# Patient Record
Sex: MALE | Race: WHITE | Employment: UNEMPLOYED | URBAN - METROPOLITAN AREA
[De-identification: names, ages, dates, MRNs, and addresses within clinical notes are randomized per-mention and may not be internally consistent; named-entity substitution may affect disease eponyms.]

---

## 2017-01-01 ENCOUNTER — NURSE TRIAGE (OUTPATIENT)
Dept: NURSING | Facility: CLINIC | Age: 57
End: 2017-01-01

## 2017-01-01 ENCOUNTER — TELEPHONE (OUTPATIENT)
Dept: INTERNAL MEDICINE | Facility: CLINIC | Age: 57
End: 2017-01-01

## 2017-01-01 ENCOUNTER — APPOINTMENT (OUTPATIENT)
Dept: SPEECH THERAPY | Facility: CLINIC | Age: 57
DRG: 101 | End: 2017-01-01
Attending: PSYCHIATRY & NEUROLOGY
Payer: MEDICARE

## 2017-01-01 ENCOUNTER — TRANSFERRED RECORDS (OUTPATIENT)
Dept: HEALTH INFORMATION MANAGEMENT | Facility: CLINIC | Age: 57
End: 2017-01-01

## 2017-01-01 ENCOUNTER — APPOINTMENT (OUTPATIENT)
Dept: GENERAL RADIOLOGY | Facility: CLINIC | Age: 57
DRG: 194 | End: 2017-01-01
Attending: EMERGENCY MEDICINE
Payer: MEDICARE

## 2017-01-01 ENCOUNTER — MEDICAL CORRESPONDENCE (OUTPATIENT)
Dept: HEALTH INFORMATION MANAGEMENT | Facility: CLINIC | Age: 57
End: 2017-01-01

## 2017-01-01 ENCOUNTER — OFFICE VISIT (OUTPATIENT)
Dept: INTERNAL MEDICINE | Facility: CLINIC | Age: 57
End: 2017-01-01
Payer: MEDICARE

## 2017-01-01 ENCOUNTER — TELEPHONE (OUTPATIENT)
Dept: ENDOCRINOLOGY | Facility: CLINIC | Age: 57
End: 2017-01-01

## 2017-01-01 ENCOUNTER — CARE COORDINATION (OUTPATIENT)
Dept: CARE COORDINATION | Facility: CLINIC | Age: 57
End: 2017-01-01

## 2017-01-01 ENCOUNTER — APPOINTMENT (OUTPATIENT)
Dept: GENERAL RADIOLOGY | Facility: CLINIC | Age: 57
End: 2017-01-01
Attending: EMERGENCY MEDICINE
Payer: MEDICARE

## 2017-01-01 ENCOUNTER — ALLIED HEALTH/NURSE VISIT (OUTPATIENT)
Dept: NEUROLOGY | Facility: CLINIC | Age: 57
DRG: 101 | End: 2017-01-01
Attending: PSYCHIATRY & NEUROLOGY
Payer: MEDICARE

## 2017-01-01 ENCOUNTER — DOCUMENTATION ONLY (OUTPATIENT)
Dept: OTHER | Facility: CLINIC | Age: 57
End: 2017-01-01

## 2017-01-01 ENCOUNTER — HOSPITAL ENCOUNTER (INPATIENT)
Facility: CLINIC | Age: 57
LOS: 3 days | Discharge: NURSING, REHAB OR LONG-TERM CARE | DRG: 194 | End: 2017-04-29
Attending: EMERGENCY MEDICINE | Admitting: INTERNAL MEDICINE
Payer: MEDICARE

## 2017-01-01 ENCOUNTER — DOCUMENTATION ONLY (OUTPATIENT)
Dept: CARE COORDINATION | Facility: CLINIC | Age: 57
End: 2017-01-01

## 2017-01-01 ENCOUNTER — HOSPITAL ENCOUNTER (OUTPATIENT)
Facility: CLINIC | Age: 57
Setting detail: OBSERVATION
Discharge: HOME HEALTH | End: 2017-12-17
Attending: EMERGENCY MEDICINE | Admitting: INTERNAL MEDICINE
Payer: MEDICARE

## 2017-01-01 ENCOUNTER — HOSPITAL ENCOUNTER (INPATIENT)
Facility: CLINIC | Age: 57
LOS: 1 days | Discharge: NURSING, REHAB OR LONG-TERM CARE | DRG: 101 | End: 2017-11-07
Attending: EMERGENCY MEDICINE | Admitting: PSYCHIATRY & NEUROLOGY
Payer: MEDICARE

## 2017-01-01 ENCOUNTER — APPOINTMENT (OUTPATIENT)
Dept: CT IMAGING | Facility: CLINIC | Age: 57
End: 2017-01-01
Attending: EMERGENCY MEDICINE
Payer: MEDICARE

## 2017-01-01 ENCOUNTER — APPOINTMENT (OUTPATIENT)
Dept: CT IMAGING | Facility: CLINIC | Age: 57
DRG: 194 | End: 2017-01-01
Attending: EMERGENCY MEDICINE
Payer: MEDICARE

## 2017-01-01 ENCOUNTER — APPOINTMENT (OUTPATIENT)
Dept: CT IMAGING | Facility: CLINIC | Age: 57
DRG: 101 | End: 2017-01-01
Attending: EMERGENCY MEDICINE
Payer: MEDICARE

## 2017-01-01 ENCOUNTER — APPOINTMENT (OUTPATIENT)
Dept: GENERAL RADIOLOGY | Facility: CLINIC | Age: 57
DRG: 101 | End: 2017-01-01
Attending: PSYCHIATRY & NEUROLOGY
Payer: MEDICARE

## 2017-01-01 VITALS
BODY MASS INDEX: 34.85 KG/M2 | WEIGHT: 229.94 LBS | RESPIRATION RATE: 18 BRPM | OXYGEN SATURATION: 93 % | DIASTOLIC BLOOD PRESSURE: 63 MMHG | TEMPERATURE: 97.6 F | SYSTOLIC BLOOD PRESSURE: 110 MMHG | HEIGHT: 68 IN | HEART RATE: 82 BPM

## 2017-01-01 VITALS
HEART RATE: 120 BPM | WEIGHT: 165 LBS | DIASTOLIC BLOOD PRESSURE: 74 MMHG | OXYGEN SATURATION: 95 % | HEIGHT: 67 IN | BODY MASS INDEX: 25.9 KG/M2 | SYSTOLIC BLOOD PRESSURE: 108 MMHG | TEMPERATURE: 97.9 F

## 2017-01-01 VITALS
TEMPERATURE: 98 F | HEIGHT: 67 IN | HEART RATE: 80 BPM | DIASTOLIC BLOOD PRESSURE: 62 MMHG | RESPIRATION RATE: 16 BRPM | SYSTOLIC BLOOD PRESSURE: 117 MMHG | WEIGHT: 165 LBS | OXYGEN SATURATION: 98 % | BODY MASS INDEX: 25.9 KG/M2

## 2017-01-01 VITALS — DIASTOLIC BLOOD PRESSURE: 76 MMHG | TEMPERATURE: 97 F | HEART RATE: 71 BPM | SYSTOLIC BLOOD PRESSURE: 120 MMHG

## 2017-01-01 VITALS
DIASTOLIC BLOOD PRESSURE: 70 MMHG | HEIGHT: 67 IN | SYSTOLIC BLOOD PRESSURE: 124 MMHG | WEIGHT: 220 LBS | OXYGEN SATURATION: 97 % | HEART RATE: 70 BPM | TEMPERATURE: 97.9 F | BODY MASS INDEX: 34.53 KG/M2

## 2017-01-01 VITALS
OXYGEN SATURATION: 96 % | DIASTOLIC BLOOD PRESSURE: 66 MMHG | TEMPERATURE: 98.3 F | SYSTOLIC BLOOD PRESSURE: 114 MMHG | HEART RATE: 71 BPM

## 2017-01-01 VITALS
SYSTOLIC BLOOD PRESSURE: 153 MMHG | RESPIRATION RATE: 16 BRPM | TEMPERATURE: 97.2 F | DIASTOLIC BLOOD PRESSURE: 90 MMHG | OXYGEN SATURATION: 93 %

## 2017-01-01 VITALS
SYSTOLIC BLOOD PRESSURE: 136 MMHG | HEIGHT: 68 IN | DIASTOLIC BLOOD PRESSURE: 80 MMHG | HEART RATE: 82 BPM | RESPIRATION RATE: 20 BRPM | TEMPERATURE: 97.5 F | WEIGHT: 215.6 LBS | OXYGEN SATURATION: 96 % | BODY MASS INDEX: 32.67 KG/M2

## 2017-01-01 VITALS
OXYGEN SATURATION: 97 % | HEART RATE: 72 BPM | WEIGHT: 229 LBS | DIASTOLIC BLOOD PRESSURE: 68 MMHG | TEMPERATURE: 98.7 F | BODY MASS INDEX: 34.71 KG/M2 | HEIGHT: 68 IN | SYSTOLIC BLOOD PRESSURE: 104 MMHG

## 2017-01-01 VITALS
HEART RATE: 78 BPM | BODY MASS INDEX: 34.53 KG/M2 | DIASTOLIC BLOOD PRESSURE: 72 MMHG | WEIGHT: 220 LBS | TEMPERATURE: 98.6 F | HEIGHT: 67 IN | OXYGEN SATURATION: 96 % | SYSTOLIC BLOOD PRESSURE: 130 MMHG

## 2017-01-01 VITALS — TEMPERATURE: 98.8 F | SYSTOLIC BLOOD PRESSURE: 128 MMHG | HEART RATE: 86 BPM | DIASTOLIC BLOOD PRESSURE: 84 MMHG

## 2017-01-01 VITALS
BODY MASS INDEX: 35.31 KG/M2 | HEART RATE: 78 BPM | HEIGHT: 67 IN | SYSTOLIC BLOOD PRESSURE: 116 MMHG | OXYGEN SATURATION: 95 % | TEMPERATURE: 98.4 F | DIASTOLIC BLOOD PRESSURE: 60 MMHG | WEIGHT: 225 LBS

## 2017-01-01 DIAGNOSIS — R60.0 BILATERAL EDEMA OF LOWER EXTREMITY: ICD-10-CM

## 2017-01-01 DIAGNOSIS — G81.04 FLACCID HEMIPLEGIA OF LEFT NONDOMINANT SIDE DUE TO INFARCTION OF BRAIN (H): ICD-10-CM

## 2017-01-01 DIAGNOSIS — G81.94 HEMIPLEGIA AFFECTING LEFT NONDOMINANT SIDE (H): ICD-10-CM

## 2017-01-01 DIAGNOSIS — K59.1 FUNCTIONAL DIARRHEA: ICD-10-CM

## 2017-01-01 DIAGNOSIS — A04.72 COLITIS DUE TO CLOSTRIDIUM DIFFICILE: ICD-10-CM

## 2017-01-01 DIAGNOSIS — I63.9 FLACCID HEMIPLEGIA OF LEFT NONDOMINANT SIDE DUE TO INFARCTION OF BRAIN (H): ICD-10-CM

## 2017-01-01 DIAGNOSIS — M54.5 LOW BACK PAIN, UNSPECIFIED BACK PAIN LATERALITY, UNSPECIFIED CHRONICITY, WITH SCIATICA PRESENCE UNSPECIFIED: ICD-10-CM

## 2017-01-01 DIAGNOSIS — F32.0 MILD MAJOR DEPRESSION (H): ICD-10-CM

## 2017-01-01 DIAGNOSIS — Z86.73 HISTORY OF CVA (CEREBROVASCULAR ACCIDENT): ICD-10-CM

## 2017-01-01 DIAGNOSIS — I10 ESSENTIAL HYPERTENSION, BENIGN: ICD-10-CM

## 2017-01-01 DIAGNOSIS — Z53.9 DIAGNOSIS NOT YET DEFINED: Primary | ICD-10-CM

## 2017-01-01 DIAGNOSIS — G40.909 SEIZURE DISORDER (H): ICD-10-CM

## 2017-01-01 DIAGNOSIS — K59.1 FUNCTIONAL DIARRHEA: Primary | ICD-10-CM

## 2017-01-01 DIAGNOSIS — J44.9 CHRONIC OBSTRUCTIVE PULMONARY DISEASE, UNSPECIFIED COPD TYPE (H): ICD-10-CM

## 2017-01-01 DIAGNOSIS — R19.7 DIARRHEA OF PRESUMED INFECTIOUS ORIGIN: Primary | ICD-10-CM

## 2017-01-01 DIAGNOSIS — N40.1 BENIGN NON-NODULAR PROSTATIC HYPERPLASIA WITH LOWER URINARY TRACT SYMPTOMS: ICD-10-CM

## 2017-01-01 DIAGNOSIS — R44.3 HALLUCINATIONS: ICD-10-CM

## 2017-01-01 DIAGNOSIS — G93.40 ENCEPHALOPATHY: ICD-10-CM

## 2017-01-01 DIAGNOSIS — Z86.73 HISTORY OF STROKE: Primary | ICD-10-CM

## 2017-01-01 DIAGNOSIS — R05.8 RESPIRATORY TRACT CONGESTION WITH COUGH: ICD-10-CM

## 2017-01-01 DIAGNOSIS — Z23 NEED FOR PROPHYLACTIC VACCINATION AND INOCULATION AGAINST INFLUENZA: ICD-10-CM

## 2017-01-01 DIAGNOSIS — J41.8 MIXED SIMPLE AND MUCOPURULENT CHRONIC BRONCHITIS (H): ICD-10-CM

## 2017-01-01 DIAGNOSIS — E78.5 HYPERLIPIDEMIA LDL GOAL <100: ICD-10-CM

## 2017-01-01 DIAGNOSIS — M17.12 PRIMARY OSTEOARTHRITIS OF LEFT KNEE: Primary | ICD-10-CM

## 2017-01-01 DIAGNOSIS — Z86.73 HISTORY OF STROKE: ICD-10-CM

## 2017-01-01 DIAGNOSIS — F69 BEHAVIOR PROBLEM, ADULT: Primary | ICD-10-CM

## 2017-01-01 DIAGNOSIS — F33.0 MAJOR DEPRESSIVE DISORDER, RECURRENT EPISODE, MILD (H): ICD-10-CM

## 2017-01-01 DIAGNOSIS — J44.9 CHRONIC OBSTRUCTIVE PULMONARY DISEASE, UNSPECIFIED COPD TYPE (H): Primary | ICD-10-CM

## 2017-01-01 DIAGNOSIS — G40.89 OTHER SEIZURES (H): Primary | ICD-10-CM

## 2017-01-01 DIAGNOSIS — R41.82 ALTERED MENTAL STATUS, UNSPECIFIED ALTERED MENTAL STATUS TYPE: ICD-10-CM

## 2017-01-01 DIAGNOSIS — Z86.73 HISTORY OF CVA (CEREBROVASCULAR ACCIDENT): Primary | ICD-10-CM

## 2017-01-01 DIAGNOSIS — R41.0 CONFUSION: Primary | ICD-10-CM

## 2017-01-01 DIAGNOSIS — G40.909 SEIZURE DISORDER (H): Primary | ICD-10-CM

## 2017-01-01 DIAGNOSIS — G89.4 CHRONIC PAIN SYNDROME: ICD-10-CM

## 2017-01-01 DIAGNOSIS — M54.5 LOW BACK PAIN, UNSPECIFIED BACK PAIN LATERALITY, UNSPECIFIED CHRONICITY, WITH SCIATICA PRESENCE UNSPECIFIED: Primary | ICD-10-CM

## 2017-01-01 DIAGNOSIS — M54.2 NECK PAIN: ICD-10-CM

## 2017-01-01 DIAGNOSIS — J41.8 MIXED SIMPLE AND MUCOPURULENT CHRONIC BRONCHITIS (H): Primary | ICD-10-CM

## 2017-01-01 DIAGNOSIS — F69 BEHAVIOR PROBLEM, ADULT: ICD-10-CM

## 2017-01-01 DIAGNOSIS — R60.0 LEG EDEMA, LEFT: ICD-10-CM

## 2017-01-01 DIAGNOSIS — E55.9 VITAMIN D DEFICIENCY: Primary | ICD-10-CM

## 2017-01-01 DIAGNOSIS — R56.9 SEIZURE (H): ICD-10-CM

## 2017-01-01 DIAGNOSIS — R21 RASH: Primary | ICD-10-CM

## 2017-01-01 DIAGNOSIS — J44.1 COPD EXACERBATION (H): ICD-10-CM

## 2017-01-01 DIAGNOSIS — I10 ESSENTIAL HYPERTENSION, BENIGN: Primary | ICD-10-CM

## 2017-01-01 DIAGNOSIS — J10.1 INFLUENZA B: Primary | ICD-10-CM

## 2017-01-01 DIAGNOSIS — Z71.89 ACP (ADVANCE CARE PLANNING): Chronic | ICD-10-CM

## 2017-01-01 DIAGNOSIS — S91.302A WOUND, OPEN, FOOT, LEFT, INITIAL ENCOUNTER: Primary | ICD-10-CM

## 2017-01-01 DIAGNOSIS — G40.89 OTHER SEIZURES (H): ICD-10-CM

## 2017-01-01 DIAGNOSIS — Z09 HOSPITAL DISCHARGE FOLLOW-UP: Primary | ICD-10-CM

## 2017-01-01 DIAGNOSIS — R41.0 DISORIENTATION: Primary | ICD-10-CM

## 2017-01-01 DIAGNOSIS — R56.9 SEIZURE (H): Primary | ICD-10-CM

## 2017-01-01 DIAGNOSIS — R19.7 DIARRHEA, UNSPECIFIED TYPE: Primary | ICD-10-CM

## 2017-01-01 DIAGNOSIS — R41.0 DELIRIUM: ICD-10-CM

## 2017-01-01 LAB
ALBUMIN SERPL-MCNC: 2.8 G/DL (ref 3.4–5)
ALBUMIN SERPL-MCNC: 3 G/DL (ref 3.4–5)
ALBUMIN SERPL-MCNC: 3.2 G/DL (ref 3.4–5)
ALBUMIN SERPL-MCNC: 3.2 G/DL (ref 3.4–5)
ALBUMIN SERPL-MCNC: 3.5 G/DL (ref 3.4–5)
ALBUMIN UR-MCNC: 10 MG/DL
ALBUMIN UR-MCNC: NEGATIVE MG/DL
ALP SERPL-CCNC: 58 U/L (ref 40–150)
ALP SERPL-CCNC: 62 U/L (ref 40–150)
ALP SERPL-CCNC: 68 U/L (ref 40–150)
ALP SERPL-CCNC: 71 U/L (ref 40–150)
ALP SERPL-CCNC: 77 U/L (ref 40–150)
ALT SERPL W P-5'-P-CCNC: 11 U/L (ref 0–70)
ALT SERPL W P-5'-P-CCNC: 16 U/L (ref 0–70)
ALT SERPL W P-5'-P-CCNC: 16 U/L (ref 0–70)
ALT SERPL W P-5'-P-CCNC: 22 U/L (ref 0–70)
ALT SERPL W P-5'-P-CCNC: 23 U/L (ref 0–70)
AMMONIA PLAS-SCNC: 39 UMOL/L (ref 10–50)
ANION GAP SERPL CALCULATED.3IONS-SCNC: 10 MMOL/L (ref 3–14)
ANION GAP SERPL CALCULATED.3IONS-SCNC: 10 MMOL/L (ref 3–14)
ANION GAP SERPL CALCULATED.3IONS-SCNC: 12 MMOL/L (ref 3–14)
ANION GAP SERPL CALCULATED.3IONS-SCNC: 5 MMOL/L (ref 6–17)
ANION GAP SERPL CALCULATED.3IONS-SCNC: 6 MMOL/L (ref 3–14)
ANION GAP SERPL CALCULATED.3IONS-SCNC: 7 MMOL/L (ref 3–14)
ANION GAP SERPL CALCULATED.3IONS-SCNC: 8 MMOL/L (ref 3–14)
ANION GAP SERPL CALCULATED.3IONS-SCNC: 9 MMOL/L (ref 3–14)
APPEARANCE UR: CLEAR
APPEARANCE UR: CLEAR
AST SERPL W P-5'-P-CCNC: 10 U/L (ref 0–45)
AST SERPL W P-5'-P-CCNC: 17 U/L (ref 0–45)
AST SERPL W P-5'-P-CCNC: 25 U/L (ref 0–45)
AST SERPL W P-5'-P-CCNC: 26 U/L (ref 0–45)
AST SERPL W P-5'-P-CCNC: 45 U/L (ref 0–45)
BACTERIA SPEC CULT: NO GROWTH
BACTERIA SPEC CULT: NO GROWTH
BASOPHILS # BLD AUTO: 0 10E9/L (ref 0–0.2)
BASOPHILS # BLD AUTO: 0 10E9/L (ref 0–0.2)
BASOPHILS NFR BLD AUTO: 0.5 %
BASOPHILS NFR BLD AUTO: 0.5 %
BILIRUB DIRECT SERPL-MCNC: 0.1 MG/DL (ref 0–0.2)
BILIRUB SERPL-MCNC: 0.2 MG/DL (ref 0.2–1.3)
BILIRUB SERPL-MCNC: 0.3 MG/DL (ref 0.2–1.3)
BILIRUB SERPL-MCNC: 0.3 MG/DL (ref 0.2–1.3)
BILIRUB UR QL STRIP: NEGATIVE
BILIRUB UR QL STRIP: NEGATIVE
BUN SERPL-MCNC: 10 MG/DL (ref 7–30)
BUN SERPL-MCNC: 12 MG/DL (ref 7–30)
BUN SERPL-MCNC: 14 MG/DL (ref 7–30)
BUN SERPL-MCNC: 15 MG/DL (ref 7–30)
BUN SERPL-MCNC: 19 MG/DL (ref 7–30)
BUN SERPL-MCNC: 21 MG/DL (ref 7–30)
BUN SERPL-MCNC: 22 MG/DL (ref 7–30)
BUN SERPL-MCNC: 24 MG/DL (ref 7–30)
C DIFF TOX B STL QL: ABNORMAL
CA-I BLD-SCNC: 4.7 MG/DL (ref 4.4–5.2)
CALCIUM SERPL-MCNC: 7.5 MG/DL (ref 8.5–10.1)
CALCIUM SERPL-MCNC: 7.8 MG/DL (ref 8.5–10.1)
CALCIUM SERPL-MCNC: 8 MG/DL (ref 8.5–10.1)
CALCIUM SERPL-MCNC: 8.1 MG/DL (ref 8.5–10.1)
CALCIUM SERPL-MCNC: 8.3 MG/DL (ref 8.5–10.1)
CALCIUM SERPL-MCNC: 8.4 MG/DL (ref 8.5–10.1)
CALCIUM SERPL-MCNC: 8.5 MG/DL (ref 8.5–10.1)
CARBAMAZEPINE EP FREE SERPL-MCNC: 2.6 UG/ML
CARBAMAZEPINE EP SERPL-MCNC: 5.1 UG/ML
CARBAMAZEPINE FREE SERPL-MCNC: 2.4 UG/ML
CARBAMAZEPINE SERPL-MCNC: 8 UG/ML
CARBAMAZEPINE SERPL-MCNC: 9.4 MG/L (ref 4–12)
CHLORIDE BLD-SCNC: 108 MMOL/L (ref 94–109)
CHLORIDE SERPL-SCNC: 106 MMOL/L (ref 94–109)
CHLORIDE SERPL-SCNC: 107 MMOL/L (ref 94–109)
CHLORIDE SERPL-SCNC: 108 MMOL/L (ref 94–109)
CHLORIDE SERPL-SCNC: 109 MMOL/L (ref 94–109)
CHLORIDE SERPL-SCNC: 109 MMOL/L (ref 94–109)
CHLORIDE SERPL-SCNC: 110 MMOL/L (ref 94–109)
CHLORIDE SERPL-SCNC: 111 MMOL/L (ref 94–109)
CHLORIDE SERPL-SCNC: 111 MMOL/L (ref 94–109)
CHLORIDE SERPL-SCNC: 112 MMOL/L (ref 94–109)
CO2 BLD-SCNC: 27 MMOL/L (ref 20–32)
CO2 BLDCOV-SCNC: 24 MMOL/L (ref 21–28)
CO2 BLDCOV-SCNC: 25 MMOL/L (ref 21–28)
CO2 BLDCOV-SCNC: 27 MMOL/L (ref 21–28)
CO2 SERPL-SCNC: 21 MMOL/L (ref 20–32)
CO2 SERPL-SCNC: 23 MMOL/L (ref 20–32)
CO2 SERPL-SCNC: 24 MMOL/L (ref 20–32)
CO2 SERPL-SCNC: 25 MMOL/L (ref 20–32)
CO2 SERPL-SCNC: 25 MMOL/L (ref 20–32)
CO2 SERPL-SCNC: 27 MMOL/L (ref 20–32)
CO2 SERPL-SCNC: 27 MMOL/L (ref 20–32)
COLOR UR AUTO: YELLOW
COLOR UR AUTO: YELLOW
CREAT BLD-MCNC: 1.1 MG/DL (ref 0.66–1.25)
CREAT BLD-MCNC: 1.1 MG/DL (ref 0.66–1.25)
CREAT SERPL-MCNC: 0.71 MG/DL (ref 0.66–1.25)
CREAT SERPL-MCNC: 0.75 MG/DL (ref 0.66–1.25)
CREAT SERPL-MCNC: 0.82 MG/DL (ref 0.66–1.25)
CREAT SERPL-MCNC: 0.82 MG/DL (ref 0.66–1.25)
CREAT SERPL-MCNC: 0.92 MG/DL (ref 0.66–1.25)
CREAT SERPL-MCNC: 0.94 MG/DL (ref 0.66–1.25)
CREAT SERPL-MCNC: 1 MG/DL (ref 0.66–1.25)
CREAT SERPL-MCNC: 1 MG/DL (ref 0.66–1.25)
CREAT SERPL-MCNC: 1.02 MG/DL (ref 0.66–1.25)
CREAT SERPL-MCNC: 1.15 MG/DL (ref 0.66–1.25)
CRP SERPL-MCNC: 10 MG/L (ref 0–8)
DIFFERENTIAL METHOD BLD: ABNORMAL
DIFFERENTIAL METHOD BLD: ABNORMAL
EOSINOPHIL # BLD AUTO: 0.1 10E9/L (ref 0–0.7)
EOSINOPHIL # BLD AUTO: 0.2 10E9/L (ref 0–0.7)
EOSINOPHIL NFR BLD AUTO: 1.8 %
EOSINOPHIL NFR BLD AUTO: 2.7 %
ERYTHROCYTE [DISTWIDTH] IN BLOOD BY AUTOMATED COUNT: 12.3 % (ref 10–15)
ERYTHROCYTE [DISTWIDTH] IN BLOOD BY AUTOMATED COUNT: 12.3 % (ref 10–15)
ERYTHROCYTE [DISTWIDTH] IN BLOOD BY AUTOMATED COUNT: 12.5 % (ref 10–15)
ERYTHROCYTE [DISTWIDTH] IN BLOOD BY AUTOMATED COUNT: 12.7 % (ref 10–15)
ERYTHROCYTE [DISTWIDTH] IN BLOOD BY AUTOMATED COUNT: 13.4 % (ref 10–15)
ERYTHROCYTE [DISTWIDTH] IN BLOOD BY AUTOMATED COUNT: 13.7 % (ref 10–15)
ERYTHROCYTE [SEDIMENTATION RATE] IN BLOOD BY WESTERGREN METHOD: 16 MM/H (ref 0–20)
FLUAV+FLUBV RNA SPEC QL NAA+PROBE: ABNORMAL
FLUAV+FLUBV RNA SPEC QL NAA+PROBE: ABNORMAL
FOLATE SERPL-MCNC: 10.6 NG/ML
GFR SERPL CREATININE-BSD FRML MDRD: 65 ML/MIN/1.7M2
GFR SERPL CREATININE-BSD FRML MDRD: 69 ML/MIN/1.7M2
GFR SERPL CREATININE-BSD FRML MDRD: 69 ML/MIN/1.7M2
GFR SERPL CREATININE-BSD FRML MDRD: 75 ML/MIN/1.7M2
GFR SERPL CREATININE-BSD FRML MDRD: 76 ML/MIN/1.7M2
GFR SERPL CREATININE-BSD FRML MDRD: 76 ML/MIN/1.7M2
GFR SERPL CREATININE-BSD FRML MDRD: 82 ML/MIN/1.7M2
GFR SERPL CREATININE-BSD FRML MDRD: 85 ML/MIN/1.7M2
GFR SERPL CREATININE-BSD FRML MDRD: >90 ML/MIN/1.7M2
GFR SERPL CREATININE-BSD FRML MDRD: ABNORMAL ML/MIN/1.7M2
GLUCOSE BLD-MCNC: 102 MG/DL (ref 70–99)
GLUCOSE BLDC GLUCOMTR-MCNC: 102 MG/DL (ref 70–99)
GLUCOSE BLDC GLUCOMTR-MCNC: 164 MG/DL (ref 70–99)
GLUCOSE SERPL-MCNC: 114 MG/DL (ref 70–99)
GLUCOSE SERPL-MCNC: 118 MG/DL (ref 70–99)
GLUCOSE SERPL-MCNC: 146 MG/DL (ref 70–99)
GLUCOSE SERPL-MCNC: 80 MG/DL (ref 70–99)
GLUCOSE SERPL-MCNC: 84 MG/DL (ref 70–99)
GLUCOSE SERPL-MCNC: 85 MG/DL (ref 70–99)
GLUCOSE SERPL-MCNC: 95 MG/DL (ref 70–99)
GLUCOSE SERPL-MCNC: 96 MG/DL (ref 70–99)
GLUCOSE SERPL-MCNC: 99 MG/DL (ref 70–99)
GLUCOSE UR STRIP-MCNC: NEGATIVE MG/DL
GLUCOSE UR STRIP-MCNC: NEGATIVE MG/DL
HBA1C MFR BLD: 5.3 % (ref 4.3–6)
HCT VFR BLD AUTO: 36.9 % (ref 40–53)
HCT VFR BLD AUTO: 37.1 % (ref 40–53)
HCT VFR BLD AUTO: 37.9 % (ref 40–53)
HCT VFR BLD AUTO: 38 % (ref 40–53)
HCT VFR BLD AUTO: 39.1 % (ref 40–53)
HCT VFR BLD AUTO: 39.7 % (ref 40–53)
HCT VFR BLD AUTO: 40 % (ref 40–53)
HCT VFR BLD AUTO: 41 % (ref 40–53)
HCT VFR BLD CALC: 42 %PCV (ref 40–53)
HGB BLD CALC-MCNC: 14.3 G/DL (ref 13.3–17.7)
HGB BLD-MCNC: 11.7 G/DL (ref 13.3–17.7)
HGB BLD-MCNC: 11.8 G/DL (ref 13.3–17.7)
HGB BLD-MCNC: 12.6 G/DL (ref 13.3–17.7)
HGB BLD-MCNC: 12.7 G/DL (ref 13.3–17.7)
HGB BLD-MCNC: 12.9 G/DL (ref 13.3–17.7)
HGB BLD-MCNC: 13 G/DL (ref 13.3–17.7)
HGB UR QL STRIP: NEGATIVE
HGB UR QL STRIP: NEGATIVE
HYALINE CASTS #/AREA URNS LPF: 2 /LPF (ref 0–2)
IMM GRANULOCYTES # BLD: 0 10E9/L (ref 0–0.4)
IMM GRANULOCYTES # BLD: 0.1 10E9/L (ref 0–0.4)
IMM GRANULOCYTES NFR BLD: 0.6 %
IMM GRANULOCYTES NFR BLD: 1 %
INR PPP: 1.01 (ref 0.86–1.14)
INTERPRETATION ECG - MUSE: NORMAL
INTERPRETATION ECG - MUSE: NORMAL
KETONES UR STRIP-MCNC: 5 MG/DL
KETONES UR STRIP-MCNC: NEGATIVE MG/DL
LACTATE BLD-SCNC: 1 MMOL/L (ref 0.7–2.1)
LACTATE BLD-SCNC: 1.3 MMOL/L (ref 0.7–2.1)
LACTATE BLD-SCNC: 2.7 MMOL/L (ref 0.7–2.1)
LEUKOCYTE ESTERASE UR QL STRIP: NEGATIVE
LEUKOCYTE ESTERASE UR QL STRIP: NEGATIVE
LEVETIRACETAM SERPL-MCNC: 21 UG/ML
LEVETIRACETAM SERPL-MCNC: 21 UG/ML (ref 12–46)
LYMPHOCYTES # BLD AUTO: 1.1 10E9/L (ref 0.8–5.3)
LYMPHOCYTES # BLD AUTO: 1.7 10E9/L (ref 0.8–5.3)
LYMPHOCYTES NFR BLD AUTO: 21.2 %
LYMPHOCYTES NFR BLD AUTO: 29.4 %
Lab: NORMAL
Lab: NORMAL
MAGNESIUM SERPL-MCNC: 2.2 MG/DL (ref 1.6–2.3)
MCH RBC QN AUTO: 34 PG (ref 26.5–33)
MCH RBC QN AUTO: 34 PG (ref 26.5–33)
MCH RBC QN AUTO: 34.4 PG (ref 26.5–33)
MCH RBC QN AUTO: 34.5 PG (ref 26.5–33)
MCH RBC QN AUTO: 34.7 PG (ref 26.5–33)
MCH RBC QN AUTO: 34.8 PG (ref 26.5–33)
MCH RBC QN AUTO: 35.1 PG (ref 26.5–33)
MCH RBC QN AUTO: 35.5 PG (ref 26.5–33)
MCHC RBC AUTO-ENTMCNC: 31.5 G/DL (ref 31.5–36.5)
MCHC RBC AUTO-ENTMCNC: 31.5 G/DL (ref 31.5–36.5)
MCHC RBC AUTO-ENTMCNC: 31.7 G/DL (ref 31.5–36.5)
MCHC RBC AUTO-ENTMCNC: 32 G/DL (ref 31.5–36.5)
MCHC RBC AUTO-ENTMCNC: 32.5 G/DL (ref 31.5–36.5)
MCHC RBC AUTO-ENTMCNC: 32.5 G/DL (ref 31.5–36.5)
MCHC RBC AUTO-ENTMCNC: 33.4 G/DL (ref 31.5–36.5)
MCHC RBC AUTO-ENTMCNC: 33.5 G/DL (ref 31.5–36.5)
MCV RBC AUTO: 104 FL (ref 78–100)
MCV RBC AUTO: 106 FL (ref 78–100)
MCV RBC AUTO: 108 FL (ref 78–100)
MCV RBC AUTO: 108 FL (ref 78–100)
MCV RBC AUTO: 109 FL (ref 78–100)
MCV RBC AUTO: 110 FL (ref 78–100)
METHYLMALONATE SERPL-SCNC: 0.16 UMOL/L (ref 0–0.4)
MICRO REPORT STATUS: NORMAL
MICRO REPORT STATUS: NORMAL
MONOCYTES # BLD AUTO: 0.4 10E9/L (ref 0–1.3)
MONOCYTES # BLD AUTO: 0.9 10E9/L (ref 0–1.3)
MONOCYTES NFR BLD AUTO: 11.1 %
MONOCYTES NFR BLD AUTO: 11.5 %
NEUTROPHILS # BLD AUTO: 2.1 10E9/L (ref 1.6–8.3)
NEUTROPHILS # BLD AUTO: 5 10E9/L (ref 1.6–8.3)
NEUTROPHILS NFR BLD AUTO: 55.8 %
NEUTROPHILS NFR BLD AUTO: 63.9 %
NITRATE UR QL: NEGATIVE
NITRATE UR QL: NEGATIVE
NRBC # BLD AUTO: 0 10*3/UL
NRBC # BLD AUTO: 0 10*3/UL
NRBC BLD AUTO-RTO: 0 /100
NRBC BLD AUTO-RTO: 0 /100
PCO2 BLDV: 42 MM HG (ref 40–50)
PCO2 BLDV: 51 MM HG (ref 40–50)
PCO2 BLDV: 51 MM HG (ref 40–50)
PH BLDV: 7.29 PH (ref 7.32–7.43)
PH BLDV: 7.3 PH (ref 7.32–7.43)
PH BLDV: 7.42 PH (ref 7.32–7.43)
PH UR STRIP: 5 PH (ref 5–7)
PH UR STRIP: 6.5 PH (ref 5–7)
PHOSPHATE SERPL-MCNC: 3 MG/DL (ref 2.5–4.5)
PLATELET # BLD AUTO: 139 10E9/L (ref 150–450)
PLATELET # BLD AUTO: 141 10E9/L (ref 150–450)
PLATELET # BLD AUTO: 148 10E9/L (ref 150–450)
PLATELET # BLD AUTO: 160 10E9/L (ref 150–450)
PLATELET # BLD AUTO: 162 10E9/L (ref 150–450)
PLATELET # BLD AUTO: 167 10E9/L (ref 150–450)
PLATELET # BLD AUTO: 167 10E9/L (ref 150–450)
PLATELET # BLD AUTO: 196 10E9/L (ref 150–450)
PLATELET # BLD AUTO: 225 10E9/L (ref 150–450)
PLATELET # BLD AUTO: 259 10E9/L (ref 150–450)
PO2 BLDV: 22 MM HG (ref 25–47)
PO2 BLDV: 32 MM HG (ref 25–47)
PO2 BLDV: 53 MM HG (ref 25–47)
POTASSIUM BLD-SCNC: 5.3 MMOL/L (ref 3.4–5.3)
POTASSIUM SERPL-SCNC: 4 MMOL/L (ref 3.4–5.3)
POTASSIUM SERPL-SCNC: 4.2 MMOL/L (ref 3.4–5.3)
POTASSIUM SERPL-SCNC: 4.3 MMOL/L (ref 3.4–5.3)
POTASSIUM SERPL-SCNC: 4.3 MMOL/L (ref 3.4–5.3)
POTASSIUM SERPL-SCNC: 4.6 MMOL/L (ref 3.4–5.3)
POTASSIUM SERPL-SCNC: 4.9 MMOL/L (ref 3.4–5.3)
POTASSIUM SERPL-SCNC: 5.7 MMOL/L (ref 3.4–5.3)
PROT SERPL-MCNC: 6.2 G/DL (ref 6.8–8.8)
PROT SERPL-MCNC: 6.5 G/DL (ref 6.8–8.8)
PROT SERPL-MCNC: 6.8 G/DL (ref 6.8–8.8)
PROT SERPL-MCNC: 6.9 G/DL (ref 6.8–8.8)
PROT SERPL-MCNC: 7.7 G/DL (ref 6.8–8.8)
RBC # BLD AUTO: 3.44 10E12/L (ref 4.4–5.9)
RBC # BLD AUTO: 3.47 10E12/L (ref 4.4–5.9)
RBC # BLD AUTO: 3.58 10E12/L (ref 4.4–5.9)
RBC # BLD AUTO: 3.66 10E12/L (ref 4.4–5.9)
RBC # BLD AUTO: 3.66 10E12/L (ref 4.4–5.9)
RBC # BLD AUTO: 3.67 10E12/L (ref 4.4–5.9)
RBC # BLD AUTO: 3.68 10E12/L (ref 4.4–5.9)
RBC # BLD AUTO: 3.74 10E12/L (ref 4.4–5.9)
RBC #/AREA URNS AUTO: 1 /HPF (ref 0–2)
RBC #/AREA URNS AUTO: 3 /HPF (ref 0–2)
RSV RNA SPEC NAA+PROBE: ABNORMAL
SAO2 % BLDV FROM PO2: 30 %
SAO2 % BLDV FROM PO2: 54 %
SAO2 % BLDV FROM PO2: 88 %
SODIUM BLD-SCNC: 140 MMOL/L (ref 133–144)
SODIUM SERPL-SCNC: 138 MMOL/L (ref 133–144)
SODIUM SERPL-SCNC: 141 MMOL/L (ref 133–144)
SODIUM SERPL-SCNC: 141 MMOL/L (ref 133–144)
SODIUM SERPL-SCNC: 143 MMOL/L (ref 133–144)
SODIUM SERPL-SCNC: 144 MMOL/L (ref 133–144)
SODIUM SERPL-SCNC: 145 MMOL/L (ref 133–144)
SOURCE: ABNORMAL
SP GR UR STRIP: 1.01 (ref 1–1.03)
SP GR UR STRIP: 1.02 (ref 1–1.03)
SPECIMEN SOURCE: ABNORMAL
SPECIMEN SOURCE: ABNORMAL
SPECIMEN SOURCE: NORMAL
SPECIMEN SOURCE: NORMAL
SQUAMOUS #/AREA URNS AUTO: <1 /HPF (ref 0–1)
T PALLIDUM IGG+IGM SER QL: NEGATIVE
TROPONIN I BLD-MCNC: 0 UG/L (ref 0–0.1)
TROPONIN I BLD-MCNC: 0.02 UG/L (ref 0–0.1)
TROPONIN I SERPL-MCNC: <0.015 UG/L (ref 0–0.04)
URN SPEC COLLECT METH UR: ABNORMAL
UROBILINOGEN UR STRIP-MCNC: 0 MG/DL (ref 0–2)
UROBILINOGEN UR STRIP-MCNC: NORMAL MG/DL (ref 0–2)
VALPROATE SERPL-MCNC: 123 MG/L (ref 50–100)
VALPROATE SERPL-MCNC: 64 MG/L (ref 50–100)
VIT B1 BLD-MCNC: 69 NMOL/L (ref 70–180)
VIT B12 SERPL-MCNC: 786 PG/ML (ref 193–986)
WBC # BLD AUTO: 3.4 10E9/L (ref 4–11)
WBC # BLD AUTO: 3.6 10E9/L (ref 4–11)
WBC # BLD AUTO: 3.8 10E9/L (ref 4–11)
WBC # BLD AUTO: 4.3 10E9/L (ref 4–11)
WBC # BLD AUTO: 5.4 10E9/L (ref 4–11)
WBC # BLD AUTO: 6.3 10E9/L (ref 4–11)
WBC # BLD AUTO: 7.8 10E9/L (ref 4–11)
WBC # BLD AUTO: 7.8 10E9/L (ref 4–11)
WBC #/AREA URNS AUTO: 1 /HPF (ref 0–2)
WBC #/AREA URNS AUTO: 1 /HPF (ref 0–2)

## 2017-01-01 PROCEDURE — 99496 TRANSJ CARE MGMT HIGH F2F 7D: CPT | Performed by: INTERNAL MEDICINE

## 2017-01-01 PROCEDURE — 99207 ZZC APP CREDIT; MD BILLING SHARED VISIT: CPT | Performed by: INTERNAL MEDICINE

## 2017-01-01 PROCEDURE — 25000132 ZZH RX MED GY IP 250 OP 250 PS 637: Mod: GY | Performed by: INTERNAL MEDICINE

## 2017-01-01 PROCEDURE — 82803 BLOOD GASES ANY COMBINATION: CPT

## 2017-01-01 PROCEDURE — 40000275 ZZH STATISTIC RCP TIME EA 10 MIN

## 2017-01-01 PROCEDURE — 80157 ASSAY CARBAMAZEPINE FREE: CPT | Performed by: EMERGENCY MEDICINE

## 2017-01-01 PROCEDURE — A9270 NON-COVERED ITEM OR SERVICE: HCPCS | Mod: GY | Performed by: INTERNAL MEDICINE

## 2017-01-01 PROCEDURE — 99214 OFFICE O/P EST MOD 30 MIN: CPT | Performed by: INTERNAL MEDICINE

## 2017-01-01 PROCEDURE — 81001 URINALYSIS AUTO W/SCOPE: CPT | Performed by: EMERGENCY MEDICINE

## 2017-01-01 PROCEDURE — G0180 MD CERTIFICATION HHA PATIENT: HCPCS | Performed by: INTERNAL MEDICINE

## 2017-01-01 PROCEDURE — 82140 ASSAY OF AMMONIA: CPT | Performed by: PSYCHIATRY & NEUROLOGY

## 2017-01-01 PROCEDURE — 94640 AIRWAY INHALATION TREATMENT: CPT

## 2017-01-01 PROCEDURE — 92526 ORAL FUNCTION THERAPY: CPT | Mod: GN | Performed by: SPEECH-LANGUAGE PATHOLOGIST

## 2017-01-01 PROCEDURE — 80177 DRUG SCRN QUAN LEVETIRACETAM: CPT | Performed by: EMERGENCY MEDICINE

## 2017-01-01 PROCEDURE — 80048 BASIC METABOLIC PNL TOTAL CA: CPT | Performed by: EMERGENCY MEDICINE

## 2017-01-01 PROCEDURE — 40000274 ZZH STATISTIC RCP CONSULT EA 30 MIN

## 2017-01-01 PROCEDURE — 87493 C DIFF AMPLIFIED PROBE: CPT | Performed by: INTERNAL MEDICINE

## 2017-01-01 PROCEDURE — A9270 NON-COVERED ITEM OR SERVICE: HCPCS | Mod: GY | Performed by: STUDENT IN AN ORGANIZED HEALTH CARE EDUCATION/TRAINING PROGRAM

## 2017-01-01 PROCEDURE — 99223 1ST HOSP IP/OBS HIGH 75: CPT | Mod: AI | Performed by: INTERNAL MEDICINE

## 2017-01-01 PROCEDURE — 71010 XR CHEST PORT 1 VW: CPT

## 2017-01-01 PROCEDURE — 85027 COMPLETE CBC AUTOMATED: CPT | Performed by: INTERNAL MEDICINE

## 2017-01-01 PROCEDURE — 99217 ZZC OBSERVATION CARE DISCHARGE: CPT | Performed by: INTERNAL MEDICINE

## 2017-01-01 PROCEDURE — 82746 ASSAY OF FOLIC ACID SERUM: CPT | Performed by: EMERGENCY MEDICINE

## 2017-01-01 PROCEDURE — 99207 ZZC CDG-CODE CATEGORY CHANGED: CPT | Performed by: INTERNAL MEDICINE

## 2017-01-01 PROCEDURE — 80048 BASIC METABOLIC PNL TOTAL CA: CPT | Performed by: INTERNAL MEDICINE

## 2017-01-01 PROCEDURE — 93010 ELECTROCARDIOGRAM REPORT: CPT | Performed by: INTERNAL MEDICINE

## 2017-01-01 PROCEDURE — 80053 COMPREHEN METABOLIC PANEL: CPT | Performed by: EMERGENCY MEDICINE

## 2017-01-01 PROCEDURE — 72125 CT NECK SPINE W/O DYE: CPT

## 2017-01-01 PROCEDURE — 94640 AIRWAY INHALATION TREATMENT: CPT | Mod: 76

## 2017-01-01 PROCEDURE — 70450 CT HEAD/BRAIN W/O DYE: CPT

## 2017-01-01 PROCEDURE — 40000915 ZZH STATISTIC SITTER, EVENING HOURS

## 2017-01-01 PROCEDURE — 85027 COMPLETE CBC AUTOMATED: CPT | Performed by: EMERGENCY MEDICINE

## 2017-01-01 PROCEDURE — 94660 CPAP INITIATION&MGMT: CPT

## 2017-01-01 PROCEDURE — 25000125 ZZHC RX 250: Performed by: EMERGENCY MEDICINE

## 2017-01-01 PROCEDURE — 25000125 ZZHC RX 250: Performed by: INTERNAL MEDICINE

## 2017-01-01 PROCEDURE — 93005 ELECTROCARDIOGRAM TRACING: CPT

## 2017-01-01 PROCEDURE — 36416 COLLJ CAPILLARY BLOOD SPEC: CPT | Performed by: INTERNAL MEDICINE

## 2017-01-01 PROCEDURE — 83921 ORGANIC ACID SINGLE QUANT: CPT | Performed by: EMERGENCY MEDICINE

## 2017-01-01 PROCEDURE — 86780 TREPONEMA PALLIDUM: CPT | Performed by: INTERNAL MEDICINE

## 2017-01-01 PROCEDURE — 99233 SBSQ HOSP IP/OBS HIGH 50: CPT | Performed by: INTERNAL MEDICINE

## 2017-01-01 PROCEDURE — 90686 IIV4 VACC NO PRSV 0.5 ML IM: CPT | Performed by: INTERNAL MEDICINE

## 2017-01-01 PROCEDURE — 96361 HYDRATE IV INFUSION ADD-ON: CPT

## 2017-01-01 PROCEDURE — 71260 CT THORAX DX C+: CPT

## 2017-01-01 PROCEDURE — 40000556 ZZH STATISTIC PERIPHERAL IV START W US GUIDANCE

## 2017-01-01 PROCEDURE — 25000125 ZZHC RX 250: Performed by: STUDENT IN AN ORGANIZED HEALTH CARE EDUCATION/TRAINING PROGRAM

## 2017-01-01 PROCEDURE — G0378 HOSPITAL OBSERVATION PER HR: HCPCS

## 2017-01-01 PROCEDURE — 25000128 H RX IP 250 OP 636: Performed by: EMERGENCY MEDICINE

## 2017-01-01 PROCEDURE — 80164 ASSAY DIPROPYLACETIC ACD TOT: CPT | Performed by: EMERGENCY MEDICINE

## 2017-01-01 PROCEDURE — 25000125 ZZHC RX 250

## 2017-01-01 PROCEDURE — 99285 EMERGENCY DEPT VISIT HI MDM: CPT | Mod: 25

## 2017-01-01 PROCEDURE — 99239 HOSP IP/OBS DSCHRG MGMT >30: CPT | Performed by: INTERNAL MEDICINE

## 2017-01-01 PROCEDURE — 80076 HEPATIC FUNCTION PANEL: CPT | Performed by: EMERGENCY MEDICINE

## 2017-01-01 PROCEDURE — 93010 ELECTROCARDIOGRAM REPORT: CPT | Mod: Z6 | Performed by: EMERGENCY MEDICINE

## 2017-01-01 PROCEDURE — 25000128 H RX IP 250 OP 636: Performed by: INTERNAL MEDICINE

## 2017-01-01 PROCEDURE — 82607 VITAMIN B-12: CPT | Performed by: EMERGENCY MEDICINE

## 2017-01-01 PROCEDURE — 25000132 ZZH RX MED GY IP 250 OP 250 PS 637: Mod: GY | Performed by: STUDENT IN AN ORGANIZED HEALTH CARE EDUCATION/TRAINING PROGRAM

## 2017-01-01 PROCEDURE — 86140 C-REACTIVE PROTEIN: CPT | Performed by: INTERNAL MEDICINE

## 2017-01-01 PROCEDURE — 25500064 ZZH RX 255 OP 636: Performed by: EMERGENCY MEDICINE

## 2017-01-01 PROCEDURE — 36415 COLL VENOUS BLD VENIPUNCTURE: CPT | Performed by: EMERGENCY MEDICINE

## 2017-01-01 PROCEDURE — 96365 THER/PROPH/DIAG IV INF INIT: CPT | Mod: 59

## 2017-01-01 PROCEDURE — 94640 AIRWAY INHALATION TREATMENT: CPT | Performed by: EMERGENCY MEDICINE

## 2017-01-01 PROCEDURE — 85049 AUTOMATED PLATELET COUNT: CPT | Performed by: INTERNAL MEDICINE

## 2017-01-01 PROCEDURE — 80053 COMPREHEN METABOLIC PANEL: CPT | Performed by: INTERNAL MEDICINE

## 2017-01-01 PROCEDURE — A9270 NON-COVERED ITEM OR SERVICE: HCPCS | Performed by: INTERNAL MEDICINE

## 2017-01-01 PROCEDURE — 96374 THER/PROPH/DIAG INJ IV PUSH: CPT

## 2017-01-01 PROCEDURE — 99285 EMERGENCY DEPT VISIT HI MDM: CPT | Mod: 25 | Performed by: EMERGENCY MEDICINE

## 2017-01-01 PROCEDURE — 85652 RBC SED RATE AUTOMATED: CPT | Performed by: INTERNAL MEDICINE

## 2017-01-01 PROCEDURE — 85014 HEMATOCRIT: CPT

## 2017-01-01 PROCEDURE — 85610 PROTHROMBIN TIME: CPT | Performed by: EMERGENCY MEDICINE

## 2017-01-01 PROCEDURE — 36415 COLL VENOUS BLD VENIPUNCTURE: CPT | Performed by: INTERNAL MEDICINE

## 2017-01-01 PROCEDURE — 84425 ASSAY OF VITAMIN B-1: CPT | Performed by: PSYCHIATRY & NEUROLOGY

## 2017-01-01 PROCEDURE — 80339 ANTIEPILEPTICS NOS 1-3: CPT | Performed by: EMERGENCY MEDICINE

## 2017-01-01 PROCEDURE — 99215 OFFICE O/P EST HI 40 MIN: CPT | Performed by: INTERNAL MEDICINE

## 2017-01-01 PROCEDURE — 25000132 ZZH RX MED GY IP 250 OP 250 PS 637: Performed by: INTERNAL MEDICINE

## 2017-01-01 PROCEDURE — 84484 ASSAY OF TROPONIN QUANT: CPT

## 2017-01-01 PROCEDURE — 85025 COMPLETE CBC W/AUTO DIFF WBC: CPT | Performed by: EMERGENCY MEDICINE

## 2017-01-01 PROCEDURE — 00000146 ZZHCL STATISTIC GLUCOSE BY METER IP

## 2017-01-01 PROCEDURE — 70498 CT ANGIOGRAPHY NECK: CPT

## 2017-01-01 PROCEDURE — 99225 ZZC SUBSEQUENT OBSERVATION CARE,LEVEL II: CPT | Performed by: INTERNAL MEDICINE

## 2017-01-01 PROCEDURE — 40000225 ZZH STATISTIC SLP WARD VISIT: Performed by: SPEECH-LANGUAGE PATHOLOGIST

## 2017-01-01 PROCEDURE — 83605 ASSAY OF LACTIC ACID: CPT

## 2017-01-01 PROCEDURE — 96372 THER/PROPH/DIAG INJ SC/IM: CPT

## 2017-01-01 PROCEDURE — 40000916 ZZH STATISTIC SITTER, NIGHT HOURS

## 2017-01-01 PROCEDURE — 83735 ASSAY OF MAGNESIUM: CPT | Performed by: EMERGENCY MEDICINE

## 2017-01-01 PROCEDURE — 40000893 ZZH STATISTIC PT IP EVAL DEFER

## 2017-01-01 PROCEDURE — 80156 ASSAY CARBAMAZEPINE TOTAL: CPT | Performed by: EMERGENCY MEDICINE

## 2017-01-01 PROCEDURE — 12000007 ZZH R&B INTERMEDIATE

## 2017-01-01 PROCEDURE — 92610 EVALUATE SWALLOWING FUNCTION: CPT | Mod: GN | Performed by: SPEECH-LANGUAGE PATHOLOGIST

## 2017-01-01 PROCEDURE — 82565 ASSAY OF CREATININE: CPT | Performed by: INTERNAL MEDICINE

## 2017-01-01 PROCEDURE — G0008 ADMIN INFLUENZA VIRUS VAC: HCPCS | Performed by: INTERNAL MEDICINE

## 2017-01-01 PROCEDURE — 12000008 ZZH R&B INTERMEDIATE UMMC

## 2017-01-01 PROCEDURE — 84484 ASSAY OF TROPONIN QUANT: CPT | Performed by: EMERGENCY MEDICINE

## 2017-01-01 PROCEDURE — 40000914 ZZH STATISTIC SITTER, DAY HOURS

## 2017-01-01 PROCEDURE — 12000000 ZZH R&B MED SURG/OB

## 2017-01-01 PROCEDURE — 84100 ASSAY OF PHOSPHORUS: CPT | Performed by: EMERGENCY MEDICINE

## 2017-01-01 PROCEDURE — 83036 HEMOGLOBIN GLYCOSYLATED A1C: CPT | Performed by: EMERGENCY MEDICINE

## 2017-01-01 PROCEDURE — 99215 OFFICE O/P EST HI 40 MIN: CPT | Mod: 25 | Performed by: INTERNAL MEDICINE

## 2017-01-01 PROCEDURE — 99213 OFFICE O/P EST LOW 20 MIN: CPT | Performed by: FAMILY MEDICINE

## 2017-01-01 PROCEDURE — 25000128 H RX IP 250 OP 636: Performed by: STUDENT IN AN ORGANIZED HEALTH CARE EDUCATION/TRAINING PROGRAM

## 2017-01-01 PROCEDURE — 96375 TX/PRO/DX INJ NEW DRUG ADDON: CPT

## 2017-01-01 PROCEDURE — 74177 CT ABD & PELVIS W/CONTRAST: CPT

## 2017-01-01 PROCEDURE — 87040 BLOOD CULTURE FOR BACTERIA: CPT | Performed by: EMERGENCY MEDICINE

## 2017-01-01 PROCEDURE — 25000128 H RX IP 250 OP 636

## 2017-01-01 PROCEDURE — 95951 ZZHC EEG VIDEO < 12 HR: CPT | Mod: 52,ZF

## 2017-01-01 PROCEDURE — 40000809 ZZH STATISTIC NO DOCUMENTATION TO SUPPORT CHARGE

## 2017-01-01 PROCEDURE — 87631 RESP VIRUS 3-5 TARGETS: CPT | Performed by: INTERNAL MEDICINE

## 2017-01-01 PROCEDURE — 80047 BASIC METABLC PNL IONIZED CA: CPT

## 2017-01-01 PROCEDURE — 93005 ELECTROCARDIOGRAM TRACING: CPT | Performed by: EMERGENCY MEDICINE

## 2017-01-01 PROCEDURE — 82565 ASSAY OF CREATININE: CPT

## 2017-01-01 PROCEDURE — 40000275 ZZH STATISTIC RCP TIME EA 10 MIN: Mod: 76

## 2017-01-01 RX ORDER — SIMVASTATIN 40 MG
TABLET ORAL
Qty: 31 TABLET | Refills: 0 | Status: SHIPPED | OUTPATIENT
Start: 2017-01-01 | End: 2017-01-01

## 2017-01-01 RX ORDER — GABAPENTIN 600 MG/1
TABLET ORAL
Refills: 11 | OUTPATIENT
Start: 2017-01-01

## 2017-01-01 RX ORDER — ACETAMINOPHEN 325 MG/1
TABLET ORAL
Refills: 10 | OUTPATIENT
Start: 2017-01-01

## 2017-01-01 RX ORDER — LOPERAMIDE HCL 2 MG
CAPSULE ORAL
Qty: 180 CAPSULE | Status: SHIPPED | OUTPATIENT
Start: 2017-01-01 | End: 2017-01-01

## 2017-01-01 RX ORDER — CITALOPRAM HYDROBROMIDE 20 MG/1
TABLET ORAL
Qty: 31 TABLET | Refills: 5 | OUTPATIENT
Start: 2017-01-01

## 2017-01-01 RX ORDER — BUDESONIDE 0.25 MG/2ML
0.25 INHALANT ORAL 2 TIMES DAILY
Status: DISCONTINUED | OUTPATIENT
Start: 2017-01-01 | End: 2017-01-01 | Stop reason: HOSPADM

## 2017-01-01 RX ORDER — LOPERAMIDE HCL 2 MG
CAPSULE ORAL
Refills: 11 | OUTPATIENT
Start: 2017-01-01

## 2017-01-01 RX ORDER — DIVALPROEX SODIUM 500 MG/1
TABLET, DELAYED RELEASE ORAL
Refills: 11 | OUTPATIENT
Start: 2017-01-01

## 2017-01-01 RX ORDER — DEXTROSE MONOHYDRATE 25 G/50ML
25-50 INJECTION, SOLUTION INTRAVENOUS
Status: DISCONTINUED | OUTPATIENT
Start: 2017-01-01 | End: 2017-01-01

## 2017-01-01 RX ORDER — NYSTATIN 100000 U/G
OINTMENT TOPICAL 2 TIMES DAILY
Qty: 30 G | Refills: 1 | Status: SHIPPED | OUTPATIENT
Start: 2017-01-01 | End: 2017-01-01

## 2017-01-01 RX ORDER — QUETIAPINE FUMARATE 100 MG/1
100 TABLET, FILM COATED ORAL 2 TIMES DAILY
DISCHARGE
Start: 2017-01-01 | End: 2017-01-01

## 2017-01-01 RX ORDER — CLOPIDOGREL BISULFATE 75 MG/1
75 TABLET ORAL DAILY
Status: DISCONTINUED | OUTPATIENT
Start: 2017-01-01 | End: 2017-01-01 | Stop reason: HOSPADM

## 2017-01-01 RX ORDER — LOPERAMIDE HYDROCHLORIDE 2 MG/1
2 TABLET ORAL 4 TIMES DAILY PRN
Status: ON HOLD | COMMUNITY
End: 2017-01-01

## 2017-01-01 RX ORDER — AMLODIPINE BESYLATE 10 MG/1
10 TABLET ORAL DAILY
Status: DISCONTINUED | OUTPATIENT
Start: 2017-01-01 | End: 2017-01-01 | Stop reason: HOSPADM

## 2017-01-01 RX ORDER — FUROSEMIDE 20 MG
TABLET ORAL
Qty: 31 TABLET | Refills: 5 | Status: SHIPPED | OUTPATIENT
Start: 2017-01-01 | End: 2017-01-01

## 2017-01-01 RX ORDER — QUETIAPINE FUMARATE 100 MG/1
100 TABLET, FILM COATED ORAL 2 TIMES DAILY
Status: DISCONTINUED | OUTPATIENT
Start: 2017-01-01 | End: 2017-01-01 | Stop reason: HOSPADM

## 2017-01-01 RX ORDER — ONDANSETRON 2 MG/ML
4 INJECTION INTRAMUSCULAR; INTRAVENOUS EVERY 6 HOURS PRN
Status: DISCONTINUED | OUTPATIENT
Start: 2017-01-01 | End: 2017-01-01 | Stop reason: HOSPADM

## 2017-01-01 RX ORDER — BACLOFEN 10 MG/1
20 TABLET ORAL 3 TIMES DAILY
Status: DISCONTINUED | OUTPATIENT
Start: 2017-01-01 | End: 2017-01-01 | Stop reason: HOSPADM

## 2017-01-01 RX ORDER — POTASSIUM CHLORIDE 29.8 MG/ML
20 INJECTION INTRAVENOUS
Status: DISCONTINUED | OUTPATIENT
Start: 2017-01-01 | End: 2017-01-01 | Stop reason: HOSPADM

## 2017-01-01 RX ORDER — POTASSIUM CHLORIDE 1.5 G/1.58G
20-40 POWDER, FOR SOLUTION ORAL
Status: DISCONTINUED | OUTPATIENT
Start: 2017-01-01 | End: 2017-01-01 | Stop reason: HOSPADM

## 2017-01-01 RX ORDER — QUETIAPINE FUMARATE 100 MG/1
TABLET, FILM COATED ORAL
Qty: 62 TABLET | Refills: 5 | OUTPATIENT
Start: 2017-01-01

## 2017-01-01 RX ORDER — CEPHALEXIN 500 MG/1
500 CAPSULE ORAL 2 TIMES DAILY
Qty: 14 CAPSULE | Refills: 0 | Status: SHIPPED | OUTPATIENT
Start: 2017-01-01 | End: 2017-01-01

## 2017-01-01 RX ORDER — TAMSULOSIN HYDROCHLORIDE 0.4 MG/1
0.4 CAPSULE ORAL DAILY
Status: DISCONTINUED | OUTPATIENT
Start: 2017-01-01 | End: 2017-01-01 | Stop reason: HOSPADM

## 2017-01-01 RX ORDER — ACETAMINOPHEN 325 MG/1
650 TABLET ORAL EVERY 4 HOURS PRN
Status: DISCONTINUED | OUTPATIENT
Start: 2017-01-01 | End: 2017-01-01 | Stop reason: HOSPADM

## 2017-01-01 RX ORDER — GINSENG 100 MG
CAPSULE ORAL 2 TIMES DAILY PRN
Status: DISCONTINUED | OUTPATIENT
Start: 2017-01-01 | End: 2017-01-01 | Stop reason: HOSPADM

## 2017-01-01 RX ORDER — SIMVASTATIN 40 MG
TABLET ORAL
Qty: 30 TABLET | Refills: 0 | Status: SHIPPED | OUTPATIENT
Start: 2017-01-01 | End: 2017-01-01

## 2017-01-01 RX ORDER — IPRATROPIUM BROMIDE AND ALBUTEROL SULFATE 2.5; .5 MG/3ML; MG/3ML
6 SOLUTION RESPIRATORY (INHALATION) ONCE
Status: DISCONTINUED | OUTPATIENT
Start: 2017-01-01 | End: 2017-01-01

## 2017-01-01 RX ORDER — POTASSIUM CL/LIDO/0.9 % NACL 10MEQ/0.1L
10 INTRAVENOUS SOLUTION, PIGGYBACK (ML) INTRAVENOUS
Status: DISCONTINUED | OUTPATIENT
Start: 2017-01-01 | End: 2017-01-01 | Stop reason: HOSPADM

## 2017-01-01 RX ORDER — ONDANSETRON 4 MG/1
4 TABLET, ORALLY DISINTEGRATING ORAL EVERY 6 HOURS PRN
Status: DISCONTINUED | OUTPATIENT
Start: 2017-01-01 | End: 2017-01-01 | Stop reason: HOSPADM

## 2017-01-01 RX ORDER — QUETIAPINE FUMARATE 50 MG/1
TABLET, FILM COATED ORAL
Qty: 62 TABLET | Refills: 5 | OUTPATIENT
Start: 2017-01-01

## 2017-01-01 RX ORDER — IPRATROPIUM BROMIDE AND ALBUTEROL SULFATE 2.5; .5 MG/3ML; MG/3ML
3 SOLUTION RESPIRATORY (INHALATION) EVERY 4 HOURS
Status: DISCONTINUED | OUTPATIENT
Start: 2017-01-01 | End: 2017-01-01

## 2017-01-01 RX ORDER — IPRATROPIUM BROMIDE AND ALBUTEROL SULFATE 2.5; .5 MG/3ML; MG/3ML
3 SOLUTION RESPIRATORY (INHALATION) ONCE
Status: COMPLETED | OUTPATIENT
Start: 2017-01-01 | End: 2017-01-01

## 2017-01-01 RX ORDER — QUETIAPINE FUMARATE 200 MG/1
100 TABLET, FILM COATED ORAL 2 TIMES DAILY
Qty: 60 TABLET | DISCHARGE
Start: 2017-01-01 | End: 2017-01-01

## 2017-01-01 RX ORDER — NALOXONE HYDROCHLORIDE 0.4 MG/ML
INJECTION, SOLUTION INTRAMUSCULAR; INTRAVENOUS; SUBCUTANEOUS
Status: COMPLETED
Start: 2017-01-01 | End: 2017-01-01

## 2017-01-01 RX ORDER — MINERAL OIL/HYDROPHIL PETROLAT
OINTMENT (GRAM) TOPICAL DAILY
Status: ON HOLD | COMMUNITY
End: 2018-01-01

## 2017-01-01 RX ORDER — SODIUM CHLORIDE 9 MG/ML
1000 INJECTION, SOLUTION INTRAVENOUS CONTINUOUS
Status: DISCONTINUED | OUTPATIENT
Start: 2017-01-01 | End: 2017-01-01

## 2017-01-01 RX ORDER — LABETALOL HYDROCHLORIDE 5 MG/ML
20 INJECTION, SOLUTION INTRAVENOUS
Status: DISCONTINUED | OUTPATIENT
Start: 2017-01-01 | End: 2017-01-01 | Stop reason: HOSPADM

## 2017-01-01 RX ORDER — ACETAMINOPHEN 325 MG/1
TABLET ORAL
Qty: 60 TABLET | Refills: 11 | Status: SHIPPED | OUTPATIENT
Start: 2017-01-01 | End: 2018-01-01 | Stop reason: DRUGHIGH

## 2017-01-01 RX ORDER — CARBAMAZEPINE 200 MG/1
TABLET ORAL
Qty: 90 TABLET | Refills: 11 | Status: SHIPPED | OUTPATIENT
Start: 2017-01-01 | End: 2018-01-01

## 2017-01-01 RX ORDER — FUROSEMIDE 20 MG
20 TABLET ORAL DAILY
Status: DISCONTINUED | OUTPATIENT
Start: 2017-01-01 | End: 2017-01-01 | Stop reason: HOSPADM

## 2017-01-01 RX ORDER — ALBUTEROL SULFATE 0.83 MG/ML
SOLUTION RESPIRATORY (INHALATION)
Status: COMPLETED
Start: 2017-01-01 | End: 2017-01-01

## 2017-01-01 RX ORDER — BACLOFEN 20 MG/1
TABLET ORAL
Qty: 90 TABLET | Refills: 11 | Status: ON HOLD | OUTPATIENT
Start: 2017-01-01 | End: 2018-01-01

## 2017-01-01 RX ORDER — POLYETHYLENE GLYCOL 3350 17 G/17G
17 POWDER, FOR SOLUTION ORAL DAILY PRN
Status: DISCONTINUED | OUTPATIENT
Start: 2017-01-01 | End: 2017-01-01

## 2017-01-01 RX ORDER — CITALOPRAM HYDROBROMIDE 20 MG/1
20 TABLET ORAL DAILY
Status: DISCONTINUED | OUTPATIENT
Start: 2017-01-01 | End: 2017-01-01 | Stop reason: HOSPADM

## 2017-01-01 RX ORDER — CARBAMAZEPINE 200 MG/1
200 TABLET ORAL 3 TIMES DAILY
Status: DISCONTINUED | OUTPATIENT
Start: 2017-01-01 | End: 2017-01-01 | Stop reason: HOSPADM

## 2017-01-01 RX ORDER — POTASSIUM CHLORIDE 750 MG/1
20-40 TABLET, EXTENDED RELEASE ORAL
Status: DISCONTINUED | OUTPATIENT
Start: 2017-01-01 | End: 2017-01-01 | Stop reason: HOSPADM

## 2017-01-01 RX ORDER — LEVETIRACETAM 500 MG/1
500 TABLET ORAL 2 TIMES DAILY
Status: DISCONTINUED | OUTPATIENT
Start: 2017-01-01 | End: 2017-01-01 | Stop reason: HOSPADM

## 2017-01-01 RX ORDER — NALOXONE HYDROCHLORIDE 0.4 MG/ML
0.2 INJECTION, SOLUTION INTRAMUSCULAR; INTRAVENOUS; SUBCUTANEOUS ONCE
Status: DISCONTINUED | OUTPATIENT
Start: 2017-01-01 | End: 2017-01-01

## 2017-01-01 RX ORDER — NALOXONE HYDROCHLORIDE 0.4 MG/ML
.1-.4 INJECTION, SOLUTION INTRAMUSCULAR; INTRAVENOUS; SUBCUTANEOUS
Status: DISCONTINUED | OUTPATIENT
Start: 2017-01-01 | End: 2017-01-01 | Stop reason: HOSPADM

## 2017-01-01 RX ORDER — DIVALPROEX SODIUM 250 MG/1
250 TABLET, DELAYED RELEASE ORAL 2 TIMES DAILY
Status: DISCONTINUED | OUTPATIENT
Start: 2017-01-01 | End: 2017-01-01 | Stop reason: HOSPADM

## 2017-01-01 RX ORDER — OXYCODONE HYDROCHLORIDE 5 MG/1
5 TABLET ORAL 4 TIMES DAILY
Qty: 90 TABLET | Refills: 0 | COMMUNITY
Start: 2017-01-01 | End: 2017-01-01

## 2017-01-01 RX ORDER — OSELTAMIVIR PHOSPHATE 75 MG/1
150 CAPSULE ORAL 2 TIMES DAILY
Status: DISCONTINUED | OUTPATIENT
Start: 2017-01-01 | End: 2017-01-01 | Stop reason: HOSPADM

## 2017-01-01 RX ORDER — IPRATROPIUM BROMIDE AND ALBUTEROL SULFATE 2.5; .5 MG/3ML; MG/3ML
1 SOLUTION RESPIRATORY (INHALATION) 3 TIMES DAILY
Status: DISCONTINUED | OUTPATIENT
Start: 2017-01-01 | End: 2017-01-01 | Stop reason: HOSPADM

## 2017-01-01 RX ORDER — FUROSEMIDE 20 MG
TABLET ORAL
Qty: 30 TABLET | Refills: 5 | Status: ON HOLD | OUTPATIENT
Start: 2017-01-01 | End: 2018-01-01

## 2017-01-01 RX ORDER — NALOXONE HYDROCHLORIDE 0.4 MG/ML
.1-.4 INJECTION, SOLUTION INTRAMUSCULAR; INTRAVENOUS; SUBCUTANEOUS
Status: DISCONTINUED | OUTPATIENT
Start: 2017-01-01 | End: 2017-01-01

## 2017-01-01 RX ORDER — DIVALPROEX SODIUM 250 MG/1
TABLET, DELAYED RELEASE ORAL
Refills: 11 | OUTPATIENT
Start: 2017-01-01

## 2017-01-01 RX ORDER — GABAPENTIN 600 MG/1
1800 TABLET ORAL EVERY EVENING
Status: DISCONTINUED | OUTPATIENT
Start: 2017-01-01 | End: 2017-01-01 | Stop reason: HOSPADM

## 2017-01-01 RX ORDER — AMLODIPINE BESYLATE 10 MG/1
TABLET ORAL
Qty: 31 TABLET | Refills: 11 | Status: ON HOLD | OUTPATIENT
Start: 2017-01-01 | End: 2018-01-01

## 2017-01-01 RX ORDER — OXYCODONE HYDROCHLORIDE 5 MG/1
5 TABLET ORAL 3 TIMES DAILY
Qty: 90 TABLET | Refills: 0 | Status: SHIPPED | OUTPATIENT
Start: 2017-01-01 | End: 2017-01-01

## 2017-01-01 RX ORDER — PREDNISONE 20 MG/1
40 TABLET ORAL DAILY
DISCHARGE
Start: 2017-01-01 | End: 2017-01-01

## 2017-01-01 RX ORDER — OSELTAMIVIR PHOSPHATE 75 MG/1
75 CAPSULE ORAL 2 TIMES DAILY
Status: DISCONTINUED | OUTPATIENT
Start: 2017-01-01 | End: 2017-01-01

## 2017-01-01 RX ORDER — SIMVASTATIN 40 MG
40 TABLET ORAL AT BEDTIME
Status: DISCONTINUED | OUTPATIENT
Start: 2017-01-01 | End: 2017-01-01 | Stop reason: HOSPADM

## 2017-01-01 RX ORDER — LEVALBUTEROL TARTRATE 45 UG/1
2 AEROSOL, METERED ORAL EVERY 6 HOURS PRN
Status: DISCONTINUED | OUTPATIENT
Start: 2017-01-01 | End: 2017-01-01 | Stop reason: HOSPADM

## 2017-01-01 RX ORDER — LEVETIRACETAM 1000 MG/1
1000 TABLET ORAL 2 TIMES DAILY
Qty: 60 TABLET | Status: ON HOLD | DISCHARGE
Start: 2017-01-01 | End: 2017-01-01

## 2017-01-01 RX ORDER — IPRATROPIUM BROMIDE AND ALBUTEROL SULFATE 2.5; .5 MG/3ML; MG/3ML
SOLUTION RESPIRATORY (INHALATION)
Status: COMPLETED
Start: 2017-01-01 | End: 2017-01-01

## 2017-01-01 RX ORDER — OXYCODONE HYDROCHLORIDE 5 MG/1
2.5 TABLET ORAL 2 TIMES DAILY
Qty: 15 TABLET | Refills: 0 | Status: SHIPPED | OUTPATIENT
Start: 2017-01-01 | End: 2018-01-01

## 2017-01-01 RX ORDER — IPRATROPIUM BROMIDE AND ALBUTEROL SULFATE 2.5; .5 MG/3ML; MG/3ML
3 SOLUTION RESPIRATORY (INHALATION) EVERY 4 HOURS PRN
Status: DISCONTINUED | OUTPATIENT
Start: 2017-01-01 | End: 2017-01-01 | Stop reason: HOSPADM

## 2017-01-01 RX ORDER — AMOXICILLIN 250 MG
1 CAPSULE ORAL 2 TIMES DAILY PRN
Status: DISCONTINUED | OUTPATIENT
Start: 2017-01-01 | End: 2017-01-01 | Stop reason: HOSPADM

## 2017-01-01 RX ORDER — QUETIAPINE FUMARATE 100 MG/1
200 TABLET, FILM COATED ORAL 2 TIMES DAILY
Status: DISCONTINUED | OUTPATIENT
Start: 2017-01-01 | End: 2017-01-01 | Stop reason: HOSPADM

## 2017-01-01 RX ORDER — POTASSIUM CHLORIDE 7.45 MG/ML
10 INJECTION INTRAVENOUS
Status: DISCONTINUED | OUTPATIENT
Start: 2017-01-01 | End: 2017-01-01 | Stop reason: HOSPADM

## 2017-01-01 RX ORDER — DIVALPROEX SODIUM 500 MG/1
1500 TABLET, DELAYED RELEASE ORAL EVERY 12 HOURS SCHEDULED
Status: DISCONTINUED | OUTPATIENT
Start: 2017-01-01 | End: 2017-01-01 | Stop reason: HOSPADM

## 2017-01-01 RX ORDER — DIVALPROEX SODIUM 250 MG/1
250 TABLET, DELAYED RELEASE ORAL 2 TIMES DAILY
COMMUNITY
End: 2018-01-01

## 2017-01-01 RX ORDER — OXYCODONE HYDROCHLORIDE 5 MG/1
5 TABLET ORAL 4 TIMES DAILY
Qty: 120 TABLET | Refills: 0 | Status: ON HOLD | OUTPATIENT
Start: 2017-01-01 | End: 2017-01-01

## 2017-01-01 RX ORDER — LEVALBUTEROL TARTRATE 45 UG/1
2 AEROSOL, METERED ORAL EVERY 6 HOURS PRN
Qty: 1 INHALER | Refills: 8 | Status: SHIPPED | OUTPATIENT
Start: 2017-01-01 | End: 2018-01-01

## 2017-01-01 RX ORDER — FUROSEMIDE 20 MG
20 TABLET ORAL DAILY
Status: DISCONTINUED | OUTPATIENT
Start: 2017-01-01 | End: 2017-01-01

## 2017-01-01 RX ORDER — OXYCODONE HYDROCHLORIDE 5 MG/1
TABLET ORAL
Qty: 180 TABLET | Refills: 0 | Status: SHIPPED | OUTPATIENT
Start: 2017-01-01 | End: 2017-01-01

## 2017-01-01 RX ORDER — GABAPENTIN 300 MG/1
600 CAPSULE ORAL 3 TIMES DAILY
Status: DISCONTINUED | OUTPATIENT
Start: 2017-01-01 | End: 2017-01-01 | Stop reason: HOSPADM

## 2017-01-01 RX ORDER — SODIUM CHLORIDE 9 MG/ML
INJECTION, SOLUTION INTRAVENOUS CONTINUOUS
Status: DISCONTINUED | OUTPATIENT
Start: 2017-01-01 | End: 2017-01-01 | Stop reason: CLARIF

## 2017-01-01 RX ORDER — POTASSIUM CHLORIDE 750 MG/1
10 TABLET, EXTENDED RELEASE ORAL EVERY MORNING
Status: DISCONTINUED | OUTPATIENT
Start: 2017-01-01 | End: 2017-01-01 | Stop reason: HOSPADM

## 2017-01-01 RX ORDER — OXYCODONE HYDROCHLORIDE 10 MG/1
10 TABLET ORAL ONCE
Status: COMPLETED | OUTPATIENT
Start: 2017-01-01 | End: 2017-01-01

## 2017-01-01 RX ORDER — LORAZEPAM 2 MG/ML
2 INJECTION INTRAMUSCULAR
Status: DISCONTINUED | OUTPATIENT
Start: 2017-01-01 | End: 2017-01-01 | Stop reason: HOSPADM

## 2017-01-01 RX ORDER — CARBAMAZEPINE 200 MG/1
TABLET ORAL
Qty: 93 TABLET | Refills: 0 | Status: SHIPPED | OUTPATIENT
Start: 2017-01-01 | End: 2017-01-01

## 2017-01-01 RX ORDER — LEVETIRACETAM 1000 MG/1
TABLET ORAL
Qty: 30 TABLET | Refills: 1 | Status: SHIPPED | OUTPATIENT
Start: 2017-01-01 | End: 2017-01-01

## 2017-01-01 RX ORDER — ALBUTEROL SULFATE 0.83 MG/ML
3 SOLUTION RESPIRATORY (INHALATION)
Status: DISCONTINUED | OUTPATIENT
Start: 2017-01-01 | End: 2017-01-01 | Stop reason: HOSPADM

## 2017-01-01 RX ORDER — CARBAMAZEPINE 200 MG/1
TABLET ORAL
Refills: 11 | OUTPATIENT
Start: 2017-01-01

## 2017-01-01 RX ORDER — LIDOCAINE 40 MG/G
CREAM TOPICAL
Status: DISCONTINUED | OUTPATIENT
Start: 2017-01-01 | End: 2017-01-01

## 2017-01-01 RX ORDER — PREDNISONE 20 MG/1
40 TABLET ORAL 2 TIMES DAILY WITH MEALS
Status: DISCONTINUED | OUTPATIENT
Start: 2017-01-01 | End: 2017-01-01 | Stop reason: HOSPADM

## 2017-01-01 RX ORDER — LIDOCAINE 40 MG/G
CREAM TOPICAL
Status: DISCONTINUED | OUTPATIENT
Start: 2017-01-01 | End: 2017-01-01 | Stop reason: HOSPADM

## 2017-01-01 RX ORDER — POTASSIUM CHLORIDE 1500 MG/1
20-40 TABLET, EXTENDED RELEASE ORAL
Status: DISCONTINUED | OUTPATIENT
Start: 2017-01-01 | End: 2017-01-01 | Stop reason: HOSPADM

## 2017-01-01 RX ORDER — HALOPERIDOL 5 MG/ML
2 INJECTION INTRAMUSCULAR EVERY 6 HOURS PRN
Status: DISCONTINUED | OUTPATIENT
Start: 2017-01-01 | End: 2017-01-01 | Stop reason: HOSPADM

## 2017-01-01 RX ORDER — DIVALPROEX SODIUM 500 MG/1
TABLET, DELAYED RELEASE ORAL
Qty: 180 TABLET | Refills: 5 | Status: ON HOLD | OUTPATIENT
Start: 2017-01-01 | End: 2018-01-01

## 2017-01-01 RX ORDER — NALOXONE HYDROCHLORIDE 0.4 MG/ML
0.4 INJECTION, SOLUTION INTRAMUSCULAR; INTRAVENOUS; SUBCUTANEOUS
Status: DISCONTINUED | OUTPATIENT
Start: 2017-01-01 | End: 2017-01-01

## 2017-01-01 RX ORDER — MAGNESIUM SULFATE HEPTAHYDRATE 40 MG/ML
4 INJECTION, SOLUTION INTRAVENOUS EVERY 4 HOURS PRN
Status: DISCONTINUED | OUTPATIENT
Start: 2017-01-01 | End: 2017-01-01 | Stop reason: HOSPADM

## 2017-01-01 RX ORDER — POTASSIUM CHLORIDE 750 MG/1
TABLET, EXTENDED RELEASE ORAL
Qty: 31 TABLET | Refills: 11 | Status: ON HOLD | OUTPATIENT
Start: 2017-01-01 | End: 2018-01-01

## 2017-01-01 RX ORDER — AMOXICILLIN 250 MG
1-2 CAPSULE ORAL 2 TIMES DAILY
Status: DISCONTINUED | OUTPATIENT
Start: 2017-01-01 | End: 2017-01-01

## 2017-01-01 RX ORDER — LEVETIRACETAM 500 MG/1
500 TABLET ORAL 2 TIMES DAILY
Qty: 62 TABLET | Status: ON HOLD | OUTPATIENT
Start: 2017-01-01 | End: 2017-01-01

## 2017-01-01 RX ORDER — LEVETIRACETAM 500 MG/1
1000 TABLET ORAL 2 TIMES DAILY
Status: DISCONTINUED | OUTPATIENT
Start: 2017-01-01 | End: 2017-01-01 | Stop reason: HOSPADM

## 2017-01-01 RX ORDER — PRAMOXINE HYDROCHLORIDE 10 MG/ML
LOTION TOPICAL PRN
Status: ON HOLD | COMMUNITY
End: 2018-01-01

## 2017-01-01 RX ORDER — SIMVASTATIN 40 MG
TABLET ORAL
Qty: 30 TABLET | Refills: 11 | Status: ON HOLD | OUTPATIENT
Start: 2017-01-01 | End: 2018-01-01

## 2017-01-01 RX ORDER — QUETIAPINE FUMARATE 200 MG/1
200 TABLET, FILM COATED ORAL AT BEDTIME
Status: DISCONTINUED | OUTPATIENT
Start: 2017-01-01 | End: 2017-01-01

## 2017-01-01 RX ORDER — IPRATROPIUM BROMIDE AND ALBUTEROL SULFATE 2.5; .5 MG/3ML; MG/3ML
3 SOLUTION RESPIRATORY (INHALATION) 4 TIMES DAILY
Status: DISCONTINUED | OUTPATIENT
Start: 2017-01-01 | End: 2017-01-01

## 2017-01-01 RX ORDER — POLYETHYLENE GLYCOL 3350 17 G/17G
17 POWDER, FOR SOLUTION ORAL DAILY PRN
Status: DISCONTINUED | OUTPATIENT
Start: 2017-01-01 | End: 2017-01-01 | Stop reason: HOSPADM

## 2017-01-01 RX ORDER — LOPERAMIDE HCL 2 MG
2 CAPSULE ORAL 2 TIMES DAILY
Qty: 120 CAPSULE | Refills: 3 | Status: SHIPPED | OUTPATIENT
Start: 2017-01-01 | End: 2017-01-01

## 2017-01-01 RX ORDER — OSELTAMIVIR PHOSPHATE 75 MG/1
75 CAPSULE ORAL 2 TIMES DAILY
Qty: 4 CAPSULE | DISCHARGE
Start: 2017-01-01 | End: 2017-01-01

## 2017-01-01 RX ORDER — IPRATROPIUM BROMIDE AND ALBUTEROL SULFATE 2.5; .5 MG/3ML; MG/3ML
3 SOLUTION RESPIRATORY (INHALATION) 4 TIMES DAILY
Status: DISCONTINUED | OUTPATIENT
Start: 2017-01-01 | End: 2017-01-01 | Stop reason: HOSPADM

## 2017-01-01 RX ORDER — GABAPENTIN 600 MG/1
600 TABLET ORAL 3 TIMES DAILY
Status: DISCONTINUED | OUTPATIENT
Start: 2017-01-01 | End: 2017-01-01 | Stop reason: HOSPADM

## 2017-01-01 RX ORDER — METHYLPREDNISOLONE SODIUM SUCCINATE 125 MG/2ML
60 INJECTION, POWDER, LYOPHILIZED, FOR SOLUTION INTRAMUSCULAR; INTRAVENOUS EVERY 12 HOURS
Status: DISCONTINUED | OUTPATIENT
Start: 2017-01-01 | End: 2017-01-01

## 2017-01-01 RX ORDER — HYDRALAZINE HYDROCHLORIDE 20 MG/ML
10 INJECTION INTRAMUSCULAR; INTRAVENOUS
Status: DISCONTINUED | OUTPATIENT
Start: 2017-01-01 | End: 2017-01-01 | Stop reason: HOSPADM

## 2017-01-01 RX ORDER — OXYCODONE HYDROCHLORIDE 5 MG/1
5 TABLET ORAL 3 TIMES DAILY
Status: DISCONTINUED | OUTPATIENT
Start: 2017-01-01 | End: 2017-01-01 | Stop reason: HOSPADM

## 2017-01-01 RX ORDER — IPRATROPIUM BROMIDE AND ALBUTEROL SULFATE 2.5; .5 MG/3ML; MG/3ML
1 SOLUTION RESPIRATORY (INHALATION) 3 TIMES DAILY
COMMUNITY
End: 2017-01-01

## 2017-01-01 RX ORDER — LEVETIRACETAM 500 MG/1
TABLET ORAL
Refills: 11 | OUTPATIENT
Start: 2017-01-01

## 2017-01-01 RX ORDER — TAMSULOSIN HYDROCHLORIDE 0.4 MG/1
CAPSULE ORAL
Qty: 31 CAPSULE | Refills: 11 | Status: ON HOLD | OUTPATIENT
Start: 2017-01-01 | End: 2018-01-01

## 2017-01-01 RX ORDER — BACLOFEN 20 MG/1
20 TABLET ORAL 3 TIMES DAILY
Status: DISCONTINUED | OUTPATIENT
Start: 2017-01-01 | End: 2017-01-01 | Stop reason: HOSPADM

## 2017-01-01 RX ORDER — DEXAMETHASONE SODIUM PHOSPHATE 10 MG/ML
10 INJECTION, SOLUTION INTRAMUSCULAR; INTRAVENOUS ONCE
Status: COMPLETED | OUTPATIENT
Start: 2017-01-01 | End: 2017-01-01

## 2017-01-01 RX ORDER — LEVETIRACETAM 1000 MG/1
1 TABLET ORAL 2 TIMES DAILY
Qty: 62 TABLET | Refills: 1 | Status: SHIPPED | OUTPATIENT
Start: 2017-01-01 | End: 2018-01-01

## 2017-01-01 RX ORDER — CEFTRIAXONE 1 G/1
1 INJECTION, POWDER, FOR SOLUTION INTRAMUSCULAR; INTRAVENOUS ONCE
Status: COMPLETED | OUTPATIENT
Start: 2017-01-01 | End: 2017-01-01

## 2017-01-01 RX ORDER — IPRATROPIUM BROMIDE AND ALBUTEROL SULFATE 2.5; .5 MG/3ML; MG/3ML
3 SOLUTION RESPIRATORY (INHALATION)
Status: DISCONTINUED | OUTPATIENT
Start: 2017-01-01 | End: 2017-01-01 | Stop reason: HOSPADM

## 2017-01-01 RX ORDER — SIMVASTATIN 40 MG
40 TABLET ORAL AT BEDTIME
Qty: 30 TABLET | Refills: 0 | Status: SHIPPED | OUTPATIENT
Start: 2017-01-01 | End: 2017-01-01

## 2017-01-01 RX ORDER — IOPAMIDOL 755 MG/ML
500 INJECTION, SOLUTION INTRAVASCULAR ONCE
Status: COMPLETED | OUTPATIENT
Start: 2017-01-01 | End: 2017-01-01

## 2017-01-01 RX ORDER — NICOTINE POLACRILEX 4 MG
15-30 LOZENGE BUCCAL
Status: DISCONTINUED | OUTPATIENT
Start: 2017-01-01 | End: 2017-01-01

## 2017-01-01 RX ORDER — GINSENG 100 MG
CAPSULE ORAL 3 TIMES DAILY PRN
Status: DISCONTINUED | OUTPATIENT
Start: 2017-01-01 | End: 2017-01-01 | Stop reason: HOSPADM

## 2017-01-01 RX ORDER — ERGOCALCIFEROL 1.25 MG/1
CAPSULE, LIQUID FILLED ORAL
Qty: 12 CAPSULE | Refills: 3 | Status: ON HOLD | OUTPATIENT
Start: 2017-01-01 | End: 2018-01-01

## 2017-01-01 RX ORDER — AMOXICILLIN 250 MG
2 CAPSULE ORAL 2 TIMES DAILY PRN
Status: DISCONTINUED | OUTPATIENT
Start: 2017-01-01 | End: 2017-01-01 | Stop reason: HOSPADM

## 2017-01-01 RX ORDER — BACLOFEN 20 MG/1
20 TABLET ORAL 3 TIMES DAILY
Qty: 90 TABLET | Refills: 0 | Status: SHIPPED | OUTPATIENT
Start: 2017-01-01 | End: 2017-01-01

## 2017-01-01 RX ORDER — CLOPIDOGREL BISULFATE 75 MG/1
TABLET ORAL
Qty: 31 TABLET | Refills: 11 | Status: SHIPPED | OUTPATIENT
Start: 2017-01-01 | End: 2017-01-01

## 2017-01-01 RX ORDER — BACLOFEN 20 MG/1
TABLET ORAL
Refills: 11 | OUTPATIENT
Start: 2017-01-01

## 2017-01-01 RX ORDER — LEVETIRACETAM 15 MG/ML
1500 INJECTION INTRAVASCULAR ONCE
Status: COMPLETED | OUTPATIENT
Start: 2017-01-01 | End: 2017-01-01

## 2017-01-01 RX ORDER — DIVALPROEX SODIUM 500 MG/1
TABLET, DELAYED RELEASE ORAL
Qty: 60 TABLET | Refills: 11 | Status: SHIPPED | OUTPATIENT
Start: 2017-01-01 | End: 2017-01-01

## 2017-01-01 RX ORDER — CLOPIDOGREL BISULFATE 75 MG/1
75 TABLET ORAL DAILY
Qty: 90 TABLET | Refills: 2 | Status: ON HOLD | OUTPATIENT
Start: 2017-01-01 | End: 2018-01-01

## 2017-01-01 RX ORDER — NICOTINE 21 MG/24HR
1 PATCH, TRANSDERMAL 24 HOURS TRANSDERMAL DAILY
Status: DISCONTINUED | OUTPATIENT
Start: 2017-01-01 | End: 2017-01-01 | Stop reason: HOSPADM

## 2017-01-01 RX ORDER — IPRATROPIUM BROMIDE AND ALBUTEROL SULFATE 2.5; .5 MG/3ML; MG/3ML
3 SOLUTION RESPIRATORY (INHALATION)
Status: DISCONTINUED | OUTPATIENT
Start: 2017-01-01 | End: 2017-01-01

## 2017-01-01 RX ORDER — DIVALPROEX SODIUM 500 MG/1
1500 TABLET, DELAYED RELEASE ORAL EVERY 12 HOURS
Status: DISCONTINUED | OUTPATIENT
Start: 2017-01-01 | End: 2017-01-01

## 2017-01-01 RX ORDER — NICOTINE 21 MG/24HR
1 PATCH, TRANSDERMAL 24 HOURS TRANSDERMAL EVERY 24 HOURS
Status: DISCONTINUED | OUTPATIENT
Start: 2017-01-01 | End: 2017-01-01 | Stop reason: HOSPADM

## 2017-01-01 RX ORDER — LOPERAMIDE HCL 2 MG
CAPSULE ORAL
Qty: 120 CAPSULE | Refills: 1 | Status: SHIPPED | OUTPATIENT
Start: 2017-01-01 | End: 2018-01-01

## 2017-01-01 RX ORDER — ERGOCALCIFEROL 1.25 MG/1
50000 CAPSULE, LIQUID FILLED ORAL ONCE
Status: DISCONTINUED | OUTPATIENT
Start: 2017-01-01 | End: 2017-01-01 | Stop reason: HOSPADM

## 2017-01-01 RX ORDER — GABAPENTIN 600 MG/1
TABLET ORAL
Qty: 90 TABLET | Refills: 1 | Status: SHIPPED | OUTPATIENT
Start: 2017-01-01 | End: 2018-01-01

## 2017-01-01 RX ORDER — SODIUM CHLORIDE 9 MG/ML
INJECTION, SOLUTION INTRAVENOUS CONTINUOUS
Status: DISPENSED | OUTPATIENT
Start: 2017-01-01 | End: 2017-01-01

## 2017-01-01 RX ORDER — LEVETIRACETAM 500 MG/1
TABLET ORAL
Qty: 60 TABLET | Refills: 11 | Status: SHIPPED | OUTPATIENT
Start: 2017-01-01 | End: 2017-01-01

## 2017-01-01 RX ORDER — IPRATROPIUM BROMIDE AND ALBUTEROL SULFATE 2.5; .5 MG/3ML; MG/3ML
1 SOLUTION RESPIRATORY (INHALATION) 3 TIMES DAILY
Status: ON HOLD | COMMUNITY
End: 2018-01-01

## 2017-01-01 RX ORDER — GABAPENTIN 600 MG/1
TABLET ORAL
Qty: 90 TABLET | Refills: 11 | Status: SHIPPED | OUTPATIENT
Start: 2017-01-01 | End: 2017-01-01

## 2017-01-01 RX ORDER — GABAPENTIN 600 MG/1
TABLET ORAL
Qty: 93 TABLET | Status: SHIPPED | OUTPATIENT
Start: 2017-01-01 | End: 2017-01-01

## 2017-01-01 RX ORDER — IOPAMIDOL 755 MG/ML
75 INJECTION, SOLUTION INTRAVASCULAR ONCE
Status: COMPLETED | OUTPATIENT
Start: 2017-01-01 | End: 2017-01-01

## 2017-01-01 RX ADMIN — CARBAMAZEPINE 200 MG: 200 TABLET ORAL at 20:20

## 2017-01-01 RX ADMIN — CARBAMAZEPINE 200 MG: 200 TABLET ORAL at 14:18

## 2017-01-01 RX ADMIN — CLOPIDOGREL 75 MG: 75 TABLET, FILM COATED ORAL at 07:59

## 2017-01-01 RX ADMIN — DIVALPROEX SODIUM 1750 MG: 250 TABLET, DELAYED RELEASE ORAL at 09:14

## 2017-01-01 RX ADMIN — GABAPENTIN 600 MG: 600 TABLET, FILM COATED ORAL at 13:02

## 2017-01-01 RX ADMIN — LEVETIRACETAM 1000 MG: 500 TABLET, FILM COATED ORAL at 21:37

## 2017-01-01 RX ADMIN — IPRATROPIUM BROMIDE AND ALBUTEROL SULFATE 3 ML: .5; 3 SOLUTION RESPIRATORY (INHALATION) at 15:44

## 2017-01-01 RX ADMIN — CARBAMAZEPINE 200 MG: 200 TABLET ORAL at 08:00

## 2017-01-01 RX ADMIN — AMLODIPINE BESYLATE 10 MG: 10 TABLET ORAL at 08:00

## 2017-01-01 RX ADMIN — LEVETIRACETAM 1000 MG: 500 TABLET ORAL at 12:52

## 2017-01-01 RX ADMIN — CLOPIDOGREL 75 MG: 75 TABLET, FILM COATED ORAL at 07:56

## 2017-01-01 RX ADMIN — CARBAMAZEPINE 200 MG: 200 TABLET ORAL at 21:16

## 2017-01-01 RX ADMIN — LEVETIRACETAM 1000 MG: 500 TABLET, FILM COATED ORAL at 19:52

## 2017-01-01 RX ADMIN — IPRATROPIUM BROMIDE AND ALBUTEROL SULFATE 3 ML: 2.5; .5 SOLUTION RESPIRATORY (INHALATION) at 18:33

## 2017-01-01 RX ADMIN — IPRATROPIUM BROMIDE AND ALBUTEROL SULFATE 3 ML: .5; 3 SOLUTION RESPIRATORY (INHALATION) at 20:44

## 2017-01-01 RX ADMIN — ENOXAPARIN SODIUM 40 MG: 40 INJECTION SUBCUTANEOUS at 08:32

## 2017-01-01 RX ADMIN — IPRATROPIUM BROMIDE AND ALBUTEROL SULFATE 3 ML: .5; 3 SOLUTION RESPIRATORY (INHALATION) at 12:49

## 2017-01-01 RX ADMIN — DIVALPROEX SODIUM 1750 MG: 250 TABLET, DELAYED RELEASE ORAL at 20:51

## 2017-01-01 RX ADMIN — QUETIAPINE FUMARATE 100 MG: 100 TABLET, FILM COATED ORAL at 12:09

## 2017-01-01 RX ADMIN — CARBAMAZEPINE 200 MG: 200 TABLET ORAL at 16:44

## 2017-01-01 RX ADMIN — BUDESONIDE 0.25 MG: 0.25 INHALANT RESPIRATORY (INHALATION) at 07:31

## 2017-01-01 RX ADMIN — CITALOPRAM HYDROBROMIDE 20 MG: 20 TABLET ORAL at 09:21

## 2017-01-01 RX ADMIN — CITALOPRAM HYDROBROMIDE 20 MG: 20 TABLET ORAL at 09:14

## 2017-01-01 RX ADMIN — TAMSULOSIN HYDROCHLORIDE 0.4 MG: 0.4 CAPSULE ORAL at 12:57

## 2017-01-01 RX ADMIN — IPRATROPIUM BROMIDE AND ALBUTEROL SULFATE 3 ML: .5; 3 SOLUTION RESPIRATORY (INHALATION) at 20:09

## 2017-01-01 RX ADMIN — BACLOFEN 20 MG: 10 TABLET ORAL at 14:08

## 2017-01-01 RX ADMIN — NALOXONE HYDROCHLORIDE 0.4 MG: 0.4 INJECTION, SOLUTION INTRAMUSCULAR; INTRAVENOUS; SUBCUTANEOUS at 14:40

## 2017-01-01 RX ADMIN — AMLODIPINE BESYLATE 10 MG: 10 TABLET ORAL at 07:41

## 2017-01-01 RX ADMIN — ACETAMINOPHEN 650 MG: 325 TABLET, FILM COATED ORAL at 23:45

## 2017-01-01 RX ADMIN — SODIUM CHLORIDE, PRESERVATIVE FREE: 5 INJECTION INTRAVENOUS at 03:37

## 2017-01-01 RX ADMIN — LEVETIRACETAM 1000 MG: 500 TABLET, FILM COATED ORAL at 07:57

## 2017-01-01 RX ADMIN — QUETIAPINE FUMARATE 150 MG: 100 TABLET, FILM COATED ORAL at 20:52

## 2017-01-01 RX ADMIN — FUROSEMIDE 20 MG: 20 TABLET ORAL at 09:21

## 2017-01-01 RX ADMIN — NALOXONE HYDROCHLORIDE 0.4 MG: 0.4 INJECTION, SOLUTION INTRAMUSCULAR; INTRAVENOUS; SUBCUTANEOUS at 14:23

## 2017-01-01 RX ADMIN — CARBAMAZEPINE 200 MG: 200 TABLET ORAL at 09:21

## 2017-01-01 RX ADMIN — LEVETIRACETAM 500 MG: 500 TABLET ORAL at 20:21

## 2017-01-01 RX ADMIN — DIVALPROEX SODIUM 1750 MG: 500 TABLET, DELAYED RELEASE ORAL at 20:54

## 2017-01-01 RX ADMIN — OSELTAMIVIR PHOSPHATE 150 MG: 75 CAPSULE ORAL at 20:20

## 2017-01-01 RX ADMIN — IPRATROPIUM BROMIDE AND ALBUTEROL SULFATE 3 ML: .5; 3 SOLUTION RESPIRATORY (INHALATION) at 15:33

## 2017-01-01 RX ADMIN — AMLODIPINE BESYLATE 10 MG: 10 TABLET ORAL at 07:56

## 2017-01-01 RX ADMIN — IPRATROPIUM BROMIDE AND ALBUTEROL SULFATE 3 ML: .5; 3 SOLUTION RESPIRATORY (INHALATION) at 07:27

## 2017-01-01 RX ADMIN — IPRATROPIUM BROMIDE AND ALBUTEROL SULFATE 3 ML: .5; 3 SOLUTION RESPIRATORY (INHALATION) at 18:33

## 2017-01-01 RX ADMIN — OXYCODONE HYDROCHLORIDE 2.5 MG: 5 TABLET ORAL at 09:21

## 2017-01-01 RX ADMIN — NICOTINE 1 PATCH: 21 PATCH, EXTENDED RELEASE TRANSDERMAL at 20:48

## 2017-01-01 RX ADMIN — ALBUTEROL SULFATE 2.5 MG: 2.5 SOLUTION RESPIRATORY (INHALATION) at 02:02

## 2017-01-01 RX ADMIN — CITALOPRAM HYDROBROMIDE 20 MG: 20 TABLET ORAL at 07:56

## 2017-01-01 RX ADMIN — PREDNISONE 40 MG: 20 TABLET ORAL at 08:00

## 2017-01-01 RX ADMIN — AMLODIPINE BESYLATE 10 MG: 10 TABLET ORAL at 08:46

## 2017-01-01 RX ADMIN — SODIUM CHLORIDE 1000 ML: 9 INJECTION, SOLUTION INTRAVENOUS at 20:02

## 2017-01-01 RX ADMIN — IPRATROPIUM BROMIDE AND ALBUTEROL SULFATE 3 ML: .5; 3 SOLUTION RESPIRATORY (INHALATION) at 13:08

## 2017-01-01 RX ADMIN — DIVALPROEX SODIUM 1500 MG: 500 TABLET, DELAYED RELEASE ORAL at 09:04

## 2017-01-01 RX ADMIN — BACLOFEN 20 MG: 20 TABLET ORAL at 20:55

## 2017-01-01 RX ADMIN — CARBAMAZEPINE 200 MG: 200 TABLET ORAL at 20:55

## 2017-01-01 RX ADMIN — CARBAMAZEPINE 200 MG: 200 TABLET ORAL at 07:57

## 2017-01-01 RX ADMIN — BACLOFEN 20 MG: 10 TABLET ORAL at 20:52

## 2017-01-01 RX ADMIN — DIVALPROEX SODIUM 250 MG: 250 TABLET, DELAYED RELEASE ORAL at 09:21

## 2017-01-01 RX ADMIN — IPRATROPIUM BROMIDE AND ALBUTEROL SULFATE 3 ML: .5; 3 SOLUTION RESPIRATORY (INHALATION) at 11:52

## 2017-01-01 RX ADMIN — IPRATROPIUM BROMIDE AND ALBUTEROL SULFATE 3 ML: .5; 3 SOLUTION RESPIRATORY (INHALATION) at 00:57

## 2017-01-01 RX ADMIN — FUROSEMIDE 20 MG: 20 TABLET ORAL at 07:40

## 2017-01-01 RX ADMIN — ALBUTEROL SULFATE 2.5 MG: 2.5 SOLUTION RESPIRATORY (INHALATION) at 17:48

## 2017-01-01 RX ADMIN — AMLODIPINE BESYLATE 10 MG: 10 TABLET ORAL at 09:21

## 2017-01-01 RX ADMIN — FUROSEMIDE 20 MG: 20 TABLET ORAL at 09:14

## 2017-01-01 RX ADMIN — CLOPIDOGREL 75 MG: 75 TABLET, FILM COATED ORAL at 07:40

## 2017-01-01 RX ADMIN — DIVALPROEX SODIUM 1500 MG: 500 TABLET, DELAYED RELEASE ORAL at 19:53

## 2017-01-01 RX ADMIN — IPRATROPIUM BROMIDE AND ALBUTEROL SULFATE 3 ML: .5; 3 SOLUTION RESPIRATORY (INHALATION) at 16:53

## 2017-01-01 RX ADMIN — BUDESONIDE 0.25 MG: 0.25 INHALANT RESPIRATORY (INHALATION) at 08:10

## 2017-01-01 RX ADMIN — METHYLPREDNISOLONE SODIUM SUCCINATE 62.5 MG: 125 INJECTION, POWDER, FOR SOLUTION INTRAMUSCULAR; INTRAVENOUS at 09:26

## 2017-01-01 RX ADMIN — ALBUTEROL SULFATE 2.5 MG: 2.5 SOLUTION RESPIRATORY (INHALATION) at 22:46

## 2017-01-01 RX ADMIN — GABAPENTIN 1800 MG: 600 TABLET, FILM COATED ORAL at 22:16

## 2017-01-01 RX ADMIN — BUDESONIDE 0.25 MG: 0.25 INHALANT RESPIRATORY (INHALATION) at 20:08

## 2017-01-01 RX ADMIN — DIVALPROEX SODIUM 1750 MG: 500 TABLET, DELAYED RELEASE ORAL at 12:59

## 2017-01-01 RX ADMIN — OXYCODONE HYDROCHLORIDE 5 MG: 5 TABLET ORAL at 20:54

## 2017-01-01 RX ADMIN — QUETIAPINE FUMARATE 150 MG: 100 TABLET, FILM COATED ORAL at 20:19

## 2017-01-01 RX ADMIN — LEVALBUTEROL TARTRATE 2 PUFF: 45 AEROSOL, METERED ORAL at 10:02

## 2017-01-01 RX ADMIN — IOPAMIDOL 75 ML: 755 INJECTION, SOLUTION INTRAVENOUS at 23:57

## 2017-01-01 RX ADMIN — METHYLPREDNISOLONE SODIUM SUCCINATE 62.5 MG: 125 INJECTION, POWDER, FOR SOLUTION INTRAMUSCULAR; INTRAVENOUS at 09:19

## 2017-01-01 RX ADMIN — QUETIAPINE FUMARATE 200 MG: 200 TABLET, FILM COATED ORAL at 20:55

## 2017-01-01 RX ADMIN — POTASSIUM CHLORIDE 10 MEQ: 750 TABLET, FILM COATED, EXTENDED RELEASE ORAL at 08:46

## 2017-01-01 RX ADMIN — GABAPENTIN 1800 MG: 600 TABLET, FILM COATED ORAL at 21:19

## 2017-01-01 RX ADMIN — POTASSIUM CHLORIDE 10 MEQ: 750 TABLET, FILM COATED, EXTENDED RELEASE ORAL at 08:00

## 2017-01-01 RX ADMIN — IPRATROPIUM BROMIDE AND ALBUTEROL SULFATE 3 ML: .5; 3 SOLUTION RESPIRATORY (INHALATION) at 19:18

## 2017-01-01 RX ADMIN — DIVALPROEX SODIUM 1750 MG: 250 TABLET, DELAYED RELEASE ORAL at 08:51

## 2017-01-01 RX ADMIN — OSELTAMIVIR PHOSPHATE 150 MG: 75 CAPSULE ORAL at 20:52

## 2017-01-01 RX ADMIN — FUROSEMIDE 20 MG: 20 TABLET ORAL at 12:53

## 2017-01-01 RX ADMIN — CEFTRIAXONE SODIUM 1 G: 1 INJECTION, POWDER, FOR SOLUTION INTRAMUSCULAR; INTRAVENOUS at 18:14

## 2017-01-01 RX ADMIN — ENOXAPARIN SODIUM 40 MG: 40 INJECTION SUBCUTANEOUS at 21:40

## 2017-01-01 RX ADMIN — IPRATROPIUM BROMIDE AND ALBUTEROL SULFATE 3 ML: .5; 3 SOLUTION RESPIRATORY (INHALATION) at 19:03

## 2017-01-01 RX ADMIN — SODIUM CHLORIDE: 9 INJECTION, SOLUTION INTRAVENOUS at 21:16

## 2017-01-01 RX ADMIN — CLOPIDOGREL 75 MG: 75 TABLET, FILM COATED ORAL at 12:52

## 2017-01-01 RX ADMIN — IPRATROPIUM BROMIDE AND ALBUTEROL SULFATE 3 ML: .5; 3 SOLUTION RESPIRATORY (INHALATION) at 08:01

## 2017-01-01 RX ADMIN — VANCOMYCIN HYDROCHLORIDE 250 MG: 100 INJECTION, POWDER, LYOPHILIZED, FOR SOLUTION INTRAVENOUS at 09:29

## 2017-01-01 RX ADMIN — DIVALPROEX SODIUM 1750 MG: 500 TABLET, DELAYED RELEASE ORAL at 07:41

## 2017-01-01 RX ADMIN — BACLOFEN 20 MG: 10 TABLET ORAL at 14:18

## 2017-01-01 RX ADMIN — GABAPENTIN 600 MG: 600 TABLET, FILM COATED ORAL at 20:55

## 2017-01-01 RX ADMIN — ALBUTEROL SULFATE 2.5 MG: 2.5 SOLUTION RESPIRATORY (INHALATION) at 05:19

## 2017-01-01 RX ADMIN — CARBAMAZEPINE 200 MG: 200 TABLET ORAL at 09:14

## 2017-01-01 RX ADMIN — NICOTINE 1 PATCH: 21 PATCH, EXTENDED RELEASE TRANSDERMAL at 07:36

## 2017-01-01 RX ADMIN — ENOXAPARIN SODIUM 40 MG: 40 INJECTION SUBCUTANEOUS at 22:09

## 2017-01-01 RX ADMIN — SIMVASTATIN 40 MG: 40 TABLET, FILM COATED ORAL at 21:16

## 2017-01-01 RX ADMIN — SODIUM CHLORIDE 1000 ML: 9 INJECTION, SOLUTION INTRAVENOUS at 18:51

## 2017-01-01 RX ADMIN — BUDESONIDE 0.25 MG: 0.25 INHALANT RESPIRATORY (INHALATION) at 07:27

## 2017-01-01 RX ADMIN — ENOXAPARIN SODIUM 40 MG: 40 INJECTION SUBCUTANEOUS at 07:40

## 2017-01-01 RX ADMIN — ALBUTEROL SULFATE 2.5 MG: 2.5 SOLUTION RESPIRATORY (INHALATION) at 03:30

## 2017-01-01 RX ADMIN — ENOXAPARIN SODIUM 40 MG: 40 INJECTION SUBCUTANEOUS at 19:54

## 2017-01-01 RX ADMIN — GABAPENTIN 600 MG: 600 TABLET, FILM COATED ORAL at 07:41

## 2017-01-01 RX ADMIN — LEVETIRACETAM 1500 MG: 1500 INJECTION, SOLUTION INTRAVENOUS at 04:27

## 2017-01-01 RX ADMIN — SODIUM CHLORIDE 91 ML: 9 INJECTION, SOLUTION INTRAVENOUS at 23:57

## 2017-01-01 RX ADMIN — VANCOMYCIN HYDROCHLORIDE 250 MG: 100 INJECTION, POWDER, LYOPHILIZED, FOR SOLUTION INTRAVENOUS at 13:41

## 2017-01-01 RX ADMIN — BACLOFEN 20 MG: 10 TABLET ORAL at 09:14

## 2017-01-01 RX ADMIN — CARBAMAZEPINE 200 MG: 200 TABLET ORAL at 21:37

## 2017-01-01 RX ADMIN — METHYLPREDNISOLONE SODIUM SUCCINATE 62.5 MG: 125 INJECTION, POWDER, FOR SOLUTION INTRAMUSCULAR; INTRAVENOUS at 22:08

## 2017-01-01 RX ADMIN — SODIUM CHLORIDE 61 ML: 9 INJECTION, SOLUTION INTRAVENOUS at 19:32

## 2017-01-01 RX ADMIN — OXYCODONE HYDROCHLORIDE 5 MG: 5 TABLET ORAL at 14:24

## 2017-01-01 RX ADMIN — OSELTAMIVIR PHOSPHATE 150 MG: 75 CAPSULE ORAL at 09:14

## 2017-01-01 RX ADMIN — IPRATROPIUM BROMIDE AND ALBUTEROL SULFATE 3 ML: .5; 3 SOLUTION RESPIRATORY (INHALATION) at 07:31

## 2017-01-01 RX ADMIN — IPRATROPIUM BROMIDE AND ALBUTEROL SULFATE 3 ML: .5; 3 SOLUTION RESPIRATORY (INHALATION) at 14:44

## 2017-01-01 RX ADMIN — IPRATROPIUM BROMIDE AND ALBUTEROL SULFATE 3 ML: .5; 3 SOLUTION RESPIRATORY (INHALATION) at 12:06

## 2017-01-01 RX ADMIN — OXYCODONE HYDROCHLORIDE 5 MG: 5 TABLET ORAL at 07:50

## 2017-01-01 RX ADMIN — CLOPIDOGREL 75 MG: 75 TABLET, FILM COATED ORAL at 09:22

## 2017-01-01 RX ADMIN — CLOPIDOGREL 75 MG: 75 TABLET, FILM COATED ORAL at 09:14

## 2017-01-01 RX ADMIN — LEVETIRACETAM 1000 MG: 500 TABLET ORAL at 20:55

## 2017-01-01 RX ADMIN — LEVETIRACETAM 500 MG: 500 TABLET ORAL at 09:14

## 2017-01-01 RX ADMIN — OSELTAMIVIR PHOSPHATE 150 MG: 75 CAPSULE ORAL at 08:00

## 2017-01-01 RX ADMIN — LEVETIRACETAM 500 MG: 500 TABLET ORAL at 08:00

## 2017-01-01 RX ADMIN — TAMSULOSIN HYDROCHLORIDE 0.4 MG: 0.4 CAPSULE ORAL at 07:41

## 2017-01-01 RX ADMIN — LEVETIRACETAM 1000 MG: 500 TABLET, FILM COATED ORAL at 09:22

## 2017-01-01 RX ADMIN — BUDESONIDE 0.25 MG: 0.25 INHALANT RESPIRATORY (INHALATION) at 09:20

## 2017-01-01 RX ADMIN — BACLOFEN 20 MG: 20 TABLET ORAL at 07:41

## 2017-01-01 RX ADMIN — SIMVASTATIN 40 MG: 40 TABLET, FILM COATED ORAL at 21:20

## 2017-01-01 RX ADMIN — TAMSULOSIN HYDROCHLORIDE 0.4 MG: 0.4 CAPSULE ORAL at 09:21

## 2017-01-01 RX ADMIN — LEVETIRACETAM 1000 MG: 500 TABLET ORAL at 07:40

## 2017-01-01 RX ADMIN — VANCOMYCIN HYDROCHLORIDE 250 MG: 100 INJECTION, POWDER, LYOPHILIZED, FOR SOLUTION INTRAVENOUS at 23:57

## 2017-01-01 RX ADMIN — OXYCODONE HYDROCHLORIDE 10 MG: 10 TABLET ORAL at 23:58

## 2017-01-01 RX ADMIN — VANCOMYCIN HYDROCHLORIDE 250 MG: 100 INJECTION, POWDER, LYOPHILIZED, FOR SOLUTION INTRAVENOUS at 20:53

## 2017-01-01 RX ADMIN — IOPAMIDOL 82 ML: 755 INJECTION, SOLUTION INTRAVENOUS at 19:32

## 2017-01-01 RX ADMIN — VANCOMYCIN HYDROCHLORIDE 250 MG: 100 INJECTION, POWDER, LYOPHILIZED, FOR SOLUTION INTRAVENOUS at 08:07

## 2017-01-01 RX ADMIN — IPRATROPIUM BROMIDE AND ALBUTEROL SULFATE 3 ML: .5; 3 SOLUTION RESPIRATORY (INHALATION) at 00:18

## 2017-01-01 RX ADMIN — TAMSULOSIN HYDROCHLORIDE 0.4 MG: 0.4 CAPSULE ORAL at 08:45

## 2017-01-01 RX ADMIN — CARBAMAZEPINE 200 MG: 200 TABLET ORAL at 20:52

## 2017-01-01 RX ADMIN — QUETIAPINE FUMARATE 150 MG: 100 TABLET, FILM COATED ORAL at 08:45

## 2017-01-01 RX ADMIN — NICOTINE 1 PATCH: 21 PATCH, EXTENDED RELEASE TRANSDERMAL at 08:33

## 2017-01-01 RX ADMIN — CARBAMAZEPINE 200 MG: 200 TABLET ORAL at 07:41

## 2017-01-01 RX ADMIN — QUETIAPINE FUMARATE 150 MG: 100 TABLET, FILM COATED ORAL at 07:59

## 2017-01-01 RX ADMIN — AMLODIPINE BESYLATE 10 MG: 10 TABLET ORAL at 09:14

## 2017-01-01 RX ADMIN — DIVALPROEX SODIUM 1500 MG: 500 TABLET, DELAYED RELEASE ORAL at 21:36

## 2017-01-01 RX ADMIN — DEXAMETHASONE SODIUM PHOSPHATE 10 MG: 10 INJECTION, SOLUTION INTRAMUSCULAR; INTRAVENOUS at 19:17

## 2017-01-01 RX ADMIN — IPRATROPIUM BROMIDE AND ALBUTEROL SULFATE 3 ML: .5; 3 SOLUTION RESPIRATORY (INHALATION) at 12:14

## 2017-01-01 RX ADMIN — CITALOPRAM HYDROBROMIDE 20 MG: 20 TABLET ORAL at 08:46

## 2017-01-01 RX ADMIN — TAMSULOSIN HYDROCHLORIDE 0.4 MG: 0.4 CAPSULE ORAL at 09:14

## 2017-01-01 RX ADMIN — DIVALPROEX SODIUM 1750 MG: 250 TABLET, DELAYED RELEASE ORAL at 08:00

## 2017-01-01 RX ADMIN — IPRATROPIUM BROMIDE AND ALBUTEROL SULFATE 3 ML: .5; 3 SOLUTION RESPIRATORY (INHALATION) at 13:21

## 2017-01-01 RX ADMIN — OXYCODONE HYDROCHLORIDE 2.5 MG: 5 TABLET ORAL at 21:37

## 2017-01-01 RX ADMIN — OXYCODONE HYDROCHLORIDE 2.5 MG: 5 TABLET ORAL at 07:57

## 2017-01-01 RX ADMIN — VANCOMYCIN HYDROCHLORIDE 250 MG: 100 INJECTION, POWDER, LYOPHILIZED, FOR SOLUTION INTRAVENOUS at 15:41

## 2017-01-01 RX ADMIN — DIVALPROEX SODIUM 250 MG: 250 TABLET, DELAYED RELEASE ORAL at 09:04

## 2017-01-01 RX ADMIN — CARBAMAZEPINE 200 MG: 200 TABLET ORAL at 09:24

## 2017-01-01 RX ADMIN — GABAPENTIN 600 MG: 300 CAPSULE ORAL at 11:14

## 2017-01-01 RX ADMIN — FUROSEMIDE 20 MG: 20 TABLET ORAL at 07:57

## 2017-01-01 RX ADMIN — BUDESONIDE 0.25 MG: 0.25 INHALANT RESPIRATORY (INHALATION) at 20:09

## 2017-01-01 RX ADMIN — ENOXAPARIN SODIUM 40 MG: 40 INJECTION SUBCUTANEOUS at 20:22

## 2017-01-01 RX ADMIN — CITALOPRAM HYDROBROMIDE 20 MG: 20 TABLET ORAL at 08:00

## 2017-01-01 RX ADMIN — CARBAMAZEPINE 200 MG: 200 TABLET ORAL at 13:02

## 2017-01-01 RX ADMIN — SIMVASTATIN 40 MG: 40 TABLET, FILM COATED ORAL at 22:17

## 2017-01-01 RX ADMIN — QUETIAPINE FUMARATE 200 MG: 200 TABLET, FILM COATED ORAL at 12:53

## 2017-01-01 RX ADMIN — SENNOSIDES AND DOCUSATE SODIUM 1 TABLET: 8.6; 5 TABLET ORAL at 08:44

## 2017-01-01 RX ADMIN — METHYLPREDNISOLONE SODIUM SUCCINATE 62.5 MG: 125 INJECTION, POWDER, FOR SOLUTION INTRAMUSCULAR; INTRAVENOUS at 20:21

## 2017-01-01 RX ADMIN — BUDESONIDE 0.25 MG: 0.25 INHALANT RESPIRATORY (INHALATION) at 07:36

## 2017-01-01 RX ADMIN — OSELTAMIVIR PHOSPHATE 75 MG: 75 CAPSULE ORAL at 06:19

## 2017-01-01 RX ADMIN — LEVETIRACETAM 500 MG: 500 TABLET ORAL at 08:44

## 2017-01-01 RX ADMIN — IPRATROPIUM BROMIDE AND ALBUTEROL SULFATE 3 ML: .5; 3 SOLUTION RESPIRATORY (INHALATION) at 08:51

## 2017-01-01 RX ADMIN — IPRATROPIUM BROMIDE AND ALBUTEROL SULFATE 3 ML: .5; 3 SOLUTION RESPIRATORY (INHALATION) at 14:54

## 2017-01-01 RX ADMIN — CITALOPRAM HYDROBROMIDE 20 MG: 20 TABLET ORAL at 12:53

## 2017-01-01 RX ADMIN — DIVALPROEX SODIUM 250 MG: 250 TABLET, DELAYED RELEASE ORAL at 21:37

## 2017-01-01 RX ADMIN — SODIUM CHLORIDE 1000 ML: 9 INJECTION, SOLUTION INTRAVENOUS at 16:36

## 2017-01-01 RX ADMIN — TAMSULOSIN HYDROCHLORIDE 0.4 MG: 0.4 CAPSULE ORAL at 07:57

## 2017-01-01 RX ADMIN — OXYCODONE HYDROCHLORIDE 2.5 MG: 5 TABLET ORAL at 19:53

## 2017-01-01 RX ADMIN — HALOPERIDOL LACTATE 2 MG: 5 INJECTION, SOLUTION INTRAMUSCULAR at 21:37

## 2017-01-01 RX ADMIN — CLOPIDOGREL 75 MG: 75 TABLET, FILM COATED ORAL at 08:46

## 2017-01-01 RX ADMIN — BACLOFEN 20 MG: 20 TABLET ORAL at 13:02

## 2017-01-01 RX ADMIN — QUETIAPINE FUMARATE 150 MG: 100 TABLET, FILM COATED ORAL at 09:14

## 2017-01-01 RX ADMIN — DIVALPROEX SODIUM 1750 MG: 250 TABLET, DELAYED RELEASE ORAL at 20:17

## 2017-01-01 RX ADMIN — IPRATROPIUM BROMIDE AND ALBUTEROL SULFATE 3 ML: .5; 3 SOLUTION RESPIRATORY (INHALATION) at 07:32

## 2017-01-01 RX ADMIN — DIVALPROEX SODIUM 250 MG: 250 TABLET, DELAYED RELEASE ORAL at 19:52

## 2017-01-01 RX ADMIN — PREDNISONE 40 MG: 20 TABLET ORAL at 17:35

## 2017-01-01 RX ADMIN — BACLOFEN 20 MG: 10 TABLET ORAL at 08:46

## 2017-01-01 RX ADMIN — BACLOFEN 20 MG: 10 TABLET ORAL at 20:19

## 2017-01-01 RX ADMIN — IPRATROPIUM BROMIDE AND ALBUTEROL SULFATE 3 ML: .5; 3 SOLUTION RESPIRATORY (INHALATION) at 08:10

## 2017-01-01 RX ADMIN — POTASSIUM CHLORIDE 10 MEQ: 750 TABLET, FILM COATED, EXTENDED RELEASE ORAL at 09:14

## 2017-01-01 RX ADMIN — AMLODIPINE BESYLATE 10 MG: 10 TABLET ORAL at 12:54

## 2017-01-01 RX ADMIN — BUDESONIDE 0.25 MG: 0.25 INHALANT RESPIRATORY (INHALATION) at 08:01

## 2017-01-01 RX ADMIN — SIMVASTATIN 40 MG: 40 TABLET, FILM COATED ORAL at 21:37

## 2017-01-01 RX ADMIN — IPRATROPIUM BROMIDE AND ALBUTEROL SULFATE 3 ML: .5; 3 SOLUTION RESPIRATORY (INHALATION) at 09:20

## 2017-01-01 RX ADMIN — SODIUM CHLORIDE: 9 INJECTION, SOLUTION INTRAVENOUS at 05:28

## 2017-01-01 RX ADMIN — FUROSEMIDE 20 MG: 20 TABLET ORAL at 08:46

## 2017-01-01 RX ADMIN — SIMVASTATIN 40 MG: 40 TABLET, FILM COATED ORAL at 20:57

## 2017-01-01 RX ADMIN — NICOTINE 1 PATCH: 21 PATCH, EXTENDED RELEASE TRANSDERMAL at 20:16

## 2017-01-01 RX ADMIN — QUETIAPINE FUMARATE 200 MG: 200 TABLET, FILM COATED ORAL at 07:40

## 2017-01-01 RX ADMIN — BACLOFEN 20 MG: 10 TABLET ORAL at 08:00

## 2017-01-01 RX ADMIN — CITALOPRAM HYDROBROMIDE 20 MG: 20 TABLET ORAL at 07:40

## 2017-01-01 RX ADMIN — LEVETIRACETAM 500 MG: 500 TABLET ORAL at 20:52

## 2017-01-01 RX ADMIN — VANCOMYCIN HYDROCHLORIDE 250 MG: 100 INJECTION, POWDER, LYOPHILIZED, FOR SOLUTION INTRAVENOUS at 12:19

## 2017-01-01 RX ADMIN — BUDESONIDE 0.25 MG: 0.25 INHALANT RESPIRATORY (INHALATION) at 20:44

## 2017-01-01 RX ADMIN — CARBAMAZEPINE 200 MG: 200 TABLET ORAL at 14:08

## 2017-01-01 RX ADMIN — BACLOFEN 20 MG: 20 TABLET ORAL at 11:14

## 2017-01-01 RX ADMIN — TAMSULOSIN HYDROCHLORIDE 0.4 MG: 0.4 CAPSULE ORAL at 08:00

## 2017-01-01 ASSESSMENT — ACTIVITIES OF DAILY LIVING (ADL)
BATHING: 3-->ASSISTIVE EQUIPMENT AND PERSON
BATHING: 3-->ASSISTIVE EQUIPMENT AND PERSON
AMBULATION: 4-->COMPLETELY DEPENDENT
ADLS_ACUITY_SCORE: 37
ADLS_ACUITY_SCORE: 36
RETIRED_COMMUNICATION: 2-->DIFFICULTY UNDERSTANDING AND SPEAKING (NOT RELATED TO LANGUAGE BARRIER)
COGNITION: 2 - DIFFICULTY WITH ORGANIZING THOUGHTS
SWALLOWING: 0-->SWALLOWS FOODS/LIQUIDS WITHOUT DIFFICULTY
DRESS: 2-->ASSISTIVE PERSON
BATHING: 3-->ASSISTIVE EQUIPMENT AND PERSON
COGNITION: 2 - DIFFICULTY WITH ORGANIZING THOUGHTS
BATHING: 4 - COMPLETELY DEPENDENT
AMBULATION: 0 - INDEPENDENT
TRANSFERRING: 4-->COMPLETELY DEPENDENT
TOILETING: 3-->ASSISTIVE EQUIPMENT AND PERSON
COMMUNICATION: 2 - DIFFICULTY UNDERSTANDING AND SPEAKING (NOT RELATED TO LANGUAGE BARRIER)
TRANSFERRING: 4-->COMPLETELY DEPENDENT
DRESS: 2-->ASSISTIVE PERSON
FALL_HISTORY_WITHIN_LAST_SIX_MONTHS: YES
RETIRED_COMMUNICATION: 0-->UNDERSTANDS/COMMUNICATES WITHOUT DIFFICULTY
TOILETING: 3-->ASSISTIVE EQUIPMENT AND PERSON
EATING: 4 - COMPLETELY DEPENDENT
ADLS_ACUITY_SCORE: 37
ADLS_ACUITY_SCORE: 36
TRANSFERRING: 4 - COMPLETELY DEPENDENT
SWALLOWING: 0-->SWALLOWS FOODS/LIQUIDS WITHOUT DIFFICULTY
WHICH_OF_THE_ABOVE_FUNCTIONAL_RISKS_HAD_A_RECENT_ONSET_OR_CHANGE?: FALL HISTORY
SWALLOWING: 2 - DIFFICULTY SWALLOWING LIQUIDS/FOODS
RETIRED_EATING: 2-->ASSISTIVE PERSON
AMBULATION: 4-->COMPLETELY DEPENDENT
DRESS: 4 - COMPLETELY DEPENDENT
RETIRED_EATING: 3-->ASSISTIVE EQUIPMENT AND PERSON
TOILETING: 3-->ASSISTIVE EQUIPMENT AND PERSON
AMBULATION: 4-->COMPLETELY DEPENDENT
FALL_HISTORY_WITHIN_LAST_SIX_MONTHS: NO
RETIRED_EATING: 2-->ASSISTIVE PERSON
DRESS: 3-->ASSISTIVE EQUIPMENT AND PERSON
TOILETING: 4 - COMPLETELY DEPENDENT
ADLS_ACUITY_SCORE: 37

## 2017-01-01 ASSESSMENT — ENCOUNTER SYMPTOMS: WHEEZING: 1

## 2017-01-01 ASSESSMENT — VISUAL ACUITY
OU: BASELINE

## 2017-01-02 ENCOUNTER — TELEPHONE (OUTPATIENT)
Dept: INTERNAL MEDICINE | Facility: CLINIC | Age: 57
End: 2017-01-02

## 2017-01-02 NOTE — TELEPHONE ENCOUNTER
More lethargic, flinching, leaning on Right more than normal   Something is off, VSS    Can they do labs or what?    Please advise

## 2017-01-03 ENCOUNTER — TRANSFERRED RECORDS (OUTPATIENT)
Dept: HEALTH INFORMATION MANAGEMENT | Facility: CLINIC | Age: 57
End: 2017-01-03

## 2017-01-03 ENCOUNTER — TELEPHONE (OUTPATIENT)
Dept: INTERNAL MEDICINE | Facility: CLINIC | Age: 57
End: 2017-01-03

## 2017-01-03 NOTE — TELEPHONE ENCOUNTER
Forms received from [facility] for review and signature.  Put in Dr. Krishnan's in basket.

## 2017-01-05 ENCOUNTER — TELEPHONE (OUTPATIENT)
Dept: INTERNAL MEDICINE | Facility: CLINIC | Age: 57
End: 2017-01-05

## 2017-01-05 DIAGNOSIS — G89.4 CHRONIC PAIN SYNDROME: Primary | ICD-10-CM

## 2017-01-05 NOTE — TELEPHONE ENCOUNTER
Gilma from nursing [facility] calling.  Pt will run out of Oxycodone by 12:00p 1-6-17.    Routed to partner.    Please advise, thanks.

## 2017-01-05 NOTE — TELEPHONE ENCOUNTER
Per [facility], since pt is in a skilled nursing home, ok to fax written rx for Oxycodone to pharm.    No signed CSA form in chart.    Please advise, thanks.

## 2017-01-05 NOTE — TELEPHONE ENCOUNTER
Oxycodone      Last Written Prescription Date:  10/10/16  Last Fill Quantity: 360,   # refills: 0  Last Office Visit with [facility] prescribing provider: 11/08/16  Future Office visit:    Next 5 appointments (look out 90 days)     Feb 03, 2017 10:40 AM   SHORT with Len Krishnan MD   [facility]    [address] [facility] [address]   [phone]                   Routing refill request to provider for review/approval because:  Drug not on the [facility] refill protocol or controlled substance

## 2017-01-10 ENCOUNTER — HOSPITAL ENCOUNTER (EMERGENCY)
Facility: CLINIC | Age: 57
Discharge: HOME | End: 2017-01-10
Attending: EMERGENCY MEDICINE | Admitting: EMERGENCY MEDICINE
Payer: MEDICARE

## 2017-01-10 VITALS
HEART RATE: 71 BPM | SYSTOLIC BLOOD PRESSURE: 93 MMHG | WEIGHT: 165 LBS | DIASTOLIC BLOOD PRESSURE: 77 MMHG | RESPIRATION RATE: 16 BRPM | BODY MASS INDEX: 26.64 KG/M2 | TEMPERATURE: 98.8 F | OXYGEN SATURATION: 97 %

## 2017-01-10 DIAGNOSIS — R45.1 AGITATION: ICD-10-CM

## 2017-01-10 PROCEDURE — 99282 EMERGENCY DEPT VISIT SF MDM: CPT

## 2017-01-10 ASSESSMENT — ENCOUNTER SYMPTOMS: AGITATION: 1

## 2017-01-10 NOTE — ED AVS SNAPSHOT
Emergency Department    [address] [place] [address]    Phone:  [phone]    Fax:  [phone]                                       David Dawn   MRN: [id number]    Department:   Emergency Department   Date of Visit:  1/10/2017           After Visit Summary Signature Page     I have received my discharge instructions, and my questions have been answered. I have discussed any challenges I see with this plan with the nurse or doctor.    ..........................................................................................................................................  Patient/Patient Representative Signature      ..........................................................................................................................................  Patient Representative Print Name and Relationship to Patient    ..................................................               ................................................  Date                                            Time    ..........................................................................................................................................  Reviewed by Signature/Title    ...................................................              ..............................................  Date                                                            Time

## 2017-01-10 NOTE — ED AVS SNAPSHOT
Emergency Department    [address] [facility] [address]    Phone:  [phone]    Fax:  [phone]                                       David Dawn   MRN: [id number]    Department:   Emergency Department   Date of Visit:  1/10/2017           Patient Information     Date Of Birth          1960        Your diagnoses for this visit were:     Agitation        You were seen by Atif Acuña MD.      Follow-up Information     Follow up with Len Krishnan MD.    Specialty:  Internal Medicine    Contact information:    [facility]  [address]  [facility] [address]  [phone]        Future Appointments        Provider Department Dept Phone Center    2/3/2017 10:40 AM Len Krishnan MD [facility] [phone] [place]      24 Hour Appointment Hotline       To make an appointment at any [facility] call [phone] ([phone]). If you don't have a family doctor or clinic, we will help you find one. [facility] are conveniently located to serve the needs of you and your family.             Review of your medicines      Our records show that you are taking the medicines listed below. If these are incorrect, please call your family doctor or clinic.        Dose / Directions Last dose taken    * albuterol 108 (90 BASE) MCG/ACT Inhaler   Commonly known as:  PROAIR HFA/PROVENTIL HFA/VENTOLIN HFA   Dose:  2 puff   Quantity:  1 Inhaler        Inhale 2 puffs into the lungs 2 times daily as needed for shortness of breath / dyspnea or wheezing   Refills:  5        * albuterol 108 (90 BASE) MCG/ACT Inhaler   Commonly known as:  PROAIR HFA/PROVENTIL HFA/VENTOLIN HFA   Dose:  2 puff   Indication:  Chronic Obstructive Lung Disease        Inhale 2 puffs into the lungs 2 times daily At 1200 and 1600   Refills:  0        amLODIPine 10 MG tablet   Commonly known as:  NORVASC   Dose:  10 mg   Quantity:  30 tablet        Take 1 tablet (10 mg) by  mouth daily   Refills:  1        baclofen 20 MG tablet   Commonly known as:  LIORESAL   Dose:  20 mg        Take 20 mg by mouth 3 times daily.   Refills:  0        budesonide 0.25 MG/2ML neb solution   Commonly known as:  PULMICORT   Dose:  0.25 mg   Quantity:  360 mL        Take 2 mLs (0.25 mg) by nebulization 2 times daily 0800 and 1400   Refills:  1        carBAMazepine 200 MG tablet   Commonly known as:  TEGretol   Dose:  200 mg        Take 1 tablet (200 mg) by mouth 3 times daily   Refills:  0        citalopram 20 MG tablet   Commonly known as:  celeXA   Dose:  20 mg        Take 20 mg by mouth daily   Refills:  0        clopidogrel 75 MG tablet   Commonly known as:  PLAVIX   Dose:  75 mg   Quantity:  90 tablet        Take 1 tablet (75 mg) by mouth daily   Refills:  3        Colloidal Oatmeal 1 % Crea   Quantity:  1 Tube        Externally apply topically 2 times daily   Refills:  3        divalproex 500 MG EC tablet   Commonly known as:  DEPAKOTE   Dose:  1500 mg   Quantity:  180 tablet        Take 3 tablets by mouth every 12 hours.   Refills:  2        GABAPENTIN PO   Dose:  1400 mg        Take 1,400 mg by mouth every evening   Refills:  0        * ipratropium - albuterol 0.5 mg/2.5 mg/3 mL 0.5-2.5 (3) MG/3ML neb solution   Commonly known as:  DUONEB   Dose:  1 vial        Take 1 vial by nebulization daily as needed for shortness of breath / dyspnea or wheezing   Refills:  0        * ipratropium - albuterol 0.5 mg/2.5 mg/3 mL 0.5-2.5 (3) MG/3ML neb solution   Commonly known as:  DUONEB   Dose:  1 vial        Take 1 vial by nebulization 3 times daily   Refills:  0        * LASIX PO   Dose:  10 mg        Take 10 mg by mouth daily   Refills:  0        * furosemide 20 MG tablet   Commonly known as:  LASIX   Dose:  20 mg   Quantity:  60 tablet        Take 1 tablet (20 mg) by mouth daily   Refills:  3        levalbuterol 45 MCG/ACT Inhaler   Commonly known as:  XOPENEX HFA   Dose:  2 puff   Quantity:  1 Inhaler         Inhale 2 puffs into the lungs every 6 hours as needed for shortness of breath / dyspnea or wheezing   Refills:  1        levETIRAcetam 500 MG tablet   Commonly known as:  KEPPRA   Dose:  500 mg   Quantity:  60 tablet        Take 1 tablet (500 mg) by mouth 2 times daily   Refills:  5        lidocaine 2 % topical gel   Commonly known as:  XYLOCAINE   Quantity:  30 mL        Apply to anus qid as needed for pain   Refills:  3        nebulizer/adult mask Kit   Dose:  1 Device   Quantity:  1 kit        1 Device 4 times daily.   Refills:  3        niacin 500 MG CR capsule   Dose:  500 mg   Quantity:  30 capsule        Take 1 capsule (500 mg) by mouth At Bedtime   Refills:  5        order for DME   Quantity:  1 each        Equipment being ordered: shower benck chair   Refills:  0        * oxyCODONE HCl 5 MG Taba   Commonly known as:  OXECTA   Dose:  5 mg   Quantity:  360 each        Take 5 mg by mouth every 6 hours as needed   Refills:  0        * oxyCODONE HCl 5 MG Taba   Commonly known as:  OXECTA   Dose:  5 mg   Quantity:  360 each        Take 5 mg by mouth 4 times daily   Refills:  0        potassium chloride 10 MEQ tablet   Commonly known as:  K-TAB,KLOR-CON   Dose:  10 mEq        Take 10 mEq by mouth every morning   Refills:  0        SEROQUEL PO   Dose:  150 mg        Take 150 mg by mouth 2 times daily   Refills:  0        simvastatin 40 MG tablet   Commonly known as:  ZOCOR   Dose:  40 mg   Quantity:  90 tablet        Take 1 tablet (40 mg) by mouth At Bedtime   Refills:  3        tamsulosin 0.4 MG capsule   Commonly known as:  FLOMAX   Dose:  0.4 mg   Quantity:  60 capsule        Take 1 capsule (0.4 mg) by mouth daily   Refills:  6        TYLENOL PO   Dose:  650 mg        Take 650 mg by mouth every 4 hours as needed for mild pain or fever   Refills:  0        vitamin D 44301 UNIT capsule   Commonly known as:  ERGOCALCIFEROL   Dose:  14260 Units        Take 50,000 Units by mouth once a week On Monday   Refills:  0         VOLTAREN 1 % Gel topical gel   Generic drug:  diclofenac        Place onto the skin 2 times daily as needed Apply 4 grams to knees or 2 grams to hands   Refills:  0        * Notice:  This list has 8 medication(s) that are the same as other medications prescribed for you. Read the directions carefully, and ask your doctor or other care provider to review them with you.            Orders Needing Specimen Collection     None      Pending Results     No orders found from 1/9/2017 to 1/11/2017.            Pending Culture Results     No orders found from 1/9/2017 to 1/11/2017.             Test Results from your hospital stay            Clinical Quality Measure: Blood Pressure Screening     Your blood pressure was checked while you were in the emergency department today. The last reading we obtained was  BP: 105/77 mmHg . Please read the guidelines below about what these numbers mean and what you should do about them.  If your systolic blood pressure (the top number) is less than 120 and your diastolic blood pressure (the bottom number) is less than 80, then your blood pressure is normal. There is nothing more that you need to do about it.  If your systolic blood pressure (the top number) is 120-139 or your diastolic blood pressure (the bottom number) is 80-89, your blood pressure may be higher than it should be. You should have your blood pressure rechecked within a year by a primary care provider.  If your systolic blood pressure (the top number) is 140 or greater or your diastolic blood pressure (the bottom number) is 90 or greater, you may have high blood pressure. High blood pressure is treatable, but if left untreated over time it can put you at risk for heart attack, stroke, or kidney failure. You should have your blood pressure rechecked by a primary care provider within the next 4 weeks.  If your provider in the emergency department today gave you specific instructions to follow-up with your doctor or  "provider even sooner than that, you should follow that instruction and not wait for up to 4 weeks for your follow-up visit.        Thank you for choosing [facility]       Thank you for choosing [facility] for your care. Our goal is always to provide you with excellent care. Hearing back from our patients is one way we can continue to improve our services. Please take a few minutes to complete the written survey that you may receive in the mail after you visit with us. Thank you!        [email or web address] Information     [employer] lets you send messages to your doctor, view your test results, renew your prescriptions, schedule appointments and more. To sign up, go to [email or web address] . Click on \"Log in\" on the left side of the screen, which will take you to the Welcome page. Then click on \"Sign up Now\" on the right side of the page.     You will be asked to enter the access code listed below, as well as some personal information. Please follow the directions to create your username and password.     Your access code is: [email or web address]  Expires: 4/10/2017  3:07 PM     Your access code will  in 90 days. If you need help or a new code, please call your [facility] clinic or [phone].        Care EveryWhere ID     This is your Care EveryWhere ID. This could be used by other organizations to access your [facility] medical records  [id number]        After Visit Summary       This is your record. Keep this with you and show to your community pharmacist(s) and doctor(s) at your next visit.                  "

## 2017-01-10 NOTE — ED NOTES
Bed: formerly [facility]  Expected date: 1/10/17  Expected time: 1:01 PM  Means of arrival: Ambulance  Comments:  Brianne 418 Psych eval 56 male
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
## 2017-01-24 ENCOUNTER — TELEPHONE (OUTPATIENT)
Dept: INTERNAL MEDICINE | Facility: CLINIC | Age: 57
End: 2017-01-24

## 2017-01-24 DIAGNOSIS — G89.4 CHRONIC PAIN SYNDROME: Primary | ICD-10-CM

## 2017-01-24 NOTE — TELEPHONE ENCOUNTER
Lorie, Nurse at [facility] left message. She states pt is having a lot of pain despite OT trying different wheelchair cushions for him.     Lorie is asking for referral to Pain clinic.     Pended.     Last OV 11/8/16.

## 2017-01-26 ENCOUNTER — TELEPHONE (OUTPATIENT)
Dept: PALLIATIVE MEDICINE | Facility: CLINIC | Age: 57
End: 2017-01-26

## 2017-01-26 NOTE — Clinical Note
January 26, 2017    David Dawn  [facility]  [address]  [facility] [address]    Dear David,                                                                 Welcome to the [facility] at the [facility]. We are located on the 6th floor, [address], of the [facility] located at [address], [place] [address]. For general parking, the [facility] [address] is the closest to our building.     Your appointment at the [facility] has been scheduled on February 10th at 2:00pm with Kaye pEperson MD.    At your first visit, you will meet your team of caregivers who will help you to develop pain management strategies that will last a lifetime. You will meet with our support staff to validate parking at a reduced rate, review your insurance information, and collect your co-payment if required by your insurance company. You will also meet with a medical pain specialist and care coordinator who will assess your pain and develop a plan of care for your successful pain rehabilitation. You should expect to spend 1-2 hours at your first visit with us. Usually, patients work with us for a period of 6-12 months, and eventually return to their primary doctor once their pain management has stabilized.      To help us make your visit go as smoothly as possible, please bring the following items with you on your visit:   Completed Pain Questionnaire enclosed in this packet.  If you do not bring the completed questionnaire, we may have to reschedule your appointment.  List of any medicines that you are currently taking or have been prescribed  Important NON-[facility] medical information such as medical records or tests results (X-rays, or laboratory tests)  Your health insurance card  Financial resources to cover your co-payment or balance due at the time of service (cash, personal check, Visa, and MasterCard are  acceptable methods of payment)     Due to the demand for new patient evaluations, you must notify the scheduling department 48 hours in advance if you are not able to keep this appointment.  Failure to do so could affect your ability to reschedule with our clinic. Please do not assume that you will  receive any prescription medications at your first visit.    Please call [phone] with any questions regarding your appointment.  We look forward to meeting you and working to address your health care needs.       Sincerely,    [facility]

## 2017-01-26 NOTE — TELEPHONE ENCOUNTER
Pain Management Center Referral      1. Confirmed address with patient? Yes  2. Confirmed phone number with patient? Yes  3. Confirmed referring provider? Yes  4. Is the PCP the same as the referring provider? Yes  5. Has the patient been to any previous pain clinics? No  (If yes, send J[facility] with welcome letter)  6. Which insurance are we to bill for this appointment?  Medicare    7. Informed pt of cancellation (48 hour) policy? Yes    REGARDING OPIOID MEDICATIONS: We will always address appropriateness of opioid pain medications, but we generally will not automatically take on a prescribing role. When we do take on prescribing of opioids for chronic pain, it is in collaboration with the referring physician for an intermediate period of time (months), with an expectation that the primary physician or provider will assume the prescribing role if medications are effective at stable doses with demonstrated compliance. Therefore, please do not assume that your prescribing responsibilities end on the day of pain clinic consultation.  7. Informed pt of prescribing policy? Yes      8. Referring Provider: Len Krishnan

## 2017-02-03 ENCOUNTER — OFFICE VISIT (OUTPATIENT)
Dept: INTERNAL MEDICINE | Facility: CLINIC | Age: 57
End: 2017-02-03
Payer: MEDICARE

## 2017-02-03 VITALS
HEIGHT: 68 IN | DIASTOLIC BLOOD PRESSURE: 80 MMHG | HEART RATE: 72 BPM | OXYGEN SATURATION: 96 % | TEMPERATURE: 98 F | SYSTOLIC BLOOD PRESSURE: 108 MMHG

## 2017-02-03 DIAGNOSIS — K21.9 GASTROESOPHAGEAL REFLUX DISEASE WITHOUT ESOPHAGITIS: ICD-10-CM

## 2017-02-03 DIAGNOSIS — F33.0 MAJOR DEPRESSIVE DISORDER, RECURRENT EPISODE, MILD (H): ICD-10-CM

## 2017-02-03 DIAGNOSIS — Z86.73 HISTORY OF CVA (CEREBROVASCULAR ACCIDENT): ICD-10-CM

## 2017-02-03 DIAGNOSIS — I10 ESSENTIAL HYPERTENSION, BENIGN: Primary | ICD-10-CM

## 2017-02-03 DIAGNOSIS — N40.1 BENIGN NON-NODULAR PROSTATIC HYPERPLASIA WITH LOWER URINARY TRACT SYMPTOMS: ICD-10-CM

## 2017-02-03 DIAGNOSIS — G40.909 SEIZURE DISORDER (H): ICD-10-CM

## 2017-02-03 DIAGNOSIS — J44.9 CHRONIC OBSTRUCTIVE PULMONARY DISEASE, UNSPECIFIED COPD TYPE (H): ICD-10-CM

## 2017-02-03 DIAGNOSIS — Z09 HOSPITAL DISCHARGE FOLLOW-UP: ICD-10-CM

## 2017-02-03 DIAGNOSIS — K59.1 FUNCTIONAL DIARRHEA: ICD-10-CM

## 2017-02-03 DIAGNOSIS — R53.82 CHRONIC FATIGUE: ICD-10-CM

## 2017-02-03 PROCEDURE — 99214 OFFICE O/P EST MOD 30 MIN: CPT | Performed by: INTERNAL MEDICINE

## 2017-02-03 RX ORDER — LOPERAMIDE HYDROCHLORIDE 2 MG/1
2 TABLET ORAL 2 TIMES DAILY
Qty: 30 TABLET | Refills: 0 | Status: ON HOLD | COMMUNITY
Start: 2017-02-03 | End: 2017-01-01

## 2017-02-03 NOTE — PROGRESS NOTES
SUBJECTIVE:                                                    David Dawn is a 56 year old male who presents to clinic today for the following health issues:  SUBJECTIVE:  David Dawn, a 56 year old male scheduled an appointment to discuss the following issues:     Functional diarrhea  History of CVA (cerebrovascular accident)  Essential hypertension, benign  Seizure disorder (H)  Chronic fatigue  Major depressive disorder, recurrent episode, mild (H)  Gastroesophageal reflux disease without esophagitis  Chronic obstructive pulmonary disease, unspecified COPD type (H)  Benign non-nodular prostatic hyperplasia with lower urinary tract symptoms  Hospital discharge follow-up    Patient is seen for a follow up visit.  Pt is a group home resident, has left sided paralysis and uses a motorized wheelchair. Has episodes of agitation, has been in the ER once for getting upset and throwing a coffee cup at a nurse aid.   Discharged to home, did not have evidence of acute disease.   Concern for chronic progressive pain in the paralyzed arm and leg , seeing pain clinic.   Concern for chronic diarrhea. Has h/o c diff colitis. No current abdominal pain, fever, chills, nausea. Negative rechecked c diff toxin.   On Loperamide 2 mg bid. Helps for symptoms control.   Has h/o HTN. on medical treatment. BP has been controlled. No side effects from medications. No CP, HA, dizziness. good compliance with medications and low salt diet.  Has h/o depression. On medical treatment, controlled, no side effects. No depressive symptoms or suicidal ideation.  Has h/o seizures. No seizure for years. On anti-seizure treatment. No side effects   Has COPD. Related to tobacco smoking. Does not want to quit. Still smokes. 1ppd.   Has H/O BPH. On treatment with Flomax . Has symptoms of frequency, decreased urinary stream ,  No hematuria or dysuria. No side effects from medications.  Has h/o GERD on PPI  treatment. symptoms are controlled.  No nausea, vomiting, heartburns, bloating.  Reviewed preventive measures.       Amount of exercise or physical activity: None    Problems taking medications regularly: No    Medication side effects: none    Diet: low salt        Problem list and histories reviewed & adjusted, as indicated.  Additional history: as documented    Patient Active Problem List   Diagnosis     Seizure disorder (H)     Chronic fatigue     Low back pain     Mild major depression (H)     Stroke/cerebrovascular accident (H)     Seizures (H)     GERD (gastroesophageal reflux disease)     Hyperlipidemia LDL goal <100     COPD (chronic obstructive pulmonary disease) (H)     BPH (benign prostatic hyperplasia)     Hemiplegia affecting left nondominant side (H)     Radicular syndrome of upper limbs     Neck pain     Advance Care Planning     Osteoarthritis of glenohumeral joint     Osteoarthritis of acromioclavicular joint     Altered mental status     Pneumonia     Acute renal failure (H)     Metabolic encephalopathy     Health Care Home     Adult failure to thrive     HTN (hypertension)     Sepsis due to urinary tract infection (H)     Encephalopathy     Dysphagia     Agitation     CAP (community acquired pneumonia)     Hallucinations     Cough     Generalized muscle weakness     Fall     Alcohol abuse     Behavior problem, adult     UTI (urinary tract infection)     Fatigue     Neuropathy (H)     Finger wound, simple, open     Skin rash     C. 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/2013     Procedure: EXCISE TUMOR RECTUM VILLOUS;   Trans Anal Excision Rectal Villous Tumor, Proctoscopy;  Surgeon: Milton Jacobs MD;  Location: [facility]     Sigmoidoscopy flexible  4/25/2013     Procedure: SIGMOIDOSCOPY FLEXIBLE;  SIGMOIDOSCOPY FLEXIBLE;  Surgeon: Milton Jacobs MD;  Location:  GI     Colonoscopy  8/6/2014     Procedure: COMBINED COLONOSCOPY, SINGLE BIOPSY/POLYPECTOMY BY BIOPSY;  Surgeon: Jose Elias Mclain MD;  Location:  GI       Social History   Substance Use Topics     Smoking status: Current Every Day Smoker -- 1.00 packs/day for 35 years     Types: Cigarettes     Smokeless tobacco: Never Used     Alcohol Use: No      Comment: quit 20 years ago.     Family History   Problem Relation Age of Onset     HEART DISEASE Mother      MI     CEREBROVASCULAR DISEASE Father      alzheimers disease     CEREBROVASCULAR DISEASE Brother      Neurologic Disorder Brother      stroke     Neurologic Disorder Son      seizure     CANCER Sister          Current Outpatient Prescriptions   Medication Sig Dispense Refill     loperamide (IMODIUM A-D) 2 MG tablet 1 tablet bid and qid PRN. 30 tablet 0     oxyCODONE HCl (OXECTA) 5 MG TABA Take 5 mg by mouth 4 times daily 360 each 0     furosemide (LASIX) 20 MG tablet Take 1 tablet (20 mg) by mouth daily 60 tablet 3     oxyCODONE HCl (OXECTA) 5 MG TABA Take 5 mg by mouth every 6 hours as needed 360 each 0     QUEtiapine Fumarate (SEROQUEL PO) Take 150 mg by mouth 2 times daily        potassium chloride (K-DUR) 10 MEQ tablet Take 10 mEq by mouth every morning       carBAMazepine (TEGRETOL) 200 MG tablet Take 1 tablet (200 mg) by mouth 3 times daily       vitamin D (ERGOCALCIFEROL) 16219 UNIT capsule Take 50,000 Units by mouth once a week On Monday       amLODIPine (NORVASC) 10 MG tablet Take 1 tablet (10 mg) by mouth daily 30 tablet 1     levETIRAcetam (KEPPRA) 500 MG tablet Take 1 tablet (500 mg) by mouth 2 times daily 60 tablet 5     niacin 500 MG CR capsule Take 1 capsule (500 mg) by mouth At Bedtime 30 capsule 5      simvastatin (ZOCOR) 40 MG tablet Take 1 tablet (40 mg) by mouth At Bedtime 90 tablet 3     tamsulosin (FLOMAX) 0.4 MG 24 hr capsule Take 1 capsule (0.4 mg) by mouth daily 60 capsule 6     clopidogrel (PLAVIX) 75 MG tablet Take 1 tablet (75 mg) by mouth daily 90 tablet 3     Colloidal Oatmeal 1 % CREA Externally apply topically 2 times daily 1 Tube 3     levalbuterol (XOPENEX HFA) 45 MCG/ACT inhaler Inhale 2 puffs into the lungs every 6 hours as needed for shortness of breath / dyspnea or wheezing 1 Inhaler 1     citalopram (CELEXA) 20 MG tablet Take 20 mg by mouth daily       Acetaminophen (TYLENOL PO) Take 650 mg by mouth every 4 hours as needed for mild pain or fever       GABAPENTIN PO Take 1,800 mg by mouth every evening        ipratropium - albuterol 0.5 mg/2.5 mg/3 mL (DUONEB) 0.5-2.5 (3) MG/3ML nebulization Take 1 vial by nebulization daily as needed for shortness of breath / dyspnea or wheezing       albuterol (PROAIR HFA, PROVENTIL HFA, VENTOLIN HFA) 108 (90 BASE) MCG/ACT inhaler Inhale 2 puffs into the lungs 2 times daily At 1200 and 1600       diclofenac (VOLTAREN) 1 % GEL Place onto the skin 2 times daily as needed Apply 4 grams to knees or 2 grams to hands       ipratropium - albuterol 0.5 mg/2.5 mg/3 mL (DUONEB) 0.5-2.5 (3) MG/3ML nebulization Take 1 vial by nebulization 3 times daily        budesonide (PULMICORT) 0.25 MG/2ML nebulizer solution Take 2 mLs (0.25 mg) by nebulization 2 times daily 0800 and 1400 360 mL 1     albuterol (PROAIR HFA, PROVENTIL HFA, VENTOLIN HFA) 108 (90 BASE) MCG/ACT inhaler Inhale 2 puffs into the lungs 2 times daily as needed for shortness of breath / dyspnea or wheezing 1 Inhaler 5     lidocaine (XYLOCAINE) 2 % jelly Apply to anus qid as needed for pain 30 mL 3     Placebo (ORDER FOR DME) Equipment being ordered: shower benck chair 1 each 0     Respiratory Therapy Supplies (NEBULIZER/ADULT MASK) KIT 1 Device 4 times daily. 1 kit 3     divalproex (DEPAKOTE) 500 MG EC  "tablet Take 3 tablets by mouth every 12 hours. 180 tablet 2     baclofen (LIORESAL) 20 MG tablet Take 20 mg by mouth 3 times daily.       Problem list, Medication list, Allergies, and Medical/Social/Surgical histories reviewed in [facility] and updated as appropriate.    ROS:  Constitutional, HEENT, cardiovascular, pulmonary, GI, , musculoskeletal, neuro, skin, endocrine and psych systems are negative, except as otherwise noted.    OBJECTIVE:                                                    /80 mmHg  Pulse 72  Temp(Src) 98  F (36.7  C) (Oral)  Ht 5' 8\" (1.727 m)  Wt   SpO2 96%  There is no weight on file to calculate BMI.  GENERAL: weak, in wheelchair, alert and no distress  EYES: Eyes grossly normal to inspection, PERRL and conjunctivae and sclerae normal  NECK: no adenopathy, no asymmetry, masses, or scars and thyroid normal to palpation  RESP: lungs - no rales,bilateral coarse rhonchi and expiratory wheezes  CV: regular rate and rhythm, normal S1 S2, no S3 or S4, no murmur, click or rub, no peripheral edema and peripheral pulses strong  ABDOMEN: soft, nontender, no hepatosplenomegaly, no masses and bowel sounds normal  MS: no gross musculoskeletal defects noted, trace  LE edema  SKIN: no suspicious lesions or rashes  NEURO: left arm and leg paralysis, left foot in a brace, mentation intact and speech normal    Diagnostic Test Results:  Lab reviewed from group home     ASSESSMENT/PLAN:                                                      Problem List Items Addressed This Visit     Seizure disorder (H)    Chronic fatigue    Mild major depression (H)    GERD (gastroesophageal reflux disease)    Relevant Medications    loperamide (IMODIUM A-D) 2 MG tablet    COPD (chronic obstructive pulmonary disease) (H)    BPH (benign prostatic hyperplasia)    History of CVA (cerebrovascular accident)    Essential hypertension, benign - Primary      Other Visit Diagnoses     Functional diarrhea         Relevant " Medications     loperamide (IMODIUM A-D) 2 MG tablet     Hospital discharge follow-up                Monitor lab work  Cont treatment   Scheduled Loperamide approved   Follow up with pain clinic.   Stable BP  No seizures, monitor levels of medications  Smoking cessation advised     Follow-Up: in 2 months, recheck lab    Len Krishnan MD  [facility]

## 2017-02-03 NOTE — NURSING NOTE
"Chief Complaint   Patient presents with     RECHECK     pain all over left side       Initial /80 mmHg  Pulse 72  Temp(Src) 98  F (36.7  C) (Oral)  Ht 5' 8\" (1.727 m)  Wt   SpO2 96% Estimated body mass index is 25.09 kg/(m^2) as calculated from the following:    Height as of this encounter: 5' 8\" (1.727 m).    Weight as of 1/10/17: 165 lb (74.844 kg).  BP completed using cuff size: large    "

## 2017-02-03 NOTE — MR AVS SNAPSHOT
After Visit Summary   2/3/2017    David Dawn    MRN: [id number]           Patient Information     Date Of Birth          1960        Visit Information        Provider Department      2/3/2017 10:40 AM Len Krishnan MD [facility]        Today's Diagnoses     Essential hypertension, benign    -  1     Functional diarrhea         History of CVA (cerebrovascular accident)         Seizure disorder (H)         Chronic fatigue         Major depressive disorder, recurrent episode, mild (H)         Gastroesophageal reflux disease without esophagitis         Chronic obstructive pulmonary disease, unspecified COPD type (H)         Benign non-nodular prostatic hyperplasia with lower urinary tract symptoms         Hospital discharge follow-up            Follow-ups after your visit        Your next 10 appointments already scheduled     Feb 10, 2017  2:00 PM   New Visit with Kaye Epperson MD   [facility] ([facility])    [address]  [address]  [facility] [address] [phone]              Who to contact     If you have questions or need follow up information about today's clinic visit or your schedule please contact [facility] directly at [phone].  Normal or non-critical lab and imaging results will be communicated to you by [employer], letter or phone within 4 business days after the clinic has received the results. If you do not hear from us within 7 days, please contact the clinic through [employer] or phone. If you have a critical or abnormal lab result, we will notify you by phone as soon as possible.  Submit refill requests through [employer] or call your pharmacy and they will forward the refill request to us. Please allow 3 business days for your refill to be completed.          Additional Information About Your Visit        [employer] Information     [employer] lets you send messages to your doctor, view your test results,  "renew your prescriptions, schedule appointments and more. To sign up, go to [email or web address] . Click on \"Log in\" on the left side of the screen, which will take you to the Welcome page. Then click on \"Sign up Now\" on the right side of the page.     You will be asked to enter the access code listed below, as well as some personal information. Please follow the directions to create your username and password.     Your access code is: [email or web address]  Expires: 4/10/2017  3:07 PM     Your access code will  in 90 days. If you need help or a new code, please call your [facility] or [phone].        Care EveryWhere ID     This is your Care EveryWhere ID. This could be used by other organizations to access your [facility] medical records  [id number]        Your Vitals Were     Pulse Temperature Height Pulse Oximetry          72 98  F (36.7  C) (Oral) 5' 8\" (1.727 m) 96%         Blood Pressure from Last 3 Encounters:   17 108/80   01/10/17 93/77   16 118/76    Weight from Last 3 Encounters:   01/10/17 165 lb (74.844 kg)   16 178 lb (80.74 kg)   16 170 lb (77.111 kg)              Today, you had the following     No orders found for display       Primary Care Provider Office Phone # Fax #    Len Krishnan [phone]       [place] [address] [place] [address]        Thank you!     Thank you for choosing [facility]  for your care. Our goal is always to provide you with excellent care. Hearing back from our patients is one way we can continue to improve our services. Please take a few minutes to complete the written survey that you may receive in the mail after your visit with us. Thank you!             Your Updated Medication List - Protect others around you: Learn how to safely use, store and throw away your medicines at www.disposemymeds.org.          This list is accurate as of: 2/3/17 11:26 AM.  Always use your most " recent med list.                   Brand Name Dispense Instructions for use    * albuterol 108 (90 BASE) MCG/ACT Inhaler    PROAIR HFA/PROVENTIL HFA/VENTOLIN HFA    1 Inhaler    Inhale 2 puffs into the lungs 2 times daily as needed for shortness of breath / dyspnea or wheezing       * albuterol 108 (90 BASE) MCG/ACT Inhaler    PROAIR HFA/PROVENTIL HFA/VENTOLIN HFA     Inhale 2 puffs into the lungs 2 times daily At 1200 and 1600       amLODIPine 10 MG tablet    NORVASC    30 tablet    Take 1 tablet (10 mg) by mouth daily       baclofen 20 MG tablet    LIORESAL     Take 20 mg by mouth 3 times daily.       budesonide 0.25 MG/2ML neb solution    PULMICORT    360 mL    Take 2 mLs (0.25 mg) by nebulization 2 times daily 0800 and 1400       carBAMazepine 200 MG tablet    TEGretol     Take 1 tablet (200 mg) by mouth 3 times daily       citalopram 20 MG tablet    celeXA     Take 20 mg by mouth daily       clopidogrel 75 MG tablet    PLAVIX    90 tablet    Take 1 tablet (75 mg) by mouth daily       Colloidal Oatmeal 1 % Crea     1 Tube    Externally apply topically 2 times daily       divalproex 500 MG EC tablet    DEPAKOTE    180 tablet    Take 3 tablets by mouth every 12 hours.       furosemide 20 MG tablet    LASIX    60 tablet    Take 1 tablet (20 mg) by mouth daily       GABAPENTIN PO      Take 1,800 mg by mouth every evening       * ipratropium - albuterol 0.5 mg/2.5 mg/3 mL 0.5-2.5 (3) MG/3ML neb solution    DUONEB     Take 1 vial by nebulization daily as needed for shortness of breath / dyspnea or wheezing       * ipratropium - albuterol 0.5 mg/2.5 mg/3 mL 0.5-2.5 (3) MG/3ML neb solution    DUONEB     Take 1 vial by nebulization 3 times daily       levalbuterol 45 MCG/ACT Inhaler    XOPENEX HFA    1 Inhaler    Inhale 2 puffs into the lungs every 6 hours as needed for shortness of breath / dyspnea or wheezing       levETIRAcetam 500 MG tablet    KEPPRA    60 tablet    Take 1 tablet (500 mg) by mouth 2 times daily        lidocaine 2 % topical gel    XYLOCAINE    30 mL    Apply to anus qid as needed for pain       loperamide 2 MG tablet    IMODIUM A-D    30 tablet    1 tablet bid and qid PRN.       nebulizer/adult mask Kit     1 kit    1 Device 4 times daily.       niacin 500 MG CR capsule     30 capsule    Take 1 capsule (500 mg) by mouth At Bedtime       order for DME     1 each    Equipment being ordered: shower benck chair       * oxyCODONE HCl 5 MG Taba    OXECTA    360 each    Take 5 mg by mouth every 6 hours as needed       * oxyCODONE HCl 5 MG Taba    OXECTA    360 each    Take 5 mg by mouth 4 times daily       potassium chloride 10 MEQ tablet    K-TAB,KLOR-CON     Take 10 mEq by mouth every morning       SEROQUEL PO      Take 150 mg by mouth 2 times daily       simvastatin 40 MG tablet    ZOCOR    90 tablet    Take 1 tablet (40 mg) by mouth At Bedtime       tamsulosin 0.4 MG capsule    FLOMAX    60 capsule    Take 1 capsule (0.4 mg) by mouth daily       TYLENOL PO      Take 650 mg by mouth every 4 hours as needed for mild pain or fever       vitamin D 08695 UNIT capsule    ERGOCALCIFEROL     Take 50,000 Units by mouth once a week On Monday       VOLTAREN 1 % Gel topical gel   Generic drug:  diclofenac      Place onto the skin 2 times daily as needed Apply 4 grams to knees or 2 grams to hands       * Notice:  This list has 6 medication(s) that are the same as other medications prescribed for you. Read the directions carefully, and ask your doctor or other care provider to review them with you.

## 2017-02-10 ENCOUNTER — OFFICE VISIT (OUTPATIENT)
Dept: PALLIATIVE MEDICINE | Facility: CLINIC | Age: 57
End: 2017-02-10
Payer: MEDICARE

## 2017-02-10 VITALS
HEART RATE: 68 BPM | DIASTOLIC BLOOD PRESSURE: 81 MMHG | OXYGEN SATURATION: 95 % | RESPIRATION RATE: 16 BRPM | SYSTOLIC BLOOD PRESSURE: 129 MMHG

## 2017-02-10 DIAGNOSIS — R41.89 COGNITIVE IMPAIRMENT: ICD-10-CM

## 2017-02-10 DIAGNOSIS — G62.9 NEUROPATHY: Primary | ICD-10-CM

## 2017-02-10 DIAGNOSIS — G40.909 SEIZURE DISORDER (H): ICD-10-CM

## 2017-02-10 DIAGNOSIS — Z86.73 HISTORY OF CVA (CEREBROVASCULAR ACCIDENT): ICD-10-CM

## 2017-02-10 DIAGNOSIS — F33.0 MAJOR DEPRESSIVE DISORDER, RECURRENT EPISODE, MILD (H): ICD-10-CM

## 2017-02-10 PROCEDURE — 99204 OFFICE O/P NEW MOD 45 MIN: CPT | Performed by: ANESTHESIOLOGY

## 2017-02-10 ASSESSMENT — PAIN SCALES - GENERAL: PAINLEVEL: WORST PAIN (10)

## 2017-02-10 NOTE — NURSING NOTE
"Chief Complaint   Patient presents with     Pain       Initial /81 mmHg  Pulse 68  Resp 16  SpO2 95% Estimated body mass index is 25.09 kg/(m^2) as calculated from the following:    Height as of 2/3/17: 1.727 m (5' 8\").    Weight as of 1/10/17: 74.844 kg (165 lb).  Medication Reconciliation: complete       Delia Stover MA  [facility]      "
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
## 2017-02-10 NOTE — PATIENT INSTRUCTIONS
Nurse Triage line:  [phone]   Call this number with any questions or concerns. You may leave a detailed message anytime. Calls are typically returned Monday through Friday between 8 AM and 4:30 PM. We usually get back to you within 2 business days depending on the issue/request.       Medication refills:    For non-narcotic medications, call your pharmacy directly to request a refill. The pharmacy will contact the [facility] for authorization. Please allow 3-4 days for these refills to be processed.     For narcotic refills, call the nurse triage line or send a [employer] message. Please contact us 7-10 days before your refill is due. The message MUST include the name of the specific medication(s) requested and how you would like to receive the prescription(s). The options are as follows:    Pain Clinic staff can mail the prescription to your pharmacy. Please tell us the name of the pharmacy.    You may pick the prescription up at the Pain Clinic (tell us the location) or during a clinic visit with your pain provider    Pain Clinic staff can deliver the prescription to the [facility] pharmacy in the clinic building. Please tell us the location.      Scheduling number: [phone].  Call this number to schedule or change appointments.    We believe regular attendance is key to your success in our program.    Any time you are unable to keep your appointment we ask that you call us at least 24 hours in advance to let us know. This will allow us to offer the appointment time to another patient.

## 2017-02-11 ASSESSMENT — PATIENT HEALTH QUESTIONNAIRE - PHQ9: SUM OF ALL RESPONSES TO PHQ QUESTIONS 1-9: 6

## 2017-02-13 ENCOUNTER — TELEPHONE (OUTPATIENT)
Dept: INTERNAL MEDICINE | Facility: CLINIC | Age: 57
End: 2017-02-13

## 2017-02-13 DIAGNOSIS — M79.89 LEG SWELLING: Primary | ICD-10-CM

## 2017-02-13 NOTE — TELEPHONE ENCOUNTER
Fabiola with [facility] calls. She reports pt has had swelling in left lower extremity for a few days - she was not able to clarify number of days this was present. Initially they had him elevating his leg, but swelling is increasing. He wears a brace on this leg due to paralysis and the brace is getting tight despite wearing his BEKAH stockings. Pt does not have redness, pain, temp changes, loss of pulse or shortness of breath. Currently taking Furosemide 20mg daily - he has been taking this dose since 11/8/16. Pt is non-compliant with diet so she is not sure how much salt he is getting each day. Fabiola is asking for recommendations from Dr. Krishnan. Please advise.

## 2017-02-14 ENCOUNTER — TRANSFERRED RECORDS (OUTPATIENT)
Dept: HEALTH INFORMATION MANAGEMENT | Facility: CLINIC | Age: 57
End: 2017-02-14

## 2017-02-14 NOTE — TELEPHONE ENCOUNTER
Spoke to a few nurses at pt home and they can do the U/S in house, gave verbal order over phone and they will fax us results.

## 2017-02-16 ENCOUNTER — TELEPHONE (OUTPATIENT)
Dept: INTERNAL MEDICINE | Facility: CLINIC | Age: 57
End: 2017-02-16

## 2017-02-16 DIAGNOSIS — G89.4 CHRONIC PAIN SYNDROME: ICD-10-CM

## 2017-02-16 NOTE — TELEPHONE ENCOUNTER
"Lorie, nurse from [facility] calling regarding patients complaints of worsening pain left lower leg over past 4-5 days.  Patient is vague about how long he has had the pain, \"quite a while\".  See previous note, had US LLE done 2/14/17, negative.  There has been an increase in swelling of LLE as evident when brace is applied it is now tight.  He wears BEKAH hose and elevates legs over night and intermittently during the day.  Patient refuses to be weighed so unsure if there has been any significant change.  No redness or change in sensation or temperature of LLE.  There has not been an attempt to check pulses.    Pt saw pain clinic on 2/10/17, recommendations were to be sent back to PCP who in turn would contact the facility with any directions.  JOHN Toussaint R.N.    "

## 2017-02-17 ENCOUNTER — TELEPHONE (OUTPATIENT)
Dept: INTERNAL MEDICINE | Facility: CLINIC | Age: 57
End: 2017-02-17

## 2017-02-17 NOTE — TELEPHONE ENCOUNTER
Suggest to use pain medication for control of symptoms. Can increase Oxycodone to 10 mg qid PRN   Check pulses , if decreased will do an arterial US doppler.

## 2017-02-17 NOTE — TELEPHONE ENCOUNTER
Called Lorie and informed MD directions below for Pt. She requests new script with directions to be faxed to [facility] @ [phone]. Informed nurse Pt had enough tablets to increase but was She states Rx will be over soon and new slip needs to be faxed.  Danielle Taylor MA

## 2017-02-17 NOTE — TELEPHONE ENCOUNTER
Fax received from [facility] for review and signature.  Put in Dr. Krishnan's in basket.

## 2017-02-23 ENCOUNTER — TELEPHONE (OUTPATIENT)
Dept: INTERNAL MEDICINE | Facility: CLINIC | Age: 57
End: 2017-02-23

## 2017-02-23 NOTE — TELEPHONE ENCOUNTER
Forms received from [facility] for review and signature.  Put in Dr. Krishnan's in basket.

## 2017-02-27 NOTE — TELEPHONE ENCOUNTER
Rosa Nurse from [facility] calls stating that [facility] did not receive the new Oxycodone Rx yet. They need this re-faxed and also they need an order that the scheduled Oxycodone is discontinued.     Refaxed the prescription to [facility]. The fax number was off by one digit.   Fax [phone]  Also call back to [facility] [phone] after confirming with Dr Krishnan to discontinue the scheduled OXycodone and just use the PRN Rx. She agrees.

## 2017-02-28 DIAGNOSIS — G89.4 CHRONIC PAIN SYNDROME: ICD-10-CM

## 2017-02-28 NOTE — TELEPHONE ENCOUNTER
Oxycodone      Last Written Prescription Date:  02/17/17  Last Fill Quantity: 90,   # refills: 0  Last Office Visit with [facility] [place], [facility] or  Health prescribing provider: 02/03/17  Future Office visit:       Routing refill request to provider for review/approval because:  Drug not on the [facility] [place], [facility] or  [facility] refill protocol or controlled substance

## 2017-03-13 ENCOUNTER — OFFICE VISIT (OUTPATIENT)
Dept: INTERNAL MEDICINE | Facility: CLINIC | Age: 57
End: 2017-03-13
Payer: MEDICARE

## 2017-03-13 VITALS
HEART RATE: 49 BPM | WEIGHT: 164.9 LBS | HEIGHT: 68 IN | OXYGEN SATURATION: 89 % | BODY MASS INDEX: 24.99 KG/M2 | TEMPERATURE: 97.1 F | SYSTOLIC BLOOD PRESSURE: 109 MMHG | DIASTOLIC BLOOD PRESSURE: 73 MMHG

## 2017-03-13 DIAGNOSIS — M54.5 LOW BACK PAIN, UNSPECIFIED BACK PAIN LATERALITY, UNSPECIFIED CHRONICITY, WITH SCIATICA PRESENCE UNSPECIFIED: ICD-10-CM

## 2017-03-13 DIAGNOSIS — G82.20 PARAPLEGIA (H): Primary | ICD-10-CM

## 2017-03-13 DIAGNOSIS — J44.9 CHRONIC OBSTRUCTIVE PULMONARY DISEASE, UNSPECIFIED COPD TYPE (H): ICD-10-CM

## 2017-03-13 DIAGNOSIS — R56.9 CONVULSIONS, UNSPECIFIED CONVULSION TYPE (H): ICD-10-CM

## 2017-03-13 DIAGNOSIS — F33.0 MAJOR DEPRESSIVE DISORDER, RECURRENT EPISODE, MILD (H): ICD-10-CM

## 2017-03-13 DIAGNOSIS — N40.1 BENIGN NON-NODULAR PROSTATIC HYPERPLASIA WITH LOWER URINARY TRACT SYMPTOMS: ICD-10-CM

## 2017-03-13 DIAGNOSIS — E78.5 HYPERLIPIDEMIA LDL GOAL <100: ICD-10-CM

## 2017-03-13 DIAGNOSIS — I10 ESSENTIAL HYPERTENSION: ICD-10-CM

## 2017-03-13 PROCEDURE — 99215 OFFICE O/P EST HI 40 MIN: CPT | Performed by: INTERNAL MEDICINE

## 2017-03-13 NOTE — NURSING NOTE
"Chief Complaint   Patient presents with     RECHECK     medication        Initial /73  Pulse (!) 49  Temp 97.1  F (36.2  C) (Oral)  Ht 5' 7.72\" (1.72 m)  Wt 164 lb 14.5 oz (74.8 kg)  SpO2 (!) 89%  BMI 25.28 kg/m2 Estimated body mass index is 25.28 kg/(m^2) as calculated from the following:    Height as of this encounter: 5' 7.72\" (1.72 m).    Weight as of this encounter: 164 lb 14.5 oz (74.8 kg).  Medication Reconciliation: complete      "

## 2017-03-13 NOTE — PROGRESS NOTES
SUBJECTIVE:                                                    David Dawn is a 57 year old male who presents to clinic today for the following health issues:         Paraplegia (H)  Chronic obstructive pulmonary disease, unspecified COPD type (H)  Essential hypertension  Hyperlipidemia LDL goal <100  Convulsions, unspecified convulsion type (H)  Major depressive disorder, recurrent episode, mild (H)  Low back pain, unspecified back pain laterality, unspecified chronicity, with sciatica presence unspecified  Benign non-nodular prostatic hyperplasia with lower urinary tract symptoms    Concern for left leg and foot pain. Has left hemiparalysis after a stroke. Has a foot brace to prevent foot drop. His leg has been swollen and the brace is causing pain. No open skin areas. No redness.   Patient is wheelchair bound. Has chronic LS pain, related to DDD/ OA. On Oxycodone. Still has episodes of pain, but most of the time controlled. No side effects.   Has h/o HTN. on medical treatment. BP has been controlled. No side effects from medications. No CP, HA, dizziness. good compliance with medications and low salt diet.  Has H/O hyperlipidemia. On medical treatment and diet. No side effects. No muscle weakness, myalgias or upset stomach.   Has h/o COPD. On inhaled steroids, bronchodilators. Still smoking cigarettes. Discussed cessation. Not interested.   Has h/o seizures. No recurrence on preventive treatment.   Has h/o depression. On medical treatment, controlled, no side effects. No depressive symptoms or suicidal ideation.  Has H/O BPH. On treatment with Flomax . Has chronic symptoms of frequency, decreased urinary stream, no hematuria or dysuria. No side effects from medications.  Has h/o chronic diarrhea. On Imodium. As needed.   No abdominal pain. No change in weight.         Amount of exercise or physical activity: None    Problems taking medications regularly: No    Medication side effects: none  Diet: regular  (no restrictions)    PROBLEMS TO ADD ON...    Problem list and histories reviewed & adjusted, as indicated.  Additional history: as documented    Patient Active Problem List   Diagnosis     Seizure disorder (H)     Chronic fatigue     Low back pain     Mild major depression (H)     Stroke/cerebrovascular accident (H)     Seizures (H)     GERD (gastroesophageal reflux disease)     Hyperlipidemia LDL goal <100     COPD (chronic obstructive pulmonary disease) (H)     BPH (benign prostatic hyperplasia)     Hemiplegia affecting left nondominant side (H)     Radicular syndrome of upper limbs     Neck pain     Advance Care Planning     Osteoarthritis of glenohumeral joint     Osteoarthritis of acromioclavicular joint     Altered mental status     Pneumonia     Acute renal failure (H)     Metabolic encephalopathy     Health Care Home     Adult failure to thrive     HTN (hypertension)     Sepsis due to urinary tract infection (H)     Encephalopathy     Dysphagia     Agitation     CAP (community acquired pneumonia)     Hallucinations     Cough     Generalized muscle weakness     Fall     Alcohol abuse     Behavior problem, adult     UTI (urinary tract infection)     Fatigue     Neuropathy (H)     Finger wound, simple, open     Skin rash     C. 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/2013     Procedure: EXCISE TUMOR RECTUM VILLOUS;  Trans Anal Excision Rectal Villous Tumor, Proctoscopy;  Surgeon: Milton Jacobs MD;  Location:  OR     Sigmoidoscopy flexible  4/25/2013     Procedure: SIGMOIDOSCOPY FLEXIBLE;   SIGMOIDOSCOPY FLEXIBLE;  Surgeon: Milton Jacobs MD;  Location:  GI     Colonoscopy  8/6/2014     Procedure: COMBINED COLONOSCOPY, SINGLE BIOPSY/POLYPECTOMY BY BIOPSY;  Surgeon: Jose Elias Mclain MD;  Location:  GI       Social History   Substance Use Topics     Smoking status: Current Every Day Smoker     Packs/day: 1.00     Years: 35.00     Types: Cigarettes     Smokeless tobacco: Never Used     Alcohol use No      Comment: quit 20 years ago.     Family History   Problem Relation Age of Onset     HEART DISEASE Mother      MI     CEREBROVASCULAR DISEASE Father      alzheimers disease     CEREBROVASCULAR DISEASE Brother      Neurologic Disorder Brother      stroke     Neurologic Disorder Son      seizure     CANCER Sister          Current Outpatient Prescriptions   Medication Sig Dispense Refill     oxyCODONE HCl (OXECTA) 5 MG TABA Take 5-10 mg by mouth every 6 hours as needed 90 each 0     loperamide (IMODIUM A-D) 2 MG tablet 1 tablet bid and qid PRN. 30 tablet 0     Colloidal Oatmeal 1 % CREA Externally apply topically 2 times daily 1 Tube 3     furosemide (LASIX) 20 MG tablet Take 1 tablet (20 mg) by mouth daily 60 tablet 3     levalbuterol (XOPENEX HFA) 45 MCG/ACT inhaler Inhale 2 puffs into the lungs every 6 hours as needed for shortness of breath / dyspnea or wheezing 1 Inhaler 1     citalopram (CELEXA) 20 MG tablet Take 20 mg by mouth daily       GABAPENTIN PO Take 1,800 mg by mouth every evening        ipratropium - albuterol 0.5 mg/2.5 mg/3 mL (DUONEB) 0.5-2.5 (3) MG/3ML nebulization Take 1 vial by nebulization daily as needed for shortness of breath / dyspnea or wheezing       QUEtiapine Fumarate (SEROQUEL PO) Take 150 mg by mouth 2 times daily        potassium chloride (K-DUR) 10 MEQ tablet Take 10 mEq by mouth every morning       albuterol (PROAIR HFA, PROVENTIL HFA, VENTOLIN HFA) 108 (90 BASE) MCG/ACT inhaler Inhale 2 puffs into the lungs 2 times daily At 1200 and 1600       diclofenac (VOLTAREN)  1 % GEL Place onto the skin 2 times daily as needed Apply 4 grams to knees or 2 grams to hands       carBAMazepine (TEGRETOL) 200 MG tablet Take 1 tablet (200 mg) by mouth 3 times daily       vitamin D (ERGOCALCIFEROL) 02155 UNIT capsule Take 50,000 Units by mouth once a week On Monday       budesonide (PULMICORT) 0.25 MG/2ML nebulizer solution Take 2 mLs (0.25 mg) by nebulization 2 times daily 0800 and 1400 360 mL 1     albuterol (PROAIR HFA, PROVENTIL HFA, VENTOLIN HFA) 108 (90 BASE) MCG/ACT inhaler Inhale 2 puffs into the lungs 2 times daily as needed for shortness of breath / dyspnea or wheezing 1 Inhaler 5     amLODIPine (NORVASC) 10 MG tablet Take 1 tablet (10 mg) by mouth daily 30 tablet 1     levETIRAcetam (KEPPRA) 500 MG tablet Take 1 tablet (500 mg) by mouth 2 times daily 60 tablet 5     lidocaine (XYLOCAINE) 2 % jelly Apply to anus qid as needed for pain 30 mL 3     niacin 500 MG CR capsule Take 1 capsule (500 mg) by mouth At Bedtime 30 capsule 5     simvastatin (ZOCOR) 40 MG tablet Take 1 tablet (40 mg) by mouth At Bedtime 90 tablet 3     tamsulosin (FLOMAX) 0.4 MG 24 hr capsule Take 1 capsule (0.4 mg) by mouth daily 60 capsule 6     clopidogrel (PLAVIX) 75 MG tablet Take 1 tablet (75 mg) by mouth daily 90 tablet 3     Respiratory Therapy Supplies (NEBULIZER/ADULT MASK) KIT 1 Device 4 times daily. 1 kit 3     divalproex (DEPAKOTE) 500 MG EC tablet Take 3 tablets by mouth every 12 hours. 180 tablet 2     baclofen (LIORESAL) 20 MG tablet Take 20 mg by mouth 3 times daily.       Acetaminophen (TYLENOL PO) Take 650 mg by mouth every 4 hours as needed for mild pain or fever       [DISCONTINUED] ipratropium - albuterol 0.5 mg/2.5 mg/3 mL (DUONEB) 0.5-2.5 (3) MG/3ML nebulization Take 1 vial by nebulization 3 times daily        Placebo (ORDER FOR DME) Equipment being ordered: shower benck chair 1 each 0       ROS:  Constitutional, HEENT, cardiovascular, pulmonary, GI, , musculoskeletal, neuro, skin, endocrine  "and psych systems are negative, except as otherwise noted.    OBJECTIVE:                                                    /73  Pulse (!) 49  Temp 97.1  F (36.2  C) (Oral)  Ht 5' 7.72\" (1.72 m)  Wt 164 lb 14.5 oz (74.8 kg)  SpO2 (!) 89%  BMI 25.28 kg/m2  Body mass index is 25.28 kg/(m^2).  GENERAL: weak, in a wheelchair, alert and no distress  EYES: Eyes grossly normal to inspection, PERRL and conjunctivae and sclerae normal  NECK: no adenopathy, no asymmetry, masses, or scars and thyroid normal to palpation  RESP: lungs clear to auscultation - no rales, + bilateral rhonchi and expiratory wheezes  CV: regular rate and rhythm, normal S1 S2, no S3 or S4, no murmur, click or rub, no peripheral edema and peripheral pulses strong  ABDOMEN: soft, obese,nontender, no hepatosplenomegaly, no masses and bowel sounds normal  MS: no gross musculoskeletal defects noted, 1+ LE  edema  SKIN: no suspicious lesions or rashes  NEURO: left arm and leg paralysis, left foot brace, mentation intact and speech normal    Diagnostic Test Results:  none      ASSESSMENT/PLAN:                                                      Problem List Items Addressed This Visit     Low back pain    Mild major depression (H)    Seizures (H)    Hyperlipidemia LDL goal <100    COPD (chronic obstructive pulmonary disease) (H)    BPH (benign prostatic hyperplasia)    HTN (hypertension)      Other Visit Diagnoses     Paraplegia (H)    -  Primary    Relevant Orders    ORTHOTICS REFERRAL           Cont treatment   Assess lab work - will be done at the group home   Refer for orthotics to reassess his foot brace   Smoking cessation discussed     Follow-Up:in 3 months     Len Krishnan MD  [facility]      "

## 2017-03-13 NOTE — MR AVS SNAPSHOT
After Visit Summary   3/13/2017    David Dawn    MRN: [id number]           Patient Information     Date Of Birth          1960        Visit Information        Provider Department      3/13/2017 2:00 PM Len Krishnan MD [facility]        Today's Diagnoses     Paraplegia (H)    -  1    Chronic obstructive pulmonary disease, unspecified COPD type (H)        Essential hypertension        Hyperlipidemia LDL goal <100        Convulsions, unspecified convulsion type (H)        Major depressive disorder, recurrent episode, mild (H)        Low back pain, unspecified back pain laterality, unspecified chronicity, with sciatica presence unspecified        Benign non-nodular prostatic hyperplasia with lower urinary tract symptoms           Follow-ups after your visit        Additional Services     ORTHOTICS REFERRAL       **This referral order prints off in the [facility]  (Orthotics & Prosthetics) Central Scheduling Office**    The [facility] Central Scheduling Staff will contact the patient to schedule appointments.     Central Scheduling Contact Information: [phone] ([place])    Orthotics: Foot Orthotics and foot brace     Please be aware that coverage of these services is subject to the terms and limitations of your health insurance plan.  Call member services at your health plan with any benefit or coverage questions.      Please bring the following to your appointment:    >>   Any x-rays, CTs or MRIs which have been performed.  Contact the facility where they were done to arrange for  prior to your scheduled appointment.    >>   List of current medications   >>   This referral request   >>   Any documents/labs given to you for this referral                  Your next 10 appointments already scheduled     Jun 13, 2017  1:00 PM CDT   SHORT with Len Krishnan MD   [facility] [address] ([facility])    [facility]  [address]  [facility] [address]   [phone]              Who to contact     If you have questions or need follow up information about today's clinic visit or your schedule please contact [facility] directly at [phone].  Normal or non-critical lab and imaging results will be communicated to you by MyChart, letter or phone within 4 business days after the clinic has received the results. If you do not hear from us within 7 days, please contact the clinic through MyChart or phone. If you have a critical or abnormal lab result, we will notify you by phone as soon as possible.  Submit refill requests through [facility] or call your pharmacy and they will forward the refill request to us. Please allow 3 business days for your refill to be completed.          Additional Information About Your Visit        Care EveryWhere ID     This is your Care EveryWhere ID. This could be used by other organizations to access your [facility] medical records  [id number]        Your Vitals Were     Pulse Temperature Height Pulse Oximetry BMI (Body Mass Index)       49 97.1  F (36.2  C) (Oral) 5' 7.72\" (1.72 m) 89% 25.28 kg/m2        Blood Pressure from Last 3 Encounters:   03/13/17 109/73   02/10/17 129/81   02/03/17 108/80    Weight from Last 3 Encounters:   03/13/17 164 lb 14.5 oz (74.8 kg)   01/10/17 165 lb (74.8 kg)   11/08/16 178 lb (80.7 kg)              We Performed the Following     ORTHOTICS REFERRAL          Today's Medication Changes          These changes are accurate as of: 3/13/17  2:41 PM.  If you have any questions, ask your nurse or doctor.               These medicines have changed or have updated prescriptions.        Dose/Directions    ipratropium - albuterol 0.5 mg/2.5 mg/3 mL 0.5-2.5 (3) MG/3ML neb solution   Commonly known as:  DUONEB   This may have changed:  Another medication with the same name was removed. Continue taking this medication, and follow the directions you see here.        " Dose:  1 vial   Take 1 vial by nebulization daily as needed for shortness of breath / dyspnea or wheezing   Refills:  0                Primary Care Provider Office Phone # Fax #    Len Krishnan [phone]       [facility] [address] [facility] [address]        Thank you!     Thank you for choosing [facility]  for your care. Our goal is always to provide you with excellent care. Hearing back from our patients is one way we can continue to improve our services. Please take a few minutes to complete the written survey that you may receive in the mail after your visit with us. Thank you!             Your Updated Medication List - Protect others around you: Learn how to safely use, store and throw away your medicines at www.disposemymeds.org.          This list is accurate as of: 3/13/17  2:41 PM.  Always use your most recent med list.                   Brand Name Dispense Instructions for use    * albuterol 108 (90 BASE) MCG/ACT Inhaler    PROAIR HFA/PROVENTIL HFA/VENTOLIN HFA    1 Inhaler    Inhale 2 puffs into the lungs 2 times daily as needed for shortness of breath / dyspnea or wheezing       * albuterol 108 (90 BASE) MCG/ACT Inhaler    PROAIR HFA/PROVENTIL HFA/VENTOLIN HFA     Inhale 2 puffs into the lungs 2 times daily At 1200 and 1600       amLODIPine 10 MG tablet    NORVASC    30 tablet    Take 1 tablet (10 mg) by mouth daily       baclofen 20 MG tablet    LIORESAL     Take 20 mg by mouth 3 times daily.       budesonide 0.25 MG/2ML neb solution    PULMICORT    360 mL    Take 2 mLs (0.25 mg) by nebulization 2 times daily 0800 and 1400       carBAMazepine 200 MG tablet    TEGretol     Take 1 tablet (200 mg) by mouth 3 times daily       citalopram 20 MG tablet    celeXA     Take 20 mg by mouth daily       clopidogrel 75 MG tablet    PLAVIX    90 tablet    Take 1 tablet (75 mg) by mouth daily       Colloidal Oatmeal 1 % Crea     1 Tube    Externally apply  topically 2 times daily       divalproex 500 MG EC tablet    DEPAKOTE    180 tablet    Take 3 tablets by mouth every 12 hours.       furosemide 20 MG tablet    LASIX    60 tablet    Take 1 tablet (20 mg) by mouth daily       GABAPENTIN PO      Take 1,800 mg by mouth every evening       ipratropium - albuterol 0.5 mg/2.5 mg/3 mL 0.5-2.5 (3) MG/3ML neb solution    DUONEB     Take 1 vial by nebulization daily as needed for shortness of breath / dyspnea or wheezing       levalbuterol 45 MCG/ACT Inhaler    XOPENEX HFA    1 Inhaler    Inhale 2 puffs into the lungs every 6 hours as needed for shortness of breath / dyspnea or wheezing       levETIRAcetam 500 MG tablet    KEPPRA    60 tablet    Take 1 tablet (500 mg) by mouth 2 times daily       lidocaine 2 % topical gel    XYLOCAINE    30 mL    Apply to anus qid as needed for pain       loperamide 2 MG tablet    IMODIUM A-D    30 tablet    1 tablet bid and qid PRN.       nebulizer/adult mask Kit     1 kit    1 Device 4 times daily.       niacin 500 MG CR capsule     30 capsule    Take 1 capsule (500 mg) by mouth At Bedtime       order for DME     1 each    Equipment being ordered: shower benck chair       oxyCODONE HCl 5 MG Taba    OXECTA    90 each    Take 5-10 mg by mouth every 6 hours as needed       potassium chloride 10 MEQ tablet    K-TAB,KLOR-CON     Take 10 mEq by mouth every morning       SEROQUEL PO      Take 150 mg by mouth 2 times daily       simvastatin 40 MG tablet    ZOCOR    90 tablet    Take 1 tablet (40 mg) by mouth At Bedtime       tamsulosin 0.4 MG capsule    FLOMAX    60 capsule    Take 1 capsule (0.4 mg) by mouth daily       TYLENOL PO      Take 650 mg by mouth every 4 hours as needed for mild pain or fever       vitamin D 17366 UNIT capsule    ERGOCALCIFEROL     Take 50,000 Units by mouth once a week On Monday       VOLTAREN 1 % Gel topical gel   Generic drug:  diclofenac      Place onto the skin 2 times daily as needed Apply 4 grams to knees or 2  grams to hands       * Notice:  This list has 2 medication(s) that are the same as other medications prescribed for you. Read the directions carefully, and ask your doctor or other care provider to review them with you.

## 2017-03-14 ENCOUNTER — TELEPHONE (OUTPATIENT)
Dept: INTERNAL MEDICINE | Facility: CLINIC | Age: 57
End: 2017-03-14

## 2017-03-14 NOTE — TELEPHONE ENCOUNTER
He didn't have labs...Dr. Krishnan, were you expecting Him to draw labs elsewhere?  Danielle Taylor MA

## 2017-03-14 NOTE — TELEPHONE ENCOUNTER
Reason for Call:  Request for results:    Name of test or procedure: LAB    Date of test of procedure: 3/13    Location of the test or procedure:     OK to leave the result message on voice mail or with a family member? Not Applicable    Phone number Patient can be reached at:  [phone] CALLED, SHE IS LEAVING, NEW NURSE TAKING OVER FOR EVENING     Additional comments: CHLORIDE , SLIGHTLY OFF     Call taken on 3/14/2017 at 4:03 PM by Candace Hernandez

## 2017-03-14 NOTE — TELEPHONE ENCOUNTER
"Radha, nurse from [facility] calls stating pt saw Dr Krishnan yesterday. They received orders from Dr Krishnan but cannot read part of the order.     One order says, : \"Change Tank Stockings to 10-15 ???\". They cannot read what is after this. Asked her if it is mmHG, but she states it looks like a cw.     Please advise.         "

## 2017-03-28 NOTE — TELEPHONE ENCOUNTER
Fax received from [employer] [facility] [email or web address] that Quetiapine requires a PA. Key [id number].  Plan telephone .[employer] ID [id number].  Form put in the PA basket at the [place] station.    Provider please advise of alternative or if PA should be continued.

## 2017-03-28 NOTE — TELEPHONE ENCOUNTER
Lorie calls back, states she really thinks the in-house [facility] provider who saw pt in 2015 did rx. Discuss that if [facility] provider did write rx it would be in epic med list history just like the notes from their visits are viewable. Discuss last rx written t/ [facility] provider was 2015 with much lower dosings than reported seroquel 150 mg BID. Lorie is unsure who has been writing refill rxs. Discuss she needs to either find out who is writing rx and ask them for recommendation and/or to contact the in-house psych providers pt sees at the facility.

## 2017-03-28 NOTE — TELEPHONE ENCOUNTER
I don't see that Dr. Krishnan ever did a prescription for this medication. Check with the pharmacy who actually did it; need to contact that provider.

## 2017-03-28 NOTE — TELEPHONE ENCOUNTER
There was another message about PA for Seroquel which I can't see Dr. Krishnan prescribes. He recommended psychiatrist for behavior problems last fall;  You can find out if he had a psychiatrist who manages this med, they should give input. If not he should be referred.

## 2017-03-28 NOTE — TELEPHONE ENCOUNTER
Called Lorie at [facility], relay Seroquel rx didn't come t/ PCP per [employer] med list. Unsure who is prescribing. Lorie states pt is being seen by in-house psychiatrist and psychologist. Discuss her reaching out to them to make recommendations on pt's behavior. States she will do this and be in contact with clinic if needed.
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
## 2017-04-18 NOTE — TELEPHONE ENCOUNTER
Fax received from [employer] for review and signature.  Put in Dr. Krishnan's in basket.

## 2017-04-21 NOTE — TELEPHONE ENCOUNTER
Pt does not have appt made yet- prescription for hospital bed is ready. Left message on 's number to call back. RX under my monitor.

## 2017-04-21 NOTE — TELEPHONE ENCOUNTER
Received a call from Yisel SHANNON at [facility].    She states the pt has been with them for long term.  They have been working with a placement into a assisted living for this pt.    Pt will be moving in about 2 weeks and so she wanted to get things stated.  She thought a face to face would need to be done with Dr. Krishnan first and then get an DME order for Hospital Bed.    Please advise  Den SR RN

## 2017-04-24 NOTE — TELEPHONE ENCOUNTER
[facility] Informed pt needs a face to face before DME Rx will be provided. Once scheduled please fax to  Attn: Yisel  Fax: [phone].  Transferred to scheduling.

## 2017-04-25 NOTE — TELEPHONE ENCOUNTER
Called Radha, relay MD message below. Radha states pt was unusually incoherent and turning around in circles. Radha states they have an appt for tomorrow. Instruct go to ER and ER will give recommendations on f/u. Pt will be going to ER.

## 2017-04-25 NOTE — TELEPHONE ENCOUNTER
Radha at [facility] left message that pt is very confused. Acting incoherently and lethargic.     Please advise.     [phone]

## 2017-04-26 PROBLEM — J44.1 COPD EXACERBATION (H): Status: ACTIVE | Noted: 2017-01-01
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
## 2017-04-26 NOTE — NURSING NOTE
"Chief Complaint   Patient presents with     Consult     Pt is moving out of nursing home to his own apartment       Initial /74 (BP Location: Right arm, Patient Position: Chair, Cuff Size: Adult Large)  Pulse 120  Temp 97.9  F (36.6  C) (Oral)  Ht 5' 7.17\" (1.706 m)  Wt 165 lb (74.8 kg)  SpO2 95%  BMI 25.71 kg/m2 Estimated body mass index is 25.71 kg/(m^2) as calculated from the following:    Height as of this encounter: 5' 7.17\" (1.706 m).    Weight as of this encounter: 165 lb (74.8 kg).  Medication Reconciliation: complete   Nayana Broderick MA    "

## 2017-04-26 NOTE — IP AVS SNAPSHOT
"    [facility]: [phone]                                              INTERAGENCY TRANSFER FORM - PHYSICIAN ORDERS   2017                    Hospital Admission Date: 2017  MILTON LAMBERT   : 1960  Sex: Male        Attending Provider: Bonifacio Le MD     Allergies:  Ibuprofen Sodium, Tuberculin Tests    Infection:  None   Service:  GENERAL [facility]    Ht:  1.706 m (5' 7.17\")   Wt:  74.8 kg (165 lb)   Admission Wt:  --    BMI:  25.71 kg/m 2   BSA:  1.88 m 2            Patient PCP Information     Provider PCP Type    Len Krishnan MD General      ED Clinical Impression     Diagnosis Description Comment Added By Time Added    Altered mental status, unspecified altered mental status type [R41.82] Altered mental status, unspecified altered mental status type [R41.82]  Bonifacio Zacarias MD 2017  7:00 PM    COPD exacerbation (H) [J44.1] COPD exacerbation (H) [J44.1]  Bonifacio Zacarias MD 2017  7:08 PM      Hospital Problems as of 2017              Priority Class Noted POA    COPD exacerbation (H) Medium  2017 Yes      Non-Hospital Problems as of 2017              Priority Class Noted    Seizure disorder (H)   9/15/2011    Chronic fatigue   9/15/2011    Low back pain   9/15/2011    Mild major depression (H)   9/15/2011    GERD (gastroesophageal reflux disease)   9/15/2011    Hyperlipidemia LDL goal <100   9/15/2011    COPD (chronic obstructive pulmonary disease) (H)   9/15/2011    BPH (benign prostatic hyperplasia)   9/15/2011    Hemiplegia affecting left nondominant side (H)   2013    Radicular syndrome of upper limbs   2013    Neck pain   2013    Advance Care Planning   7/15/2013    Osteoarthritis of glenohumeral joint Medium  2013    Osteoarthritis of acromioclavicular joint Medium  2013    Metabolic encephalopathy Medium  2014    Health Care Home   2014    Dysphagia   2014    Hallucinations " Medium  4/15/2015    Cough Medium  4/15/2015    Generalized muscle weakness Medium  4/15/2015    Alcohol abuse Medium  6/17/2015    Behavior problem, adult Medium  7/22/2015    Neuropathy (H) Medium  10/14/2015    History of CVA (cerebrovascular accident) Medium  1/11/2016    Cognitive impairment Medium  1/11/2016    Essential hypertension, benign Medium  1/11/2016      Code Status History     Date Active Date Inactive Code Status Order ID Comments User Context    4/29/2017 12:19 PM  DNR/DNI [id number]  Heber Rod MD Outpatient    4/26/2017  9:05 PM 4/29/2017 12:19 PM DNR/DNI [id number]  Bonifacio Le MD Inpatient    12/22/2015  1:20 PM 4/26/2017  9:05 PM DNR/DNI [id number]  Nick Quarles PA-C Outpatient    12/19/2015 12:29 AM 12/22/2015  1:20 PM DNR/DNI [id number]  Humberto Ansari APRN CNP Inpatient    2/25/2015 11:40 AM 12/19/2015 12:29 AM Full Code [id number]  Evelin Mai MD Outpatient    2/16/2015  9:45 PM 2/25/2015 11:40 AM Full Code [id number]  Demetrius Corona MD Inpatient    1/26/2015  8:32 PM 1/30/2015  8:07 PM Full Code [id number]  Ephraim Kim MD Inpatient    6/30/2014 10:30 AM 1/26/2015  8:32 PM Full Code [id number]  Roger Sagastume DO Outpatient    6/23/2014  5:11 PM 6/30/2014 10:30 AM Full Code [id number]  Heber Rod MD Inpatient    6/12/2014 11:52 AM 6/23/2014  5:11 PM Full Code [id number]  Elias Staley MD Outpatient    4/29/2014 11:43 PM 6/10/2014  7:15 PM Full Code [id number]  Andrae Stover MD Inpatient    4/8/2012  2:43 AM 4/10/2012  8:45 PM Full Code [id number]  Abbie Waters, RN Inpatient         Medication Review      START taking        Dose / Directions Comments    oseltamivir 75 MG capsule   Commonly known as:  TAMIFLU   Indication:  Flu   Used for:  Influenza B        Dose:  75 mg   Take 1 capsule (75 mg) by mouth 2 times daily for 2 days   Quantity:  4 capsule   Refills:  0        predniSONE 20 MG tablet   Commonly  known as:  DELTASONE   Used for:  Influenza B        Dose:  40 mg   Take 2 tablets (40 mg) by mouth daily for 5 days   Refills:  0        vancomycin 50 mg/mL Liqd solution   Commonly known as:  VANOCIN   Indication:  Clostridium difficile Bacteria   Used for:  Colitis due to Clostridium difficile        Dose:  250 mg   Take 5 mLs (250 mg) by mouth 4 times daily for 7 days   Refills:  0          CONTINUE these medications which have NOT CHANGED        Dose / Directions Comments    albuterol 108 (90 BASE) MCG/ACT Inhaler   Commonly known as:  PROAIR HFA/PROVENTIL HFA/VENTOLIN HFA   Used for:  COPD (chronic obstructive pulmonary disease) (H)        Dose:  2 puff   Inhale 2 puffs into the lungs 2 times daily as needed for shortness of breath / dyspnea or wheezing   Quantity:  1 Inhaler   Refills:  5        amLODIPine 10 MG tablet   Commonly known as:  NORVASC   Used for:  HTN (hypertension)        Dose:  10 mg   Take 1 tablet (10 mg) by mouth daily   Quantity:  30 tablet   Refills:  1        baclofen 20 MG tablet   Commonly known as:  LIORESAL        Dose:  20 mg   Take 20 mg by mouth 3 times daily.   Refills:  0        budesonide 0.25 MG/2ML neb solution   Commonly known as:  PULMICORT   Used for:  COPD (chronic obstructive pulmonary disease) (H)        Dose:  0.25 mg   Take 2 mLs (0.25 mg) by nebulization 2 times daily 0800 and 1400   Quantity:  360 mL   Refills:  1        carBAMazepine 200 MG tablet   Commonly known as:  TEGretol   Used for:  Seizure disorder (H)        Dose:  200 mg   Take 1 tablet (200 mg) by mouth 3 times daily   Refills:  0        citalopram 20 MG tablet   Commonly known as:  celeXA        Dose:  20 mg   Take 20 mg by mouth daily   Refills:  0        clopidogrel 75 MG tablet   Commonly known as:  PLAVIX   Used for:  CVA (cerebral infarction)        Dose:  75 mg   Take 1 tablet (75 mg) by mouth daily   Quantity:  90 tablet   Refills:  3        DEPAKOTE PO        Dose:  1750 mg   Take 1,750 mg by  mouth 2 times daily Delayed release   Refills:  0        furosemide 20 MG tablet   Commonly known as:  LASIX   Used for:  Bilateral edema of lower extremity        Dose:  20 mg   Take 1 tablet (20 mg) by mouth daily   Quantity:  60 tablet   Refills:  3        GABAPENTIN PO        Dose:  1800 mg   Take 1,800 mg by mouth At Bedtime   Refills:  0        * ipratropium - albuterol 0.5 mg/2.5 mg/3 mL 0.5-2.5 (3) MG/3ML neb solution   Commonly known as:  DUONEB        Dose:  1 vial   Take 1 vial by nebulization every 6 hours as needed for shortness of breath / dyspnea or wheezing   Refills:  0        * ipratropium - albuterol 0.5 mg/2.5 mg/3 mL 0.5-2.5 (3) MG/3ML neb solution   Commonly known as:  DUONEB        Dose:  1 vial   Take 1 vial by nebulization 3 times daily   Refills:  0        levETIRAcetam 500 MG tablet   Commonly known as:  KEPPRA   Used for:  Seizure disorder (H)        Dose:  500 mg   Take 1 tablet (500 mg) by mouth 2 times daily   Quantity:  60 tablet   Refills:  5        lidocaine 2 % topical gel   Commonly known as:  XYLOCAINE   Used for:  External hemorrhoids        Apply to anus qid as needed for pain   Quantity:  30 mL   Refills:  3        nebulizer/adult mask Kit   Used for:  Wheezing        Dose:  1 Device   1 Device 4 times daily.   Quantity:  1 kit   Refills:  3        niacin 500 MG CR capsule   Used for:  Hyperlipidemia LDL goal <100        Dose:  500 mg   Take 1 capsule (500 mg) by mouth At Bedtime   Quantity:  30 capsule   Refills:  5        order for DME   Used for:  Stroke (H)        Equipment being ordered: shower benck chair   Quantity:  1 each   Refills:  0        order for DME   Used for:  History of stroke        Equipment being ordered: Hospital Bed  Fax number: [phone]  Attn:  Yisel    Quantity:  1 each   Refills:  0        oxyCODONE HCl 5 MG Taba   Commonly known as:  OXECTA   Used for:  Chronic pain syndrome        Dose:  5-10 mg   Take 5-10 mg by  mouth every 6 hours as needed   Quantity:  90 each   Refills:  0        potassium chloride 10 MEQ tablet   Commonly known as:  K-TAB,KLOR-CON        Dose:  10 mEq   Take 10 mEq by mouth every morning   Refills:  0        SEROQUEL PO        Dose:  150 mg   Take 150 mg by mouth 2 times daily   Refills:  0        simvastatin 40 MG tablet   Commonly known as:  ZOCOR   Used for:  Hyperlipidemia LDL goal <100        Dose:  40 mg   Take 1 tablet (40 mg) by mouth At Bedtime   Quantity:  90 tablet   Refills:  3        tamsulosin 0.4 MG capsule   Commonly known as:  FLOMAX   Used for:  BPH (benign prostatic hyperplasia)        Dose:  0.4 mg   Take 1 capsule (0.4 mg) by mouth daily   Quantity:  60 capsule   Refills:  6        TYLENOL PO        Dose:  650 mg   Take 650 mg by mouth every 4 hours as needed for mild pain or fever   Refills:  0        vitamin D 24383 UNIT capsule   Commonly known as:  ERGOCALCIFEROL        Dose:  75976 Units   Take 50,000 Units by mouth once a week On Monday   Refills:  0        * Notice:  This list has 2 medication(s) that are the same as other medications prescribed for you. Read the directions carefully, and ask your doctor or other care provider to review them with you.      STOP taking     loperamide 2 MG tablet   Commonly known as:  IMODIUM A-D           LOPERAMIDE A-D 2 MG tablet   Generic drug:  loperamide                   Summary of Visit     Reason for your hospital stay       Influenza B and Clostridium colitis             After Care     Advance Diet as Tolerated       Follow this diet upon discharge: Orders Placed This Encounter      Combination Diet Regular Diet Adult       General info for SNF       Length of Stay Estimate: Long Term Care  Condition at Discharge: Improving  Level of care:skilled   Rehabilitation Potential: Fair  Admission H&P remains valid and up-to-date: Yes  Recent Chemotherapy: N/A  Use Nursing Home Standing Orders: Yes       Mantoux instructions       Give  two-step Mantoux (PPD) Per Facility Policy Yes             Your next 10 appointments already scheduled     May 03, 2017  3:40 PM CDT   SHORT with Len Krishnan MD   [facility] ([facility]    [address]  [facility] [address]   [phone]            Jun 13, 2017  1:00 PM CDT   SHORT with Len Krishnan MD   [facility] ([facility])    [address]  [facility] [address]   [phone]              Follow-Up Appointment Instructions     Future Labs/Procedures    Follow Up and recommended labs and tests     Comments:    Follow-up with nursing home provider next week      Follow-Up Appointment Instructions     Follow Up and recommended labs and tests       Follow-up with nursing home provider next week             Statement of Approval     Ordered          04/29/17 1219  I have reviewed and agree with all the recommendations and orders detailed in this document.  EFFECTIVE NOW     Approved and electronically signed by:  Heber Rod MD

## 2017-04-26 NOTE — IP AVS SNAPSHOT
` `     [facility] ORTHO SPINE: [phone]            Medication Administration Report for David Dawn as of 04/29/17 1408   Legend:    Given Hold Not Given Due Canceled Entry Other Actions    Time Time (Time) Time  Time-Action       Inactive    Active    Linked        Medications 04/23/17 04/24/17 04/25/17 04/26/17 04/27/17 04/28/17 04/29/17    acetaminophen (TYLENOL) tablet 650 mg  Dose: 650 mg Freq: EVERY 4 HOURS PRN Route: PO  PRN Reasons: mild pain,fever  Start: 04/26/17 2105   Admin Instructions: Maximum acetaminophen dose from all sources = 75 mg/kg/day not to exceed 4 grams/day.               albuterol neb solution 2.5 mg  Dose: 3 mL Freq: EVERY 2 HOURS PRN Route: NEBULIZATION  PRN Reason: shortness of breath / dyspnea  Start: 04/26/17 2105 2246 (2.5 mg)-Given        0519 (2.5 mg)-Given        0330 (2.5 mg)-Given            amLODIPine (NORVASC) tablet 10 mg  Dose: 10 mg Freq: DAILY Route: PO  Start: 04/27/17 0800        0846 (10 mg)-Given        0914 (10 mg)-Given        0800 (10 mg)-Given           baclofen (LIORESAL) tablet 20 mg  Dose: 20 mg Freq: 3 TIMES DAILY Route: PO  Start: 04/26/17 2115       (2305)-Not Given [C]        0846 (20 mg)-Given       1408 (20 mg)-Given       2019 (20 mg)-Given        0914 (20 mg)-Given       1418 (20 mg)-Given       2052 (20 mg)-Given        0800 (20 mg)-Given       [ ] 1400       [ ] 2000           budesonide (PULMICORT) neb solution 0.25 mg  Dose: 0.25 mg Freq: 2 TIMES DAILY Route: NEBULIZATION  Start: 04/26/17 2115       (2246)-Not Given        0736 (0.25 mg)-Given       2008 (0.25 mg)-Given        0731 (0.25 mg)-Given       2009 (0.25 mg)-Given        0810 (0.25 mg)-Given       [ ] 2000           carBAMazepine (TEGretol) tablet 200 mg  Dose: 200 mg Freq: 3 TIMES DAILY Route: PO  Start: 04/26/17 2115       (5015)-Not Given [C]        0924 (200 mg)-Given       1408 (200 mg)-Given       2020 (200 mg)-Given        0914 (200 mg)-Given       1418 (200  mg)-Given       2052 (200 mg)-Given        0800 (200 mg)-Given       [ ] 1400       [ ] 2000           citalopram (celeXA) tablet 20 mg  Dose: 20 mg Freq: DAILY Route: PO  Start: 04/27/17 0800        0846 (20 mg)-Given        0914 (20 mg)-Given        0800 (20 mg)-Given           clopidogrel (PLAVIX) tablet 75 mg  Dose: 75 mg Freq: DAILY Route: PO  Start: 04/27/17 0800        0846 (75 mg)-Given        0914 (75 mg)-Given        0759 (75 mg)-Given           divalproex (DEPAKOTE) EC tablet 1,750 mg  Dose: 1,750 mg Freq: EVERY 12 HOURS SCHEDULED Route: PO  Start: 04/27/17 0830   Admin Instructions: DO NOT CRUSH.         0851 (1,750 mg)-Given       2017 (1,750 mg)-Given        0914 (1,750 mg)-Given       2051 (1,750 mg)-Given        0800 (1,750 mg)-Given       [ ] 2000           enoxaparin (LOVENOX) injection 40 mg  Dose: 40 mg Freq: EVERY 24 HOURS Route: SC  Start: 04/26/17 2115   Admin Instructions: HOLD if platelet count falls below 50% of baseline or less than 100,000/ L and notify provider.        (2143)-Not Given [C]       2209 (40 mg)-Given        2022 (40 mg)-Given        (2050)-Not Given        [ ] 2000           gabapentin (NEURONTIN) tablet 1,800 mg  Dose: 1,800 mg Freq: EVERY EVENING Route: PO  Start: 04/26/17 2115       (2306)-Not Given [C]        2119 (1,800 mg)-Given        2216 (1,800 mg)-Given        [ ] 2200           haloperidol lactate (HALDOL) injection 2 mg  Dose: 2 mg Freq: EVERY 6 HOURS PRN Route: IV/IM  PRN Reason: agitation  Start: 04/26/17 2105 2137 (2 mg)-Given              ipratropium - albuterol 0.5 mg/2.5 mg/3 mL (DUONEB) neb solution 3 mL  Dose: 1 vial Freq: 3 TIMES DAILY Route: NEBULIZATION  Start: 04/27/17 2000 1918 (3 mL)-Given        0731 (3 mL)-Given       1454 (3 mL)-Given       2009 (3 mL)-Given        0810 (3 mL)-Given       1308 (3 mL)-Given       [ ] 2000           levETIRAcetam (KEPPRA) tablet 500 mg  Dose: 500 mg Freq: 2 TIMES DAILY Route: PO  Start: 04/26/17 9356        (2306)-Not Given [C]        0844 (500 mg)-Given       2021 (500 mg)-Given        0914 (500 mg)-Given       2052 (500 mg)-Given        0800 (500 mg)-Given       [ ] 2000           naloxone (NARCAN) injection 0.1-0.4 mg  Dose: 0.1-0.4 mg Freq: EVERY 2 MIN PRN Route: IV  PRN Reason: opioid reversal  Start: 04/26/17 2105   Admin Instructions: For respiratory rate LESS than or EQUAL to 8.  Partial reversal dose:  0.1 mg titrated q 2 minutes for Analgesia Side Effects Monitoring Sedation Level of 3 (frequently drowsy, arousable, drifts to sleep during conversation).Full reversal dose:  0.4 mg bolus for Analgesia Side Effects Monitoring Sedation Level of 4 (somnolent, minimal or no response to stimulation).               nicotine (NICODERM CQ) 21 MG/24HR 24 hr patch 1 patch  Dose: 1 patch Freq: EVERY 24 HOURS Route: TD  Start: 04/27/17 1900 2016 (1 patch)-Given        2048 (1 patch)-Given        [ ] 2000           nicotine Patch in Place  Freq: EVERY 8 HOURS Route: TD  Start: 04/27/17 1845   Admin Instructions: Chart every shift, confirming that patch is still in place on patient (no barcode scan needed). See patch order for dose information.         2022 ( )-Given       2359 ( )-Patch in Place        0918 ( )-Patch in Place       1542 ( )-Patch in Place        0015 ( )-Patch in Place       0803 ( )-Given       [ ] 1600           nicotine patch REMOVAL  Freq: EVERY 24 HOURS Route: TD  Start: 04/28/17 2000   Admin Instructions: Remove patch when new patch is applied or patch is discontinued.          2052 ( )-Patch Removed        [ ] 2000           ondansetron (ZOFRAN-ODT) ODT tab 4 mg  Dose: 4 mg Freq: EVERY 6 HOURS PRN Route: PO  PRN Reason: nausea  Start: 04/26/17 2105   Admin Instructions: This is Step 1 of nausea and vomiting management.  If nausea not resolved in 15 minutes, go to Step 2 prochlorperazine (COMPAZINE). Do not push through foil backing. Peel back foil and gently remove. Place on tongue  immediately. Administration with liquid unnecessary              Or  ondansetron (ZOFRAN) injection 4 mg  Dose: 4 mg Freq: EVERY 6 HOURS PRN Route: IV  PRN Reasons: nausea,vomiting  Start: 04/26/17 2105   Admin Instructions: This is Step 1 of nausea and vomiting management.  If nausea not resolved in 15 minutes, go to Step 2 prochlorperazine (COMPAZINE).  Irritant.               oseltamivir (TAMIFLU) capsule 150 mg  Dose: 150 mg Freq: 2 TIMES DAILY Route: PO  Indications of Use: INFLUENZA  Start: 04/27/17 2000 2020 (150 mg)-Given        0914 (150 mg)-Given       2052 (150 mg)-Given        0800 (150 mg)-Given       [ ] 2000           potassium chloride (K-TAB,KLOR-CON) CR tablet 10 mEq  Dose: 10 mEq Freq: EVERY MORNING Route: PO  Start: 04/27/17 0800   Admin Instructions: DO NOT CRUSH.         0846 (10 mEq)-Given        0914 (10 mEq)-Given        0800 (10 mEq)-Given           predniSONE (DELTASONE) tablet 40 mg  Dose: 40 mg Freq: 2 TIMES DAILY WITH MEALS Route: PO  Start: 04/28/17 1800         1735 (40 mg)-Given        0800 (40 mg)-Given       [ ] 1800           QUEtiapine (SEROquel) tablet 150 mg  Dose: 150 mg Freq: 2 TIMES DAILY Route: PO  Start: 04/26/17 2115       (2306)-Not Given [C]        0845 (150 mg)-Given       2019 (150 mg)-Given        0914 (150 mg)-Given       2052 (150 mg)-Given        0759 (150 mg)-Given       [ ] 2000           simvastatin (ZOCOR) tablet 40 mg  Dose: 40 mg Freq: AT BEDTIME Route: PO  Start: 04/26/17 2200       (2306)-Not Given [C]        2120 (40 mg)-Given        2217 (40 mg)-Given        [ ] 2200           tamsulosin (FLOMAX) capsule 0.4 mg  Dose: 0.4 mg Freq: DAILY Route: PO  Start: 04/27/17 0800   Admin Instructions: Administer 30 minutes after the same meal each day.  Capsules should be swallowed whole; do not crush chew or open.         0845 (0.4 mg)-Given        0914 (0.4 mg)-Given        0800 (0.4 mg)-Given           vancomycin (VANOCIN) solution 250 mg  Dose: 250 mg  Freq: 4 TIMES DAILY Route: PO  Indications of Use: CLOSTRIDIUM DIFFICILE  Start: 04/27/17 2300        2357 (250 mg)-Given        0929 (250 mg)-Given       1219 (250 mg)-Given       1541 (250 mg)-Given       2053 (250 mg)-Given        0807 (250 mg)-Given       1341 (250 mg)-Given       [ ] 1600       [ ] 2000          Completed Medications  Medications 04/23/17 04/24/17 04/25/17 04/26/17 04/27/17 04/28/17 04/29/17         Dose: 1,000 mL Freq: ONCE Route: IV  Last Dose: Stopped (04/26/17 1942)  Start: 04/26/17 1932   End: 04/26/17 1942 1932 (61 mL)-New Bag [C]       1942-Stopped                Dose: 1,000 mL Freq: ONCE Route: IV  Last Dose: Stopped (04/26/17 2033)  Start: 04/26/17 1901   End: 04/26/17 2033 2002 (1,000 mL)-New Bag       2033-ED Infusing on Admission/transfer                Dose: 1,000 mL Freq: ONCE Route: IV  Last Dose: Stopped (04/26/17 2000)  Start: 04/26/17 1626   End: 04/26/17 2000       1851 (1,000 mL)-New Bag       2000-Stopped             Followed by    Dose: 1,000 mL Freq: ONCE Route: IV  Last Dose: Stopped (04/26/17 1848)  Start: 04/26/17 1626   End: 04/26/17 1848       1636 (1,000 mL)-New Bag       1848-Stopped                Dose: 1 g Freq: ONCE Route: IV  Indications of Use: COMMUNITY ACQUIRED PNEUMONIA  Last Dose: Stopped (04/26/17 1849)  Start: 04/26/17 1655   End: 04/26/17 1849       1814 (1 g)-New Bag       1849-Stopped                Dose: 10 mg Freq: ONCE Route: IV  Start: 04/26/17 1909   End: 04/26/17 1917       1917 (10 mg)-Given                Dose: 500 mL Freq: ONCE Route: IV  Start: 04/26/17 1932   End: 04/26/17 1932 1932 (82 mL)-Given                Dose: 3 mL Freq: ONCE Route: NEBULIZATION  Start: 04/26/17 1909   End: 04/26/17 1918 1918 (3 mL)-Given                Dose: 3 mL Freq: ONCE Route: NEBULIZATION  Start: 04/26/17 1852   End: 04/26/17 1903 1903 (3 mL)-Given                Dose: 3 mL Freq: ONCE Route: NEBULIZATION  Start: 04/26/17 1828    "End: 04/26/17 1833       1833 (3 mL)-Given             Discontinued Medications  Medications 04/23/17 04/24/17 04/25/17 04/26/17 04/27/17 04/28/17 04/29/17         Rate: 100 mL/hr Freq: CONTINUOUS Route: IV  Start: 04/26/17 2115   End: 04/27/17 1744       2116 ( )-New Bag        0528 ( )-New Bag       1744-Med Discontinued           Rate: 125 mL/hr Freq: CONTINUOUS Route: IV  Start: 04/26/17 1626   End: 04/26/17 2105   Admin Instructions: Administer after the boluses.               2105-Med Discontinued            Dose: 1,500 mg Freq: EVERY 12 HOURS Route: PO  Start: 04/26/17 2115   End: 04/27/17 0825   Admin Instructions: DO NOT CRUSH.        (7240)-Not Given [C]        0825-Med Discontinued           Dose: 1,750 mg Freq: 2 TIMES DAILY Route: PO  Start: 04/27/17 1800   End: 04/27/17 1621        1621-Med Discontinued           Dose: 20 mg Freq: DAILY Route: PO  Start: 04/27/17 0800   End: 04/28/17 1245        0846 (20 mg)-Given        0914 (20 mg)-Given       1245-Med Discontinued          Dose: 3 mL Freq: 4 TIMES DAILY Route: NEBULIZATION  Start: 04/27/17 0800   End: 04/27/17 1623        0732 (3 mL)-Given       1249 (3 mL)-Given       1533 (3 mL)-Given       1623-Med Discontinued           Dose: 3 mL Freq: EVERY 4 HOURS Route: NEBULIZATION  Start: 04/26/17 2115   End: 04/26/17 2134              2134-Med Discontinued            Freq: EVERY 1 HOUR PRN Route: Top  PRN Reason: pain  PRN Comment: with VAD insertion or accessing implanted port.  Start: 04/26/17 1610   End: 04/26/17 2105   Admin Instructions: Do NOT give if patient has a history of allergy to any local anesthetic or any \"lindsey\" product.   Apply 30 minutes prior to VAD insertion or port access.  MAX Dose:  2.5 g (  of 5 g tube)        2105-Med Discontinued            Dose: 1 mL Freq: EVERY 1 HOUR PRN Route: OTHER  PRN Comment: mild pain with VAD insertion or accessing implanted port  Start: 04/26/17 1610   End: 04/26/17 2105   Admin Instructions: Do NOT " "give if patient has a history of allergy to any local anesthetic or any \"lindsey\" product. MAX dose 1 mL subcutaneous OR intradermal in divided doses.        2105-Med Discontinued            Dose: 62.5 mg Freq: EVERY 12 HOURS Route: IV  Start: 04/26/17 2200   End: 04/28/17 1245   Admin Instructions: First dose now if not given in ED.  Doses greater than 125 mg need to be in at least 50 mL IVPB        2208 (62.5 mg)-Given        0926 (62.5 mg)-Given       2021 (62.5 mg)-Given        0919 (62.5 mg)-Given       1245-Med Discontinued          Dose: 0.1-0.4 mg Freq: EVERY 2 MIN PRN Route: IV  PRN Reason: opioid reversal  Start: 04/26/17 2105   End: 04/26/17 2112   Admin Instructions: For respiratory rate LESS than or EQUAL to 8.  Partial reversal dose:  0.1 mg titrated q 2 minutes for Analgesia Side Effects Monitoring Sedation Level of 3 (frequently drowsy, arousable, drifts to sleep during conversation).Full reversal dose:  0.4 mg bolus for Analgesia Side Effects Monitoring Sedation Level of 4 (somnolent, minimal or no response to stimulation).        2112-Med Discontinued            Dose: 75 mg Freq: 2 TIMES DAILY Route: PO  Indications of Use: INFLUENZA  Start: 04/27/17 0545   End: 04/27/17 1405        0619 (75 mg)-Given              1405-Med Discontinued           Dose: 17 g Freq: DAILY PRN Route: PO  PRN Reason: constipation  Start: 04/26/17 2105   End: 04/28/17 0811   Admin Instructions: Give in 8oz of  water, juice, or soda. Hold for loose stools.  This is the second step of a three step constipation treatment protocol.  1 Packet = 17 grams. Mixed prescribed dose in 8 ounces of water. Follow with 8 oz. of water.          0811-Med Discontinued          Dose: 1-2 tablet Freq: 2 TIMES DAILY Route: PO  Start: 04/26/17 2115   End: 04/28/17 0811   Admin Instructions: Start with 1 tablet PO BID, If no bowel movement in 24 hours, increase to 2 tablets PO BID.  Hold for loose stools.        (6265)-Not Given [C]        0844 " (1 tablet)-Given       (2023)-Not Given        0811-Med Discontinued  (0921)-Not Given              Dose: 3 mL Freq: EVERY 8 HOURS Route: IK  Start: 04/26/17 1612   End: 04/26/17 2105   Admin Instructions: And Q1H PRN, to lock peripheral IV dormant line.               2105-Med Discontinued            Dose: 3 mL Freq: EVERY 1 HOUR PRN Route: IK  PRN Reason: line flush  PRN Comment: for peripheral IV flush post IV meds  Start: 04/26/17 1610   End: 04/26/17 2105 2105-Med Discontinued       Medications 04/23/17 04/24/17 04/25/17 04/26/17 04/27/17 04/28/17 04/29/17

## 2017-04-26 NOTE — LETTER
Transition Communication Hand-off for Care Transitions to Next Level of Care Provider    Name: David Dawn  MRN #: [id number]  Primary Care Provider: Len Krishnan  Primary Care MD Name: Ariella  Primary Clinic: [facility] [address]  [facility] [address]  Primary Care Clinic Name: [facility]   Reason for Hospitalization:  COPD exacerbation (H) [J44.1]  Altered mental status, unspecified altered mental status type [R41.82]  Admit Date/Time: 4/26/2017  3:52 PM  Discharge Date: 4/29/17  Payor Source: Payor:MEDICARE / Plan: MEDICARE / Product Type: Medicare /     Readmission Assessment Measure (ROMARIO) Risk Score/category: Average     Medical Goals   Short-term Follow-up with nursing home  provider next week   Long-term: Manage and treat COPD symptoms at  home as tolerable.          Reason for Communication Hand-off Referral: Admission diagnoses: COPD    Discharge Plan: See Medical Goals above.       Concern for non-adherence with plan of care:   Y/N No  Discharge Needs Assessment:  Needs       Most Recent Value    # of Referrals Placed by CTS Scheduled Follow-up appointments, Communication hand-offs to next level of Care Providers [Assess for need of ]          Already enrolled in Tele-monitoring program and name of program:  n/a  Follow-up specialty is recommended: No    Follow-up plan:  Future Appointments  Date Time Provider Department Center   5/3/2017 3:40 PM Len Krishnan MD [facility]   6/13/2017 1:00 PM Len Krishnan MD [facility]       Any outstanding tests or procedures:  n/a            Key Recommendations:  See notes above.    Caitlin Reddy, RN, BSN, PHN, CTS  Care Transitions Specialist  [facility]      AVS/Discharge Summary is the source of truth; this is a helpful guide for improved communication of patient story
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
## 2017-04-26 NOTE — IP AVS SNAPSHOT
MRN:[id number]                      After Visit Summary   4/26/2017    David Dawn    MRN: [id number]           Thank you!     Thank you for choosing [facility] for your care. Our goal is always to provide you with excellent care. Hearing back from our patients is one way we can continue to improve our services. Please take a few minutes to complete the written survey that you may receive in the mail after you visit. If you would like to speak to someone directly about your visit please contact Patient Relations at [phone]. Thank you!          Patient Information     Date Of Birth          1960        Designated Caregiver       Most Recent Value    Caregiver    Will someone help with your care after discharge? no      About your hospital stay     You were admitted on:  April 26, 2017 You last received care in the:  [facility]    You were discharged on:  April 29, 2017        Reason for your hospital stay       Influenza B and Clostridium colitis                  Who to Call     For medical emergencies, please call 911.  For non-urgent questions about your medical care, please call your primary care provider or clinic, [phone]          Attending Provider     Provider Specialty    Bonifacio Zacarias MD Emergency Medicine    [facility]       Primary Care Provider Office Phone # Fax #    Len Krishnan [phone]       [place] [address] [place] [address]        After Care Instructions     Advance Diet as Tolerated       Follow this diet upon discharge: Orders Placed This Encounter      Combination Diet Regular Diet Adult            General info for SNF       Length of Stay Estimate: Long Term Care  Condition at Discharge: Improving  Level of care:skilled   Rehabilitation Potential: Fair  Admission H&P remains valid and up-to-date: Yes  Recent Chemotherapy: N/A  Use  Nursing Home Standing Orders: Yes            Mantoux instructions       Give two-step Mantoux (PPD) Per Facility Policy Yes                  Follow-up Appointments     Follow Up and recommended labs and tests       Follow-up with nursing home provider next week                  Your next 10 appointments already scheduled     May 03, 2017  3:40 PM CDT   [facility] MD   [facility]    [address]  [facility] [address]   [phone]            Jun 13, 2017  1:00 PM CDT   SHORT with Len Krishnan MD   [facility]    [address]  [facility] [address]   [phone]              Pending Results     Date and Time Order Name Status Description    4/26/2017 1636 Blood culture Preliminary     4/26/2017 1611 Blood culture Preliminary             Statement of Approval     Ordered          04/29/17 1219  I have reviewed and agree with all the recommendations and orders detailed in this document.  EFFECTIVE NOW     Approved and electronically signed by:  Heber Rod MD             Admission Information     Date & Time Provider Department Dept. Phone    4/26/2017 Bonifacio Le MD [facility] [phone]      Your Vitals Were     Blood Pressure Temperature Respirations Pulse Oximetry          153/90 97.2  F (36.2  C) (Oral) 16 93%        Care EveryWhere ID     This is your Care EveryWhere ID. This could be used by other organizations to access your [facility] medical records  [id number]           Review of your medicines      START taking        Dose / Directions    oseltamivir 75 MG capsule   Commonly known as:  TAMIFLU   Indication:  Flu   Used for:  Influenza B        Dose:  75 mg   Take 1 capsule (75 mg) by mouth 2 times daily for 2 days   Quantity:  4 capsule   Refills:  0       predniSONE 20 MG tablet   Commonly known as:  DELTASONE   Used for:   Influenza B        Dose:  40 mg   Take 2 tablets (40 mg) by mouth daily for 5 days   Refills:  0       vancomycin 50 mg/mL Liqd solution   Commonly known as:  VANOCIN   Indication:  Clostridium difficile Bacteria   Used for:  Colitis due to Clostridium difficile        Dose:  250 mg   Take 5 mLs (250 mg) by mouth 4 times daily for 7 days   Refills:  0         CONTINUE these medicines which have NOT CHANGED        Dose / Directions    albuterol 108 (90 BASE) MCG/ACT Inhaler   Commonly known as:  PROAIR HFA/PROVENTIL HFA/VENTOLIN HFA   Used for:  COPD (chronic obstructive pulmonary disease) (H)        Dose:  2 puff   Inhale 2 puffs into the lungs 2 times daily as needed for shortness of breath / dyspnea or wheezing   Quantity:  1 Inhaler   Refills:  5       amLODIPine 10 MG tablet   Commonly known as:  NORVASC   Used for:  HTN (hypertension)        Dose:  10 mg   Take 1 tablet (10 mg) by mouth daily   Quantity:  30 tablet   Refills:  1       baclofen 20 MG tablet   Commonly known as:  LIORESAL        Dose:  20 mg   Take 20 mg by mouth 3 times daily.   Refills:  0       budesonide 0.25 MG/2ML neb solution   Commonly known as:  PULMICORT   Used for:  COPD (chronic obstructive pulmonary disease) (H)        Dose:  0.25 mg   Take 2 mLs (0.25 mg) by nebulization 2 times daily 0800 and 1400   Quantity:  360 mL   Refills:  1       carBAMazepine 200 MG tablet   Commonly known as:  TEGretol   Used for:  Seizure disorder (H)        Dose:  200 mg   Take 1 tablet (200 mg) by mouth 3 times daily   Refills:  0       citalopram 20 MG tablet   Commonly known as:  celeXA        Dose:  20 mg   Take 20 mg by mouth daily   Refills:  0       clopidogrel 75 MG tablet   Commonly known as:  PLAVIX   Used for:  CVA (cerebral infarction)        Dose:  75 mg   Take 1 tablet (75 mg) by mouth daily   Quantity:  90 tablet   Refills:  3       DEPAKOTE PO        Dose:  1750 mg   Take 1,750 mg by mouth 2 times daily Delayed release   Refills:  0        furosemide 20 MG tablet   Commonly known as:  LASIX   Used for:  Bilateral edema of lower extremity        Dose:  20 mg   Take 1 tablet (20 mg) by mouth daily   Quantity:  60 tablet   Refills:  3       GABAPENTIN PO        Dose:  1800 mg   Take 1,800 mg by mouth At Bedtime   Refills:  0       * ipratropium - albuterol 0.5 mg/2.5 mg/3 mL 0.5-2.5 (3) MG/3ML neb solution   Commonly known as:  DUONEB        Dose:  1 vial   Take 1 vial by nebulization every 6 hours as needed for shortness of breath / dyspnea or wheezing   Refills:  0       * ipratropium - albuterol 0.5 mg/2.5 mg/3 mL 0.5-2.5 (3) MG/3ML neb solution   Commonly known as:  DUONEB        Dose:  1 vial   Take 1 vial by nebulization 3 times daily   Refills:  0       levETIRAcetam 500 MG tablet   Commonly known as:  KEPPRA   Used for:  Seizure disorder (H)        Dose:  500 mg   Take 1 tablet (500 mg) by mouth 2 times daily   Quantity:  60 tablet   Refills:  5       lidocaine 2 % topical gel   Commonly known as:  XYLOCAINE   Used for:  External hemorrhoids        Apply to anus qid as needed for pain   Quantity:  30 mL   Refills:  3       nebulizer/adult mask Kit   Used for:  Wheezing        Dose:  1 Device   1 Device 4 times daily.   Quantity:  1 kit   Refills:  3       niacin 500 MG CR capsule   Used for:  Hyperlipidemia LDL goal <100        Dose:  500 mg   Take 1 capsule (500 mg) by mouth At Bedtime   Quantity:  30 capsule   Refills:  5       order for DME   Used for:  Stroke (H)        Equipment being ordered: shower benck chair   Quantity:  1 each   Refills:  0       order for DME   Used for:  History of stroke        Equipment being ordered: Hospital Bed  Fax number: [phone] [facility]  Attn:  Yisel    Quantity:  1 each   Refills:  0       oxyCODONE HCl 5 MG Taba   Commonly known as:  OXECTA   Used for:  Chronic pain syndrome        Dose:  5-10 mg   Take 5-10 mg by mouth every 6 hours as needed   Quantity:  90 each   Refills:  0        potassium chloride 10 MEQ tablet   Commonly known as:  K-TAB,KLOR-CON        Dose:  10 mEq   Take 10 mEq by mouth every morning   Refills:  0       SEROQUEL PO        Dose:  150 mg   Take 150 mg by mouth 2 times daily   Refills:  0       simvastatin 40 MG tablet   Commonly known as:  ZOCOR   Used for:  Hyperlipidemia LDL goal <100        Dose:  40 mg   Take 1 tablet (40 mg) by mouth At Bedtime   Quantity:  90 tablet   Refills:  3       tamsulosin 0.4 MG capsule   Commonly known as:  FLOMAX   Used for:  BPH (benign prostatic hyperplasia)        Dose:  0.4 mg   Take 1 capsule (0.4 mg) by mouth daily   Quantity:  60 capsule   Refills:  6       TYLENOL PO        Dose:  650 mg   Take 650 mg by mouth every 4 hours as needed for mild pain or fever   Refills:  0       vitamin D 39572 UNIT capsule   Commonly known as:  ERGOCALCIFEROL        Dose:  90924 Units   Take 50,000 Units by mouth once a week On Monday   Refills:  0       * Notice:  This list has 2 medication(s) that are the same as other medications prescribed for you. Read the directions carefully, and ask your doctor or other care provider to review them with you.      STOP taking     loperamide 2 MG tablet   Commonly known as:  IMODIUM A-D           LOPERAMIDE A-D 2 MG tablet   Generic drug:  loperamide                Where to get your medicines      Some of these will need a paper prescription and others can be bought over the counter. Ask your nurse if you have questions.     You don't need a prescription for these medications     oseltamivir 75 MG capsule    predniSONE 20 MG tablet    vancomycin 50 mg/mL Liqd solution                Protect others around you: Learn how to safely use, store and throw away your medicines at www.disposemymeds.org.             Medication List: This is a list of all your medications and when to take them. Check marks below indicate your daily home schedule. Keep this list as a reference.      Medications           Morning  Afternoon Evening Bedtime As Needed    albuterol 108 (90 BASE) MCG/ACT Inhaler   Commonly known as:  PROAIR HFA/PROVENTIL HFA/VENTOLIN HFA   Inhale 2 puffs into the lungs 2 times daily as needed for shortness of breath / dyspnea or wheezing                                amLODIPine 10 MG tablet   Commonly known as:  NORVASC   Take 1 tablet (10 mg) by mouth daily   Last time this was given:  10 mg on 4/29/2017  8:00 AM                                baclofen 20 MG tablet   Commonly known as:  LIORESAL   Take 20 mg by mouth 3 times daily.   Last time this was given:  20 mg on 4/29/2017  8:00 AM                                budesonide 0.25 MG/2ML neb solution   Commonly known as:  PULMICORT   Take 2 mLs (0.25 mg) by nebulization 2 times daily 0800 and 1400   Last time this was given:  0.25 mg on 4/29/2017  8:10 AM                                carBAMazepine 200 MG tablet   Commonly known as:  TEGretol   Take 1 tablet (200 mg) by mouth 3 times daily   Last time this was given:  200 mg on 4/29/2017  8:00 AM                                citalopram 20 MG tablet   Commonly known as:  celeXA   Take 20 mg by mouth daily   Last time this was given:  20 mg on 4/29/2017  8:00 AM                                clopidogrel 75 MG tablet   Commonly known as:  PLAVIX   Take 1 tablet (75 mg) by mouth daily   Last time this was given:  75 mg on 4/29/2017  7:59 AM                                DEPAKOTE PO   Take 1,750 mg by mouth 2 times daily Delayed release   Last time this was given:  1,750 mg on 4/29/2017  8:00 AM                                furosemide 20 MG tablet   Commonly known as:  LASIX   Take 1 tablet (20 mg) by mouth daily   Last time this was given:  20 mg on 4/28/2017  9:14 AM                                GABAPENTIN PO   Take 1,800 mg by mouth At Bedtime   Last time this was given:  1,800 mg on 4/28/2017 10:16 PM                                * ipratropium - albuterol 0.5 mg/2.5 mg/3 mL 0.5-2.5 (3) MG/3ML neb  solution   Commonly known as:  DUONEB   Take 1 vial by nebulization every 6 hours as needed for shortness of breath / dyspnea or wheezing   Last time this was given:  3 mLs on 4/29/2017  8:10 AM                                * ipratropium - albuterol 0.5 mg/2.5 mg/3 mL 0.5-2.5 (3) MG/3ML neb solution   Commonly known as:  DUONEB   Take 1 vial by nebulization 3 times daily   Last time this was given:  3 mLs on 4/29/2017  8:10 AM                                levETIRAcetam 500 MG tablet   Commonly known as:  KEPPRA   Take 1 tablet (500 mg) by mouth 2 times daily   Last time this was given:  500 mg on 4/29/2017  8:00 AM                                lidocaine 2 % topical gel   Commonly known as:  XYLOCAINE   Apply to anus qid as needed for pain                                nebulizer/adult mask Kit   1 Device 4 times daily.                                niacin 500 MG CR capsule   Take 1 capsule (500 mg) by mouth At Bedtime                                order for DME   Equipment being ordered: shower benck chair                                order for DME   Equipment being ordered: Hospital Bed  Fax number: [phone] [facility]  Attn:  Yisel                                 oseltamivir 75 MG capsule   Commonly known as:  TAMIFLU   Take 1 capsule (75 mg) by mouth 2 times daily for 2 days   Last time this was given:  150 mg on 4/29/2017  8:00 AM                                oxyCODONE HCl 5 MG Taba   Commonly known as:  OXECTA   Take 5-10 mg by mouth every 6 hours as needed                                potassium chloride 10 MEQ tablet   Commonly known as:  K-TAB,KLOR-CON   Take 10 mEq by mouth every morning   Last time this was given:  10 mEq on 4/29/2017  8:00 AM                                predniSONE 20 MG tablet   Commonly known as:  DELTASONE   Take 2 tablets (40 mg) by mouth daily for 5 days   Last time this was given:  40 mg on 4/29/2017  8:00 AM                                 SEROQUEL PO   Take 150 mg by mouth 2 times daily   Last time this was given:  150 mg on 4/29/2017  7:59 AM                                simvastatin 40 MG tablet   Commonly known as:  ZOCOR   Take 1 tablet (40 mg) by mouth At Bedtime   Last time this was given:  40 mg on 4/28/2017 10:17 PM                                tamsulosin 0.4 MG capsule   Commonly known as:  FLOMAX   Take 1 capsule (0.4 mg) by mouth daily   Last time this was given:  0.4 mg on 4/29/2017  8:00 AM                                TYLENOL PO   Take 650 mg by mouth every 4 hours as needed for mild pain or fever                                vancomycin 50 mg/mL Liqd solution   Commonly known as:  VANOCIN   Take 5 mLs (250 mg) by mouth 4 times daily for 7 days   Last time this was given:  250 mg on 4/29/2017  8:07 AM                                vitamin D 50969 UNIT capsule   Commonly known as:  ERGOCALCIFEROL   Take 50,000 Units by mouth once a week On Monday                                * Notice:  This list has 2 medication(s) that are the same as other medications prescribed for you. Read the directions carefully, and ask your doctor or other care provider to review them with you.

## 2017-04-26 NOTE — ED NOTES
Bed: ED07  Expected date: 4/26/17  Expected time: 3:44 PM  Means of arrival: Ambulance  Comments:  BV2

## 2017-04-26 NOTE — IP AVS SNAPSHOT
` ` Patient Information     Patient Name Sex     David Dawn ([id number]) Male 1960       Room Bed    20 0620-01      Patient Demographics     Address Phone    [address] [address] [phone] (Home)  NONE (Work)  NONE (Mobile)      Patient Ethnicity & Race     Ethnic Group Patient Race    American White      Emergency Contact(s)     Name Relation Home Work Mobile    Caitlin Jefferson [phone]      Documents on File        Status Date Received Description       Documents for the Patient    Privacy Notice - [place] Received 11     Insurance Card Received () 12     External Medication Information Consent       Patient ID Received () 11     Consent for Services - Hospital/Clinic  ()      Consent for Services - Hospital/Clinic Received () 11     External Medication Information Consent Accepted () 11     Waiver - Payment Accepted 11     Waiver - Payment Accepted 11     Waiver - Payment Accepted 12     HIM JACKSON Authorization  04/10/12 DEC CONSENT FOR RELEASE OF INFORMATION    HIM JACKSON Authorization - File Only  12 AUTHORIZATION FOR RELEASE OF PROTECTED HEALTH INFORMATION    HIM JACKSON Authorization - File Only  12 AUTHORIZATION FOR RELEASE OF PROTECTED HEALTH INFORMATION    Consent for Services - Hospital/Clinic Received () 12     Waiver - Payment Received 12     Privacy Notice - [place] Received 12     Consent to Communicate Received () 12     Occupational Therapy Certification Sent 12     External Medication Information Consent Accepted 12     Insurance Card Received 12 [employer] MEDICARE PRIMARY     Occupational Therapy Re-Certification Received 12     Insurance Card  () 12 nothing scanned    Insurance Card Received 12 [employer] MEDICARE PRIMARY      Consent to Communicate Received 12     Physical Therapy Certification Received 12     Business/Insurance/Care Coordination/Health Form - Patient.1  12 ADULT REHABILITATION ATTENDANCE POLICY    Business/Insurance/Care Coordination/Health Form - Patient.1  12 ADULT REHABILITATION ATTENDANCE POLICY    Physical Therapy Re-Certification Received 13     Consent to Communicate Received 12     HIM JACKSON Authorization  12     Occupational Therapy Certification Sent 13     Patient ID  ()      Patient ID Received () 02/16/15 [place] EXP.  2016    Physical Therapy Re-Certification Received 13     Consent for EHR Access  13 Copied from existing Consent for services - C/HOD collected on 2012    [facility] Specified Other       Consent for Services - Hospital/Clinic Received () 13     Consent to Communicate Received 13     Business/Insurance/Care Coordination/Health Form - Patient.1  06/10/13 ADULT REHABILITATION ATTENDANCE POLICY    Power of  Received 10/24/13 Power of  1/10/2011    HIM JACKSON Authorization - File Only   [facility]  JACKSON 9/3/13    HIM JACKSON Authorization - File Only   [employer]  JACKSON 3/7/14    Advance Directives and Living Will Not Received  INVALID DIRECTIVE DATED 01/10/2011 RECEIVED    Power of  Received 14 POWER OF  01/10/2011    Legal Documentation  14 TERMINATION OF GUARDIANSHIP 2014    Consent for Services - Hospital/Clinic Received () 14     Legal Documentation  14 [facility] PHYSICIAN'S STATEMENT IN SUPPORT OF GUARDIANSHIP 14    HIM JACKSON Authorization  14     Consent to Communicate Received 14 PHI Auth    Speech Therapy Certification Received 14 dysphagia eval only    HIM JACKSON Authorization - File Only   [facility] 2014    Speech Therapy Certification Received 14 Eval Only     Business/Insurance/Care Coordination/Health Form - Patient   [facility] Transition Notification-14    Legal Documentation  03/02/15 PETITION FOR GUARDIANSHIP    Consent for Services - Hospital/Clinic Received () 07/21/15     HIM JACKSON Authorization - File Only  07/22/15 DEC RELEASE OF INFORMATION    Advance Directives and Living Will Received 08/21/15 RESUSCITATION GUIDELINES 2015    Business/Insurance/Care Coordination/Health Form - Patient  16 PHYSICIAN ORDER, NUMOTION- 2015    Consent for Services - [facility]       Consent for Services/Privacy Notice - Hospital/Clinic-[facility] Received 16     Advance Directives and Living Will Received 16 RESUSCITATION GUIDELINES 2016    Consent for Services/Privacy Notice - Hospital/Clinic       CMS IM for Patient Signature  (Deleted)      Consent for Services - Hospital/Clinic  (Deleted)      Power of  Not Received (Deleted)  duplicate scan    Advance Directives and Living Will Not Received (Deleted)  duplicate- to be deleted    Advance Directives and Living Will Not Received (Deleted)  To be deleted - Not signed by provider    Advance Directives and Living Will Not Received (Deleted)  to be deleted not valid    Consent for Services/Privacy Notice - Hospital/Clinic  (Deleted)      Consent for Services/Privacy Notice - Hospital/Clinic  (Deleted)         Documents for the Encounter    CMS IM for Patient Signature Received 17     Discharge Instructions  17 CONTRAST DISCHARGE INSTRUCTIONS    Assessment/Questionnaire  17 CONTRAST QUESTIONNAIRE      Admission Information     Attending Provider Admitting Provider Admission Type Admission Date/Time    Bonifacio Le MD Loecken, Scott Peter, MD Emergency 17  1552    Discharge Date Hospital Service Auth/Cert Status Service Area     General Medicine [facility] SERVICES    Unit Room/Bed Admission Status       [facility] 20/0620- Admission  (Confirmed)       Admission     Complaint    COPD exacerbation (H)      Hospital Account     Name Acct ID Class Status Primary Coverage    David Dawn [id number] Inpatient Open MEDICARE - MEDICARE            Guarantor Account (for Hospital Account #[id number])     Name Relation to Pt Service Area Active? Acct Type    David Dawn Self FCS Yes Personal/Family    Address Phone          [place] [address] [place] [id number] [phone](H)              Coverage Information (for Hospital Account #[id number])     1. MEDICARE/MEDICARE     F/O Payor/Plan Precert #    MEDICARE/MEDICARE     Subscriber Subscriber #    David Dawn [id number]    Address Phone    [employer]  [address]  [place] [address] [phone]          2. [place]ARE/[facility]ARE [place]CT [place] ONLY     F/O Payor/Plan Precert #    UCARE/[place]ARE [place]CT [place]     Subscriber Subscriber #    David Dawn [id number]    Address Phone    [address]  [place] [id number] [phone]

## 2017-04-26 NOTE — PROGRESS NOTES
CHIEF COMPLAINT    Confusion, Not self per care unit.  Patient wants to move to [facility].      HISTORY    Patient with large previous R CVA and L hemiparesis presents in motorized . Wants me to sign a discharge order for him.    Dr Krishnan had requested he be sent to E [facility] 1 day ago.    Patient Active Problem List   Diagnosis     Seizure disorder (H)     Chronic fatigue     Low back pain     Mild major depression (H)     GERD (gastroesophageal reflux disease)     Hyperlipidemia LDL goal <100     COPD (chronic obstructive pulmonary disease) (H)     BPH (benign prostatic hyperplasia)     Hemiplegia affecting left nondominant side (H)     Radicular syndrome of upper limbs     Neck pain     Advance Care Planning     Osteoarthritis of glenohumeral joint     Osteoarthritis of acromioclavicular joint     Metabolic encephalopathy     Health Care Home     Dysphagia     Hallucinations     Cough     Generalized muscle weakness     Alcohol abuse     Behavior problem, adult     Neuropathy (H)     History of CVA (cerebrovascular accident)     Cognitive impairment     Essential hypertension, benign     Current Outpatient Prescriptions   Medication Sig Dispense Refill     order for DME Equipment being ordered: Hospital Bed    Fax number: [phone]  [address]   Attn:  Yisel  1 each 0     oxyCODONE HCl (OXECTA) 5 MG TABA Take 5-10 mg by mouth every 6 hours as needed 90 each 0     loperamide (IMODIUM A-D) 2 MG tablet 1 tablet bid and qid PRN. 30 tablet 0     Colloidal Oatmeal 1 % CREA Externally apply topically 2 times daily 1 Tube 3     furosemide (LASIX) 20 MG tablet Take 1 tablet (20 mg) by mouth daily 60 tablet 3     levalbuterol (XOPENEX HFA) 45 MCG/ACT inhaler Inhale 2 puffs into the lungs every 6 hours as needed for shortness of breath / dyspnea or wheezing 1 Inhaler 1     citalopram (CELEXA) 20 MG tablet Take 20 mg by mouth daily       Acetaminophen (TYLENOL PO) Take 650 mg by mouth every 4 hours as needed  for mild pain or fever       GABAPENTIN PO Take 1,800 mg by mouth every evening        ipratropium - albuterol 0.5 mg/2.5 mg/3 mL (DUONEB) 0.5-2.5 (3) MG/3ML nebulization Take 1 vial by nebulization daily as needed for shortness of breath / dyspnea or wheezing       QUEtiapine Fumarate (SEROQUEL PO) Take 150 mg by mouth 2 times daily        potassium chloride (K-DUR) 10 MEQ tablet Take 10 mEq by mouth every morning       albuterol (PROAIR HFA, PROVENTIL HFA, VENTOLIN HFA) 108 (90 BASE) MCG/ACT inhaler Inhale 2 puffs into the lungs 2 times daily At 1200 and 1600       diclofenac (VOLTAREN) 1 % GEL Place onto the skin 2 times daily as needed Apply 4 grams to knees or 2 grams to hands       carBAMazepine (TEGRETOL) 200 MG tablet Take 1 tablet (200 mg) by mouth 3 times daily       vitamin D (ERGOCALCIFEROL) 55514 UNIT capsule Take 50,000 Units by mouth once a week On Monday       budesonide (PULMICORT) 0.25 MG/2ML nebulizer solution Take 2 mLs (0.25 mg) by nebulization 2 times daily 0800 and 1400 360 mL 1     albuterol (PROAIR HFA, PROVENTIL HFA, VENTOLIN HFA) 108 (90 BASE) MCG/ACT inhaler Inhale 2 puffs into the lungs 2 times daily as needed for shortness of breath / dyspnea or wheezing 1 Inhaler 5     amLODIPine (NORVASC) 10 MG tablet Take 1 tablet (10 mg) by mouth daily 30 tablet 1     levETIRAcetam (KEPPRA) 500 MG tablet Take 1 tablet (500 mg) by mouth 2 times daily 60 tablet 5     lidocaine (XYLOCAINE) 2 % jelly Apply to anus qid as needed for pain 30 mL 3     niacin 500 MG CR capsule Take 1 capsule (500 mg) by mouth At Bedtime 30 capsule 5     simvastatin (ZOCOR) 40 MG tablet Take 1 tablet (40 mg) by mouth At Bedtime 90 tablet 3     tamsulosin (FLOMAX) 0.4 MG 24 hr capsule Take 1 capsule (0.4 mg) by mouth daily 60 capsule 6     clopidogrel (PLAVIX) 75 MG tablet Take 1 tablet (75 mg) by mouth daily 90 tablet 3     Placebo (ORDER FOR DME) Equipment being ordered: shower benck chair 1 each 0     Respiratory Therapy  "Supplies (NEBULIZER/ADULT MASK) KIT 1 Device 4 times daily. 1 kit 3     divalproex (DEPAKOTE) 500 MG EC tablet Take 3 tablets by mouth every 12 hours. 180 tablet 2     baclofen (LIORESAL) 20 MG tablet Take 20 mg by mouth 3 times daily.         REVIEW OF SYSTEMS    Didn't get to review this      Past Medical History:   Diagnosis Date     BPH (benign prostatic hyperplasia) 9/15/2011     COPD (chronic obstructive pulmonary disease) (H) 9/15/2011     Edema      ETOH abuse      GERD (gastroesophageal reflux disease) 9/15/2011     HTN (hypertension) 6/14/2014     Hyperlipidemia LDL goal <100 9/15/2011     Impulse control disorder      Mild major depression (H) 9/15/2011     Positive TB test      Seizures (H) 9/15/2011     Unspecified cerebral artery occlusion with cerebral infarction        EXAM  /74 (BP Location: Right arm, Patient Position: Chair, Cuff Size: Adult Large)  Pulse 120  Temp 97.9  F (36.6  C) (Oral)  Ht 5' 7.17\" (1.706 m)  Wt 165 lb (74.8 kg)  SpO2 95%  BMI 25.71 kg/m2    He has obvious chest congestion  L hemiparesis    I tried to convince him to go to E R but he left the exam room and proceeded to disrupt the waiting area      (R41.0) Confusion  (primary encounter diagnosis)  Comment:   Plan:     (R05) Respiratory tract congestion with cough  Comment:   Plan:     (J44.9) Chronic obstructive pulmonary disease, unspecified COPD type (H)  Comment:   Plan:     (G81.94) Hemiplegia affecting left nondominant side (H)  Comment:   Plan:     Ambulance was called.  Transport hold placed.  He will be takne to [facility].      "

## 2017-04-26 NOTE — ED PROVIDER NOTES
"  History     Chief Complaint:  Altered Mental Status and Agitation     HPI   History is limited due to patient's sedation level:    David Dawn is a 57 year old male with a history of CVA, COPD and cognitive impairment who presents with altered mental status and agitation. Per review of chart and telephone encounter, the patient was incoherent and turning around in circles in a wheel chair yesterday and was told to come to the ED. Per EMS, the patient was seen in clinic today, and when his physician told him that he would need to come to the ED because he suspected pneumonia and possibly sepsis, the patient reportedly became combative and tried to leave. On the way out, he attempted to run a few people over in his wheel chair, and EMS was called. He received 5 mg of IM versed and was brought to the ED for evaluation. While in the ED, the patient is sedated and not able to answer questions.         Allergies:  Ibuprofen sodium  Tylenol  Tuberculin Tests      Medications:    Oxecta  Imodium A-D  Lasix  Xopenex inhaler  Celexa  Tylenol  Gabapentin   Duoneb  Seroquel  K-Dur  Proair  Voltaren  Tegretol  Ergocalciferol  Pulmicort  Proair  Norvasc  Keppra  Niacin  Zocor  Flomax  Plavix  Depakote   Lioresal     Past Medical History:    BPH  COPD  Edema  GERD  Hypertension  Hyperlipidemia   Impulse control disorder  Mild major depression  Positive TB test  Seizures  CVA  Cognitive impairment  Hypertension  Neuropathy  Alcohol abuse  Hallucinations   Metabolic encephalopathy   Hemiplegia affecting left nondominant side    Past Surgical History:    Colonoscopy x2  Sigmoidoscopy x2  Knee surgery  Neck surgery  Excise tumor rectum villus     Family History:  Mother: MI  Father: Alzheimer's disease  Brother: Stroke  Son: Seizures    Social History:  Relationship status: Single  The patient currently smokes.   The patient does not drink alcohol, \"quit 20 years ago\".      Review of Systems   Unable to perform ROS: Other "   Respiratory: Positive for wheezing.      Unable to perform ROS due to patient's sedation level.    Physical Exam     Patient Vitals for the past 24 hrs:   BP Temp Temp src Heart Rate Resp SpO2   04/26/17 1808 144/86 - - - - 93 %   04/26/17 1709 - - - 71 - 100 %   04/26/17 1700 (!) 160/97 - - - - 96 %   04/26/17 1646 (!) 138/115 - - - - 95 %   04/26/17 1637 - 98.6  F (37  C) Oral - - -   04/26/17 1630 (!) 117/97 - - - - 97 %   04/26/17 1615 (!) 145/135 - - 79 - 95 %   04/26/17 1600 122/45 - - - - 94 %   04/26/17 1552 115/78 98.7  F (37.1  C) Temporal 82 14 95 %         Physical Exam  General: Patient is sleepy, in restraints, confused when I enter room.   Head:  The scalp, face, and head appear normal  Eyes:  The pupils are equal, round, and reactive to light    Conjunctivae and sclerae are normal  ENT:    External acoustic canals are normal    The oropharynx is normal without erythema.     Uvula is in the midline  Neck:  Normal range of motion  CV:  Regular rate. S1/S2. No murmurs.   Resp:  No distress. Crackles both bases, expiratory wheezes.   GI:  Abdomen is soft, no rigidity, guarding, or rebound    No distension. No tenderness to palpation in any quadrant.     MS:  Normal tone. Joints grossly normal without effusions.     No asymmetric leg swelling, calf or thigh tenderness.      Left AFO  :   Scrotum slightly red, does not look cellulitic  Skin:  No rash or lesions noted. Normal capillary refill noted  Neuro: Speech is garbled, left hemiparesis.   Psych:  Sleepy, difficult to assess due to altered mental status. .  Lymph: No anterior cervical lymphadenopathy noted    Emergency Department Course     Imaging:  Radiographic findings were communicated with the patient who voiced understanding of the findings.    Portable Chest XR per radiology:   IMPRESSION: Linear atelectasis or fibrosis at the right lung apex,  possibly unchanged from 12/20/2015 given differences in projection.  The lungs otherwise appear  grossly clear. No apparent pleural  Effusion.    Chest/Abdomen/Pelvis CT, with contrast, per radiology:   IMPRESSION:  1. Right upper lobe scarring and bullous changes.  2. Old granulomatous disease with right hilar/mediastinal calcified  lymph nodes and splenic calcified granulomas.  3. Although images are degraded by patient respiratory motion  artifact, there is no evidence of an acute inflammatory process in the  abdomen or pelvis.  4. Hepatic fatty infiltration--possible etiologies include consumption  of alcohol or excessive carbohydrate intake, especially  sugar/fructose.  Metabolic syndrome commonly occurs in combination  with nonalcoholic fatty liver disease. Although often reversible,  nonalcoholic fatty liver disease can progress to steatohepatitis and  Cirrhosis.    Head CT, without contrast, as per radiology:   IMPRESSION:   1. Nothing acute.  2. Old right hemispheric infarct.  3. Chronic white matter disease and atrophy.  4. Brain is unchanged in the interval.  5. Membrane thickening in the paranasal sinuses increased in the  interval.      Laboratory:  CBC: WBC 3.8 (WNL) HGB 12.7 (L)  (L) RBC 3.68 (L) HCT 39.1 (L)  (H) MCH 34.5 (H)  BMP: Cr 1.15 (WNL) Glucose 85 (WNL) Calcium 8.1 (L) Rest WNL  ISTAT gases lactate jennifer POCT: pH 7.30 (L) pCO2 51 (H) p02 32 (WNL) Bicarbonate 25 (WNL) Lactate 2.7 (H) Rest WNL  Hepatic Panel: Albumin 3.0 (L) Rest WNL  Blood culture 1: Pending  Blood culture 2: Pending  UA: Clear, yellow urine; RBC (H) Rest WNL  Urine Culture: Pending  1919: ISTAT gases lactate jennifer POCT: pH 7.29 (WNL) pCO2 51 (H) p02 22 (L) Bicarbonate 24 (WNL) Lactate 1.3 (WNL) Rest WNL  Depakote level: 64  Carbamazepine and epoxide free and total: pending  Levetiracetam level: pending    Interventions:  1636: Normal Saline, 1000 mL, IV   1814: Rocephin, 1 g, IV  1833: Duoneb, 3 mLs, Nebulization   1851: Normal Saline, 1000 mL, IV   1903: Duoneb, 3 mLs, Nebulization   1917: Decadron, 10 mg,  IV  1918: Duoneb, 3 mLs, Nebulization  2002: Normal Saline, 1000 mL, IV     ED Course:  The patient arrived by ambulance. He was escorted to the ED where his vitals were measured and recorded.   Nursing notes and past medical history reviewed.   I performed a physical examination of the patient as documented above.  I explained the plan with the patient who consents to this.   Blood was drawn and sent to the laboratory for testing, see above results.   A peripheral IV was established.  The patient received the above interventions.   The patient underwent the above imaging studies.   Urine was sent to the laboratory for testing, see above results.   1914: Discussed the patient with Dr. Le from the hospitalist service.  I personally reviewed the laboratory and imaging results with the Patient and answered all related questions prior to admitting.   Findings and plan explained to the Patient who consents to admission. Discussed the patient with Dr. Le, who will admit the patient to a [facility] bed for further monitoring, evaluation, and treatment.  Impression & Plan      CMS Diagnoses:   The patient has signs of Severe Sepsis as evidenced by:    1. 2 SIRS criteria, AND  2. Suspected infection, AND   3. Organ dysfunction: Lactic Acid >2    Time severe sepsis diagnosis confirmed = 1623 as this was the time when Lactate resulted, and the level was >2      3 Hour Severe Sepsis Bundle Completion:  1. Initial Lactic Acid Result:   Recent Labs   Lab Test  04/26/17   1623  12/20/15   0323  12/18/15   1941   LACT  2.7*  0.5  1.2     2. Blood Cultures before Antibiotics: Yes  3. Broad Spectrum Antibiotics Administered: Yes     Anti-infectives     None        4. 3,000 mL of Fluids      Medical Decision Making:  David Dawn is a 57 year old male who presents for evaluation of altered mental status.  He looks impaired here on initial exam and during course in ED the mental status has waxed and waned but still appears  confused of unclear etiolgy.  A broad differential was considered including infection, metabolic derangement, electrolyte abnormality (hyponatremia, etc), hypotension or shock, CVA, intracranial hemorrage or tumor, thyroid abnormality.  The most likely etiology of the altered mental state is unclear, detailed infection workup negative making a metabolic encephalopathy more likely. Doubt it is just hypercarbia at that low level. No  Indication for intubation or bipap at this point.  Head CT ok.  Based on this would admit to medicine for further cares.  Detailed physical exam for occult sources of infection does not demonstrate any hidden infections like perirectal abscess or cellulitis, cellulitis, open wounds, etc.   Is wheezing, duonebs and steroids given. Needed restaints for agitation intermittently. Further cares per the inpatient team.  Hemodynamically stable here in ED and would not place in ICU or telemetry.        Diagnosis:    ICD-10-CM    1. Altered mental status, unspecified altered mental status type R41.82 Blood culture     Blood culture   2. COPD exacerbation (H) J44.1        Disposition:   Admit to a [facility] bed under the care of Dr. Le.         IReina, am serving as a scribe on 4/26/2017 at 4:05 PM to personally document services performed by Bonifacio Zacarias MD, based on my observations and the provider's statements to me.       Bonifacio Zacarias MD  04/26/17 7427

## 2017-04-26 NOTE — MR AVS SNAPSHOT
After Visit Summary   4/26/2017    David Dawn    MRN: [id number]           Patient Information     Date Of Birth          1960        Visit Information        Provider Department      4/26/2017 2:20 PM Jcarlos Pringle MD [facility]        Today's Diagnoses     Confusion    -  1    Respiratory tract congestion with cough        Chronic obstructive pulmonary disease, unspecified COPD type (H)        Hemiplegia affecting left nondominant side (H)           Follow-ups after your visit        Your next 10 appointments already scheduled     May 03, 2017  3:40 PM CDT   [facility]    [address]  [facility] [id number] [phone]            Jun 13, 2017  1:00 PM CDT   [facility]    [address]  [facility] [id number] [phone]              Who to contact     If you have questions or need follow up information about today's clinic visit or your schedule please contact [facility] directly at [phone].  Normal or non-critical lab and imaging results will be communicated to you by MyChart, letter or phone within 4 business days after the clinic has received the results. If you do not hear from us within 7 days, please contact the clinic through MyChart or phone. If you have a critical or abnormal lab result, we will notify you by phone as soon as possible.  Submit refill requests through [employer] or call your pharmacy and they will forward the refill request to us. Please allow 3 business days for your refill to be completed.          Additional Information About Your Visit        Care EveryWhere ID     This is your Care EveryWhere ID. This could be used by other organizations to access your [facility] medical records  [id number]        Your Vitals Were     Pulse  "Temperature Height Pulse Oximetry BMI (Body Mass Index)       120 97.9  F (36.6  C) (Oral) 5' 7.17\" (1.706 m) 95% 25.71 kg/m2        Blood Pressure from Last 3 Encounters:   04/26/17 108/74   03/13/17 109/73   02/10/17 129/81    Weight from Last 3 Encounters:   04/26/17 165 lb (74.8 kg)   03/13/17 164 lb 14.5 oz (74.8 kg)   01/10/17 165 lb (74.8 kg)              Today, you had the following     No orders found for display       Primary Care Provider Office Phone # Fax #    Len Krishnan [phone]       [facility] [address] [facility] [address]        Thank you!     Thank you for choosing [facility]  for your care. Our goal is always to provide you with excellent care. Hearing back from our patients is one way we can continue to improve our services. Please take a few minutes to complete the written survey that you may receive in the mail after your visit with us. Thank you!             Your Updated Medication List - Protect others around you: Learn how to safely use, store and throw away your medicines at www.disposemymeds.org.          This list is accurate as of: 4/26/17  3:33 PM.  Always use your most recent med list.                   Brand Name Dispense Instructions for use    * albuterol 108 (90 BASE) MCG/ACT Inhaler    PROAIR HFA/PROVENTIL HFA/VENTOLIN HFA    1 Inhaler    Inhale 2 puffs into the lungs 2 times daily as needed for shortness of breath / dyspnea or wheezing       * albuterol 108 (90 BASE) MCG/ACT Inhaler    PROAIR HFA/PROVENTIL HFA/VENTOLIN HFA     Inhale 2 puffs into the lungs 2 times daily At 1200 and 1600       amLODIPine 10 MG tablet    NORVASC    30 tablet    Take 1 tablet (10 mg) by mouth daily       baclofen 20 MG tablet    LIORESAL     Take 20 mg by mouth 3 times daily.       budesonide 0.25 MG/2ML neb solution    PULMICORT    360 mL    Take 2 mLs (0.25 mg) by nebulization 2 times daily 0800 and 1400       carBAMazepine 200 MG " tablet    TEGretol     Take 1 tablet (200 mg) by mouth 3 times daily       citalopram 20 MG tablet    celeXA     Take 20 mg by mouth daily       clopidogrel 75 MG tablet    PLAVIX    90 tablet    Take 1 tablet (75 mg) by mouth daily       Colloidal Oatmeal 1 % Crea     1 Tube    Externally apply topically 2 times daily       divalproex 500 MG EC tablet    DEPAKOTE    180 tablet    Take 3 tablets by mouth every 12 hours.       furosemide 20 MG tablet    LASIX    60 tablet    Take 1 tablet (20 mg) by mouth daily       GABAPENTIN PO      Take 1,800 mg by mouth every evening       ipratropium - albuterol 0.5 mg/2.5 mg/3 mL 0.5-2.5 (3) MG/3ML neb solution    DUONEB     Take 1 vial by nebulization daily as needed for shortness of breath / dyspnea or wheezing       levalbuterol 45 MCG/ACT Inhaler    XOPENEX HFA    1 Inhaler    Inhale 2 puffs into the lungs every 6 hours as needed for shortness of breath / dyspnea or wheezing       levETIRAcetam 500 MG tablet    KEPPRA    60 tablet    Take 1 tablet (500 mg) by mouth 2 times daily       lidocaine 2 % topical gel    XYLOCAINE    30 mL    Apply to anus qid as needed for pain       loperamide 2 MG tablet    IMODIUM A-D    30 tablet    1 tablet bid and qid PRN.       nebulizer/adult mask Kit     1 kit    1 Device 4 times daily.       niacin 500 MG CR capsule     30 capsule    Take 1 capsule (500 mg) by mouth At Bedtime       order for DME     1 each    Equipment being ordered: shower benck chair       order for DME     1 each    Equipment being ordered: Hospital Bed  Fax number: [phone] [facility]  Attn:  Yisel        oxyCODONE HCl 5 MG Taba    OXECTA    90 each    Take 5-10 mg by mouth every 6 hours as needed       potassium chloride 10 MEQ tablet    K-TAB,KLOR-CON     Take 10 mEq by mouth every morning       SEROQUEL PO      Take 150 mg by mouth 2 times daily       simvastatin 40 MG tablet    ZOCOR    90 tablet    Take 1 tablet (40 mg) by mouth At  Bedtime       tamsulosin 0.4 MG capsule    FLOMAX    60 capsule    Take 1 capsule (0.4 mg) by mouth daily       TYLENOL PO      Take 650 mg by mouth every 4 hours as needed for mild pain or fever       vitamin D 67494 UNIT capsule    ERGOCALCIFEROL     Take 50,000 Units by mouth once a week On Monday       VOLTAREN 1 % Gel topical gel   Generic drug:  diclofenac      Place onto the skin 2 times daily as needed Apply 4 grams to knees or 2 grams to hands       * Notice:  This list has 2 medication(s) that are the same as other medications prescribed for you. Read the directions carefully, and ask your doctor or other care provider to review them with you.

## 2017-04-27 NOTE — PLAN OF CARE
Problem: Respiratory Insufficiency (Adult)  Goal: Effective Ventilation  Patient will demonstrate the desired outcomes by discharge/transition of care.   Outcome: No Change  Pt still with exp wheezed throughout, dyspnea with activities, productive cough with creamy phelm. sats mid 90's on r/a. Afeb today, vss, voiding with help, loose BM half in bedpan half in depends. Lane flu given but hospitalist will double the dose. Did sleep most of the day after he got his am meds. Refused to eat anything today, will encourage him to eat tonight.

## 2017-04-27 NOTE — PHARMACY-ADMISSION MEDICATION HISTORY
Admission medication history interview status for this patient is complete. See [facility] admission navigator for allergy information, prior to admission medications and immunization status.     Medication history interview source(s):Nursing home paper MAR  Medication history resources (including written lists, pill bottles, clinic record):paper MAR that was requested to be faxed from Nursing home: [facility] in [place] - [phone]      Changes made to PTA medication list:  Added: Duoneb TID  Deleted:     Albuterol inhaler BID,     Xopenex inhaler prn    Diclofenac gel prn (not on MAR);     Colloidal ointment 1% cream bid (no on MAR)    Changed:     Depakote delayed release: from 1500mg bid ---> to 1750mg bid    Loperamide - split order in 2 separate orders since takes bid and prn        Medication reconciliation/reorder completed by provider prior to medication history? Yes    For patients on insulin therapy: No       Prior to Admission medications    Medication Sig Last Dose Taking? Auth Provider   ipratropium - albuterol 0.5 mg/2.5 mg/3 mL (DUONEB) 0.5-2.5 (3) MG/3ML neb solution Take 1 vial by nebulization 3 times daily 4/26/2017 at 2 doses Yes Unknown, Entered By History   loperamide (LOPERAMIDE A-D) 2 MG tablet Take 2 mg by mouth 4 times daily as needed for diarrhea  Yes Unknown, Entered By History   Divalproex Sodium (DEPAKOTE PO) Take 1,750 mg by mouth 2 times daily Delayed release 4/26/2017 at am Yes Unknown, Entered By History   oxyCODONE HCl (OXECTA) 5 MG TABA Take 5-10 mg by mouth every 6 hours as needed  Yes Len Krishnan MD   loperamide (IMODIUM A-D) 2 MG tablet Take 2 mg by mouth 2 times daily 1 tablet bid and qid PRN. 4/26/2017 at am Yes Len Krishnan MD   furosemide (LASIX) 20 MG tablet Take 1 tablet (20 mg) by mouth daily 4/26/2017 at am Yes Len Krishnan MD   citalopram (CELEXA) 20 MG tablet Take 20 mg by mouth daily 4/26/2017 at am Yes Reported, Patient   Acetaminophen  (TYLENOL PO) Take 650 mg by mouth every 4 hours as needed for mild pain or fever  Yes Reported, Patient   GABAPENTIN PO Take 1,800 mg by mouth every evening  4/25/2017 at 2200 Yes Unknown, Entered By History   ipratropium - albuterol 0.5 mg/2.5 mg/3 mL (DUONEB) 0.5-2.5 (3) MG/3ML nebulization Take 1 vial by nebulization every 6 hours as needed for shortness of breath / dyspnea or wheezing   Yes Unknown, Entered By History   QUEtiapine Fumarate (SEROQUEL PO) Take 150 mg by mouth 2 times daily  4/26/2017 at am Yes Reported, Patient   potassium chloride (K-DUR) 10 MEQ tablet Take 10 mEq by mouth every morning 4/26/2017 at am Yes Reported, Patient   carBAMazepine (TEGRETOL) 200 MG tablet Take 1 tablet (200 mg) by mouth 3 times daily 4/26/2017 at 2 doses Yes Evelin Mai MD   vitamin D (ERGOCALCIFEROL) 72277 UNIT capsule Take 50,000 Units by mouth once a week On Monday 4/24/2017 at 0800 Yes Unknown, Entered By History   budesonide (PULMICORT) 0.25 MG/2ML nebulizer solution Take 2 mLs (0.25 mg) by nebulization 2 times daily 0800 and 1400 4/27/2017 at 1200, charted to be given at 0800, 1200 Yes Len Krishnan MD   albuterol (PROAIR HFA, PROVENTIL HFA, VENTOLIN HFA) 108 (90 BASE) MCG/ACT inhaler Inhale 2 puffs into the lungs 2 times daily as needed for shortness of breath / dyspnea or wheezing  Yes Len Krishnan MD   amLODIPine (NORVASC) 10 MG tablet Take 1 tablet (10 mg) by mouth daily 4/26/2017 at am Yes Len Krishnan MD   levETIRAcetam (KEPPRA) 500 MG tablet Take 1 tablet (500 mg) by mouth 2 times daily 4/26/2017 at am Yes Len Krishnan MD   lidocaine (XYLOCAINE) 2 % jelly Apply to anus qid as needed for pain  Yes Len Krishnan MD   niacin 500 MG CR capsule Take 1 capsule (500 mg) by mouth At Bedtime 4/25/2017 at 2000 Yes Len Krishnan MD   simvastatin (ZOCOR) 40 MG tablet Take 1 tablet (40 mg) by mouth At Bedtime 4/25/2017 at 2000 Yes Len Krishnan MD   tamsulosin (FLOMAX)  0.4 MG 24 hr capsule Take 1 capsule (0.4 mg) by mouth daily 4/26/2017 at am Yes Len Krishnan MD   clopidogrel (PLAVIX) 75 MG tablet Take 1 tablet (75 mg) by mouth daily 4/26/2017 at am Yes Len Krishnan MD   baclofen (LIORESAL) 20 MG tablet Take 20 mg by mouth 3 times daily. 4/26/2017 at 2 doses Yes Reported, Patient   order for DME Equipment being ordered: Hospital Bed    Fax number: [phone]  [facility]   Attn:  Yisel,    Len Krishnan MD

## 2017-04-27 NOTE — PLAN OF CARE
"Problem: Goal Outcome Summary  Goal: Goal Outcome Summary  Outcome: No Change  From ED at 2105, initially calm but became increasingly agitated toward staff cussing spitting kicking, PRN IV haldol x1 & 4 point restraints initiated at 2145 by security guards, now calmer so switched over to soft-wrist restraints on R wrist & R ankle only.  Offered fluids, pt declined.  C/O legs pain, stated \"just a little\", repositioned onto pillows. Incont. urine x2, attends on.  Oriented to room and call system, denies questions.  Sitter at bedside.      "

## 2017-04-27 NOTE — PROGRESS NOTES
[facility]  Hospitalist Progress Note    Name: David Dwan    MRN: [id number]  Provider:  Heber Rod MD, [facility].      Assessment & Plan   Summary of Stay: David Dawn is a 57 year old male who was admitted on 4/26/2017.  He presented with increased agitation and a cough.  He was found to have Influenza B and started on tamiflu.  He also was having loose stools so Clostridium difficile was checked and came back positive    1.  Influenza B with COPD exacerbation:  -  Tamiflu 150 mg BID  -  Droplet precautions  -  Nebs and steroids, change IV Solu-Medrol to oral prednisone    2.  Clostridium difficile colitis, unclear if he is carrying or has active infection, he did have loose stool also today, would hold Lasix for now, continue oral vancomycin    3.  Prior CVA with left hemiplegia, baseline wheelchair bound:-  No new focal deficits noted.  Continue plavix.    4.  Seizure disorder without seizures noted here, Flu/metabolic related encephalopathy with behavioral issues:: -  Divalproex, keppra to continue.  Haldol PRN.  Continue baseline seroquel.    5.  HTN:-  Amlodipine to continue    6. Hyperlipidemia:-  Statin    7.  BPH:-  Flomax    DVT Prophylaxis: Pneumatic Compression Devices  Code Status: DNR/DNI    Disposition: If he continues to improve possibly can discharge tomorrow, back to his current living       Interval History   Assumed care for the day, history reviewed.   Diffuse better, he continues to have wheezing and a congested cough  1 loose stool today     Discussed with the patient's nurse      Physical Exam   Temp: 98  F (36.7  C) Temp src: Oral BP: 151/70   Heart Rate: 81 Resp: 20 SpO2: 96 % O2 Device: None (Room air) Oxygen Delivery: 2 LPM  There were no vitals filed for this visit.  Vital Signs with Ranges  Temp:  [97.5  F (36.4  C)-98.7  F (37.1  C)] 98  F (36.7  C)  Heart Rate:  [71-90] 81  Resp:  [18-20] 20  BP: (117-160)/() 151/70  SpO2:  [93 %-100 %] 96 %  I/O  last 3 completed shifts:  In: 2171 [P.O.:420; I.V.:1751]  Out: 1875 [Urine:1875]    GEN:  Alert, oriented but confused with details, appears ill but comfortable, no overt distress.  HEENT:  Normocephalic/atraumatic, no scleral icterus, no nasal discharge, mouth moist.  CV:  Regular rate and rhythm, no murmur or JVD.  S1 + S2 noted, no S3 or S4.  LUNGS:  Mild decreased breath sounds, some wheezing noted.  No retractions.  Symmetric chest rise on inhalation noted.  ABD:  Active bowel sounds, soft, non-tender, mildly distended.  No rebound/guarding/rigidity.  EXT:  Trace edema.  No cyanosis.  No acute joint synovitis noted.  SKIN:  Dry to touch, no exanthems noted in the visualized areas.    Medications        divalproex  1,750 mg Oral Q12H KATY     oseltamivir  150 mg Oral BID     ipratropium - albuterol 0.5 mg/2.5 mg/3 mL  1 vial Nebulization TID     amLODIPine  10 mg Oral Daily     baclofen  20 mg Oral TID     budesonide  0.25 mg Nebulization BID     carBAMazepine  200 mg Oral TID     citalopram  20 mg Oral Daily     clopidogrel  75 mg Oral Daily     furosemide  20 mg Oral Daily     gabapentin (NEURONTIN) tablet 1,800 mg  1,800 mg Oral QPM     levETIRAcetam  500 mg Oral BID     potassium chloride  10 mEq Oral QAM     QUEtiapine (SEROquel) tablet 150 mg  150 mg Oral BID     simvastatin  40 mg Oral At Bedtime     tamsulosin  0.4 mg Oral Daily     enoxaparin  40 mg Subcutaneous Q24H     senna-docusate  1-2 tablet Oral BID     methylPREDNISolone  62.5 mg Intravenous Q12H     ipratropium - albuterol 0.5 mg/2.5 mg/3 mL  3 mL Nebulization 4x Daily     Data       Recent Labs  Lab 04/27/17  0640 04/26/17  2145 04/26/17  1621   WBC 3.4*  --  3.8*   HGB 12.7*  --  12.7*   HCT 38.0*  --  39.1*   *  --  106*   * 148* 141*       Recent Labs  Lab 04/26/17  1655 04/26/17  1622   CULT No growth after 9 hours No growth after 9 hours       Recent Labs  Lab 04/27/17  0640 04/26/17  2145 04/26/17  1655 04/26/17  1621   NA  143  --   --  143   POTASSIUM 4.6  --   --  4.3   CHLORIDE 112*  --   --  109   CO2 21  --   --  24   ANIONGAP 10  --   --  10   *  --   --  85   BUN 15  --   --  22   CR 0.82 0.94  --  1.15   GFRESTIMATED >90Non  GFR Calc 82  --  65   GFRESTBLACK >90African American GFR Calc >90African American GFR Calc  --  79   KADIE 7.5*  --   --  8.1*   PROTTOTAL  --   --  6.8  --    ALBUMIN  --   --  3.0*  --    BILITOTAL  --   --  0.2  --    ALKPHOS  --   --  71  --    AST  --   --  25  --    ALT  --   --  23  --        Recent Results (from the past 24 hour(s))   Chest  XR, 1 view PORTABLE    Narrative    CHEST ONE VIEW PORTABLE  4/26/2017 4:37 PM     HISTORY: Confusion, abnormal lung sounds, evaluate for pneumonia.      Impression    IMPRESSION: Linear atelectasis or fibrosis at the right lung apex,  possibly unchanged from 12/20/2015 given differences in projection.  The lungs otherwise appear grossly clear. No apparent pleural  effusion.    LILLIE MAZA MD   CT Chest/Abdomen/Pelvis w Contrast    Narrative    CT CHEST/ABDOMEN/PELVIS WITH CONTRAST 4/26/2017 7:42 PM     HISTORY: Confusion, question fever, evaluate for fever source like  pneumonia, diverticulitis.    CONTRAST DOSE: 82mL Isovue-370    Radiation dose for this scan was reduced using automated exposure  control, adjustment of the mA and/or kV according to patient size, or  iterative reconstruction technique.    FINDINGS:      Chest: Emphysematous bulla are noted at the right apex. Fibrotic  changes are noted in the right suprahilar region, essentially  unchanged from 2/16/2015. The lungs otherwise appear clear. No pleural  effusions. Right hilar calcified lymph nodes are noted. Calcified  azygos lymph node is also noted. The mediastinum and sunni are  otherwise unremarkable in appearance.    Abdomen/pelvis: Splenic calcified granulomas are noted. Hepatic fatty  infiltration is present. Although there is respiratory motion  artifact, the  kidneys appear within normal limits. Right renal cyst is  noted. The pancreas and gallbladder are also grossly unremarkable.  There is no evidence of bowel obstruction or pericolonic inflammatory  stranding. There is a normal-appearing appendix. No free peritoneal  fluid or air. Pelvic contents appear within normal limits.      Impression    IMPRESSION:  1. Right upper lobe scarring and bullous changes.  2. Old granulomatous disease with right hilar/mediastinal calcified  lymph nodes and splenic calcified granulomas.  3. Although images are degraded by patient respiratory motion  artifact, there is no evidence of an acute inflammatory process in the  abdomen or pelvis.  4. Hepatic fatty infiltration--possible etiologies include consumption  of alcohol or excessive carbohydrate intake, especially  sugar/fructose.  Metabolic syndrome commonly occurs in combination  with nonalcoholic fatty liver disease. Although often reversible,  nonalcoholic fatty liver disease can progress to steatohepatitis and  cirrhosis.    LLILIE MAZA MD   CT Head w/o Contrast    Narrative    CT HEAD WITHOUT CONTRAST  4/26/2017 9:06 PM    HISTORY: Altered mental status.    TECHNIQUE: Scans were obtained through the head without IV contrast.  Radiation dose for this scan was reduced using automated exposure  control, adjustment of the mA and/or kV according to patient size, or  iterative reconstruction technique.    COMPARISON: 8/19/2015.    FINDINGS: 5 cm area of encephalomalacia in the right frontal lobe due  to old infarction. Moderate atrophy. Mild chronic white matter disease  consistent with small vessel ischemic change. No hemorrhage, mass  lesion, or focal area of acute infarction identified. Moderate  membrane thickening in both maxillary antra and the ethmoid air cells.  There is also extensive disease in the sphenoid sinus. This has  increased in the interval. No bony abnormality. The appearance of the  brain is unchanged. There is  wallerian degeneration of the right  cerebral peduncle due to the right-sided infarct.      Impression    IMPRESSION:   1. Nothing acute.  2. Old right hemispheric infarct.  3. Chronic white matter disease and atrophy.  4. Brain is unchanged in the interval.  5. Membrane thickening in the paranasal sinuses increased in the  interval.    RENNY SKINNER MD

## 2017-04-27 NOTE — PROVIDER NOTIFICATION
04/27/17 0534   Significant Event   Significant Event Other (see comments)  (Critical Lab: Influenza B+; MD notified)   Above information text paged to Admitting Hospitalist. Awaiting response/new orders. Continue to monitor.

## 2017-04-27 NOTE — PLAN OF CARE
Problem: Confusion, Acute (Adult)  Goal: Identify Related Risk Factors and Signs and Symptoms  Related risk factors and signs and symptoms are identified upon initiation of Human Response Clinical Practice Guideline (CPG)   Outcome: Improving  VSS on RA. Pt rested in bed this shift, turn/repositioning offered by staff Q2H. Pt with RUE/RLE soft restraints at shift start, restraints DC'd by 0400 this AM. LS with wheezes throughout, having weak nonproductive cough, unable to collect/send sputum. Left sided weakness at baseline. L foot orthotic brace and BEKAH hose removed. Blister found on L foot, barrier film applied and covered with foam mepilex. Scabbing found to underside of R foot, no drainage, left open to air. Infrequently incontinent of urine, otherwise requesting urinal appropriately. No BM this shift, BS+. PIV infusing at 100 ml/hr, pt tolerating sips of clears overnight. Sitter at bedside d/t confusion, previous combative behavior. Lethargic and sleepy most of shift, frequently drifts to sleep during cares. Alert to self and able to respond appropriately to conversation. No alarms in place, sitter present. Not attempting to exit bed. Continue to monitor.    5:36 AM  Addendum:  MD paged with new lab result of positive Influenza B. Received PRN neb per pt request. Pt also able to have BM on bedpan. Continue to monitor.

## 2017-04-27 NOTE — H&P
CHIEF COMPLAINT:  Confusion, agitation.      HISTORY OF PRESENT ILLNESS:  Mr. David Dawn is a 57-year-old male with a medical history notable for seizure disorder, COPD, cognitive dysfunction, CVA with hemiplegia, impulse control issues who apparently lives in an assisted living facility.  It sounds like he is primarily wheelchair bound.  The patient is not providing any historical information for me and is not very interested in answering my questions currently.  Apparently over the past 2 days, he has been noted to be more agitated and confused.  Apparently at his living facility he was incoherent and turning around in circles in a wheelchair and told to come to the ER.  Apparently he was instead seen in clinic today; when his physician told him he would need to come to the hospital because he suspected pneumonia, the patient became combative and tried to leave.  On the way out he tried to run several people with his wheelchair and EMS services were called.  He received 5 mg of IM Versed and was brought to the ER for evaluation.  On arrival to the ER, he was somewhat sedated from his were Versed.  He required restraints in the ER.  At the time of my evaluation of the patient in the ER, the restraints are currently being removed and he is somewhat more awake and interactive, although again not very interested in answering my questions.      On arrival to the ER, vital signs included blood pressure 108/74 with a heart rate of 120.  Afebrile, saturation 95% on room air.  His workup in the ER included basic labs.  White cell count was low at 3.8.  Hemoglobin 12.7.  Platelet count 141,000.  VBG notable for pH 7.3 and pCO2 51.  Repeat was essentially the same.  Blood culture obtained and pending.  Urinalysis unremarkable.  Liver function tests unremarkable.  BMP unremarkable.  Imaging included a chest x-ray that showed no acute findings.  The ER physician has ordered a chest, abdomen and pelvis CT scan of the  patient, which has not yet been obtained, but he is being transported there now.  I was able to confirm DNR/DNI status with the patient, which has been consistent with previous wishes in the chart as well.      PAST MEDICAL HISTORY:   1.  BPH.   2.  COPD.   3.  Reflux disease.   4.  Hypertension.   5.  Hyperlipidemia.    6.  Impulse control problems.   7.  Depression.   8.  Seizure history.   9.  History of CVA, mobilizes with the use of a wheelchair.   10.  Cognitive impairment.   11.  Hypertension.   12.  Neuropathy.   13.  Alcohol abuse history.   14.  Hemiplegia.      CURRENT MEDICATIONS:  Await pharmacy reconciliation of medication list.  However, medications appear to include the followin.  Acetaminophen.   2.  Amlodipine.   3.  Baclofen.   4.  Pulmicort.   5.  Tegretol.   6.  Citalopram.   7.  Plavix.   8.  Depakote.   9.  Lasix.   10.  Gabapentin.   11.  Keppra.   12.  Potassium chloride.   13.  Seroquel.   14.  Simvastatin.   15.  Tamsulosin.   16.  Albuterol.   17.  Xopenex.   18.  DuoNeb.   19.  Imodium.   20.  Niacin.   21.  Oxycodone.    22.  Vitamin D.      ALLERGIES:   1.  Ibuprofen causes diarrhea.   2.  Tylenol causes diarrhea.   3.  Tuberculin test.      FAMILY HISTORY:  Family history reviewed in the chart.  Apparently mother had a myocardial infarction and father had Alzheimer disease.  Brother had a stroke.      SOCIAL HISTORY:  The patient per the chart is a smoker and quit alcohol 20 years ago.      REVIEW OF SYSTEMS:  Please see HPI for details.  The patient is not very forthcoming at all about answering questions and most of the history is obtained through the chart along with talking with the ER provider, Dr. Zacarias.      REVIEW OF SYSTEMS:  Unfortunately quite limited.      PHYSICAL EXAMINATION:   VITAL SIGNS:  Blood pressure currently 145/80 with heart rate of 85.  Afebrile, saturation is 98% on room air.   IN GENERAL:  The patient is currently being taken out of restraints.  He  did say thank you when the restraints were removed.  He is not very interested or forthcoming in answering questions.  He has some audible rhonchi.   HEENT:  Head is atraumatic.  Sclerae white.  Eyelids normal.  Conjunctivae normal.  Extraocular muscles are intact.   NECK:  Supple.  No cervical or supraclavicular lymphadenopathy.   HEART:  Regular rate and rhythm.  No significant murmurs.  No lower extremity edema.   LUNGS:  Notable for coarse breath sounds with expiratory wheezes throughout.  He has audible rhonchi.  No intercostal retractions.  No conversational dyspnea, although again not answering many questions for me.   ABDOMEN:  Nontender, nondistended.  Soft.  No masses.  No organomegaly.   EXTREMITIES:  Trace edema bilaterally.   SKIN:  Shows no rash, no jaundice.  Skin is dry to touch.   NEUROLOGIC:  Cranial nerves II through XII are intact.  Moves all extremities appropriately.  Sensation intact to light touch in the upper and lower extremities bilaterally.   PSYCHIATRIC:  Awake and alert.  Not very interested in answering questions.      LABORATORY AND IMAGING DATA:  Reviewed above in HPI.      IMPRESSION:  Mr. David Dawn is a 57-year-old male with a history of chronic obstructive pulmonary disease, cognitive dysfunction, behavioral disturbance, seizure disorder, who presents to the hospital today for concerns about agitation and confusion.  On presentation, the patient appears to be in acute on chronic respiratory failure with significant bronchospasm.   1.  Acute on chronic respiratory failure secondary to chronic obstructive pulmonary disease exacerbation.  Chest, abdomen and pelvis computerized tomography scan pending.  No infiltrate on chest x-ray.  May have a viral bronchitis.   2.  Cerebrovascular accident history with hemiplegia, mobilizes with the use of a wheelchair.   3.  Cognitive dysfunction history.   4.  Behavioral disturbance history with history of agitation.   5.  Seizure  disorder history.   6.  Apparent history of Clostridium difficile colitis per chart review.   7.  Agitation and behavioral disturbance the past 2 days, suspect may be related to acute respiratory failure and increased work of breathing.  No obvious metabolic cause or infectious cause yet, and levels of antiepileptic medications have been ordered and are pending.      PLAN:   1.  Admit to inpatient status.  Anticipate greater than 2 midnights in the hospital on Solu-Medrol 60 mg IV q.12h.   2.  Nebulizers scheduled and as needed.   3.  Check influenza titer.   4.  Chest, abdomen and pelvis CT scan being ordered by the ER and is pending to rule out infection source as yet to be identified.   5.  Hold off on antibiotic therapy unless a clear infection source found.  Chart review reveals apparent history of Clostridium difficile colitis, although I cannot find any lab results to confirm this.   6.  Use Haldol as needed for agitation.   7.  DNR/DNI as discussed with the patient and consistent with previous wishes in chart.         SPEEDY GARCIA MD             D: 2017 19:47   T: 2017 21:22   MT: [id number]      Name:     MILTON LAMBERT   MRN:      [id number]        Account:      [id number]   :      1960           Admitted:     [id number]      Document: [id number]

## 2017-04-27 NOTE — PROGRESS NOTES
"[facility]     Date: 4-26-17    Admission Dx: COPD Exacerbation    Pulmonary History COPD    Home Nebulizer/MDI Use: Pulmicort, Albuterol, xopenex, duoneb    Home Oxygen: None    Acuity Level (RCAT flow sheet): Acuity Level 3    Aerosol Therapy initiated: Duoneb QID, Albuterol Q2 prn      Pulmonary Hygiene initiated: Coughing Techniques      Volume Expansion initiated: Incentive Spirometery      Current Oxygen Requirements: Room Air    Current SpO2: 94%    Re-evaluation date: 4-29-17    Patient Education: aware of side effects and benefits of medications      See \"RT Assessments\" flow sheet for patient assessment scoring and Acuity Level Details.             "

## 2017-04-28 NOTE — PLAN OF CARE
Problem: Respiratory Insufficiency (Adult)  Goal: Identify Related Risk Factors and Signs and Symptoms  Related risk factors and signs and symptoms are identified upon initiation of Human Response Clinical Practice Guideline (CPG)   Outcome: Improving  VSS on RA. Pt rested in bed this shift, frequently shifting weight, able to reposition with A1. Denies pain overnight, appeared to rest well between cares. Voiding into urinal, adequate UO. C.Diff result positive on prior shift, placed on enteric isolation and PO vanco started this shift. Loose stool overnight. PIV SL, offering frequent fluids by bedside attendant, minimal intake, tolerating PO meds. Non-productive cough, unable to send sputum. Requested PRN neb overnight. Alert to self and able to answer appropriately in conversation, endorses ongoing confusion. Sitter remains at bedside. Continue to monitor.

## 2017-04-28 NOTE — PLAN OF CARE
Problem: Confusion, Acute (Adult)  Goal: Identify Related Risk Factors and Signs and Symptoms  Related risk factors and signs and symptoms are identified upon initiation of Human Response Clinical Practice Guideline (CPG)   Outcome: Improving  Orientation: Disoriented to situation . Pt aggressive at times. Swearing and attempted to kick staff during reposition.  Vss afebrile.   02:97 %  LS: exp wheezes. Non prod cough. Droplet precautions for + influenza  GI: incont of 3 loose stools. Po vanco for + cdiff  : uses urinal and incont other times  Skin: scabs, blister on stu ankles and old bruises scattered  Activity: repositioned q2hrs. Pt threatened to leave once he got in his w/c  Pain: denies pain  Plan: po steriods, nebs, po vanco qid. Sw/cc consulted

## 2017-04-28 NOTE — PROVIDER NOTIFICATION
04/27/17 9849   Significant Event   Significant Event Other (see comments)  (Critical lab value: C Diff +, MD notified)     Paged Hospitalist admission pager with above information  PO Vancomycin solution ordered

## 2017-04-28 NOTE — PLAN OF CARE
"Problem: Goal Outcome Summary  Goal: Goal Outcome Summary  Outcome: No Change  Bedside attendant with patient, pt irritable when \"asked too many questions\", voiding per urinal, had large liquid BM with specimen sent and result is + for C Diff, refused all PO except for ice water with pills     Enteric and droplet precautions maintained       "

## 2017-04-29 NOTE — PLAN OF CARE
Problem: Individualization  Goal: Patient Preferences  Outcome: Improving  Pt being discharged to home today explained DC paperwork sent with pt refused to go over paper work so none given to patient will return to home with a dc packet. Pt agitated all day, very set in way. Clothing was found wet, so pt has to leave in hospital gown which he was upset about. Pt bath and dressing, new brief on. Dc at 1400/ Paperwork including s/s to monitor for, diet, activity, follow up care.  New medications were discussed and general questions answered.  Pt will follow up with MD as directed. No issues at time of dc, left with own wc via transport.

## 2017-04-29 NOTE — DISCHARGE SUMMARY
[facility]  Discharge Summary  Hospitalist      Date of Admission:  4/26/2017  Date of Discharge:  4/29/2017  Provider:  Heber Rod MD. [facility]  Date of Service (when I last saw the patient): 04/29/17      Primary Provider: Len Krishnan          Discharge Diagnosis:   Discharge Diagnoses   COPD exacerbation secondary to influenza B  Clostridium difficile colitis  Prior CVA with left hemiplegia baseline wheelchair bound  Seizure disorder without active seizure this admission  Hypertension  Hyperlipidemia  BPH      Other medical issues:  Past Medical History:   Diagnosis Date     BPH (benign prostatic hyperplasia) 9/15/2011     COPD (chronic obstructive pulmonary disease) (H) 9/15/2011     Edema      ETOH abuse      GERD (gastroesophageal reflux disease) 9/15/2011     HTN (hypertension) 6/14/2014     Hyperlipidemia LDL goal <100 9/15/2011     Impulse control disorder      Mild major depression (H) 9/15/2011     Positive TB test      Seizures (H) 9/15/2011     Unspecified cerebral artery occlusion with cerebral infarction           Please see the admission history and physical for full details.     Hospital Course     David Dawn was admitted on 4/26/2017.  The following problems were addressed during his hospitalization:  57 year old male who was admitted on 4/26/2017. He presented with increased agitation and a cough. He was found to have Influenza B and started on tamiflu. He also was having loose stools so Clostridium difficile was checked and came back positive.  He did well with treatment and is back to his baseline, he is requesting to be discharged back to his nursing home.     1. Influenza B with COPD exacerbation:  - Tamiflu  BID for 2 more days at NH  Steroid PO for 5 days and Nebs as below     2. Clostridium difficile colitis, unclear if he is carrying or has active infection, he did have loose stool continue oral vancomycin for 1 week     3. Prior CVA with left hemiplegia,  baseline wheelchair bound:- No new focal deficits noted. Continue plavix.     4. Seizure disorder without seizures noted here, Flu/metabolic related encephalopathy with behavioral issues:: - Divalproex, keppra to continue.   Continue baseline seroquel.     5. HTN:- Amlodipine to continue     6. Hyperlipidemia:- Statin     7. BPH:- Flomax      Pending Results   Unresulted Labs Ordered in the Past 30 Days of this Admission     Date and Time Order Name Status Description    4/26/2017 1636 Blood culture Preliminary     4/26/2017 1611 Blood culture Preliminary           Code Status   DNR / DNI       Primary Care Physician   Len Krishnan    Physical Exam   Temp: 97.2  F (36.2  C) Temp src: Oral BP: 153/90   Heart Rate: 59 Resp: 16 SpO2: 93 % O2 Device: None (Room air)    There were no vitals filed for this visit.  Vital Signs with Ranges  Temp:  [97.2  F (36.2  C)-98.1  F (36.7  C)] 97.2  F (36.2  C)  Heart Rate:  [59-73] 59  Resp:  [16-18] 16  BP: (150-162)/(76-90) 153/90  SpO2:  [92 %-96 %] 93 %  I/O last 3 completed shifts:  In: 1010 [P.O.:1010]  Out: 600 [Urine:600]    Constitutional:  alert, cooperative, no apparent distress  Respiratory: No increased work of breathing, good air exchange, no crackles or wheezing.  Cardiovascular: apical impulse,normal S1 and S2  GI: bowel sounds present, soft, non-distended, non-tender      Discharge Disposition   Discharged to nursing home    Consultations This Hospital Stay   RESPIRATORY CARE IP CONSULT  CARE COORDINATOR IP CONSULT  SOCIAL WORK IP CONSULT    Time Spent on this Encounter   Heber ALANIZ, personally saw the patient today and spent greater than 30 minutes discharging this patient.    Discharge Orders     General info for SNF   Length of Stay Estimate: Long Term Care  Condition at Discharge: Improving  Level of care:skilled   Rehabilitation Potential: Fair  Admission H&P remains valid and up-to-date: Yes  Recent Chemotherapy: N/A  Use Nursing Home  Standing Orders: Yes     Mantoux instructions   Give two-step Mantoux (PPD) Per Facility Policy Yes     Reason for your hospital stay   Influenza B and Clostridium colitis     Follow Up and recommended labs and tests   Follow-up with nursing home provider next week     DNR/DNI     Advance Diet as Tolerated   Follow this diet upon discharge: Orders Placed This Encounter     Combination Diet Regular Diet Adult       Discharge Medications   Current Discharge Medication List      START taking these medications    Details   oseltamivir (TAMIFLU) 75 MG capsule Take 1 capsule (75 mg) by mouth 2 times daily for 2 days  Qty: 4 capsule    Associated Diagnoses: Influenza B      predniSONE (DELTASONE) 20 MG tablet Take 2 tablets (40 mg) by mouth daily for 5 days    Associated Diagnoses: Influenza B; COPD exacerbation (H)      vancomycin (VANOCIN) 50 mg/mL LIQD solution Take 5 mLs (250 mg) by mouth 4 times daily for 7 days    Associated Diagnoses: Colitis due to Clostridium difficile         CONTINUE these medications which have NOT CHANGED    Details   !! ipratropium - albuterol 0.5 mg/2.5 mg/3 mL (DUONEB) 0.5-2.5 (3) MG/3ML neb solution Take 1 vial by nebulization 3 times daily      Divalproex Sodium (DEPAKOTE PO) Take 1,750 mg by mouth 2 times daily Delayed release      oxyCODONE HCl (OXECTA) 5 MG TABA Take 5-10 mg by mouth every 6 hours as needed  Qty: 90 each, Refills: 0    Associated Diagnoses: Chronic pain syndrome      furosemide (LASIX) 20 MG tablet Take 1 tablet (20 mg) by mouth daily  Qty: 60 tablet, Refills: 3    Associated Diagnoses: Bilateral edema of lower extremity      citalopram (CELEXA) 20 MG tablet Take 20 mg by mouth daily      Acetaminophen (TYLENOL PO) Take 650 mg by mouth every 4 hours as needed for mild pain or fever      GABAPENTIN PO Take 1,800 mg by mouth At Bedtime       !! ipratropium - albuterol 0.5 mg/2.5 mg/3 mL (DUONEB) 0.5-2.5 (3) MG/3ML nebulization Take 1 vial by nebulization every 6 hours as  needed for shortness of breath / dyspnea or wheezing       QUEtiapine Fumarate (SEROQUEL PO) Take 150 mg by mouth 2 times daily       potassium chloride (K-DUR) 10 MEQ tablet Take 10 mEq by mouth every morning      carBAMazepine (TEGRETOL) 200 MG tablet Take 1 tablet (200 mg) by mouth 3 times daily    Associated Diagnoses: Seizure disorder (H)      vitamin D (ERGOCALCIFEROL) 11848 UNIT capsule Take 50,000 Units by mouth once a week On Monday      budesonide (PULMICORT) 0.25 MG/2ML nebulizer solution Take 2 mLs (0.25 mg) by nebulization 2 times daily 0800 and 1400  Qty: 360 mL, Refills: 1    Associated Diagnoses: COPD (chronic obstructive pulmonary disease) (H)      albuterol (PROAIR HFA, PROVENTIL HFA, VENTOLIN HFA) 108 (90 BASE) MCG/ACT inhaler Inhale 2 puffs into the lungs 2 times daily as needed for shortness of breath / dyspnea or wheezing  Qty: 1 Inhaler, Refills: 5    Associated Diagnoses: COPD (chronic obstructive pulmonary disease) (H)      amLODIPine (NORVASC) 10 MG tablet Take 1 tablet (10 mg) by mouth daily  Qty: 30 tablet, Refills: 1    Associated Diagnoses: HTN (hypertension)      levETIRAcetam (KEPPRA) 500 MG tablet Take 1 tablet (500 mg) by mouth 2 times daily  Qty: 60 tablet, Refills: 5    Associated Diagnoses: Seizure disorder (H)      lidocaine (XYLOCAINE) 2 % jelly Apply to anus qid as needed for pain  Qty: 30 mL, Refills: 3    Associated Diagnoses: External hemorrhoids      niacin 500 MG CR capsule Take 1 capsule (500 mg) by mouth At Bedtime  Qty: 30 capsule, Refills: 5    Associated Diagnoses: Hyperlipidemia LDL goal <100      simvastatin (ZOCOR) 40 MG tablet Take 1 tablet (40 mg) by mouth At Bedtime  Qty: 90 tablet, Refills: 3    Associated Diagnoses: Hyperlipidemia LDL goal <100      tamsulosin (FLOMAX) 0.4 MG 24 hr capsule Take 1 capsule (0.4 mg) by mouth daily  Qty: 60 capsule, Refills: 6    Associated Diagnoses: BPH (benign prostatic hyperplasia)      clopidogrel (PLAVIX) 75 MG tablet Take  1 tablet (75 mg) by mouth daily  Qty: 90 tablet, Refills: 3    Associated Diagnoses: CVA (cerebral infarction)      baclofen (LIORESAL) 20 MG tablet Take 20 mg by mouth 3 times daily.      !! order for DME Equipment being ordered: Hospital Bed    Fax number: [phone]  [address]   Attn:  Yisel   Qty: 1 each, Refills: 0    Associated Diagnoses: History of stroke      !! Placebo (ORDER FOR DME) Equipment being ordered: shower benck chair  Qty: 1 each, Refills: 0    Associated Diagnoses: Stroke (H)      Respiratory Therapy Supplies (NEBULIZER/ADULT MASK) KIT 1 Device 4 times daily.  Qty: 1 kit, Refills: 3    Associated Diagnoses: Wheezing       !! - Potential duplicate medications found. Please discuss with provider.      STOP taking these medications       loperamide (LOPERAMIDE A-D) 2 MG tablet Comments:   Reason for Stopping:         loperamide (IMODIUM A-D) 2 MG tablet Comments:   Reason for Stopping:             Allergies   Allergies   Allergen Reactions     Ibuprofen Sodium Diarrhea     Tuberculin Tests      Data   Most Recent 3 CBC's:  Recent Labs   Lab Test  04/29/17   0644  04/28/17   0557  04/27/17   0640   04/26/17   1621   WBC   --   3.6*  3.4*   --   3.8*   HGB   --   11.8*  12.7*   --   12.7*   MCV   --   106*  104*   --   106*   PLT  196  167  139*   < >  141*    < > = values in this interval not displayed.      Most Recent 3 BMP's:  Recent Labs   Lab Test  04/29/17   0644  04/28/17   0557  04/27/17   0640   NA  144  141  143   POTASSIUM  4.0  4.2  4.6   CHLORIDE  111*  109  112*   CO2  25  24  21   BUN  19  15  15   CR  0.82  0.71  0.82   ANIONGAP  8  8  10   KADIE  7.8*  8.0*  7.5*   GLC  99  95  114*     Most Recent 2 LFT's:  Recent Labs   Lab Test  04/26/17   1655  08/17/16   1259   AST  25  13   ALT  23  18   ALKPHOS  71  79   BILITOTAL  0.2  0.2     Most Recent INR's and Anticoagulation Dosing History:  Anticoagulation Dose History     Recent Dosing and Labs Latest Ref Rng &  Units 12/1/2012 4/29/2014 2/16/2015 12/18/2015    INR 0.86 - 1.14 0.90 1.10 1.03 1.18(H)        Most Recent 3 Troponin's:  Recent Labs   Lab Test  12/18/15   1908  02/16/15   1613  04/29/14   1834   TROPI  <0.015  The 99th percentile for upper reference range is 0.045 ug/L.  Troponin values in   the range of 0.045 - 0.120 ug/L may be associated with risks of adverse   clinical events.    <0.015  The 99th percentile for upper reference range is 0.045 ug/L.  Troponin values in   the range of 0.045 - 0.120 ug/L may be associated with risks of adverse   clinical events.   Effective 7/30/2014, the reference range for this assay has changed to reflect   new instrumentation/methodology.    <0.012     Most Recent Cholesterol Panel:  Recent Labs   Lab Test  05/07/14   0703   07/16/12   1445   CHOL   --    --   211*   LDL   --    --   99   HDL   --    --   52   TRIG  127   < >  302*    < > = values in this interval not displayed.     Most Recent 6 Bacteria Isolates From Any Culture (See EPIC Reports for Culture Details):  Recent Labs   Lab Test  04/26/17   1655  04/26/17   1622  12/19/15   1451  12/19/15   0130  12/18/15   1908  07/21/15   2310   CULT  No growth after 3 days  No growth after 3 days  Light growth Corynebacterium species Susceptibility testing not routinely done*  No growth  No growth  >100,000 colonies/mL Proteus mirabilis  10,000 to 50,000 colonies/mL mixed urogenital emely Susceptibility testing not   routinely done  *     Most Recent TSH, T4 and A1c Labs:  Recent Labs   Lab Test  12/20/15   1620  06/25/14   0520   TSH   --   4.08   T4  0.76   --      Results for orders placed or performed during the hospital encounter of 04/26/17   Chest  XR, 1 view PORTABLE    Narrative    CHEST ONE VIEW PORTABLE  4/26/2017 4:37 PM     HISTORY: Confusion, abnormal lung sounds, evaluate for pneumonia.      Impression    IMPRESSION: Linear atelectasis or fibrosis at the right lung apex,  possibly unchanged from 12/20/2015  given differences in projection.  The lungs otherwise appear grossly clear. No apparent pleural  effusion.    LILLIE MAZA MD   CT Chest/Abdomen/Pelvis w Contrast    Narrative    CT CHEST/ABDOMEN/PELVIS WITH CONTRAST 4/26/2017 7:42 PM     HISTORY: Confusion, question fever, evaluate for fever source like  pneumonia, diverticulitis.    CONTRAST DOSE: 82mL Isovue-370    Radiation dose for this scan was reduced using automated exposure  control, adjustment of the mA and/or kV according to patient size, or  iterative reconstruction technique.    FINDINGS:      Chest: Emphysematous bulla are noted at the right apex. Fibrotic  changes are noted in the right suprahilar region, essentially  unchanged from 2/16/2015. The lungs otherwise appear clear. No pleural  effusions. Right hilar calcified lymph nodes are noted. Calcified  azygos lymph node is also noted. The mediastinum and sunni are  otherwise unremarkable in appearance.    Abdomen/pelvis: Splenic calcified granulomas are noted. Hepatic fatty  infiltration is present. Although there is respiratory motion  artifact, the kidneys appear within normal limits. Right renal cyst is  noted. The pancreas and gallbladder are also grossly unremarkable.  There is no evidence of bowel obstruction or pericolonic inflammatory  stranding. There is a normal-appearing appendix. No free peritoneal  fluid or air. Pelvic contents appear within normal limits.      Impression    IMPRESSION:  1. Right upper lobe scarring and bullous changes.  2. Old granulomatous disease with right hilar/mediastinal calcified  lymph nodes and splenic calcified granulomas.  3. Although images are degraded by patient respiratory motion  artifact, there is no evidence of an acute inflammatory process in the  abdomen or pelvis.  4. Hepatic fatty infiltration--possible etiologies include consumption  of alcohol or excessive carbohydrate intake, especially  sugar/fructose.  Metabolic syndrome commonly occurs in  combination  with nonalcoholic fatty liver disease. Although often reversible,  nonalcoholic fatty liver disease can progress to steatohepatitis and  cirrhosis.    LILLIE MAZA MD   CT Head w/o Contrast    Narrative    CT HEAD WITHOUT CONTRAST  4/26/2017 9:06 PM    HISTORY: Altered mental status.    TECHNIQUE: Scans were obtained through the head without IV contrast.  Radiation dose for this scan was reduced using automated exposure  control, adjustment of the mA and/or kV according to patient size, or  iterative reconstruction technique.    COMPARISON: 8/19/2015.    FINDINGS: 5 cm area of encephalomalacia in the right frontal lobe due  to old infarction. Moderate atrophy. Mild chronic white matter disease  consistent with small vessel ischemic change. No hemorrhage, mass  lesion, or focal area of acute infarction identified. Moderate  membrane thickening in both maxillary antra and the ethmoid air cells.  There is also extensive disease in the sphenoid sinus. This has  increased in the interval. No bony abnormality. The appearance of the  brain is unchanged. There is wallerian degeneration of the right  cerebral peduncle due to the right-sided infarct.      Impression    IMPRESSION:   1. Nothing acute.  2. Old right hemispheric infarct.  3. Chronic white matter disease and atrophy.  4. Brain is unchanged in the interval.  5. Membrane thickening in the paranasal sinuses increased in the  interval.    RENNY SKINNER MD           Disclaimer: This note consists of symbols derived from keyboarding, dictation and/or voice recognition software. As a result, there may be errors in the script that have gone undetected. Please consider this when interpreting information found in this chart.

## 2017-04-29 NOTE — PROGRESS NOTES
SWS  Pt ready for d/c acc to nursing.  Arranged ride via [facility] at 1400.  His MA should cover cost of transport back home to the NH.  He came to [facility] from [facility].  He is returning today.  Pt has been updated.

## 2017-04-29 NOTE — PLAN OF CARE
Problem: Goal Outcome Summary  Goal: Goal Outcome Summary  Outcome: No Change  Slept well. Requested to be left alone and not awakened. Lungs with expiratory wheezes on right. Awakened this am for repositioning and offered urinal. Pt declined need to urinate at the time. No stools overnight. Bowel sounds active.

## 2017-04-29 NOTE — PLAN OF CARE
Problem: Goal Outcome Summary  Goal: Goal Outcome Summary  Outcome: Improving  Alertrto person, place, time.  Vital signs stable.  Lungs, diminished throughout, occasional expiratory wheezes with non productive cough, on droplet isolation precautions.  Bowel sounds hyperactive, 1 incontinent loose bm, c diff positive, enteric isolation.  Skin has scattered bruises and scabs.mild edema to feet.  Denies pain.  On vancomycin.  Incontinence cares done, turned repositioned q 2 hrly.  Plan of care reviewed with pt.Pt refused his lovenox.

## 2017-04-30 NOTE — PROGRESS NOTES
Transition Communication Hand-off for Care Transitions to Next Level of Care Provider    Name: David Dawn  MRN #: [id number]  Primary Care Provider: Len Krishnan  Primary Care MD Name: Ariella  Primary Clinic: [facility] [address]  [facility] [address]  Primary Care Clinic Name: [facility]   Reason for Hospitalization:  COPD exacerbation (H) [J44.1]  Altered mental status, unspecified altered mental status type [R41.82]  Admit Date/Time: 4/26/2017  3:52 PM  Discharge Date: 4/29/17  Payor Source: Payor:MEDICARE / Plan: MEDICARE / Product Type: Medicare /     Readmission Assessment Measure (ROMARIO) Risk Score/category: Average     Medical Goals   Short-term Follow-up with nursing home  provider next week   Long-term: Manage and treat COPD symptoms at  home as tolerable.          Reason for Communication Hand-off Referral: Admission diagnoses: COPD    Discharge Plan: See Medical Goals above.       Concern for non-adherence with plan of care:   Y/N No  Discharge Needs Assessment:  Needs       Most Recent Value    # of Referrals Placed by CTS Scheduled Follow-up appointments, Communication hand-offs to next level of Care Providers [Assess for need of ]          Already enrolled in Tele-monitoring program and name of program:  n/a  Follow-up specialty is recommended: No    Follow-up plan:  Future Appointments  Date Time Provider Department Center   5/3/2017 3:40 PM Len Krishnan MD [facility]   6/13/2017 1:00 PM Len Krishnan MD [facility]       Any outstanding tests or procedures:  n/a            Key Recommendations:  See notes above.    Caitlin Reddy, RN, BSN, PHN, CTS  Care Transitions Specialist  [facility]      AVS/Discharge Summary is the source of truth; this is a helpful guide for improved communication of patient story

## 2017-05-01 NOTE — PROGRESS NOTES
Clinic Care Coordination Contact      Patient was inpatient for pneumonia due to influenza B.  Patient lives in long term care facility, [facility].  He has 24/7 [facility].    Clinic care coordination is not indicated.    Tri Perkins RN, CCM - Care Coordinator     5/1/2017    10:18 AM  [phone]

## 2017-05-01 NOTE — TELEPHONE ENCOUNTER
Lorie at [facility] calls. Pt will be discharged back to their facility today. She is requesting order for OT and PT evaluation to ensure pt is back at baseline following hospitalization. She is able to take a telephone order for this.     Sent to Dr. Krishnan to review.

## 2017-05-03 NOTE — PROGRESS NOTES
SUBJECTIVE:                                                    David Dawn is a 57 year old male who presents to clinic today for the following health issues:          Hospital Follow-up Visit:    Hospital/Nursing Home/IP Rehab Facility: [facility]  Date of Admission: 4/26/17  Date of Discharge: 4/29/17  Reason(s) for Admission: Influenza B,COPD,            Problems taking medications regularly:  None       Medication changes since discharge: None       Problems adhering to non-medication therapy:  None    Summary of hospitalization:  [facility] discharge summary reviewed  Diagnostic Tests/Treatments reviewed.  Follow up needed: none  Other Healthcare Providers Involved in Patient s Care:         None  Update since discharge: improved.     Post Discharge Medication Reconciliation: discharge medications reconciled, continue medications without change.  Plan of care communicated with patient     Coding guidelines for this visit:  Type of Medical   Decision Making Face-to-Face Visit       within 7 Days of discharge Face-to-Face Visit        within 14 days of discharge   Moderate Complexity [id number]   High Complexity [id number]          Patient is seen for a follow up visit.  Recently hospitalized for COPD exacerbation related to influenza. Has been hypoxic, required supplemental oxygen. On discharge not on oxygen.   Finished antibiotic, steroid treatment. Currently feels at his baseline, has chronic cough and wheezing. Related to COPD, continues to smoke.   Has history of stroke with left hemiparalysis. Able to ambulate with a walker. Uses a motorized wheelchair for transportation. This has been taken away from him because of mental status change with confusion, driving into other people while being sick. Currently uses a manual wheelchair, difficulty to propel because of his paralysis.   Has h/o HTN. on medical treatment. BP has been controlled. No side effects from medications. No CP,  HA, dizziness. good compliance with medications and low salt diet.  Has chronic pain related to his stroke, immobility, OA.   Clinically has improved and plans to move in one week to assisted living.       Problem list and histories reviewed & adjusted, as indicated.  Additional history: as documented    Patient Active Problem List   Diagnosis     Seizure disorder (H)     Chronic fatigue     Low back pain     Mild major depression (H)     GERD (gastroesophageal reflux disease)     Hyperlipidemia LDL goal <100     COPD (chronic obstructive pulmonary disease) (H)     BPH (benign prostatic hyperplasia)     Hemiplegia affecting left nondominant side (H)     Radicular syndrome of upper limbs     Neck pain     Advance Care Planning     Osteoarthritis of glenohumeral joint     Osteoarthritis of acromioclavicular joint     Metabolic encephalopathy     Health Care Home     Dysphagia     Hallucinations     Cough     Generalized muscle weakness     Alcohol abuse     Behavior problem, adult     Neuropathy (H)     History of CVA (cerebrovascular accident)     Cognitive impairment     Essential hypertension, benign     COPD exacerbation (H)     Past Surgical History:   Procedure Laterality Date     COLONOSCOPY  12/19/2012     COLONOSCOPY  8/6/2014    Procedure: COMBINED COLONOSCOPY, SINGLE BIOPSY/POLYPECTOMY BY BIOPSY;  Surgeon: Jose Elias Mclain MD;  Location:  GI     EXCISE TUMOR RECTUM VILLUS  2/28/2013    Procedure: EXCISE TUMOR RECTUM VILLOUS;  Trans Anal Excision Rectal Villous Tumor, Proctoscopy;  Surgeon: Milton Jacobs MD;  Location:  OR     KNEE SURGERY       NECK SURGERY       SIGMOIDOSCOPY FLEXIBLE  1/31/2013    Procedure: SIGMOIDOSCOPY FLEXIBLE;   flexible sigmoidoscopy with anoscope;  Surgeon: Milton Jacobs MD;  Location:  GI     SIGMOIDOSCOPY FLEXIBLE  4/25/2013    Procedure: SIGMOIDOSCOPY FLEXIBLE;  SIGMOIDOSCOPY FLEXIBLE;  Surgeon: Milton Jacobs MD;  Location:  GI       Social History   Substance  Use Topics     Smoking status: Current Every Day Smoker     Packs/day: 1.00     Years: 35.00     Types: Cigarettes     Smokeless tobacco: Never Used     Alcohol use No      Comment: quit 20 years ago.     Family History   Problem Relation Age of Onset     HEART DISEASE Mother      MI     CEREBROVASCULAR DISEASE Father      alzheimers disease     CEREBROVASCULAR DISEASE Brother      Neurologic Disorder Brother      stroke     Neurologic Disorder Son      seizure     CANCER Sister          Current Outpatient Prescriptions   Medication Sig Dispense Refill     vancomycin (VANOCIN) 50 mg/mL LIQD solution Take 5 mLs (250 mg) by mouth 4 times daily for 7 days       ipratropium - albuterol 0.5 mg/2.5 mg/3 mL (DUONEB) 0.5-2.5 (3) MG/3ML neb solution Take 1 vial by nebulization 3 times daily       Divalproex Sodium (DEPAKOTE PO) Take 1,750 mg by mouth 2 times daily Delayed release       order for DME Equipment being ordered: Hospital Bed    Fax number: [phone]  [address]   Attn:  Yisel  1 each 0     oxyCODONE HCl (OXECTA) 5 MG TABA Take 5-10 mg by mouth every 6 hours as needed 90 each 0     furosemide (LASIX) 20 MG tablet Take 1 tablet (20 mg) by mouth daily 60 tablet 3     citalopram (CELEXA) 20 MG tablet Take 20 mg by mouth daily       Acetaminophen (TYLENOL PO) Take 650 mg by mouth every 4 hours as needed for mild pain or fever       GABAPENTIN PO Take 1,800 mg by mouth At Bedtime        ipratropium - albuterol 0.5 mg/2.5 mg/3 mL (DUONEB) 0.5-2.5 (3) MG/3ML nebulization Take 1 vial by nebulization every 6 hours as needed for shortness of breath / dyspnea or wheezing        QUEtiapine Fumarate (SEROQUEL PO) Take 150 mg by mouth 2 times daily        potassium chloride (K-DUR) 10 MEQ tablet Take 10 mEq by mouth every morning       carBAMazepine (TEGRETOL) 200 MG tablet Take 1 tablet (200 mg) by mouth 3 times daily       vitamin D (ERGOCALCIFEROL) 20560 UNIT capsule Take 50,000 Units by mouth once a  week On Monday       budesonide (PULMICORT) 0.25 MG/2ML nebulizer solution Take 2 mLs (0.25 mg) by nebulization 2 times daily 0800 and 1400 360 mL 1     albuterol (PROAIR HFA, PROVENTIL HFA, VENTOLIN HFA) 108 (90 BASE) MCG/ACT inhaler Inhale 2 puffs into the lungs 2 times daily as needed for shortness of breath / dyspnea or wheezing 1 Inhaler 5     amLODIPine (NORVASC) 10 MG tablet Take 1 tablet (10 mg) by mouth daily 30 tablet 1     levETIRAcetam (KEPPRA) 500 MG tablet Take 1 tablet (500 mg) by mouth 2 times daily 60 tablet 5     lidocaine (XYLOCAINE) 2 % jelly Apply to anus qid as needed for pain 30 mL 3     niacin 500 MG CR capsule Take 1 capsule (500 mg) by mouth At Bedtime 30 capsule 5     simvastatin (ZOCOR) 40 MG tablet Take 1 tablet (40 mg) by mouth At Bedtime 90 tablet 3     tamsulosin (FLOMAX) 0.4 MG 24 hr capsule Take 1 capsule (0.4 mg) by mouth daily 60 capsule 6     clopidogrel (PLAVIX) 75 MG tablet Take 1 tablet (75 mg) by mouth daily 90 tablet 3     Placebo (ORDER FOR DME) Equipment being ordered: shower benck chair 1 each 0     Respiratory Therapy Supplies (NEBULIZER/ADULT MASK) KIT 1 Device 4 times daily. 1 kit 3     baclofen (LIORESAL) 20 MG tablet Take 20 mg by mouth 3 times daily.         Reviewed and updated as needed this visit by clinical staff  Tobacco  Soc Hx      Reviewed and updated as needed this visit by Provider          Patient was assessed in a face to face encounter for hospital bed needs:  The patient requires positioning of his body in ways not feasible with an ordinary bed due to his history of stroke with paralysis of the left arm and leg. This is a lifelong condition.   He requires repositioning also to relief pain related to pressure/ paralysis of his body in a way not feasible with an ordinary bed.   He requires an adjustable height bed to allow transferring from / to chair , related to stroke.   He requires frequent repositioning of his body to alleviate pain, pressure.  "    ROS:  Constitutional, HEENT, cardiovascular, pulmonary, GI, , musculoskeletal, neuro, skin, endocrine and psych systems are negative, except as otherwise noted.    OBJECTIVE:                                                    /62 (BP Location: Right arm, Patient Position: Chair, Cuff Size: Adult Large)  Pulse 80  Temp 98  F (36.7  C) (Oral)  Resp 16  Ht 5' 7\" (1.702 m)  Wt 165 lb (74.8 kg)  SpO2 98%  BMI 25.84 kg/m2  Body mass index is 25.84 kg/(m^2).  GENERAL:in a wheelchair, alert and no distress  EYES: Eyes grossly normal to inspection, PERRL and conjunctivae and sclerae normal  HENT: ear canals and TM's normal, nose and mouth without ulcers or lesions  NECK: no adenopathy, no asymmetry, masses, or scars and thyroid normal to palpation  RESP: lungs  - no rales, + bilateral rhonchi and expiratory  wheezes  CV: regular rate and rhythm, normal S1 S2, no S3 or S4, no murmur, click or rub, no peripheral edema and peripheral pulses strong  ABDOMEN: soft,obese, nontender, no hepatosplenomegaly, no masses and bowel sounds normal  MS: no gross musculoskeletal defects noted, no edema  SKIN: no suspicious lesions or rashes  NEURO:left  arm and leg paralysis, mentation intact and speech normal    Diagnostic Test Results:  Results for orders placed or performed during the hospital encounter of 04/26/17   Chest  XR, 1 view PORTABLE    Narrative    CHEST ONE VIEW PORTABLE  4/26/2017 4:37 PM     HISTORY: Confusion, abnormal lung sounds, evaluate for pneumonia.      Impression    IMPRESSION: Linear atelectasis or fibrosis at the right lung apex,  possibly unchanged from 12/20/2015 given differences in projection.  The lungs otherwise appear grossly clear. No apparent pleural  effusion.    LILLIE MAZA MD   CT Chest/Abdomen/Pelvis w Contrast    Narrative    CT CHEST/ABDOMEN/PELVIS WITH CONTRAST 4/26/2017 7:42 PM     HISTORY: Confusion, question fever, evaluate for fever source like  pneumonia, " diverticulitis.    CONTRAST DOSE: 82mL Isovue-370    Radiation dose for this scan was reduced using automated exposure  control, adjustment of the mA and/or kV according to patient size, or  iterative reconstruction technique.    FINDINGS:      Chest: Emphysematous bulla are noted at the right apex. Fibrotic  changes are noted in the right suprahilar region, essentially  unchanged from 2/16/2015. The lungs otherwise appear clear. No pleural  effusions. Right hilar calcified lymph nodes are noted. Calcified  azygos lymph node is also noted. The mediastinum and sunni are  otherwise unremarkable in appearance.    Abdomen/pelvis: Splenic calcified granulomas are noted. Hepatic fatty  infiltration is present. Although there is respiratory motion  artifact, the kidneys appear within normal limits. Right renal cyst is  noted. The pancreas and gallbladder are also grossly unremarkable.  There is no evidence of bowel obstruction or pericolonic inflammatory  stranding. There is a normal-appearing appendix. No free peritoneal  fluid or air. Pelvic contents appear within normal limits.      Impression    IMPRESSION:  1. Right upper lobe scarring and bullous changes.  2. Old granulomatous disease with right hilar/mediastinal calcified  lymph nodes and splenic calcified granulomas.  3. Although images are degraded by patient respiratory motion  artifact, there is no evidence of an acute inflammatory process in the  abdomen or pelvis.  4. Hepatic fatty infiltration--possible etiologies include consumption  of alcohol or excessive carbohydrate intake, especially  sugar/fructose.  Metabolic syndrome commonly occurs in combination  with nonalcoholic fatty liver disease. Although often reversible,  nonalcoholic fatty liver disease can progress to steatohepatitis and  cirrhosis.    LILLIE MAZA MD   CT Head w/o Contrast    Narrative    CT HEAD WITHOUT CONTRAST  4/26/2017 9:06 PM    HISTORY: Altered mental status.    TECHNIQUE: Scans  were obtained through the head without IV contrast.  Radiation dose for this scan was reduced using automated exposure  control, adjustment of the mA and/or kV according to patient size, or  iterative reconstruction technique.    COMPARISON: 8/19/2015.    FINDINGS: 5 cm area of encephalomalacia in the right frontal lobe due  to old infarction. Moderate atrophy. Mild chronic white matter disease  consistent with small vessel ischemic change. No hemorrhage, mass  lesion, or focal area of acute infarction identified. Moderate  membrane thickening in both maxillary antra and the ethmoid air cells.  There is also extensive disease in the sphenoid sinus. This has  increased in the interval. No bony abnormality. The appearance of the  brain is unchanged. There is wallerian degeneration of the right  cerebral peduncle due to the right-sided infarct.      Impression    IMPRESSION:   1. Nothing acute.  2. Old right hemispheric infarct.  3. Chronic white matter disease and atrophy.  4. Brain is unchanged in the interval.  5. Membrane thickening in the paranasal sinuses increased in the  interval.    RENNY SKINNER MD   CBC with platelets differential   Result Value Ref Range    WBC 3.8 (L) 4.0 - 11.0 10e9/L    RBC Count 3.68 (L) 4.4 - 5.9 10e12/L    Hemoglobin 12.7 (L) 13.3 - 17.7 g/dL    Hematocrit 39.1 (L) 40.0 - 53.0 %     (H) 78 - 100 fl    MCH 34.5 (H) 26.5 - 33.0 pg    MCHC 32.5 31.5 - 36.5 g/dL    RDW 12.3 10.0 - 15.0 %    Platelet Count 141 (L) 150 - 450 10e9/L    Diff Method Automated Method     % Neutrophils 55.8 %    % Lymphocytes 29.4 %    % Monocytes 11.5 %    % Eosinophils 1.8 %    % Basophils 0.5 %    % Immature Granulocytes 1.0 %    Nucleated RBCs 0 0 /100    Absolute Neutrophil 2.1 1.6 - 8.3 10e9/L    Absolute Lymphocytes 1.1 0.8 - 5.3 10e9/L    Absolute Monocytes 0.4 0.0 - 1.3 10e9/L    Absolute Eosinophils 0.1 0.0 - 0.7 10e9/L    Absolute Basophils 0.0 0.0 - 0.2 10e9/L    Abs Immature Granulocytes  0.0 0 - 0.4 10e9/L    Absolute Nucleated RBC 0.0    Basic metabolic panel   Result Value Ref Range    Sodium 143 133 - 144 mmol/L    Potassium 4.3 3.4 - 5.3 mmol/L    Chloride 109 94 - 109 mmol/L    Carbon Dioxide 24 20 - 32 mmol/L    Anion Gap 10 3 - 14 mmol/L    Glucose 85 70 - 99 mg/dL    Urea Nitrogen 22 7 - 30 mg/dL    Creatinine 1.15 0.66 - 1.25 mg/dL    GFR Estimate 65 >60 mL/min/1.7m2    GFR Estimate If Black 79 >60 mL/min/1.7m2    Calcium 8.1 (L) 8.5 - 10.1 mg/dL   Hepatic panel   Result Value Ref Range    Bilirubin Direct 0.1 0.0 - 0.2 mg/dL    Bilirubin Total 0.2 0.2 - 1.3 mg/dL    Albumin 3.0 (L) 3.4 - 5.0 g/dL    Protein Total 6.8 6.8 - 8.8 g/dL    Alkaline Phosphatase 71 40 - 150 U/L    ALT 23 0 - 70 U/L    AST 25 0 - 45 U/L   UA with Microscopic   Result Value Ref Range    Color Urine Yellow     Appearance Urine Clear     Glucose Urine Negative NEG mg/dL    Bilirubin Urine Negative NEG    Ketones Urine 5 (A) NEG mg/dL    Specific Gravity Urine 1.021 1.003 - 1.035    Blood Urine Negative NEG    pH Urine 5.0 5.0 - 7.0 pH    Protein Albumin Urine Negative NEG mg/dL    Urobilinogen mg/dL 0.0 0.0 - 2.0 mg/dL    Nitrite Urine Negative NEG    Leukocyte Esterase Urine Negative NEG    Source Midstream Urine     WBC Urine 1 0 - 2 /HPF    RBC Urine 3 (H) 0 - 2 /HPF    Hyaline Casts 2 0 - 2 /LPF   Carbamazepine and epoxide free and total   Result Value Ref Range    Carbamazepine Total Level 8.0     10, 11 Epoxide Level 5.1 (H)     Free Carbamazepine Level Ug/Ml 2.4     Free Epoxide Level 2.6 (H)    Valproic acid (Depakote level)   Result Value Ref Range    Valproic Acid Level 64 50 - 100 mg/L   Platelet count   Result Value Ref Range    Platelet Count 148 (L) 150 - 450 10e9/L   Creatinine   Result Value Ref Range    Creatinine 0.94 0.66 - 1.25 mg/dL    GFR Estimate 82 >60 mL/min/1.7m2    GFR Estimate If Black >90   GFR Calc   >60 mL/min/1.7m2   Levetiracetam level   Result Value Ref Range     Levetiracetam Level 21.0    Basic metabolic panel   Result Value Ref Range    Sodium 143 133 - 144 mmol/L    Potassium 4.6 3.4 - 5.3 mmol/L    Chloride 112 (H) 94 - 109 mmol/L    Carbon Dioxide 21 20 - 32 mmol/L    Anion Gap 10 3 - 14 mmol/L    Glucose 114 (H) 70 - 99 mg/dL    Urea Nitrogen 15 7 - 30 mg/dL    Creatinine 0.82 0.66 - 1.25 mg/dL    GFR Estimate >90  Non  GFR Calc   >60 mL/min/1.7m2    GFR Estimate If Black >90   GFR Calc   >60 mL/min/1.7m2    Calcium 7.5 (L) 8.5 - 10.1 mg/dL   CBC with platelets   Result Value Ref Range    WBC 3.4 (L) 4.0 - 11.0 10e9/L    RBC Count 3.66 (L) 4.4 - 5.9 10e12/L    Hemoglobin 12.7 (L) 13.3 - 17.7 g/dL    Hematocrit 38.0 (L) 40.0 - 53.0 %     (H) 78 - 100 fl    MCH 34.7 (H) 26.5 - 33.0 pg    MCHC 33.4 31.5 - 36.5 g/dL    RDW 12.3 10.0 - 15.0 %    Platelet Count 139 (L) 150 - 450 10e9/L   CBC with platelets   Result Value Ref Range    WBC 3.6 (L) 4.0 - 11.0 10e9/L    RBC Count 3.47 (L) 4.4 - 5.9 10e12/L    Hemoglobin 11.8 (L) 13.3 - 17.7 g/dL    Hematocrit 36.9 (L) 40.0 - 53.0 %     (H) 78 - 100 fl    MCH 34.0 (H) 26.5 - 33.0 pg    MCHC 32.0 31.5 - 36.5 g/dL    RDW 12.5 10.0 - 15.0 %    Platelet Count 167 150 - 450 10e9/L   Basic metabolic panel   Result Value Ref Range    Sodium 141 133 - 144 mmol/L    Potassium 4.2 3.4 - 5.3 mmol/L    Chloride 109 94 - 109 mmol/L    Carbon Dioxide 24 20 - 32 mmol/L    Anion Gap 8 3 - 14 mmol/L    Glucose 95 70 - 99 mg/dL    Urea Nitrogen 15 7 - 30 mg/dL    Creatinine 0.71 0.66 - 1.25 mg/dL    GFR Estimate >90  Non  GFR Calc   >60 mL/min/1.7m2    GFR Estimate If Black >90   GFR Calc   >60 mL/min/1.7m2    Calcium 8.0 (L) 8.5 - 10.1 mg/dL   Platelet count   Result Value Ref Range    Platelet Count 196 150 - 450 10e9/L   Basic metabolic panel   Result Value Ref Range    Sodium 144 133 - 144 mmol/L    Potassium 4.0 3.4 - 5.3 mmol/L    Chloride 111 (H) 94 - 109 mmol/L     Carbon Dioxide 25 20 - 32 mmol/L    Anion Gap 8 3 - 14 mmol/L    Glucose 99 70 - 99 mg/dL    Urea Nitrogen 19 7 - 30 mg/dL    Creatinine 0.82 0.66 - 1.25 mg/dL    GFR Estimate >90  Non  GFR Calc   >60 mL/min/1.7m2    GFR Estimate If Black >90   GFR Calc   >60 mL/min/1.7m2    Calcium 7.8 (L) 8.5 - 10.1 mg/dL   Care Coordinator IP Consult    Ashlee Rondon RN     4/28/2017 11:34 AM  Care Transition Initial Assessment -   Reason For Consult: care coordination/care conference, discharge   planning   Patient and Caregiver of [facility] MPLS     Active Problems:    COPD exacerbation (H)     And INFLUENZA B +     DATA  Lives With: facility resident  Living Arrangements: [facility] residential facility    [address] [address] [phone]  MA BED HOLD      ASSESSMENT  Cognitive Status:  Reported per chart review to be alert and   oriented with behaviors 2/2 to dx of impulsivity.  Pt does have hx of COPD and is admitted with Influenza B, does   not use nebs or 02 at the LT facility  He is described as a total care except for feeding.   He is W/C bound.  His own wheel chair is at the bedside.   PLAN:  RTN to   [address] [address] [phone]  Transportation will have to be set up to return to [facility] when determined medically stable for discharge.     Update to be provided to [facility] regarding Discharge,   anticipated date and time.   Number is .  Orders will need to be faxed upon discharge as well.     Will consult [facility] for following over weekend.     Ashlee Fernandez RN BSN CTS  Care Transitions Team  [phone]           ISTAT gases lactate jennifer POCT   Result Value Ref Range    Ph Venous 7.30 (L) 7.32 - 7.43 pH    PCO2 Venous 51 (H) 40 - 50 mm Hg    PO2 Venous 32 25 - 47 mm Hg    Bicarbonate Venous 25 21 - 28 mmol/L    O2 Sat Venous 54 %    Lactic Acid 2.7  (H) 0.7 - 2.1 mmol/L   ISTAT gases lactate jennifer POCT   Result Value Ref Range    Ph Venous 7.29 (L) 7.32 - 7.43 pH    PCO2 Venous 51 (H) 40 - 50 mm Hg    PO2 Venous 22 (L) 25 - 47 mm Hg    Bicarbonate Venous 24 21 - 28 mmol/L    O2 Sat Venous 30 %    Lactic Acid 1.3 0.7 - 2.1 mmol/L   Blood culture   Result Value Ref Range    Specimen Description Blood Left Hand     Special Requests Aerobic and anaerobic bottles received     Culture Micro No growth     Micro Report Status FINAL 05/02/2017    Blood culture   Result Value Ref Range    Specimen Description Blood Right Arm     Special Requests Aerobic and anaerobic bottles received     Culture Micro No growth     Micro Report Status FINAL 05/02/2017    Influenza A and B and RSV PCR   Result Value Ref Range    Specimen Description Nasal     Influenza A PCR  NEG     Negative   Flu A target RNA not detected, presumed negative for Influenza A or the viral   load is below the limit of detection.      Influenza B PCR (A) NEG     Positive   Flu B target RNA detected, presumed POSITIVE for Influenza B.   Critical Value/Significant Value called to and read back by  CHRIS LOZANO RN AT 0534 4.27.17.DK      Resp Syncytial Virus  NEG     Negative   RSV target RNA not detected, presumed negative for Respiratory Syncitial Virus   or the viral load is below the limit of detection.   FDA approved assay performed using [employer] GeneXpert(R) real-time PCR.     Clostridium difficile toxin B PCR   Result Value Ref Range    Specimen Description Feces     C Diff Toxin B PCR (A) NEG     Positive  Positive: Toxin producing Clostridium difficile target DNA sequences detected,   presumed positive for Clostridium difficile toxin B.   Clostridium difficile (Requires Enteric Isolation)   FDA approved assay performed using [employer] GeneXpert real-time PCR.   Critical Value/Significant Value called to and read back by   TIM FLORES RN @2234 4/27/17. CT          ASSESSMENT/PLAN:                                                       Problem List Items Addressed This Visit     Seizure disorder (H)    Mild major depression (H)    Behavior problem, adult    History of CVA (cerebrovascular accident)    Essential hypertension, benign    COPD exacerbation (H)      Other Visit Diagnoses     Hospital discharge follow-up    -  Primary           Cont treatment   Mentation is back to normal, OK to have his motorized wheelchair back   Assessment for hospital bed   Resolved acute exacerbation of COPD- cont nebulizers, smoking cessation discussed  Controlled BP, monitor, periodic lab checks     Follow-Up:in 3 months     Len Krishnan MD  [facility]

## 2017-05-03 NOTE — MR AVS SNAPSHOT
"              After Visit Summary   5/3/2017    David Dawn    MRN: [id number]           Patient Information     Date Of Birth          1960        Visit Information        Provider Department      5/3/2017 3:40 PM Len Krishnan MD [facility]        Today's Diagnoses     Hospital discharge follow-up    -  1    COPD exacerbation (H)        Behavior problem, adult        History of CVA (cerebrovascular accident)        Essential hypertension, benign        Seizure disorder (H)        Major depressive disorder, recurrent episode, mild (H)           Follow-ups after your visit        Your next 10 appointments already scheduled     Jun 13, 2017  1:00 PM CDT   SHORT with Len Krishnan MD   [facility] ([facility])    [address]  [facility] [address] [phone]              Who to contact     If you have questions or need follow up information about today's clinic visit or your schedule please contact [facility] directly at [phone].  Normal or non-critical lab and imaging results will be communicated to you by [employer], letter or phone within 4 business days after the clinic has received the results. If you do not hear from us within 7 days, please contact the clinic through [employer] or phone. If you have a critical or abnormal lab result, we will notify you by phone as soon as possible.  Submit refill requests through [employer] or call your pharmacy and they will forward the refill request to us. Please allow 3 business days for your refill to be completed.          Additional Information About Your Visit        [employer] Information     [employer] lets you send messages to your doctor, view your test results, renew your prescriptions, schedule appointments and more. To sign up, go to [email or web address] . Click on \"Log in\" on the left side of the screen, which will take you to the Welcome page. Then click on " "\"Sign up Now\" on the right side of the page.     You will be asked to enter the access code listed below, as well as some personal information. Please follow the directions to create your username and password.     Your access code is: [email or web address]  Expires: 8/3/2017  8:05 AM     Your access code will  in 90 days. If you need help or a new code, please call your [facility] clinic or [phone].        Care EveryWhere ID     This is your Care EveryWhere ID. This could be used by other organizations to access your [facility] medical records  [id number]        Your Vitals Were     Pulse Temperature Respirations Height Pulse Oximetry BMI (Body Mass Index)    80 98  F (36.7  C) (Oral) 16 5' 7\" (1.702 m) 98% 25.84 kg/m2       Blood Pressure from Last 3 Encounters:   17 117/62   17 153/90   17 108/74    Weight from Last 3 Encounters:   17 165 lb (74.8 kg)   17 165 lb (74.8 kg)   17 164 lb 14.5 oz (74.8 kg)              We Performed the Following     DEPRESSION ACTION PLAN (DAP)          Today's Medication Changes          These changes are accurate as of: 5/3/17 11:59 PM.  If you have any questions, ask your nurse or doctor.               These medicines have changed or have updated prescriptions.        Dose/Directions    * order for DME   This may have changed:  Another medication with the same name was added. Make sure you understand how and when to take each.   Used for:  History of stroke   Changed by:  Len Krishnan MD        Equipment being ordered: Hospital Bed  Fax number: [phone] [address]  Attn:  Social Joseph Morillo   Quantity:  1 each   Refills:  0       * order for DME   This may have changed:  You were already taking a medication with the same name, and this prescription was added. Make sure you understand how and when to take each.   Used for:  History of CVA (cerebrovascular accident)   Changed by:  Len Krishnan MD Stephen Elliott " requires an electric hospital bed for lifetime.   Quantity:  1 Device   Refills:  0       * Notice:  This list has 2 medication(s) that are the same as other medications prescribed for you. Read the directions carefully, and ask your doctor or other care provider to review them with you.         Where to get your medicines      Some of these will need a paper prescription and others can be bought over the counter.  Ask your nurse if you have questions.     Bring a paper prescription for each of these medications     order for DME                Primary Care Provider Office Phone # Fax #    Len Krishnan [phone]       [facility] [address] [facility] [address]        Thank you!     Thank you for choosing [facility]  for your care. Our goal is always to provide you with excellent care. Hearing back from our patients is one way we can continue to improve our services. Please take a few minutes to complete the written survey that you may receive in the mail after your visit with us. Thank you!             Your Updated Medication List - Protect others around you: Learn how to safely use, store and throw away your medicines at www.disposemymeds.org.          This list is accurate as of: 5/3/17 11:59 PM.  Always use your most recent med list.                   Brand Name Dispense Instructions for use    albuterol 108 (90 BASE) MCG/ACT Inhaler    PROAIR HFA/PROVENTIL HFA/VENTOLIN HFA    1 Inhaler    Inhale 2 puffs into the lungs 2 times daily as needed for shortness of breath / dyspnea or wheezing       amLODIPine 10 MG tablet    NORVASC    30 tablet    Take 1 tablet (10 mg) by mouth daily       baclofen 20 MG tablet    LIORESAL     Take 20 mg by mouth 3 times daily.       budesonide 0.25 MG/2ML neb solution    PULMICORT    360 mL    Take 2 mLs (0.25 mg) by nebulization 2 times daily 0800 and 1400       carBAMazepine 200 MG tablet    TEGretol     Take 1 tablet  (200 mg) by mouth 3 times daily       citalopram 20 MG tablet    celeXA     Take 20 mg by mouth daily       clopidogrel 75 MG tablet    PLAVIX    90 tablet    Take 1 tablet (75 mg) by mouth daily       DEPAKOTE PO      Take 1,750 mg by mouth 2 times daily Delayed release       furosemide 20 MG tablet    LASIX    60 tablet    Take 1 tablet (20 mg) by mouth daily       GABAPENTIN PO      Take 1,800 mg by mouth At Bedtime       * ipratropium - albuterol 0.5 mg/2.5 mg/3 mL 0.5-2.5 (3) MG/3ML neb solution    DUONEB     Take 1 vial by nebulization every 6 hours as needed for shortness of breath / dyspnea or wheezing       * ipratropium - albuterol 0.5 mg/2.5 mg/3 mL 0.5-2.5 (3) MG/3ML neb solution    DUONEB     Take 1 vial by nebulization 3 times daily       levETIRAcetam 500 MG tablet    KEPPRA    60 tablet    Take 1 tablet (500 mg) by mouth 2 times daily       lidocaine 2 % topical gel    XYLOCAINE    30 mL    Apply to anus qid as needed for pain       nebulizer/adult mask Kit     1 kit    1 Device 4 times daily.       niacin 500 MG CR capsule     30 capsule    Take 1 capsule (500 mg) by mouth At Bedtime       order for DME     1 each    Equipment being ordered: shower benck chair       * order for DME     1 each    Equipment being ordered: Hospital Bed  Fax number: [phone] [address]  Attn:  Yisel        * order for DME     1 Device    David Dawn requires an electric hospital bed for lifetime.       oxyCODONE HCl 5 MG Taba    OXECTA    90 each    Take 5-10 mg by mouth every 6 hours as needed       potassium chloride 10 MEQ tablet    K-TAB,KLOR-CON     Take 10 mEq by mouth every morning       predniSONE 20 MG tablet    DELTASONE     Take 2 tablets (40 mg) by mouth daily for 5 days       SEROQUEL PO      Take 150 mg by mouth 2 times daily       simvastatin 40 MG tablet    ZOCOR    90 tablet    Take 1 tablet (40 mg) by mouth At Bedtime       tamsulosin 0.4 MG capsule    FLOMAX    60 capsule     Take 1 capsule (0.4 mg) by mouth daily       TYLENOL PO      Take 650 mg by mouth every 4 hours as needed for mild pain or fever       vancomycin 50 mg/mL Liqd solution    VANOCIN     Take 5 mLs (250 mg) by mouth 4 times daily for 7 days       vitamin D 14365 UNIT capsule    ERGOCALCIFEROL     Take 50,000 Units by mouth once a week On Monday       * Notice:  This list has 4 medication(s) that are the same as other medications prescribed for you. Read the directions carefully, and ask your doctor or other care provider to review them with you.

## 2017-05-03 NOTE — NURSING NOTE
"Chief Complaint   Patient presents with     Hospital F/U       Initial /62 (BP Location: Right arm, Patient Position: Chair, Cuff Size: Adult Large)  Pulse 80  Temp 98  F (36.7  C) (Oral)  Resp 16  Ht 5' 7\" (1.702 m)  Wt 165 lb (74.8 kg)  SpO2 98%  BMI 25.84 kg/m2 Estimated body mass index is 25.84 kg/(m^2) as calculated from the following:    Height as of this encounter: 5' 7\" (1.702 m).    Weight as of this encounter: 165 lb (74.8 kg).  Medication Reconciliation: complete     ALONSO Sinclair      "

## 2017-05-04 NOTE — TELEPHONE ENCOUNTER
Looks like Ariella ok'ed and signed order, but cant find it, and looked thru fax folder and its not in there. Will have Ariella reprint and sign in 5/4 phone encounter. Will close this encounter

## 2017-05-04 NOTE — TELEPHONE ENCOUNTER
Yisel (pt's SW [phone]) called. Stated pt saw Ariella yesterday and it was suppose to be for a face to face for a hospital bed. Pt did not return with any info regarding this. Was the appt yesterday a face to face for the bed? Needs dict and order faxed to [phone]. Relayed Ariella out on Thursdays and his dict is not done. Will talk to him tomorrow.       Ariella-See 4/21 refill entry, looks like you signed order but I cant find it, and SW said she did not rcv it. Can you reprint and sign, and also read above. Was the visit a face to face?

## 2017-05-08 NOTE — TELEPHONE ENCOUNTER
Yisel calling again.      1.  Still hasn't received hosp bed order and OV dictation 5-3-17 for face to face.  Order for hosp bed and OV dictation from 5-3-17 faxed to Yisel.  Left message on Yisel's vm that info faxed.    2.  Also, states pt will be discharging from [facility] to [facility] assisted [facility] 5-10-17.  Order for Oxycodone 5mg states take 1 - 2 tabs Q 6hrs prn.  Assisted living is asking for specific directions; cannot except a range.  Yisel will be faxing over form re: this.

## 2017-05-08 NOTE — TELEPHONE ENCOUNTER
Fax received from [facility] for review and signature.  Put in Dr. Krishnan's in basket.

## 2017-05-09 NOTE — TELEPHONE ENCOUNTER
FELIPE Villela and SHIVA Morillo from [facility] calls with multiple requests to facilitate pt's move to [facility].    Indicates the fax sent and documented in 05/09 telephone encounter includes some of below.    1) Asking for face-to-face documentation they received for hospital bed yest be signed by MD. Indicates the rx signed, but not the face-to-face note.     Printed face-to-face encounter in MD's in-basket. Please sign by face-to-face documentation.    2) Requesting order for nebulizer machine that includes NPI number, dx code, and a progress note explaining why pt needs nebulizer.    Most recent note regarding nebs is 04/29/17 d/c summary. Per epic ipratropium-albuterol was entered by history. Please advise if nebulizer machine order can be sent.    Requesting all DME orders be faxed to [facility] who will facilitate orders with the DME supplier.    3) States new rx for oxycodone needed to be sent to pharm. (Unsure of which pharm [facility] uses. Will call back with information) Requesting all DME orders be faxed to [facility] who will facilitate orders with the DME supplier. States oxycodone rx can not be a range unless pain level is attributed to tablet value such as pain 1-5/10 administer 1 tablet and 6-10/10 administer 2 tablets.     Oxycodone      Last Written Prescription Date:  02/28/17  Last Fill Quantity: 90,   # refills: 0  Last Office Visit with [facility], [facility] or [facility] prescribing provider: 05/03/17  Future Office visit:    Next 5 appointments (look out 90 days)     May 16, 2017  5:00 PM CDT   SHORT with Len Krishnan MD   [facility]    [address]  [facility] [address]   [phone]            Jun 13, 2017  1:00 PM CDT   SHORT with Len Krishnan MD   [facility]    [address]  [facility] [address]   [phone]                   Routing refill request to  provider for review/approval because:  Drug not on the FM, P or [facility] refill protocol or controlled substance    4) Needs XL pull-up order. Pended order.    Requesting all DME orders be faxed to [facility] who will facilitate orders with the DME supplier.    5) Requesting to d/c the daily pedal pulse check that pt currently has ordered.    Please advise, thanks.

## 2017-05-09 NOTE — TELEPHONE ENCOUNTER
Fax received from [facility] for review and signature.  Put in Dr. Krishnan's in basket.

## 2017-05-09 NOTE — TELEPHONE ENCOUNTER
Faxed requested scripts for Nebulizer, XL Pull ups, and Office visit notes to [facility].  Faxed and mailed RX for Oxycodone to [facility].

## 2017-05-10 NOTE — TELEPHONE ENCOUNTER
Yisel SHANNON (ph#[phone]) called. Stated she was to call with what pharm pt will be using now. Pt will be using the [facility] pharm (on [place]-added to pharm list). Needs Oxy rx faxed to them at [phone] (since [facility] facility rx can be faxed). Yisel would like a call back once done

## 2017-05-11 NOTE — TELEPHONE ENCOUNTER
Fax received from [facility] for review and signature.  Put in Dr. Krishnan's in basket.

## 2017-05-12 NOTE — TELEPHONE ENCOUNTER
Fax received from [facility] for review and signature.  Put in Dr. Krishnan's in basket.

## 2017-05-16 NOTE — TELEPHONE ENCOUNTER
Fax received from [facility] for review and signature.  Put in Dr. Krishnan's in basket.

## 2017-05-17 NOTE — TELEPHONE ENCOUNTER
Fax received from [facility] for review and signature.  Put in Dr. Krishnan's in basket.

## 2017-05-23 NOTE — TELEPHONE ENCOUNTER
Form received from: [facility]    Form requesting following info/need: Orders re: foot and head cream    JACKSON needed?: No    Location of form: Dr. Krishnan's in basket    When completed the route for return: Fax

## 2017-05-26 NOTE — TELEPHONE ENCOUNTER
Manisha PT with [facility] calls, pt opened to home care and she is requesting additional orders.     PT - 2w2  OT - eval and treat for wheelchair positioning  RN - eval and treat for redness on bottom    Verbal authorization given for home care orders per [facility] protocol.

## 2017-05-30 NOTE — TELEPHONE ENCOUNTER
1) [facility] nurse calls, reports pt has a new foot wound on top of his left foot. Pt wears a brace on left foot. Describes wound as 0.8-1 cm, triangular, 75% granulation, brownish drainage, mild redness around site (irritation looking vs infectious). Also has yellow-esteban scabs on feet.     Nurse cleaned the site and applied large bandaid.       Requesting appt for MD to assess. Scheduled appt for Fri.    2) Nurse indicates there are further issues to discuss with MD including: pt routinely wanting 2 tabs of imodium daily vs 1 tab scheduled and further prn. Also about med for pt to address his frequent scratching which leads to wounds. Plans send note with pt to 06/02 appt and if unable to address everything keeping 06/13 appt scheduled in the event MD would want pt to keep this appt as well.

## 2017-05-31 NOTE — TELEPHONE ENCOUNTER
Let pt nurse(Lorie) know that we placed pain clinic referral and gave her number.   Pt in room 124 for paracentesis

## 2017-06-02 NOTE — MR AVS SNAPSHOT
"              After Visit Summary   6/2/2017    David Dawn    MRN: [id number]           Patient Information     Date Of Birth          1960        Visit Information        Provider Department      6/2/2017 10:40 AM Len Krishnan MD [facility]        Today's Diagnoses     Wound, open, foot, left, initial encounter    -  1    History of stroke        Chronic obstructive pulmonary disease, unspecified COPD type (H)        Hemiplegia affecting left nondominant side (H)        Essential hypertension, benign           Follow-ups after your visit        Your next 10 appointments already scheduled     Jun 13, 2017  1:00 PM CDT   SHORT with Len Krishnan MD   [facility] ([facility])    [address]  [facility] [address] [phone]              Who to contact     If you have questions or need follow up information about today's clinic visit or your schedule please contact [facility] directly at [phone].  Normal or non-critical lab and imaging results will be communicated to you by [employer], letter or phone within 4 business days after the clinic has received the results. If you do not hear from us within 7 days, please contact the clinic through [employer] or phone. If you have a critical or abnormal lab result, we will notify you by phone as soon as possible.  Submit refill requests through [employer] or call your pharmacy and they will forward the refill request to us. Please allow 3 business days for your refill to be completed.          Additional Information About Your Visit        [employer] Information     [employer] lets you send messages to your doctor, view your test results, renew your prescriptions, schedule appointments and more. To sign up, go to [email or web address] . Click on \"Log in\" on the left side of the screen, which will take you to the Welcome page. Then click on \"Sign up Now\" on the right side of " "the page.     You will be asked to enter the access code listed below, as well as some personal information. Please follow the directions to create your username and password.     Your access code is: [email or web address]  Expires: 8/3/2017  8:05 AM     Your access code will  in 90 days. If you need help or a new code, please call your [facility] clinic or [phone].        Care EveryWhere ID     This is your Care EveryWhere ID. This could be used by other organizations to access your [facility] medical records  [id number]        Your Vitals Were     Pulse Temperature Height Pulse Oximetry BMI (Body Mass Index)       70 97.9  F (36.6  C) (Oral) 5' 7\" (1.702 m) 97% 34.46 kg/m2        Blood Pressure from Last 3 Encounters:   17 124/70   17 117/62   17 153/90    Weight from Last 3 Encounters:   17 220 lb (99.8 kg)   17 165 lb (74.8 kg)   17 165 lb (74.8 kg)              We Performed the Following     Anti Treponema     CBC with platelets     Comprehensive metabolic panel     CRP inflammation     Erythrocyte sedimentation rate auto          Today's Medication Changes          These changes are accurate as of: 17 11:59 PM.  If you have any questions, ask your nurse or doctor.               Start taking these medicines.        Dose/Directions    cephALEXin 500 MG capsule   Commonly known as:  KEFLEX   Used for:  Wound, open, foot, left, initial encounter   Started by:  Len Krishnan MD        Dose:  500 mg   Take 1 capsule (500 mg) by mouth 2 times daily   Quantity:  14 capsule   Refills:  0         These medicines have changed or have updated prescriptions.        Dose/Directions    * order for DME   This may have changed:  Another medication with the same name was added. Make sure you understand how and when to take each.   Used for:  History of stroke   Changed by:  Len Krishnan MD        Equipment being ordered: Hospital Bed  Fax number: [phone] [address]  " Attn:  Yisel    Quantity:  1 each   Refills:  0       * order for DME   This may have changed:  Another medication with the same name was added. Make sure you understand how and when to take each.   Used for:  History of CVA (cerebrovascular accident)   Changed by:  Len Krishnan MD        David Dawn requires an electric hospital bed for lifetime.   Quantity:  1 Device   Refills:  0       * order for DME   This may have changed:  Another medication with the same name was added. Make sure you understand how and when to take each.   Used for:  Chronic obstructive pulmonary disease, unspecified COPD type (H)   Changed by:  Len Krishnan MD        Nebulizer machine, tubing, mouth piece   Quantity:  1 each   Refills:  0       * order for DME   This may have changed:  Another medication with the same name was added. Make sure you understand how and when to take each.   Used for:  Hemiplegia affecting left nondominant side (H), History of CVA (cerebrovascular accident)   Changed by:  Len Krishnan MD        Pull-ups: size XL   Quantity:  90 tablet   Refills:  0       * order for DME   This may have changed:  You were already taking a medication with the same name, and this prescription was added. Make sure you understand how and when to take each.   Used for:  History of stroke   Changed by:  Len Krishnan MD        Motorized wheelchair seat cushion   Quantity:  1 Device   Refills:  0       * Notice:  This list has 5 medication(s) that are the same as other medications prescribed for you. Read the directions carefully, and ask your doctor or other care provider to review them with you.         Where to get your medicines      These medications were sent to [facility] - [place], [place] - [address] [place] [address]  [address] [facility] [address]     Phone:  [phone]     cephALEXin 500 MG capsule         Some of these will need a paper  prescription and others can be bought over the counter.  Ask your nurse if you have questions.     Bring a paper prescription for each of these medications     order for DME                Primary Care Provider Office Phone # Fax #    Len Krishnan [phone]       [facility] [address] [facility] [address]        Thank you!     Thank you for choosing [facility]  for your care. Our goal is always to provide you with excellent care. Hearing back from our patients is one way we can continue to improve our services. Please take a few minutes to complete the written survey that you may receive in the mail after your visit with us. Thank you!             Your Updated Medication List - Protect others around you: Learn how to safely use, store and throw away your medicines at www.disposemymeds.org.          This list is accurate as of: 6/2/17 11:59 PM.  Always use your most recent med list.                   Brand Name Dispense Instructions for use    albuterol 108 (90 BASE) MCG/ACT Inhaler    PROAIR HFA/PROVENTIL HFA/VENTOLIN HFA    1 Inhaler    Inhale 2 puffs into the lungs 2 times daily as needed for shortness of breath / dyspnea or wheezing       amLODIPine 10 MG tablet    NORVASC    30 tablet    Take 1 tablet (10 mg) by mouth daily       baclofen 20 MG tablet    LIORESAL     Take 20 mg by mouth 3 times daily.       budesonide 0.25 MG/2ML neb solution    PULMICORT    360 mL    Take 2 mLs (0.25 mg) by nebulization 2 times daily 0800 and 1400       carBAMazepine 200 MG tablet    TEGretol     Take 1 tablet (200 mg) by mouth 3 times daily       cephALEXin 500 MG capsule    KEFLEX    14 capsule    Take 1 capsule (500 mg) by mouth 2 times daily       citalopram 20 MG tablet    celeXA     Take 20 mg by mouth daily       clopidogrel 75 MG tablet    PLAVIX    90 tablet    Take 1 tablet (75 mg) by mouth daily       DEPAKOTE PO      Take 1,750 mg by mouth 2 times daily  Delayed release       furosemide 20 MG tablet    LASIX    60 tablet    Take 1 tablet (20 mg) by mouth daily       GABAPENTIN PO      Take 1,800 mg by mouth At Bedtime       * ipratropium - albuterol 0.5 mg/2.5 mg/3 mL 0.5-2.5 (3) MG/3ML neb solution    DUONEB     Take 1 vial by nebulization every 6 hours as needed for shortness of breath / dyspnea or wheezing       * ipratropium - albuterol 0.5 mg/2.5 mg/3 mL 0.5-2.5 (3) MG/3ML neb solution    DUONEB     Take 1 vial by nebulization 3 times daily       levETIRAcetam 500 MG tablet    KEPPRA    60 tablet    Take 1 tablet (500 mg) by mouth 2 times daily       lidocaine 2 % topical gel    XYLOCAINE    30 mL    Apply to anus qid as needed for pain       nebulizer/adult mask Kit     1 kit    1 Device 4 times daily.       niacin 500 MG CR capsule     30 capsule    Take 1 capsule (500 mg) by mouth At Bedtime       order for DME     1 each    Equipment being ordered: shower benck chair       * order for DME     1 each    Equipment being ordered: Hospital Bed  Fax number: [phone] [facility]  Attn:  Yisel        * order for DME     1 Device    David Dawn requires an electric hospital bed for lifetime.       * order for DME     1 each    Nebulizer machine, tubing, mouth piece       * order for DME     90 tablet    Pull-ups: size XL       * order for DME     1 Device    Motorized wheelchair seat cushion       oxyCODONE HCl 5 MG Taba    OXECTA    90 each    Take 5 mg by mouth every 6 hours as needed       potassium chloride 10 MEQ tablet    K-TAB,KLOR-CON     Take 10 mEq by mouth every morning       SEROQUEL PO      Take 150 mg by mouth 2 times daily       simvastatin 40 MG tablet    ZOCOR    90 tablet    Take 1 tablet (40 mg) by mouth At Bedtime       tamsulosin 0.4 MG capsule    FLOMAX    60 capsule    Take 1 capsule (0.4 mg) by mouth daily       TYLENOL PO      Take 650 mg by mouth every 4 hours as needed for mild pain or fever       vitamin D  32627 UNIT capsule    ERGOCALCIFEROL     Take 50,000 Units by mouth once a week On Monday       * Notice:  This list has 7 medication(s) that are the same as other medications prescribed for you. Read the directions carefully, and ask your doctor or other care provider to review them with you.

## 2017-06-02 NOTE — PROGRESS NOTES
SUBJECTIVE:                                                    David Dawn is a 57 year old male who presents to clinic today for the following health issues:      Lt foot wound:    Patient is seen for a follow up visit.  Has h/o stroke with left hemiplegia. Has developed a wound on the left inner foot. Has a brace on the foot.   Skin is slightly red and draining . No fever, no pain.   Has h/o HTN. on medical treatment. BP has been controlled. No side effects from medications. No CP, HA, dizziness. good compliance with medications and low salt diet.  Has H/O hyperlipidemia. On medical treatment and diet. No side effects. No muscle weakness, myalgias or upset stomach.   Has h/o COPD. Still smokes. Has inhalers for increased SOB, wheezing. Has chronic cough. No change.       Problem list and histories reviewed & adjusted, as indicated.  Additional history: as documented    Patient Active Problem List   Diagnosis     Seizure disorder (H)     Chronic fatigue     Low back pain     Mild major depression (H)     GERD (gastroesophageal reflux disease)     Hyperlipidemia LDL goal <100     COPD (chronic obstructive pulmonary disease) (H)     BPH (benign prostatic hyperplasia)     Hemiplegia affecting left nondominant side (H)     Radicular syndrome of upper limbs     Neck pain     Advance Care Planning     Osteoarthritis of glenohumeral joint     Osteoarthritis of acromioclavicular joint     Metabolic encephalopathy     Health Care Home     Dysphagia     Hallucinations     Cough     Generalized muscle weakness     Alcohol abuse     Behavior problem, adult     Neuropathy (H)     History of CVA (cerebrovascular accident)     Cognitive impairment     Essential hypertension, benign     COPD exacerbation (H)     Past Surgical History:   Procedure Laterality Date     COLONOSCOPY  12/19/2012     COLONOSCOPY  8/6/2014    Procedure: COMBINED COLONOSCOPY, SINGLE BIOPSY/POLYPECTOMY BY BIOPSY;  Surgeon: Jose Elias Mclain MD;   Location:  GI     EXCISE TUMOR RECTUM VILLUS  2/28/2013    Procedure: EXCISE TUMOR RECTUM VILLOUS;  Trans Anal Excision Rectal Villous Tumor, Proctoscopy;  Surgeon: Milton Jacobs MD;  Location:  OR     KNEE SURGERY       NECK SURGERY       SIGMOIDOSCOPY FLEXIBLE  1/31/2013    Procedure: SIGMOIDOSCOPY FLEXIBLE;   flexible sigmoidoscopy with anoscope;  Surgeon: Milton Jacobs MD;  Location:  GI     SIGMOIDOSCOPY FLEXIBLE  4/25/2013    Procedure: SIGMOIDOSCOPY FLEXIBLE;  SIGMOIDOSCOPY FLEXIBLE;  Surgeon: Milton Jacobs MD;  Location:  GI       Social History   Substance Use Topics     Smoking status: Current Every Day Smoker     Packs/day: 1.00     Years: 35.00     Types: Cigarettes     Smokeless tobacco: Never Used     Alcohol use No      Comment: quit 20 years ago.     Family History   Problem Relation Age of Onset     HEART DISEASE Mother      MI     CEREBROVASCULAR DISEASE Father      alzheimers disease     CEREBROVASCULAR DISEASE Brother      Neurologic Disorder Brother      stroke     Neurologic Disorder Son      seizure     CANCER Sister          Current Outpatient Prescriptions   Medication Sig Dispense Refill     order for DME Motorized wheelchair seat cushion 1 Device 0     cephALEXin (KEFLEX) 500 MG capsule Take 1 capsule (500 mg) by mouth 2 times daily 14 capsule 0     order for DME Nebulizer machine, tubing, mouth piece 1 each 0     oxyCODONE HCl (OXECTA) 5 MG TABA Take 5 mg by mouth every 6 hours as needed 90 each 0     order for DME Pull-ups: size XL 90 tablet 0     order for DME David Dawn requires an electric hospital bed for lifetime. 1 Device 0     order for DME Equipment being ordered: Hospital Bed    Fax number: [phone]  [address]   Attn:  Yisel  1 each 0     ipratropium - albuterol 0.5 mg/2.5 mg/3 mL (DUONEB) 0.5-2.5 (3) MG/3ML neb solution Take 1 vial by nebulization 3 times daily       Divalproex Sodium (DEPAKOTE PO) Take 1,750 mg by mouth 2 times  daily Delayed release       furosemide (LASIX) 20 MG tablet Take 1 tablet (20 mg) by mouth daily 60 tablet 3     citalopram (CELEXA) 20 MG tablet Take 20 mg by mouth daily       Acetaminophen (TYLENOL PO) Take 650 mg by mouth every 4 hours as needed for mild pain or fever       GABAPENTIN PO Take 1,800 mg by mouth At Bedtime        ipratropium - albuterol 0.5 mg/2.5 mg/3 mL (DUONEB) 0.5-2.5 (3) MG/3ML nebulization Take 1 vial by nebulization every 6 hours as needed for shortness of breath / dyspnea or wheezing        QUEtiapine Fumarate (SEROQUEL PO) Take 150 mg by mouth 2 times daily        potassium chloride (K-DUR) 10 MEQ tablet Take 10 mEq by mouth every morning       carBAMazepine (TEGRETOL) 200 MG tablet Take 1 tablet (200 mg) by mouth 3 times daily       vitamin D (ERGOCALCIFEROL) 21245 UNIT capsule Take 50,000 Units by mouth once a week On Monday       budesonide (PULMICORT) 0.25 MG/2ML nebulizer solution Take 2 mLs (0.25 mg) by nebulization 2 times daily 0800 and 1400 360 mL 1     albuterol (PROAIR HFA, PROVENTIL HFA, VENTOLIN HFA) 108 (90 BASE) MCG/ACT inhaler Inhale 2 puffs into the lungs 2 times daily as needed for shortness of breath / dyspnea or wheezing 1 Inhaler 5     amLODIPine (NORVASC) 10 MG tablet Take 1 tablet (10 mg) by mouth daily 30 tablet 1     levETIRAcetam (KEPPRA) 500 MG tablet Take 1 tablet (500 mg) by mouth 2 times daily 60 tablet 5     lidocaine (XYLOCAINE) 2 % jelly Apply to anus qid as needed for pain 30 mL 3     niacin 500 MG CR capsule Take 1 capsule (500 mg) by mouth At Bedtime 30 capsule 5     simvastatin (ZOCOR) 40 MG tablet Take 1 tablet (40 mg) by mouth At Bedtime 90 tablet 3     tamsulosin (FLOMAX) 0.4 MG 24 hr capsule Take 1 capsule (0.4 mg) by mouth daily 60 capsule 6     clopidogrel (PLAVIX) 75 MG tablet Take 1 tablet (75 mg) by mouth daily 90 tablet 3     Placebo (ORDER FOR DME) Equipment being ordered: shower benck chair 1 each 0     Respiratory Therapy Supplies  "(NEBULIZER/ADULT MASK) KIT 1 Device 4 times daily. 1 kit 3     baclofen (LIORESAL) 20 MG tablet Take 20 mg by mouth 3 times daily.         Reviewed and updated as needed this visit by clinical staff       Reviewed and updated as needed this visit by Provider         ROS:  Constitutional, HEENT, cardiovascular, pulmonary, gi and gu systems are negative, except as otherwise noted.    OBJECTIVE:                                                    /70  Pulse 70  Temp 97.9  F (36.6  C) (Oral)  Ht 5' 7\" (1.702 m)  Wt 220 lb (99.8 kg)  SpO2 97%  BMI 34.46 kg/m2  Body mass index is 34.46 kg/(m^2).  GENERAL: frail, weak, in a wheelchair , alert and no distress  NECK: no adenopathy, no asymmetry, masses, or scars and thyroid normal to palpation  RESP: lungs clear to auscultation - no rales, + rhonchi , +  wheezes  CV: regular rate and rhythm, normal S1 S2, no S3 or S4, no murmur, click or rub, no peripheral edema and peripheral pulses strong  ABDOMEN: soft, nontender, no hepatosplenomegaly, no masses and bowel sounds normal  MS: no gross musculoskeletal defects noted, 1+ LE edema  Left inner foot area of skin erythema, open skin 1 cm diameter with scarce yellow/ serous discharge   Neuro: left hemiparalysis     Diagnostic Test Results:  Results for orders placed or performed in visit on 06/02/17   Anti Treponema   Result Value Ref Range    Treponema pallidum Antibody Negative NEG   CBC with platelets   Result Value Ref Range    WBC 5.4 4.0 - 11.0 10e9/L    RBC Count 3.58 (L) 4.4 - 5.9 10e12/L    Hemoglobin 12.7 (L) 13.3 - 17.7 g/dL    Hematocrit 37.9 (L) 40.0 - 53.0 %     (H) 78 - 100 fl    MCH 35.5 (H) 26.5 - 33.0 pg    MCHC 33.5 31.5 - 36.5 g/dL    RDW 13.4 10.0 - 15.0 %    Platelet Count 225 150 - 450 10e9/L   Comprehensive metabolic panel   Result Value Ref Range    Sodium 143 133 - 144 mmol/L    Potassium 4.9 3.4 - 5.3 mmol/L    Chloride 110 (H) 94 - 109 mmol/L    Carbon Dioxide 24 20 - 32 mmol/L    " Anion Gap 9 3 - 14 mmol/L    Glucose 84 70 - 99 mg/dL    Urea Nitrogen 12 7 - 30 mg/dL    Creatinine 0.92 0.66 - 1.25 mg/dL    GFR Estimate 85 >60 mL/min/1.7m2    GFR Estimate If Black >90   GFR Calc   >60 mL/min/1.7m2    Calcium 8.4 (L) 8.5 - 10.1 mg/dL    Bilirubin Total 0.2 0.2 - 1.3 mg/dL    Albumin 3.2 (L) 3.4 - 5.0 g/dL    Protein Total 6.9 6.8 - 8.8 g/dL    Alkaline Phosphatase 77 40 - 150 U/L    ALT 16 0 - 70 U/L    AST 10 0 - 45 U/L   Erythrocyte sedimentation rate auto   Result Value Ref Range    Sed Rate 16 0 - 20 mm/h   CRP inflammation   Result Value Ref Range    CRP Inflammation 10.0 (H) 0.0 - 8.0 mg/L        ASSESSMENT/PLAN:                                                      Problem List Items Addressed This Visit     COPD (chronic obstructive pulmonary disease) (H)    Hemiplegia affecting left nondominant side (H)    Essential hypertension, benign      Other Visit Diagnoses     Wound, open, foot, left, initial encounter    -  Primary    Relevant Medications    cephALEXin (KEFLEX) 500 MG capsule    Other Relevant Orders    Anti Treponema (Completed)    CBC with platelets (Completed)    Comprehensive metabolic panel (Completed)    Erythrocyte sedimentation rate auto (Completed)    CRP inflammation (Completed)    History of stroke        Relevant Medications    order for DME           Assess lab work   Start on antibiotic  Topical daily change of dressing - foot wound  Cont rest of medications       Follow-Up:in 1 month , or if needed earlier with problems     Len Krishnan MD  [facility]

## 2017-06-02 NOTE — NURSING NOTE
Nurse at [facility] [address] calls. States prescription for Keflex needs to be sent to [facility] pharmacy, not [facility]. Keflex resent for pt and cancelled at [facility]. She also asks where pt's wound is located, informed he per Chief Complaint this is on his left foot.

## 2017-06-02 NOTE — NURSING NOTE
"Chief Complaint   Patient presents with     Wound Check     Wound on top/bottom left foot ( redness and drainage)       Initial /70  Pulse 70  Temp 97.9  F (36.6  C) (Oral)  Ht 5' 7\" (1.702 m)  Wt 220 lb (99.8 kg)  SpO2 97%  BMI 34.46 kg/m2 Estimated body mass index is 34.46 kg/(m^2) as calculated from the following:    Height as of this encounter: 5' 7\" (1.702 m).    Weight as of this encounter: 220 lb (99.8 kg).  Medication Reconciliation: unable or not appropriate to perform ( Pt is unsure of what he is taking or not)  Danielle Taylor MA      "

## 2017-06-06 NOTE — TELEPHONE ENCOUNTER
Fax received from [facility] for review and signature.  Put in Dr. Krishnan's in basket.

## 2017-06-06 NOTE — TELEPHONE ENCOUNTER
Fax received from [facility] for review and signature.  Put in Dr. Krishnan's in basket.

## 2017-06-08 NOTE — TELEPHONE ENCOUNTER
Pharmacy needs refills on all his medications.       cabamazepine level, 8.0 on 4/26/17.     Last OV 6/2/17.   TSH   Date Value Ref Range Status   06/25/2014 4.08 0.4 - 5.0 mU/L Final   ]      Recent Labs   Lab Test  05/07/14   0703  04/30/14   0530  07/16/12   1445   CHOL   --    --   211*   HDL   --    --   52   LDL   --    --   99   TRIG  127  116  302*   CHOLHDLRATIO   --    --   4.1       Lab Results   Component Value Date    ALT 16 06/02/2017     Creatinine   Date Value Ref Range Status   06/02/2017 0.92 0.66 - 1.25 mg/dL Final   ]  Potassium   Date Value Ref Range Status   06/02/2017 4.9 3.4 - 5.3 mmol/L Final   ]    Lab Results   Component Value Date    WBC 5.4 06/02/2017     Lab Results   Component Value Date    RBC 3.58 06/02/2017     Lab Results   Component Value Date    HGB 12.7 06/02/2017     Lab Results   Component Value Date    HCT 37.9 06/02/2017     No components found for: MCT  Lab Results   Component Value Date     06/02/2017     Lab Results   Component Value Date    MCH 35.5 06/02/2017     Lab Results   Component Value Date    MCHC 33.5 06/02/2017     Lab Results   Component Value Date    RDW 13.4 06/02/2017     Lab Results   Component Value Date     06/02/2017

## 2017-06-09 PROBLEM — N40.1 BENIGN NON-NODULAR PROSTATIC HYPERPLASIA WITH LOWER URINARY TRACT SYMPTOMS: Status: ACTIVE | Noted: 2017-01-01

## 2017-06-13 NOTE — MR AVS SNAPSHOT
After Visit Summary   6/13/2017    David Dawn    MRN: [id number]           Patient Information     Date Of Birth          1960        Visit Information        Provider Department      6/13/2017 1:00 PM Len Krishnan MD [facility]        Today's Diagnoses     Wound, open, foot, left, initial encounter    -  1    Major depressive disorder, recurrent episode, mild (H)        Chronic obstructive pulmonary disease, unspecified COPD type (H)        Hemiplegia affecting left nondominant side (H)        History of CVA (cerebrovascular accident)        Essential hypertension, benign        Leg edema, left        Hyperlipidemia LDL goal <100           Follow-ups after your visit        Your next 10 appointments already scheduled     Sep 08, 2017  2:00 PM CDT   Office Visit with Len Krishnan MD   [facility] ([facility])    [address]  [facility] [id number] [phone]           Bring a current list of meds and any records pertaining to this visit.  For Physicals, please bring immunization records and any forms needing to be filled out.  Please arrive 10 minutes early to complete paperwork.              Who to contact     If you have questions or need follow up information about today's clinic visit or your schedule please contact [facility] directly at [phone].  Normal or non-critical lab and imaging results will be communicated to you by MyChart, letter or phone within 4 business days after the clinic has received the results. If you do not hear from us within 7 days, please contact the clinic through MyChart or phone. If you have a critical or abnormal lab result, we will notify you by phone as soon as possible.  Submit refill requests through Modify or call your pharmacy and they will forward the refill request to us. Please allow 3 business days for your refill to be completed.           "Additional Information About Your Visit        MyChart Information     [employer] lets you send messages to your doctor, view your test results, renew your prescriptions, schedule appointments and more. To sign up, go to [email or web address] . Click on \"Log in\" on the left side of the screen, which will take you to the Welcome page. Then click on \"Sign up Now\" on the right side of the page.     You will be asked to enter the access code listed below, as well as some personal information. Please follow the directions to create your username and password.     Your access code is: [email or web address]  Expires: 8/3/2017  8:05 AM     Your access code will  in 90 days. If you need help or a new code, please call your [facility] clinic or [phone].        Care EveryWhere ID     This is your Care EveryWhere ID. This could be used by other organizations to access your [facility] medical records  [id number]        Your Vitals Were     Pulse Temperature                71 97  F (36.1  C) (Oral)           Blood Pressure from Last 3 Encounters:   17 120/76   17 124/70   17 117/62    Weight from Last 3 Encounters:   17 220 lb (99.8 kg)   17 165 lb (74.8 kg)   17 165 lb (74.8 kg)              Today, you had the following     No orders found for display         Today's Medication Changes          These changes are accurate as of: 17  4:42 PM.  If you have any questions, ask your nurse or doctor.               These medicines have changed or have updated prescriptions.        Dose/Directions    loperamide 2 MG capsule   Commonly known as:  IMODIUM   This may have changed:    - how much to take  - how to take this  - additional instructions   Used for:  Functional diarrhea        TAKE 1 CAPSULE PO BID; AND MAY TAKE 1 CAPSULE PO QID PRN   Quantity:  180 capsule   Refills:  PRN            Where to get your medicines      These medications were sent to [facility] - " [place], [place] - [address]  [address] [address]     Phone:  [phone]     cephALEXin 500 MG capsule                Primary Care Provider Office Phone # Fax #    Len Krishnan [phone]       [facility] [address]  [facility] [address]        Thank you!     Thank you for choosing [facility]  for your care. Our goal is always to provide you with excellent care. Hearing back from our patients is one way we can continue to improve our services. Please take a few minutes to complete the written survey that you may receive in the mail after your visit with us. Thank you!             Your Updated Medication List - Protect others around you: Learn how to safely use, store and throw away your medicines at www.disposemymeds.org.          This list is accurate as of: 6/13/17  4:42 PM.  Always use your most recent med list.                   Brand Name Dispense Instructions for use    albuterol 108 (90 BASE) MCG/ACT Inhaler    PROAIR HFA/PROVENTIL HFA/VENTOLIN HFA    1 Inhaler    Inhale 2 puffs into the lungs 2 times daily as needed for shortness of breath / dyspnea or wheezing       amLODIPine 10 MG tablet    NORVASC    31 tablet    TAKE 1 TABLET BY MOUTH ONCE DAILY       baclofen 20 MG tablet    LIORESAL     Take 20 mg by mouth 3 times daily.       budesonide 0.25 MG/2ML neb solution    PULMICORT    120 mL    NEBULIZE THE CONTENT OF 1 VIAL TWICE DAILY FOR COPD       carBAMazepine 200 MG tablet    TEGretol    93 tablet    TAKE 1 TABLET BY MOUTH THREE TIMES DAILY       cephALEXin 500 MG capsule    KEFLEX    14 capsule    Take 1 capsule (500 mg) by mouth 2 times daily       citalopram 20 MG tablet    celeXA     Take 20 mg by mouth daily       clopidogrel 75 MG tablet    PLAVIX    31 tablet    TAKE 1 TABLET BY MOUTH ONCE DAILY       DEPAKOTE PO      Take 1,750 mg by mouth 2 times daily Delayed release       furosemide 20 MG  tablet    LASIX    31 tablet    TAKE 1 TABLET BY MOUTH ONCE DAILY       GABAPENTIN PO      Take 1,800 mg by mouth At Bedtime       * ipratropium - albuterol 0.5 mg/2.5 mg/3 mL 0.5-2.5 (3) MG/3ML neb solution    DUONEB     Take 1 vial by nebulization every 6 hours as needed for shortness of breath / dyspnea or wheezing       * ipratropium - albuterol 0.5 mg/2.5 mg/3 mL 0.5-2.5 (3) MG/3ML neb solution    DUONEB    360 mL    NEBULIZE THE CONTENT OF 1 VIAL THREE TIMES DAILY;AND MAY NEBULIZE THE CONTENT OF 1 VIAL AS NEEDED FOR SHORTNESS OF BREATH       levETIRAcetam 500 MG tablet    KEPPRA    60 tablet    Take 1 tablet (500 mg) by mouth 2 times daily       lidocaine 2 % topical gel    XYLOCAINE    30 mL    Apply to anus qid as needed for pain       loperamide 2 MG capsule    IMODIUM    180 capsule    TAKE 1 CAPSULE PO BID; AND MAY TAKE 1 CAPSULE PO QID PRN       nebulizer/adult mask Kit     1 kit    1 Device 4 times daily.       niacin 500 MG CR capsule     31 capsule    TAKE 1 CAPSULE BY MOUTH AT BEDTIME       order for DME     1 each    Equipment being ordered: shower benck chair       * order for DME     1 each    Equipment being ordered: Hospital Bed  Fax number: [phone] [facility]  Attn:  Yisel        * order for DME     1 Device    David Dawn requires an electric hospital bed for lifetime.       * order for DME     1 each    Nebulizer machine, tubing, mouth piece       * order for DME     90 tablet    Pull-ups: size XL       * order for DME     1 Device    Motorized wheelchair seat cushion       oxyCODONE HCl 5 MG Taba    OXECTA    90 each    Take 5 mg by mouth every 6 hours as needed       potassium chloride 10 MEQ tablet    K-TAB,KLOR-CON    31 tablet    TAKE 1 TABLET BY MOUTH ONCE DAILY       SEROQUEL PO      Take 150 mg by mouth 2 times daily       simvastatin 40 MG tablet    ZOCOR    31 tablet    TAKE 1 TABLET BY MOUTH AT BEDTIME       tamsulosin 0.4 MG capsule    FLOMAX    31  capsule    TAKE 1 CAPSULE BY MOUTH ONCE DAILY       TYLENOL PO      Take 650 mg by mouth every 4 hours as needed for mild pain or fever       vitamin D 70991 UNIT capsule    ERGOCALCIFEROL     Take 50,000 Units by mouth once a week On Monday       * Notice:  This list has 7 medication(s) that are the same as other medications prescribed for you. Read the directions carefully, and ask your doctor or other care provider to review them with you.

## 2017-06-13 NOTE — PROGRESS NOTES
SUBJECTIVE:                                                    David Dawn is a 57 year old male who presents to clinic today for the following health issues:    Patient is seen for a follow up visit.  Has a non healing ulcer on the left inner foot. On Keflex for cellulitis. Has had topical dressings, compression stockings.   Still has scarce wound discharge. This is his paralyzed leg, uses a brace, that has compressed the wound.   No fever, no chills.     Has h/o stroke, resulting in left arm and leg paralysis. Unable to ambulate. Uses a motorized wheel chair. Unable to get up from the chair.   No new symptoms of neurologic impairment.     Has COPD, symptoms of chronic cough, wheezing, still smokes cigarettes.     Has h/o depression. On medical treatment, controlled, no side effects. No depressive symptoms or suicidal ideation.    Hyperlipidemia Follow-Up      Rate your low fat/cholesterol diet?: fair    Taking statin?  Yes, no muscle aches from statin    Other lipid medications/supplements?:  none   Has H/O hyperlipidemia. On medical treatment and diet. No side effects. No muscle weakness, myalgias or upset stomach.     Hypertension Follow-up      Outpatient blood pressures are not being checked.    Low Salt Diet: no added salt   Has h/o HTN. on medical treatment. BP has been controlled. No side effects from medications. No CP, HA, dizziness. good compliance with medications and low salt diet.      Amount of exercise or physical activity: None    Problems taking medications regularly: No    Medication side effects: none    Diet: low salt      PROBLEMS TO ADD ON...    Problem list and histories reviewed & adjusted, as indicated.  Additional history: as documented    Patient Active Problem List   Diagnosis     Seizure disorder (H)     Chronic fatigue     Low back pain     Mild major depression (H)     GERD (gastroesophageal reflux disease)     Hyperlipidemia LDL goal <100     COPD (chronic obstructive  pulmonary disease) (H)     Hemiplegia affecting left nondominant side (H)     Radicular syndrome of upper limbs     Neck pain     Advance Care Planning     Osteoarthritis of glenohumeral joint     Osteoarthritis of acromioclavicular joint     Metabolic encephalopathy     Health Care Home     Dysphagia     Hallucinations     Cough     Generalized muscle weakness     Alcohol abuse     Behavior problem, adult     Neuropathy (H)     History of CVA (cerebrovascular accident)     Cognitive impairment     Essential hypertension, benign     COPD exacerbation (H)     Benign non-nodular prostatic hyperplasia with lower urinary tract symptoms     Past Surgical History:   Procedure Laterality Date     COLONOSCOPY  12/19/2012     COLONOSCOPY  8/6/2014    Procedure: COMBINED COLONOSCOPY, SINGLE BIOPSY/POLYPECTOMY BY BIOPSY;  Surgeon: Jose Elias Mclain MD;  Location:  GI     EXCISE TUMOR RECTUM VILLUS  2/28/2013    Procedure: EXCISE TUMOR RECTUM VILLOUS;  Trans Anal Excision Rectal Villous Tumor, Proctoscopy;  Surgeon: Milton Jacobs MD;  Location:  OR     KNEE SURGERY       NECK SURGERY       SIGMOIDOSCOPY FLEXIBLE  1/31/2013    Procedure: SIGMOIDOSCOPY FLEXIBLE;   flexible sigmoidoscopy with anoscope;  Surgeon: Milton Jacobs MD;  Location:  GI     SIGMOIDOSCOPY FLEXIBLE  4/25/2013    Procedure: SIGMOIDOSCOPY FLEXIBLE;  SIGMOIDOSCOPY FLEXIBLE;  Surgeon: Milton Jacobs MD;  Location:  GI       Social History   Substance Use Topics     Smoking status: Current Every Day Smoker     Packs/day: 1.00     Years: 35.00     Types: Cigarettes     Smokeless tobacco: Never Used     Alcohol use No      Comment: quit 20 years ago.     Family History   Problem Relation Age of Onset     HEART DISEASE Mother      MI     CEREBROVASCULAR DISEASE Father      alzheimers disease     CEREBROVASCULAR DISEASE Brother      Neurologic Disorder Brother      stroke     Neurologic Disorder Son      seizure     CANCER Sister          Current  Outpatient Prescriptions   Medication Sig Dispense Refill     cephALEXin (KEFLEX) 500 MG capsule Take 1 capsule (500 mg) by mouth 2 times daily 14 capsule 0     amLODIPine (NORVASC) 10 MG tablet TAKE 1 TABLET BY MOUTH ONCE DAILY 31 tablet 11     carBAMazepine (TEGRETOL) 200 MG tablet TAKE 1 TABLET BY MOUTH THREE TIMES DAILY 93 tablet 0     clopidogrel (PLAVIX) 75 MG tablet TAKE 1 TABLET BY MOUTH ONCE DAILY 31 tablet 11     furosemide (LASIX) 20 MG tablet TAKE 1 TABLET BY MOUTH ONCE DAILY 31 tablet 5     niacin 500 MG CR capsule TAKE 1 CAPSULE BY MOUTH AT BEDTIME 31 capsule 11     potassium chloride (K-TAB,KLOR-CON) 10 MEQ tablet TAKE 1 TABLET BY MOUTH ONCE DAILY 31 tablet 11     simvastatin (ZOCOR) 40 MG tablet TAKE 1 TABLET BY MOUTH AT BEDTIME 31 tablet 0     tamsulosin (FLOMAX) 0.4 MG capsule TAKE 1 CAPSULE BY MOUTH ONCE DAILY 31 capsule 11     loperamide (IMODIUM) 2 MG capsule TAKE 1 CAPSULE PO BID; AND MAY TAKE 1 CAPSULE PO QID PRN (Patient taking differently: Take 2 mg by mouth TAKE 2 CAPSULE PO BID; AND MAY TAKE 1 CAPSULE PO QID PRN) 180 capsule PRN     budesonide (PULMICORT) 0.25 MG/2ML neb solution NEBULIZE THE CONTENT OF 1 VIAL TWICE DAILY FOR COPD 120 mL 10     ipratropium - albuterol 0.5 mg/2.5 mg/3 mL (DUONEB) 0.5-2.5 (3) MG/3ML neb solution NEBULIZE THE CONTENT OF 1 VIAL THREE TIMES DAILY;AND MAY NEBULIZE THE CONTENT OF 1 VIAL AS NEEDED FOR SHORTNESS OF BREATH 360 mL 10     order for DME Motorized wheelchair seat cushion 1 Device 0     order for DME Nebulizer machine, tubing, mouth piece 1 each 0     oxyCODONE HCl (OXECTA) 5 MG TABA Take 5 mg by mouth every 6 hours as needed 90 each 0     order for DME Pull-ups: size XL 90 tablet 0     order for DME David Dawn requires an electric hospital bed for lifetime. 1 Device 0     order for DME Equipment being ordered: Hospital Bed    Fax number: [phone]  [facility]   Attn:  Yisel  1 each 0     Divalproex Sodium (DEPAKOTE PO) Take  1,750 mg by mouth 2 times daily Delayed release       citalopram (CELEXA) 20 MG tablet Take 20 mg by mouth daily       Acetaminophen (TYLENOL PO) Take 650 mg by mouth every 4 hours as needed for mild pain or fever       GABAPENTIN PO Take 1,800 mg by mouth At Bedtime        ipratropium - albuterol 0.5 mg/2.5 mg/3 mL (DUONEB) 0.5-2.5 (3) MG/3ML nebulization Take 1 vial by nebulization every 6 hours as needed for shortness of breath / dyspnea or wheezing        QUEtiapine Fumarate (SEROQUEL PO) Take 150 mg by mouth 2 times daily        vitamin D (ERGOCALCIFEROL) 02962 UNIT capsule Take 50,000 Units by mouth once a week On Monday       albuterol (PROAIR HFA, PROVENTIL HFA, VENTOLIN HFA) 108 (90 BASE) MCG/ACT inhaler Inhale 2 puffs into the lungs 2 times daily as needed for shortness of breath / dyspnea or wheezing 1 Inhaler 5     levETIRAcetam (KEPPRA) 500 MG tablet Take 1 tablet (500 mg) by mouth 2 times daily 60 tablet 5     lidocaine (XYLOCAINE) 2 % jelly Apply to anus qid as needed for pain 30 mL 3     Placebo (ORDER FOR DME) Equipment being ordered: shower benck chair 1 each 0     Respiratory Therapy Supplies (NEBULIZER/ADULT MASK) KIT 1 Device 4 times daily. 1 kit 3     baclofen (LIORESAL) 20 MG tablet Take 20 mg by mouth 3 times daily.         Reviewed and updated as needed this visit by clinical staff  Tobacco  Allergies  Meds  Problems  Med Hx  Surg Hx  Fam Hx  Soc Hx        Reviewed and updated as needed this visit by Provider         ROS:  Constitutional, HEENT, cardiovascular, pulmonary, GI, , musculoskeletal, neuro, skin, endocrine and psych systems are negative, except as otherwise noted.    OBJECTIVE:                                                    /76 (BP Location: Right arm, Patient Position: Chair, Cuff Size: Adult Regular)  Pulse 71  Temp 97  F (36.1  C) (Oral)  There is no height or weight on file to calculate BMI.  GENERAL:chronically ill appearing, in a motorized wheelchair,  alert and no distress  NECK: no adenopathy, no asymmetry, masses, or scars and thyroid normal to palpation  RESP: lungs clear to auscultation - no rales, + bilateral rhonchi and wheezes  CV: regular rate and rhythm, normal S1 S2, no S3 or S4, no murmur, click or rub, 2+ LLE peripheral edema   ABDOMEN: soft, nontender, no hepatosplenomegaly, no masses and bowel sounds normal  MS: no gross musculoskeletal defects noted  Left inner foot skin erythema, mild edema, broken skin area with scarce discharge, 1 cm diameter   SKIN: no suspicious lesions or rashes  NEURO: left arm and leg paralysis, left leg in a brace for foot support. Unable to stand up , not able to ambulate.       Diagnostic Test Results:  none      ASSESSMENT/PLAN:                                                      Problem List Items Addressed This Visit     Mild major depression (H)    Hyperlipidemia LDL goal <100    COPD (chronic obstructive pulmonary disease) (H)    Hemiplegia affecting left nondominant side (H)    History of CVA (cerebrovascular accident)    Essential hypertension, benign      Other Visit Diagnoses     Wound, open, foot, left, initial encounter    -  Primary    Relevant Medications    cephALEXin (KEFLEX) 500 MG capsule    Leg edema, left               Cont Keflex for one week  Dressing daily after topical wound cleansing , apply Bacitracin, non stick gauze, no tape, use ACE wraps  Recommend to change foot brace to avoid mechanical irritation of the wound  Keep leg elevated  Advise / prescribe lift chair mechanism   Cont rest of medications     Follow-Up:in 1 month     Len Krishnan MD  [facility]

## 2017-06-13 NOTE — NURSING NOTE
"Chief Complaint   Patient presents with     Hypertension     Lipids       Initial /76 (BP Location: Right arm, Patient Position: Chair, Cuff Size: Adult Regular)  Pulse 71  Temp 97  F (36.1  C) (Oral) Estimated body mass index is 34.46 kg/(m^2) as calculated from the following:    Height as of 6/2/17: 5' 7\" (1.702 m).    Weight as of 6/2/17: 220 lb (99.8 kg).  Medication Reconciliation: complete     Patient states his wheelchair is uncomfortable, along with his Lt leg brace.  Nicole Murphy CMA      "

## 2017-06-16 NOTE — TELEPHONE ENCOUNTER
[employer] [facility]      INTERFACE REQUEST  Last Written Prescription Date:   Last Fill Quantity: ,  # refills:    Last Office Visit with [facility], [facility] or [facility] prescribing provider: 06/16/17                                         Next 5 appointments (look out 90 days)     Sep 08, 2017  2:00 PM CDT   Office Visit with Len Krishnan MD   [facility] ([facility])    [address] [facility] [id number] [phone]

## 2017-06-23 NOTE — TELEPHONE ENCOUNTER
Ok to increase oxycodone  5 mg to 1-2 tabs q 6 hrs prn , med filled,  We cannot fax oxycodone. Needs hard copy to be mailed to them or take the [facility] pharmacy .

## 2017-06-23 NOTE — TELEPHONE ENCOUNTER
[facility] from assisted living calling.      Pt c/o increase in bilat leg pain and severe L arm pain with shaking, last noc.  Rated his pain a 10 out of 10.  Refused ER for eval.  Was given his pain meds (listed below) with little relief of pain.    Pt is requesting an increase in directions for his prn Oxycodone 5mg tabs (pt would like to take 2 tabs at a time) and refill on Voltaren gel 1%.    Current pain meds are:    Oxycodone 10mg 1 tab Q 8am  Oxycodone 5mg 1 tab Q 6hrs prn  Tyl 325mg 2 tabs Q 4hrs prn  Baclofen 20mg TID  Gabapentin 1800mg Q evening  Voltaren gel 1% apply 4gm to knees prn and apply 2gm to hands prn    Please fax Oxy rx to pharm (since [facility] facility, rx can be faxed).    Oxycodone 5mg and Voltaren gel    Last Written Prescription Date:  5-9-17, 6-13-14  Last Fill Quantity: 90, 100g    # refills: 0, 5  Last Office Visit with [facility], [facility] or  [employer] prescribing provider: 6-13-17  Future Office visit:    Next 5 appointments (look out 90 days)     Sep 08, 2017  2:00 PM CDT   Office Visit with Len Krishnan MD   [facility]    [address] [facility] [id number] [phone]                   Routing refill request to provider for review/approval because:  Oxy drug not on the [facility], [facility] [place] or  [employer] [facility] refill protocol or controlled substance and Voltaren gel not on current med list.    No signed CSA form in chart.    Please advise, thanks. (Needs done asap so med can be sent to assisted living today.)

## 2017-06-23 NOTE — TELEPHONE ENCOUNTER
Rajinder calls back states they cannot use [facility] after all. They use  [facility].   Must only be mailed.  cannot take a Faxed narcotic Rx.     Mailed to  instead. Call to [facility] to cancel the Rx. The  is going to cancel the fax.

## 2017-06-23 NOTE — TELEPHONE ENCOUNTER
Call to MANNY yusuf and advised.   She agrees.   Faxed Rx orders to [facility].   Fax #[phone]    Faxed Oxycodone Rx to [facility].     Mailed Oxycodone to [facility]. To go out on Monday, 6/26. (wrote this on Rx-per their request)

## 2017-06-26 NOTE — TELEPHONE ENCOUNTER
Baclofen      [facility] has never filled  Last Office Visit with [facility], [facility] or [facility] prescribing provider: 6/13/17                                         Next 5 appointments (look out 90 days)     Sep 08, 2017  2:00 PM CDT   Office Visit with Len Krishnan MD   [facility] ([facility])    [address]  [facility] [address]   [phone]

## 2017-06-27 NOTE — TELEPHONE ENCOUNTER
Fax received from [employer] for review and signature.  Put in Dr. Gonzalez's in basket in Dr. Krishnan's absence.

## 2017-06-27 NOTE — TELEPHONE ENCOUNTER
Grace RN with [facility] calls. They received order for oxycodone written by Dr. Stanford on Friday for 5-10mg every 6 hours PRN. She states they cannot administer medication that has a range listed for dosing and asking for clarification on how to give this - such as 5mg every 4 hours or 10mg every 6 hours or other recommendation from provider.    Forwarded to Dr. Stanford (wrote order) and Dr. Gonzalez (assigned coverage while Dr. Krishnan is gone) to review.

## 2017-06-28 NOTE — TELEPHONE ENCOUNTER
Fax received from [facility] for review and signature.  Put in [facility]'s in basket in Dr. Garcia's absence.

## 2017-06-28 NOTE — TELEPHONE ENCOUNTER
Per the previous message from last week, he is having increased pain:  But could not use the previous Rx because of the reasons below. Needed Rx with one dose, and shorter duration.     Please advise. 5 mg every 8 hours is not enough.     6/23 telephone encounter:  Pt c/o increase in bilat leg pain and severe L arm pain with shaking, last noc.  Rated his pain a 10 out of 10.  Refused ER for eval.  Was given his pain meds (listed below) with little relief of pain.     Pt is requesting an increase in directions for his prn Oxycodone 5mg tabs (pt would like to take 2 tabs at a time) and refill on Voltaren gel 1%.     Current pain meds are:     Oxycodone 10mg 1 tab Q 8am  Oxycodone 5mg 1 tab Q 6hrs prn  Tyl 325mg 2 tabs Q 4hrs prn  Baclofen 20mg TID  Gabapentin 1800mg Q evening  Voltaren gel 1% apply 4gm to knees prn and apply 2gm to hands prn

## 2017-06-28 NOTE — TELEPHONE ENCOUNTER
[facility] calling stating patient 28 days left of Baclofen, was prescribed this at the care center he was previously living at. Asking for medication to be refilled. Has appointment scheduled with PCP 7/31/17 at 0840.  Provider please review and advise.

## 2017-06-28 NOTE — TELEPHONE ENCOUNTER
Jorge calling from [facility] states she needs a new Rx Script faxed to the Assisted living facility with the below dosing, cannot have a range to administer, must have a specific dose, cannot take a verbal order. Please fax updated Rx to  [phone] Attn; Jorge and send a new signed copy to the Pharmacy ([facility] pharmacy).   Jorge will sadaf back and check if pharmacy has received previous Rx.  Provider please review and advise.

## 2017-06-28 NOTE — TELEPHONE ENCOUNTER
Rajinder WANG from [facility] returns call.   She states pt is OUT of the Oxycodone and is getting Aggressive.     She states the  pharmacy has not received the Oxycodone Rx from Friday yet. .     She is asking for a new Rx with the 5 mg every 4 hours. Needs to be scheduled and cannot be a range.   FAX to  pharmacy. With note to CANCEL the 6/23 RX.   Then MAIL to the  pharmacy.     Then FAX copy to Rajinder before mailing. See fax # below

## 2017-06-29 NOTE — TELEPHONE ENCOUNTER
Copy of rx faxed to Jorge and original mailed to  [facility] care pharmacy on [address].   JOHN Toussaint R.N.

## 2017-06-30 NOTE — TELEPHONE ENCOUNTER
Gage (from ) called. Stated pt is being evicted from his assisted living facility. Due to hitting,swearing and yelling at people, and spitting. Gage just got pt's notice today so he has a month to find him a place, which will be very difficult. Gage is hoping if he does not find pt a facility within that time frame, if Ariella would do an admission into a nursing home. Relayed Ariella is gone til 7/21, so give our office a call about a week before [facility]ella returns and let us know if he found pt a place. Gage will do that.

## 2017-07-03 NOTE — TELEPHONE ENCOUNTER
Fax received from [employer] for review and signature.  Put in Dr. Gonzalez's in basket in Dr. Krishnan's absence.

## 2017-07-03 NOTE — TELEPHONE ENCOUNTER
Plavix - Pharmacy is requesting 90 Day Refills           Last Written Prescription Date: 06/09/17  Last Fill Quantity: 31, # refills: 11    Last Office Visit with [facility] or [facility] prescribing provider:  06/13/17   Future Office Visit:    Next 5 appointments (look out 90 days)     Jul 31, 2017  8:40 AM CDT   [facility]    [address]  [facility] [address]   [phone]            Sep 08, 2017  2:00 PM CDT   [facility] Visit with [facility] MD   [facility]    [address]  [facility] [address]   [phone]                   Lab Results   Component Value Date    WBC 5.4 06/02/2017     Lab Results   Component Value Date    RBC 3.58 06/02/2017     Lab Results   Component Value Date    HGB 12.7 06/02/2017     Lab Results   Component Value Date    HCT 37.9 06/02/2017     No components found for: MCT  Lab Results   Component Value Date     06/02/2017     Lab Results   Component Value Date    MCH 35.5 06/02/2017     Lab Results   Component Value Date    MCHC 33.5 06/02/2017     Lab Results   Component Value Date    RDW 13.4 06/02/2017     Lab Results   Component Value Date     06/02/2017     Lab Results   Component Value Date    AST 10 06/02/2017     Lab Results   Component Value Date    ALT 16 06/02/2017     Creatinine   Date Value Ref Range Status   06/02/2017 0.92 0.66 - 1.25 mg/dL Final   ]    Labs showing if normal/abnormal  Lab Results   Component Value Date    WBC 5.4 06/02/2017    RBC 3.58 (L) 06/02/2017    HGB 12.7 (L) 06/02/2017    HCT 37.9 (L) 06/02/2017     (H) 06/02/2017    MCH 35.5 (H) 06/02/2017    MCHC 33.5 06/02/2017    RDW 13.4 06/02/2017     06/02/2017    DTYP Automated Method 04/26/2017    NEUTROPHIL 55.8 04/26/2017    LYMPH 29.4 04/26/2017    MONOCYTE 11.5 04/26/2017    EOSINOPHIL 1.8 04/26/2017    BASOPHIL 0.5  04/26/2017    IG 1.0 04/26/2017    ANEU 2.1 04/26/2017    ALYM 1.1 04/26/2017    GERRY 0.4 04/26/2017    AEOS 0.1 04/26/2017    ABAS 0.0 04/26/2017    AIG 0.0 04/26/2017      Lab Results   Component Value Date    AST 10 06/02/2017    ALT 16 06/02/2017

## 2017-07-06 NOTE — TELEPHONE ENCOUNTER
TEGRETOL  REFILLS SAY 0, BUT NOTE TO PHARMACY SAYS 12 REFILLS ARE AUTH    Last Written Prescription Date: 06/09/17  Last Fill Quantity: 93, # refills: 0  Last Office Visit with [facility] or [facility] prescribing provider: 06/13/17  Next 5 appointments (look out 90 days)     Jul 31, 2017  8:40 AM CDT   [facility]   [facility]    [address]  [facility] [address]   [phone]            Sep 08, 2017  2:00 PM CDT   Office Visit with Len Krishnan MD   [facility]    [address]  [facility] [address] [phone]                 DEPAKOTE 250MG AND 500MG  INTERFACE/PATIENT REPORTED     Last Written Prescription Date:   Last Fill Quantity: , # refills:   Last Office Visit with [facility], [facility] or [facility] prescribing provider: 06/13/17  Next 5 appointments (look out 90 days)     Jul 31, 2017  8:40 AM CDT   SHORT with Len Krishnan MD   [facility]    [address]  [facility] [address]   [phone]            Sep 08, 2017  2:00 PM CDT   Office Visit with Len Krishnan MD   [facility]    [address]  [facility] [id number] [phone]                   Lab Results   Component Value Date    ALT 16 06/02/2017     Lab Results   Component Value Date    WBC 5.4 06/02/2017     Lab Results   Component Value Date    RBC 3.58 06/02/2017     Lab Results   Component Value Date    HGB 12.7 06/02/2017     Lab Results   Component Value Date    HCT 37.9 06/02/2017     No components found for: MCT  Lab Results   Component Value Date     06/02/2017     Lab Results   Component Value Date    MCH 35.5 06/02/2017     Lab Results   Component Value Date    MCHC 33.5 06/02/2017     Lab Results   Component Value Date    RDW 13.4 06/02/2017     Lab Results   Component  Value Date     06/02/2017     TSH   Date Value Ref Range Status   06/25/2014 4.08 0.4 - 5.0 mU/L Final     Creatinine   Date Value Ref Range Status   06/02/2017 0.92 0.66 - 1.25 mg/dL Final       Drug Levels  Depakote:   Results for orders placed or performed during the hospital encounter of 04/26/17   Valproic acid (Depakote level)   Result Value Ref Range    Valproic Acid Level 64 50 - 100 mg/L     Dilantin: No results found for this or any previous visit.  Gabitril: No results found for this or any previous visit.  Tegretol: No results found for this or any previous visit.  Zonegran: No results found for this or any previous visit.      Labs showing if normal/abnormal  Lab Results   Component Value Date    ALT 16 06/02/2017     Lab Results   Component Value Date    WBC 5.4 06/02/2017    RBC 3.58 (L) 06/02/2017    HGB 12.7 (L) 06/02/2017    HCT 37.9 (L) 06/02/2017     (H) 06/02/2017    MCH 35.5 (H) 06/02/2017    MCHC 33.5 06/02/2017    RDW 13.4 06/02/2017     06/02/2017    DTYP Automated Method 04/26/2017    NEUTROPHIL 55.8 04/26/2017    LYMPH 29.4 04/26/2017    MONOCYTE 11.5 04/26/2017    EOSINOPHIL 1.8 04/26/2017    BASOPHIL 0.5 04/26/2017    IG 1.0 04/26/2017    ANEU 2.1 04/26/2017    ALYM 1.1 04/26/2017    GERRY 0.4 04/26/2017    AEOS 0.1 04/26/2017    ABAS 0.0 04/26/2017    AIG 0.0 04/26/2017     No components found for: PHYT, TIA, ZON, CARB 250    GABAPENTIN     INTERFACE REQUEST  Last Written Prescription Date:   Last Fill Quantity: , # refills:   Last Office Visit with [facility] or [facility] prescribing provider: 06/13/17  Next 5 appointments (look out 90 days)     Jul 31, 2017  8:40 AM CDT   SHORT with Len Krishnan MD   [facility]    [address]  [facility] [address] [phone]            Sep 08, 2017  2:00 PM CDT   Office Visit with Len Krishnan MD   [facility]     [address]  [facility] [address]   [phone]                   Lab Results   Component Value Date    ALT 16 06/02/2017     Lab Results   Component Value Date    WBC 5.4 06/02/2017     Lab Results   Component Value Date    RBC 3.58 06/02/2017     Lab Results   Component Value Date    HGB 12.7 06/02/2017     Lab Results   Component Value Date    HCT 37.9 06/02/2017     No components found for: MCT  Lab Results   Component Value Date     06/02/2017     Lab Results   Component Value Date    MCH 35.5 06/02/2017     Lab Results   Component Value Date    MCHC 33.5 06/02/2017     Lab Results   Component Value Date    RDW 13.4 06/02/2017     Lab Results   Component Value Date     06/02/2017     TSH   Date Value Ref Range Status   06/25/2014 4.08 0.4 - 5.0 mU/L Final     Creatinine   Date Value Ref Range Status   06/02/2017 0.92 0.66 - 1.25 mg/dL Final       Drug Levels  Depakote:   Results for orders placed or performed during the hospital encounter of 04/26/17   Valproic acid (Depakote level)   Result Value Ref Range    Valproic Acid Level 64 50 - 100 mg/L     Dilantin: No results found for this or any previous visit.  Gabitril: No results found for this or any previous visit.  Tegretol: No results found for this or any previous visit.  Zonegran: No results found for this or any previous visit.      Labs showing if normal/abnormal  Lab Results   Component Value Date    ALT 16 06/02/2017     Lab Results   Component Value Date    WBC 5.4 06/02/2017    RBC 3.58 (L) 06/02/2017    HGB 12.7 (L) 06/02/2017    HCT 37.9 (L) 06/02/2017     (H) 06/02/2017    MCH 35.5 (H) 06/02/2017    MCHC 33.5 06/02/2017    RDW 13.4 06/02/2017     06/02/2017    DTYP Automated Method 04/26/2017    NEUTROPHIL 55.8 04/26/2017    LYMPH 29.4 04/26/2017    MONOCYTE 11.5 04/26/2017    EOSINOPHIL 1.8 04/26/2017    BASOPHIL 0.5 04/26/2017    IG 1.0 04/26/2017    ANEU 2.1 04/26/2017    ALYM 1.1 04/26/2017    GERRY  0.4 04/26/2017    AEOS 0.1 04/26/2017    ABAS 0.0 04/26/2017    AIG 0.0 04/26/2017     No components found for: PHYT, TIA, ZON, CARB    Lab Results   Component Value Date    ALT 16 06/02/2017     Lab Results   Component Value Date    WBC 5.4 06/02/2017     Lab Results   Component Value Date    RBC 3.58 06/02/2017     Lab Results   Component Value Date    HGB 12.7 06/02/2017     Lab Results   Component Value Date    HCT 37.9 06/02/2017     No components found for: MCT  Lab Results   Component Value Date     06/02/2017     Lab Results   Component Value Date    MCH 35.5 06/02/2017     Lab Results   Component Value Date    MCHC 33.5 06/02/2017     Lab Results   Component Value Date    RDW 13.4 06/02/2017     Lab Results   Component Value Date     06/02/2017     TSH   Date Value Ref Range Status   06/25/2014 4.08 0.4 - 5.0 mU/L Final     Creatinine   Date Value Ref Range Status   06/02/2017 0.92 0.66 - 1.25 mg/dL Final       Drug Levels  Depakote:   Results for orders placed or performed during the hospital encounter of 04/26/17   Valproic acid (Depakote level)   Result Value Ref Range    Valproic Acid Level 64 50 - 100 mg/L     Dilantin: No results found for this or any previous visit.  Gabitril: No results found for this or any previous visit.  Tegretol: No results found for this or any previous visit.  Zonegran: No results found for this or any previous visit.      Labs showing if normal/abnormal  Lab Results   Component Value Date    ALT 16 06/02/2017     Lab Results   Component Value Date    WBC 5.4 06/02/2017    RBC 3.58 (L) 06/02/2017    HGB 12.7 (L) 06/02/2017    HCT 37.9 (L) 06/02/2017     (H) 06/02/2017    MCH 35.5 (H) 06/02/2017    MCHC 33.5 06/02/2017    RDW 13.4 06/02/2017     06/02/2017    DTYP Automated Method 04/26/2017    NEUTROPHIL 55.8 04/26/2017    LYMPH 29.4 04/26/2017    MONOCYTE 11.5 04/26/2017    EOSINOPHIL 1.8 04/26/2017    BASOPHIL 0.5 04/26/2017    IG 1.0 04/26/2017     ANEU 2.1 04/26/2017    ALYM 1.1 04/26/2017    GERRY 0.4 04/26/2017    AEOS 0.1 04/26/2017    ABAS 0.0 04/26/2017    AIG 0.0 04/26/2017     No components found for: PHYT, TIA, ZON, CARB    KEPPRA      Last Written Prescription Date: 06/13/14  Last Fill Quantity: 60,  # refills: 5   Last Office Visit with [facility], [facility] or  [facility] prescribing provider: 06/13/17                                           Next 5 appointments (look out 90 days)     Jul 31, 2017  8:40 AM CDT   SHORT with Len [facility]nan MD   [facility]    [address]  [facility] [id number] [phone]            Sep 08, 2017  2:00 PM CDT   Office Visit with Len Krishnan MD   [facility]    [address]  [facility] [address] [phone]                  SIMVASTATIN    REFILLS SAY 0, BUT NOTE TO PHARMACY SAYS 12 REFILLS ARE AUTH    Last Written Prescription Date: 06/09/17  Last Fill Quantity: 31, # refills: 0  Last Office Visit with [facility], [facility] or [facility] prescribing provider: 06/13/17  Next 5 appointments (look out 90 days)     Jul 31, 2017  8:40 AM CDT   SHORT with Len Krishnan MD   [facility]    [address]  [facility] [address]   [phone]            Sep 08, 2017  2:00 PM CDT   Office Visit with Len Krishnan MD   [facility]    [address]  [facility] [id number] [phone]                   Lab Results   Component Value Date    CHOL 211 07/16/2012     Lab Results   Component Value Date    HDL 52 07/16/2012     Lab Results   Component Value Date    LDL 99 07/16/2012     Lab Results   Component Value Date    TRIG 127 05/07/2014     Lab Results   Component Value Date    CHOLHDLRATIO 4.1 07/16/2012       Labs showing if normal/abnormal  Lab Results   Component Value Date    CHOL 211 (H)  07/16/2012    TRIG 127 05/07/2014    HDL 52 07/16/2012    LDL 99 07/16/2012    VLDL 60 (H) 07/16/2012    CHOLHDLRATIO 4.1 07/16/2012

## 2017-07-06 NOTE — TELEPHONE ENCOUNTER
You may authorize 1 month simvastatin. ( no lipids in the last 5 years)    The rest of the meds requests should go to neurologist.

## 2017-07-07 NOTE — TELEPHONE ENCOUNTER
Kody Perez RN with [facility] calls. Yomaira Gutierrez increased Oxycodone dose last week from 5mg TID PRN to scheduled q4h due to increased pain. They are concerned pt is too sedated since increasing this dose. Last night they had difficulty waking pt to take dose at 12am and today they notice he is dozing off frequently and slept through lunch.     She asks if dosing can be updated again to only give while awake, only during the daytime, etc. Sent to covering provider to review.

## 2017-07-12 NOTE — TELEPHONE ENCOUNTER
Form received from: [employer].    Form requesting following info/need: Orders for service on power wheel chair    JACKSON needed?: No    Location of form: Given to Dr. Gonzalez to sign while Dr. Krishnan is out but she would like it to wait until Dr. Krishnan is back. Forms put in Dr. Krishnan's in basket    When completed the route for return: Fax

## 2017-07-12 NOTE — TELEPHONE ENCOUNTER
Form for nebulizer mask and tubing note signed because I signed a similar one in the last few days

## 2017-07-17 NOTE — TELEPHONE ENCOUNTER
Prescription approved per [facility] Refill Protocol. JOHN Toussaint R.N.

## 2017-07-18 NOTE — TELEPHONE ENCOUNTER
Form received from: [employer]    Form requesting following info/need: Underwear supply orders    JACKSON needed?: No    Location of form: Dr. Gonzalez's in basket for Dr. Krishnan    When completed the route for return: Fax

## 2017-07-19 NOTE — TELEPHONE ENCOUNTER
Spoke to ALONSO Roa ot taking medication during the night, patient was becoming belligerent and had increased temper when being woken up at night to take medications. Patient taking Oxycodone 5 mg every 4 hours (0800,1200,1600,2400,0400).  Janina will fax over telephone order to be signed and faxed back to [facility]. Will leave encounter open for charting.

## 2017-07-20 NOTE — TELEPHONE ENCOUNTER
Oxycodone      Last Written Prescription Date:  6-28-17  Last Fill Quantity: 180,   # refills: 0  Last Office Visit with [facility] or [facility] prescribing provider: 6-13-17  Future Office visit:    Next 5 appointments (look out 90 days)     Jul 31, 2017  8:40 AM CDT   SHORT with Len Krishnan MD   [facility]    [address]  [facility] [address]   [phone]            Sep 08, 2017  2:00 PM CDT   Office Visit with Len Krishnan MD   [facility]    [address]  [facility] [address]   [phone]                   Routing refill request to provider for review/approval because:  Drug not on the [facility], [facility] or [facility] refill protocol or controlled substance    No signed CSA form in chart.    Mail rx to  [facility].    Please advise, thanks.

## 2017-07-26 NOTE — TELEPHONE ENCOUNTER
Simvastatin     Last Written Prescription Date: 07/07/17  Last Fill Quantity: 30, # refills: 0  Last Office Visit with [facility] or [facility] prescribing provider: 06/13/17  Next 5 appointments (look out 90 days)     Jul 31, 2017  8:40 AM CDT   SHORT with Len Krishnan MD   [facility]    [address]  [facility] [address]   [phone]            Sep 08, 2017  2:00 PM CDT   Office Visit with Len Krishnan MD   [facility]    [address]  [facility] [address]   [phone]                   Lab Results   Component Value Date    CHOL 211 07/16/2012     Lab Results   Component Value Date    HDL 52 07/16/2012     Lab Results   Component Value Date    LDL 99 07/16/2012     Lab Results   Component Value Date    TRIG 127 05/07/2014     Lab Results   Component Value Date    CHOLHDLRATIO 4.1 07/16/2012       Labs showing if normal/abnormal  Lab Results   Component Value Date    CHOL 211 (H) 07/16/2012    TRIG 127 05/07/2014    HDL 52 07/16/2012    LDL 99 07/16/2012    VLDL 60 (H) 07/16/2012    CHOLHDLRATIO 4.1 07/16/2012

## 2017-08-04 NOTE — TELEPHONE ENCOUNTER
Call from [facility] at [phone], [place].   She is trying to get David settled into this facility.  Some things have been needed   Out of Medications: were ordered today    Needs DME order for a new neb machine, with tubing and face mask.  Replace tubing and face mask every 3 months.    They have been trying to get this for several months.    - [address] [address]   Phone: [phone]   FAX: [phone]     Once faxed to [facility], please call [place] at the number above.    Please advise  Den SR RN

## 2017-08-04 NOTE — TELEPHONE ENCOUNTER
Tegretol     Last Written Prescription Date: 06/09/17  Last Fill Quantity: 93, # refills: 0  Last Office Visit with [facility] or [facility] prescribing provider: 06/13/17  Next 5 appointments (look out 90 days)     Sep 08, 2017  2:00 PM CDT   Office Visit with Len Krishnan MD   [facility] ([facility])    [address]  [facility] [address]   [phone]                   Lab Results   Component Value Date    ALT 16 06/02/2017     Lab Results   Component Value Date    WBC 5.4 06/02/2017     Lab Results   Component Value Date    RBC 3.58 06/02/2017     Lab Results   Component Value Date    HGB 12.7 06/02/2017     Lab Results   Component Value Date    HCT 37.9 06/02/2017     No components found for: MCT  Lab Results   Component Value Date     06/02/2017     Lab Results   Component Value Date    MCH 35.5 06/02/2017     Lab Results   Component Value Date    MCHC 33.5 06/02/2017     Lab Results   Component Value Date    RDW 13.4 06/02/2017     Lab Results   Component Value Date     06/02/2017     TSH   Date Value Ref Range Status   06/25/2014 4.08 0.4 - 5.0 mU/L Final     Creatinine   Date Value Ref Range Status   06/02/2017 0.92 0.66 - 1.25 mg/dL Final       Drug Levels  Depakote:   Results for orders placed or performed during the hospital encounter of 04/26/17   Valproic acid (Depakote level)   Result Value Ref Range    Valproic Acid Level 64 50 - 100 mg/L     Dilantin: No results found for this or any previous visit.  Gabitril: No results found for this or any previous visit.  Tegretol: No results found for this or any previous visit.  Zonegran: No results found for this or any previous visit.      Labs showing if normal/abnormal  Lab Results   Component Value Date    ALT 16 06/02/2017     Lab Results   Component Value Date    WBC 5.4 06/02/2017    RBC 3.58 (L) 06/02/2017    HGB 12.7 (L) 06/02/2017    HCT 37.9 (L) 06/02/2017     (H) 06/02/2017     MCH 35.5 (H) 06/02/2017    MCHC 33.5 06/02/2017    RDW 13.4 06/02/2017     06/02/2017    DTYP Automated Method 04/26/2017    NEUTROPHIL 55.8 04/26/2017    LYMPH 29.4 04/26/2017    MONOCYTE 11.5 04/26/2017    EOSINOPHIL 1.8 04/26/2017    BASOPHIL 0.5 04/26/2017    IG 1.0 04/26/2017    ANEU 2.1 04/26/2017    ALYM 1.1 04/26/2017    GERRY 0.4 04/26/2017    AEOS 0.1 04/26/2017    ABAS 0.0 04/26/2017    AIG 0.0 04/26/2017     No components found for: PHYT, TIA, ZON, CARB    Depakote  UNKNOWN     Last Written Prescription Date:   Last Fill Quantity: , # refills:   Last Office Visit with [facility] or [facility] prescribing provider: 06/13/17  Next 5 appointments (look out 90 days)     Sep 08, 2017  2:00 PM CDT   Office Visit with Len Krishnan MD   [facility] ([facility])    [address]  [facility] [address]   [phone]                   Lab Results   Component Value Date    ALT 16 06/02/2017     Lab Results   Component Value Date    WBC 5.4 06/02/2017     Lab Results   Component Value Date    RBC 3.58 06/02/2017     Lab Results   Component Value Date    HGB 12.7 06/02/2017     Lab Results   Component Value Date    HCT 37.9 06/02/2017     No components found for: MCT  Lab Results   Component Value Date     06/02/2017     Lab Results   Component Value Date    MCH 35.5 06/02/2017     Lab Results   Component Value Date    MCHC 33.5 06/02/2017     Lab Results   Component Value Date    RDW 13.4 06/02/2017     Lab Results   Component Value Date     06/02/2017     TSH   Date Value Ref Range Status   06/25/2014 4.08 0.4 - 5.0 mU/L Final     Creatinine   Date Value Ref Range Status   06/02/2017 0.92 0.66 - 1.25 mg/dL Final       Drug Levels  Depakote:   Results for orders placed or performed during the hospital encounter of 04/26/17   Valproic acid (Depakote level)   Result Value Ref Range    Valproic Acid Level 64 50 - 100 mg/L     Dilantin: No results found  for this or any previous visit.  Gabitril: No results found for this or any previous visit.  Tegretol: No results found for this or any previous visit.  Zonegran: No results found for this or any previous visit.      Labs showing if normal/abnormal  Lab Results   Component Value Date    ALT 16 06/02/2017     Lab Results   Component Value Date    WBC 5.4 06/02/2017    RBC 3.58 (L) 06/02/2017    HGB 12.7 (L) 06/02/2017    HCT 37.9 (L) 06/02/2017     (H) 06/02/2017    MCH 35.5 (H) 06/02/2017    MCHC 33.5 06/02/2017    RDW 13.4 06/02/2017     06/02/2017    DTYP Automated Method 04/26/2017    NEUTROPHIL 55.8 04/26/2017    LYMPH 29.4 04/26/2017    MONOCYTE 11.5 04/26/2017    EOSINOPHIL 1.8 04/26/2017    BASOPHIL 0.5 04/26/2017    IG 1.0 04/26/2017    ANEU 2.1 04/26/2017    ALYM 1.1 04/26/2017    GERRY 0.4 04/26/2017    AEOS 0.1 04/26/2017    ABAS 0.0 04/26/2017    AIG 0.0 04/26/2017     No components found for: PHYT, TIA, ZON, CARB      Gabapentin      UNKNOWN  Last Written Prescription Date:    Last Fill Quantity: ,   # refills:   Last Office Visit with [facility] prescribing provider: 06/13/17  Future Office visit:    Next 5 appointments (look out 90 days)     Sep 08, 2017  2:00 PM CDT   Office Visit with Len Krishnan MD   [facility]    [address]  [facility] [address]   [phone]                   Routing refill request to provider for review/approval because:  Drug not on the [facility], [facility] or [facility] refill protocol or controlled substance    Keppra      Last Written Prescription Date: 06/13/14  Last Fill Quantity: 60,  # refills: 5   Last Office Visit with [facility] or [facility] prescribing provider: 06/13/17                                         Next 5 appointments (look out 90 days)     Sep 08, 2017  2:00 PM CDT   Office Visit with Len Krishnan MD   [facility] ([facility])    [address]  [facility] [address]   [phone]                  Zocor     Last Written Prescription Date: 07/26/17  Last Fill Quantity: 30, # refills: 0  Last Office Visit with [facility], [facility] or [facility] prescribing provider: 06/13/17  Next 5 appointments (look out 90 days)     Sep 08, 2017  2:00 PM CDT   Office Visit with Len Krishnan MD   [facility] ([facility])    [address]  [facility] [address]   [phone]                   Lab Results   Component Value Date    CHOL 211 07/16/2012     Lab Results   Component Value Date    HDL 52 07/16/2012     Lab Results   Component Value Date    LDL 99 07/16/2012     Lab Results   Component Value Date    TRIG 127 05/07/2014     Lab Results   Component Value Date    CHOLHDLRATIO 4.1 07/16/2012       Labs showing if normal/abnormal  Lab Results   Component Value Date    CHOL 211 (H) 07/16/2012    TRIG 127 05/07/2014    HDL 52 07/16/2012    LDL 99 07/16/2012    VLDL 60 (H) 07/16/2012    CHOLHDLRATIO 4.1 07/16/2012

## 2017-08-04 NOTE — TELEPHONE ENCOUNTER
Baclofen      Last Written Prescription Date:  6/29/17  Last Fill Quantity: 90,   # refills: 0  Last Office Visit with [facility] provider: 6/13/17  Future Office visit:    Next 5 appointments (look out 90 days)     Sep 08, 2017  2:00 PM CDT   Office Visit with Len Krishnan MD   [facility] ([facility])    [address] [place]  [facility] [address]   [phone]                   Routing refill request to provider for review/approval because:  Drug not on the [facility] or [facility] [employer] refill protocol or controlled substance

## 2017-08-12 NOTE — TELEPHONE ENCOUNTER
Requesting skilled nurse visits, PT/OT, and a referral for a new brace.  Paged on call Dr. Reno at 10:17am via Smart Web to contact the home care nurse directly at [phone].  Advised Gaby to call FNA back if no response from the doctor within 20 minutes.     Magaly Becerra RN/RUDDY

## 2017-08-14 NOTE — TELEPHONE ENCOUNTER
Cindy PT with [facility] ([phone]) calling to request the following orders:  PT once a week x1 wk, twice a week x2 wks.   Approved per RN protocol.  JOHN Toussaint R.N.

## 2017-08-17 NOTE — TELEPHONE ENCOUNTER
Form received from: [employer]    Form requesting following info/need: PT orders    JACKSON needed?: No    Location of form: Dr. Krishnan's in basket    When completed the route for return: Fax

## 2017-08-18 NOTE — TELEPHONE ENCOUNTER
nurse Noy from [facility] called to request refill for patient's oxycodone. Nurse stated that they currently only have 30 tabs left. This will last patient 5 days since patient gets 6 tabs a day.     Oxycodone 5 mg      Last Written Prescription Date:  7/28/17  Last Fill Quantity: 180,   # refills: 0  Last Office Visit with [facility] or  [facility] prescribing provider: 6/13/17  Future Office visit:    Next 5 appointments (look out 90 days)     Sep 08, 2017  2:00 PM CDT   Office Visit with Len Krishnan MD   [facility] ([facility])    [address] [place]  [facility] [address] [phone]                   Routing refill request to provider for review/approval because:  Drug not on the [facility], [facility] or  [facility] refill protocol or controlled substance

## 2017-08-18 NOTE — PROGRESS NOTES
[facility] now requests orders and shares plan of care/discharge summaries for some patients through [employer].  Please REPLY TO THIS MESSAGE in order to give authorization for orders when needed.  This is considered a verbal order, you will still receive a faxed copy of orders for signature.  Thank you for your assistance in improving collaboration for our patients.    ORDER  OT lymphedema therapy 3w6    MD SUMMARY/PLAN OF CARE  Plan to reduce LE edema, fit for compression garment and provide long term management education

## 2017-08-23 NOTE — TELEPHONE ENCOUNTER
Fax received from [facility] for review and signature.  Put in Dr. Krishnan's in basket.

## 2017-08-28 NOTE — TELEPHONE ENCOUNTER
Fax received from [facility] for review and signature.  Put in Dr. Krishnan's in basket.

## 2017-08-30 NOTE — TELEPHONE ENCOUNTER
Fax received from [facility] for review and signature.  Put in Dr. Krishnan's in basket.

## 2017-09-08 NOTE — MR AVS SNAPSHOT
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  Normal or non-critical lab and imaging results will be communicated to you by MyChart, letter or phone within 4 business days after the clinic has received the results. If you do not hear from us within 7 days, please contact the clinic through MyChart or phone. If you have a critical or abnormal lab result, we will notify you by phone as soon as possible.  Submit refill requests through [employer] or call your pharmacy and they will forward the refill request to us. Please allow 3 business days for your refill to be completed.          Additional Information About Your Visit        MyChart Information     [employer] lets you send messages to your doctor, view your test results, renew your prescriptions, schedule appointments and more. To sign up, go to [email or web address] . Click on \"Log in\" on the left side of the screen, which will take you to the Welcome page. Then click on \"Sign up Now\" on the right side of the page.     You will be asked to enter the access code listed below, as well as some personal information. Please follow the directions " "to create your username and password.     Your access code is: [email or web address]  Expires: 2017  3:56 PM     Your access code will  in 90 days. If you need help or a new code, please call your [facility] clinic or [phone].        Care EveryWhere ID     This is your Care EveryWhere ID. This could be used by other organizations to access your [facility] medical records  [id number]        Your Vitals Were     Pulse Temperature Height Pulse Oximetry BMI (Body Mass Index)       78 98.6  F (37  C) (Oral) 5' 7\" (1.702 m) 96% 34.46 kg/m2        Blood Pressure from Last 3 Encounters:   17 130/72   17 120/76   17 124/70    Weight from Last 3 Encounters:   17 220 lb (99.8 kg)   17 220 lb (99.8 kg)   17 165 lb (74.8 kg)              We Performed the Following     ADMIN INFLUENZA (For MEDICARE Patients ONLY) []     FLU VAC, SPLIT VIRUS IM > 3 YO (QUADRIVALENT) [01794]     Vaccine Administration, Initial [10058]          Today's Medication Changes          These changes are accurate as of: 17  3:56 PM.  If you have any questions, ask your nurse or doctor.               These medicines have changed or have updated prescriptions.        Dose/Directions    loperamide 2 MG capsule   Commonly known as:  IMODIUM   This may have changed:    - how much to take  - how to take this  - additional instructions   Used for:  Functional diarrhea        TAKE 1 CAPSULE PO BID; AND MAY TAKE 1 CAPSULE PO QID PRN   Quantity:  180 capsule   Refills:  PRN       oxyCODONE 5 MG IR tablet   Commonly known as:  ROXICODONE   This may have changed:  Another medication with the same name was removed. Continue taking this medication, and follow the directions you see here.   Used for:  Low back pain, unspecified back pain laterality, unspecified chronicity, with sciatica presence unspecified   Changed by:  Len Krishnan MD        TAKE 1 TABLET BY MOUTH EVERY 4 HOURS -HOLD IF PATIENT IS LETHARGIC OR " SLEEPING **CAUTION: OPIOID. RISK OF OVERDOSE AND ADDICTION**   Quantity:  180 tablet   Refills:  0                Primary Care Provider Office Phone # Fax #    Len Krishnan [phone]       [address] [facility] [address]        Equal Access to Services     [facility] REECE : Hadii aad ku hadasho Soomaali, waaxda luqadaha, qaybta kaalmada adeegyada, waxay morisin hayjose martinn adegabino cartwright lashira . So [facility] [phone].    ATENCIÓN: Si habla español, tiene a costello disposición servicios gratuitos de asistencia lingüística. Tylerame al [phone].    We comply with applicable federal civil rights laws and [place] laws. We do not discriminate on the basis of race, color, national origin, age, disability sex, sexual orientation or gender identity.            Thank you!     Thank you for choosing [facility]  for your care. Our goal is always to provide you with excellent care. Hearing back from our patients is one way we can continue to improve our services. Please take a few minutes to complete the written survey that you may receive in the mail after your visit with us. Thank you!             Your Updated Medication List - Protect others around you: Learn how to safely use, store and throw away your medicines at www.disposemymeds.org.          This list is accurate as of: 9/8/17  3:56 PM.  Always use your most recent med list.                   Brand Name Dispense Instructions for use Diagnosis    albuterol 108 (90 BASE) MCG/ACT Inhaler    PROAIR HFA/PROVENTIL HFA/VENTOLIN HFA    1 Inhaler    Inhale 2 puffs into the lungs 2 times daily as needed for shortness of breath / dyspnea or wheezing    COPD (chronic obstructive pulmonary disease) (H)       amLODIPine 10 MG tablet    NORVASC    31 tablet    TAKE 1 TABLET BY MOUTH ONCE DAILY    Essential hypertension, benign       baclofen 20 MG tablet    LIORESAL    90 tablet    TAKE 1 TABLET BY MOUTH THREE TIMES DAILY    Other seizures (H)        budesonide 0.25 MG/2ML neb solution    PULMICORT    120 mL    NEBULIZE THE CONTENT OF 1 VIAL TWICE DAILY FOR COPD    Chronic bronchitis, unspecified chronic bronchitis type (H)       carBAMazepine 200 MG tablet    TEGretol    90 tablet    TAKE 1 TABLET BY MOUTH THREE TIMES DAILY    Seizure disorder (H)       citalopram 20 MG tablet    celeXA     Take 20 mg by mouth daily        clopidogrel 75 MG tablet    PLAVIX    90 tablet    Take 1 tablet (75 mg) by mouth daily    Essential hypertension, benign, History of stroke       diclofenac 1 % Gel topical gel    VOLTAREN    100 g    Place 2 g onto the skin 2 times daily as needed Apply 4 grams to knees or 2 grams to hands    Chronic pain syndrome       divalproex 500 MG EC tablet    DEPAKOTE    60 tablet    TAKE THREE TABLETS (1500MG) BY MOUTH TWICE DAILY (TAKE WITH 250MG FOR A TOTAL OF 1750MG)    Seizure disorder (H)       furosemide 20 MG tablet    LASIX    31 tablet    TAKE 1 TABLET BY MOUTH ONCE DAILY    Bilateral edema of lower extremity       gabapentin 600 MG tablet    NEURONTIN    93 tablet    TAKE THREE TABLETS (1800MG) BY MOUTH ONCE DAILY    Seizure disorder (H)       * ipratropium - albuterol 0.5 mg/2.5 mg/3 mL 0.5-2.5 (3) MG/3ML neb solution    DUONEB     Take 1 vial by nebulization every 6 hours as needed for shortness of breath / dyspnea or wheezing        * ipratropium - albuterol 0.5 mg/2.5 mg/3 mL 0.5-2.5 (3) MG/3ML neb solution    DUONEB    360 mL    NEBULIZE THE CONTENT OF 1 VIAL THREE TIMES DAILY;AND MAY NEBULIZE THE CONTENT OF 1 VIAL AS NEEDED FOR SHORTNESS OF BREATH    Chronic bronchitis, unspecified chronic bronchitis type (H)       levalbuterol 45 MCG/ACT Inhaler    XOPENEX HFA    1 Inhaler    Inhale 2 puffs into the lungs every 6 hours as needed for shortness of breath / dyspnea or wheezing    Chronic obstructive pulmonary disease, unspecified COPD type (H)       levETIRAcetam 500 MG tablet    KEPPRA    62 tablet    Take 1 tablet (500 mg) by  mouth 2 times daily    Seizure disorder (H)       lidocaine 2 % topical gel    XYLOCAINE    30 mL    Apply to anus qid as needed for pain    External hemorrhoids       loperamide 2 MG capsule    IMODIUM    180 capsule    TAKE 1 CAPSULE PO BID; AND MAY TAKE 1 CAPSULE PO QID PRN    Functional diarrhea       MAPAP 325 MG tablet   Generic drug:  acetaminophen     60 tablet    TAKE TWO TABLETS (650MG) BY MOUTH EVERY 4 HOURS AS NEEDED FOR PAIN    Low back pain, unspecified back pain laterality, unspecified chronicity, with sciatica presence unspecified, Neck pain       nebulizer/adult mask Kit     1 kit    1 Device 4 times daily.    Wheezing       niacin 500 MG CR capsule     31 capsule    TAKE 1 CAPSULE BY MOUTH AT BEDTIME    Hyperlipidemia LDL goal <100       order for DME     1 each    Equipment being ordered: shower benck chair    Stroke (H)       * order for DME     1 each    Equipment being ordered: Hospital Bed  Fax number: [phone] [facility]  Attn:  Social [employer] Worker    History of stroke       * order for DME     1 Device    David Dawn requires an electric hospital bed for lifetime.    History of CVA (cerebrovascular accident)       * order for DME     90 tablet    Pull-ups: size XL    Hemiplegia affecting left nondominant side (H), History of CVA (cerebrovascular accident)       * order for DME     1 Device    Motorized wheelchair seat cushion    History of stroke       * order for DME     1 each    Nebulizer machine, tubing, face mask.  Please send replacement tubing and face mask every 3 months    Chronic obstructive pulmonary disease, unspecified COPD type (H)       oxyCODONE 5 MG IR tablet    ROXICODONE    180 tablet    TAKE 1 TABLET BY MOUTH EVERY 4 HOURS -HOLD IF PATIENT IS LETHARGIC OR SLEEPING **CAUTION: OPIOID. RISK OF OVERDOSE AND ADDICTION**    Low back pain, unspecified back pain laterality, unspecified chronicity, with sciatica presence unspecified       potassium chloride 10 MEQ  tablet    K-TAB,KLOR-CON    31 tablet    TAKE 1 TABLET BY MOUTH ONCE DAILY    Bilateral edema of lower extremity       SEROQUEL PO      Take 150 mg by mouth 2 times daily        simvastatin 40 MG tablet    ZOCOR    30 tablet    TAKE 1 TABLET BY MOUTH AT BEDTIME    Hyperlipidemia LDL goal <100       tamsulosin 0.4 MG capsule    FLOMAX    31 capsule    TAKE 1 CAPSULE BY MOUTH ONCE DAILY    Benign non-nodular prostatic hyperplasia with lower urinary tract symptoms       vitamin D 58122 UNIT capsule    ERGOCALCIFEROL    12 capsule    TAKE ONE CAPSULE BY MOUTH ONCE WEEKLY ON MONDAY    Vitamin D deficiency       * Notice:  This list has 7 medication(s) that are the same as other medications prescribed for you. Read the directions carefully, and ask your doctor or other care provider to review them with you.

## 2017-09-08 NOTE — TELEPHONE ENCOUNTER
OXYCODONE      Last Written Prescription Date: 08/18/17  Last Fill Quantity: 180,  # refills: 0   Last Office Visit with [facility], [facility] or [facility] prescribing provider: 06/13/17                                         Next 5 appointments (look out 90 days)     Sep 08, 2017  2:00 PM CDT   Office Visit with Len Krishnan MD   [facility]    [address] [place]  [facility] [address] [phone]

## 2017-09-08 NOTE — NURSING NOTE
"Chief Complaint   Patient presents with     RECHECK     F/U from 06/13/17       Initial /72  Pulse 78  Temp 98.6  F (37  C) (Oral)  Ht 5' 7\" (1.702 m)  Wt 220 lb (99.8 kg)  SpO2 96%  BMI 34.46 kg/m2 Estimated body mass index is 34.46 kg/(m^2) as calculated from the following:    Height as of this encounter: 5' 7\" (1.702 m).    Weight as of this encounter: 220 lb (99.8 kg).  Medication Reconciliation: complete   Danielle Taylor MA      "

## 2017-09-08 NOTE — PROGRESS NOTES
Injectable Influenza Immunization Documentation    1.  Are you sick today? (Fever of 100.5 or higher on the day of the clinic)   No    2.  Have you ever had Guillain-[place] Syndrome within 6 weeks of an influenza vaccionation?  No    3. Do you have a life-threatening allergy to eggs?  No    4. Do you have a life-threatening allergy to a component of the vaccine? May include antibiotics, gelatin or latex.  No     5. Have you ever had a reaction to a dose of flu vaccine that needed immediate medical attention?  No     Form completed by Carole Felipe MA

## 2017-09-08 NOTE — PROGRESS NOTES
SUBJECTIVE:   David Dawn is a 57 year old male who presents to clinic today for the following health issues:      3 month F/U:    Patient is seen for a follow up visit.  No acute complaints, no medication change or new medical conditions.  Has chronic cough and SOB related to COPD. Still smokes cigarettes. No chest pain. Has wheezing, uses inhalers.   Has h/o stoke with left hemiparalysis. Uses a motorized wheelchair. No new neurologic symptoms.   Has chronic left leg edema, history of cellulitis, wounds. Has improved with wound care and lymphedema therapy.   Has h/o HTN. on medical treatment. BP has been controlled. No side effects from medications. No CP, HA, dizziness. good compliance with medications and low salt diet.  Has H/O hyperlipidemia. On medical treatment and diet. No side effects. No muscle weakness, myalgias or upset stomach.   Has h/o depression. On medical treatment, controlled, no side effects. No depressive symptoms or suicidal ideation.  Has h/o seizures. On Depakote and Tegretol, seen by neurology. No change in medications.   Discussed preventive measures.     Problem list and histories reviewed & adjusted, as indicated.  Additional history: as documented    Patient Active Problem List   Diagnosis     Seizure disorder (H)     Chronic fatigue     Low back pain     Mild major depression (H)     GERD (gastroesophageal reflux disease)     Hyperlipidemia LDL goal <100     COPD (chronic obstructive pulmonary disease) (H)     Hemiplegia affecting left nondominant side (H)     Radicular syndrome of upper limbs     Neck pain     Advance Care Planning     Osteoarthritis of glenohumeral joint     Osteoarthritis of acromioclavicular joint     Metabolic encephalopathy     Health Care Home     Dysphagia     Hallucinations     Cough     Generalized muscle weakness     Alcohol abuse     Behavior problem, adult     Neuropathy (H)     History of CVA (cerebrovascular accident)     Cognitive impairment      Essential hypertension, benign     COPD exacerbation (H)     Benign non-nodular prostatic hyperplasia with lower urinary tract symptoms     Past Surgical History:   Procedure Laterality Date     COLONOSCOPY  12/19/2012     COLONOSCOPY  8/6/2014    Procedure: COMBINED COLONOSCOPY, SINGLE BIOPSY/POLYPECTOMY BY BIOPSY;  Surgeon: Jose Elias Mclain MD;  Location:  GI     EXCISE TUMOR RECTUM VILLUS  2/28/2013    Procedure: EXCISE TUMOR RECTUM VILLOUS;  Trans Anal Excision Rectal Villous Tumor, Proctoscopy;  Surgeon: Milton Jacobs MD;  Location:  OR     KNEE SURGERY       NECK SURGERY       SIGMOIDOSCOPY FLEXIBLE  1/31/2013    Procedure: SIGMOIDOSCOPY FLEXIBLE;   flexible sigmoidoscopy with anoscope;  Surgeon: Milton Jacobs MD;  Location:  GI     SIGMOIDOSCOPY FLEXIBLE  4/25/2013    Procedure: SIGMOIDOSCOPY FLEXIBLE;  SIGMOIDOSCOPY FLEXIBLE;  Surgeon: Milton Jacobs MD;  Location:  GI       Social History   Substance Use Topics     Smoking status: Current Every Day Smoker     Packs/day: 1.00     Years: 35.00     Types: Cigarettes     Smokeless tobacco: Never Used     Alcohol use No      Comment: quit 20 years ago.     Family History   Problem Relation Age of Onset     HEART DISEASE Mother      MI     CEREBROVASCULAR DISEASE Father      alzheimers disease     CEREBROVASCULAR DISEASE Brother      Neurologic Disorder Brother      stroke     Neurologic Disorder Son      seizure     CANCER Sister          Current Outpatient Prescriptions   Medication Sig Dispense Refill     gabapentin (NEURONTIN) 600 MG tablet TAKE THREE TABLETS (1800MG) BY MOUTH ONCE DAILY 93 tablet PRN     levETIRAcetam (KEPPRA) 500 MG tablet Take 1 tablet (500 mg) by mouth 2 times daily 62 tablet PRN     simvastatin (ZOCOR) 40 MG tablet TAKE 1 TABLET BY MOUTH AT BEDTIME 30 tablet 11     baclofen (LIORESAL) 20 MG tablet TAKE 1 TABLET BY MOUTH THREE TIMES DAILY 90 tablet 11     divalproex (DEPAKOTE) 500 MG EC tablet TAKE THREE TABLETS (1500MG)  BY MOUTH TWICE DAILY (TAKE WITH 250MG FOR A TOTAL OF 1750MG) 60 tablet 11     carBAMazepine (TEGRETOL) 200 MG tablet TAKE 1 TABLET BY MOUTH THREE TIMES DAILY 90 tablet 11     levalbuterol (XOPENEX HFA) 45 MCG/ACT Inhaler Inhale 2 puffs into the lungs every 6 hours as needed for shortness of breath / dyspnea or wheezing 1 Inhaler 8     MAPAP 325 MG tablet TAKE TWO TABLETS (650MG) BY MOUTH EVERY 4 HOURS AS NEEDED FOR PAIN 60 tablet 11     clopidogrel (PLAVIX) 75 MG tablet Take 1 tablet (75 mg) by mouth daily 90 tablet 2     diclofenac (VOLTAREN) 1 % GEL topical gel Place 2 g onto the skin 2 times daily as needed Apply 4 grams to knees or 2 grams to hands 100 g 0     vitamin D (ERGOCALCIFEROL) 54786 UNIT capsule TAKE ONE CAPSULE BY MOUTH ONCE WEEKLY ON MONDAY 12 capsule 3     amLODIPine (NORVASC) 10 MG tablet TAKE 1 TABLET BY MOUTH ONCE DAILY 31 tablet 11     furosemide (LASIX) 20 MG tablet TAKE 1 TABLET BY MOUTH ONCE DAILY 31 tablet 5     niacin 500 MG CR capsule TAKE 1 CAPSULE BY MOUTH AT BEDTIME 31 capsule 11     potassium chloride (K-TAB,KLOR-CON) 10 MEQ tablet TAKE 1 TABLET BY MOUTH ONCE DAILY 31 tablet 11     tamsulosin (FLOMAX) 0.4 MG capsule TAKE 1 CAPSULE BY MOUTH ONCE DAILY 31 capsule 11     loperamide (IMODIUM) 2 MG capsule TAKE 1 CAPSULE PO BID; AND MAY TAKE 1 CAPSULE PO QID PRN (Patient taking differently: Take 2 mg by mouth TAKE 2 CAPSULE PO BID; AND MAY TAKE 1 CAPSULE PO QID PRN) 180 capsule PRN     budesonide (PULMICORT) 0.25 MG/2ML neb solution NEBULIZE THE CONTENT OF 1 VIAL TWICE DAILY FOR COPD 120 mL 10     ipratropium - albuterol 0.5 mg/2.5 mg/3 mL (DUONEB) 0.5-2.5 (3) MG/3ML neb solution NEBULIZE THE CONTENT OF 1 VIAL THREE TIMES DAILY;AND MAY NEBULIZE THE CONTENT OF 1 VIAL AS NEEDED FOR SHORTNESS OF BREATH 360 mL 10     order for DME Equipment being ordered: Hospital Bed    Fax number: [phone]  [address]   Attn:  Yisel  1 each 0     citalopram (CELEXA) 20 MG tablet Take  "20 mg by mouth daily       ipratropium - albuterol 0.5 mg/2.5 mg/3 mL (DUONEB) 0.5-2.5 (3) MG/3ML nebulization Take 1 vial by nebulization every 6 hours as needed for shortness of breath / dyspnea or wheezing        QUEtiapine Fumarate (SEROQUEL PO) Take 150 mg by mouth 2 times daily        albuterol (PROAIR HFA, PROVENTIL HFA, VENTOLIN HFA) 108 (90 BASE) MCG/ACT inhaler Inhale 2 puffs into the lungs 2 times daily as needed for shortness of breath / dyspnea or wheezing 1 Inhaler 5     lidocaine (XYLOCAINE) 2 % jelly Apply to anus qid as needed for pain 30 mL 3     Placebo (ORDER FOR DME) Equipment being ordered: shower benck chair 1 each 0     Respiratory Therapy Supplies (NEBULIZER/ADULT MASK) KIT 1 Device 4 times daily. 1 kit 3     oxyCODONE (ROXICODONE) 5 MG IR tablet TAKE 1 TABLET BY MOUTH EVERY 4 HOURS -HOLD IF PATIENT IS LETHARGIC OR SLEEPING **CAUTION: OPIOID. RISK OF OVERDOSE AND ADDICTION** 180 tablet 0     order for DME Nebulizer machine, tubing, face mask.  Please send replacement tubing and face mask every 3 months 1 each 4     order for DME Motorized wheelchair seat cushion 1 Device 0     order for DME Pull-ups: size XL 90 tablet 0     order for DME David Dawn requires an electric hospital bed for lifetime. 1 Device 0         Reviewed and updated as needed this visit by clinical staff       Reviewed and updated as needed this visit by Provider         ROS:  Constitutional, HEENT, cardiovascular, pulmonary, GI, , musculoskeletal, neuro, skin, endocrine and psych systems are negative, except as otherwise noted.      OBJECTIVE:   /72  Pulse 78  Temp 98.6  F (37  C) (Oral)  Ht 5' 7\" (1.702 m)  Wt 220 lb (99.8 kg)  SpO2 96%  BMI 34.46 kg/m2  Body mass index is 34.46 kg/(m^2).   GENERAL: chronically ill in a wheelchair, alert and no distress  NECK: no adenopathy, no asymmetry, masses, or scars and thyroid normal to palpation  RESP: lungs clear to auscultation - no rales,scattered bilateral " rhonchi and wheezes  CV: regular rate and rhythm, normal S1 S2, no S3 or S4, no murmur, click or rub, no peripheral edema and peripheral pulses strong  ABDOMEN: soft, nontender, no hepatosplenomegaly, no masses and bowel sounds normal  MS: no gross musculoskeletal defects noted, no edema    Diagnostic Test Results:  none     ASSESSMENT/PLAN:     Problem List Items Addressed This Visit     Seizure disorder (H)    Mild major depression (H)    Hyperlipidemia LDL goal <100    COPD (chronic obstructive pulmonary disease) (H) - Primary    Hemiplegia affecting left nondominant side (H)    Essential hypertension, benign      Other Visit Diagnoses     Need for prophylactic vaccination and inoculation against influenza        Relevant Orders    FLU VAC, SPLIT VIRUS IM > 3 YO (QUADRIVALENT) [96978] (Completed)    Vaccine Administration, Initial [80386] (Completed)    ADMIN INFLUENZA (For MEDICARE Patients ONLY) [] (Completed)           Immunized   Cont treatment   Improved LE edema, ulcers on the left   Monitor lab work      Follow-Up:in 3 months , follow up lab     Len Krishnan MD  [facility]

## 2017-09-14 NOTE — TELEPHONE ENCOUNTER
Fax received from [facility] for review and signature.  Put in Dr. Krishnan's in basket.

## 2017-09-26 NOTE — TELEPHONE ENCOUNTER
ARANZA Almonte  through [facility] calls.   Pt is being asked to move out from his Assisted Living, but can stay, however all care will stop.   The Assisted living will not be providing any care after 10/22.   They will stop giving meds or providing meals. He only gets $97.00 a month for food. Gage states once this care stops, he will end up back in the hospital.     Gage states pt would probably benefit best from a Skilled Nursing Home. This is where he was at before. Pt doesn't want to go back but may need to.     PT is at [facility] for Pneumonia/cdiff. Will be discharged at the end of the week.     Gage Ph #[phone].    This is FYI, in case orders are needed from Dr Krishnan to transfer to SNF.     Will also route to [place] as FYI.

## 2017-09-27 NOTE — TELEPHONE ENCOUNTER
MD On Call  PCP: Len Krishnan     [place] [facility] [place] and C. Diff Colitis.  He is on Flagyl but did not fill the Lactobacillus ( not covered by insurance).  They have concerns related to behavior and  Health challenges. They were unaware of his planned return to assisted living setting today.  They apparently are in the process to find alternate living arrangements due to many behavior issues and not allowing appropriate cares.    I encouraged a hospital follow up appt and recommended calling clinic to be scheduled by 24 hour scheduling. They prefer to call in the AM.      In the interim they will do there best to treat his C.Diff colitis and are hopeful he will comply with some of the hygiene recommendations.   Perhaps care coordination can assist pt and staff.will defer to PCP    April Worthington MD  Internal Medicine  electronically signed

## 2017-09-27 NOTE — TELEPHONE ENCOUNTER
Reason for Disposition    Caller has URGENT question and triager unable to answer question    Additional Information    Negative: Sounds like a life-threatening emergency to the triager    Negative: Patient sounds very sick or weak to the triager    Negative: Sounds like a serious complication to the triager    Protocols used: POST-HOSPITALIZATION FOLLOW-UP CALL-[facility]-  RN from [facility] [place] assisted living calling for patient's PCP or on-call MD to get clarification on patient's discharge plan.  Paged  to [phone] and then relayed message and contact information [phone] to her.   Ольга Zimmerman RN  [facility]

## 2017-09-28 NOTE — TELEPHONE ENCOUNTER
Litzy Nurse from his Asst living calls.   Pt was discharged from [facility]. The Miconazole Cream Rx for his skin yeast was not covered by insurance.   Maybe use Nystatin? Looks like it is preferred.     Also, she states he has Cdiff and is on medication. He has appt with Dr Krishnan on Monday. She will send his Hospital paperwork with him.   She is wondering if he needs to have recheck of Cdiff testing at some point.     Need to fax order for Nystatin or other orders to nurses at #[phone]

## 2017-10-02 NOTE — MR AVS SNAPSHOT
"              After Visit Summary   10/2/2017    David Dawn    MRN: [id number]           Patient Information     Date Of Birth          1960        Visit Information        Provider Department      10/2/2017 1:40 PM Len Krishnan MD [facility]        Today's Diagnoses     Diarrhea of presumed infectious origin    -  1    Low back pain, unspecified back pain laterality, unspecified chronicity, with sciatica presence unspecified        Functional diarrhea           Follow-ups after your visit        Future tests that were ordered for you today     Open Future Orders        Priority Expected Expires Ordered    Clostridium difficile toxin B PCR Routine  12/2/2017 10/2/2017            Who to contact     If you have questions or need follow up information about today's clinic visit or your schedule please contact [facility] directly at [phone].  Normal or non-critical lab and imaging results will be communicated to you by MyChart, letter or phone within 4 business days after the clinic has received the results. If you do not hear from us within 7 days, please contact the clinic through MyChart or phone. If you have a critical or abnormal lab result, we will notify you by phone as soon as possible.  Submit refill requests through [employer] or call your pharmacy and they will forward the refill request to us. Please allow 3 business days for your refill to be completed.          Additional Information About Your Visit        MyChart Information     [employer] lets you send messages to your doctor, view your test results, renew your prescriptions, schedule appointments and more. To sign up, go to [email or web address] . Click on \"Log in\" on the left side of the screen, which will take you to the Welcome page. Then click on \"Sign up Now\" on the right side of the page.     You will be asked to enter the access code listed below, as well as some personal information. Please " follow the directions to create your username and password.     Your access code is: [email or web address]  Expires: 2017  3:56 PM     Your access code will  in 90 days. If you need help or a new code, please call your [place] clinic or [phone].        Care EveryWhere ID     This is your Care EveryWhere ID. This could be used by other organizations to access your [facility] medical records  [id number]        Your Vitals Were     Pulse Temperature Pulse Oximetry             71 98.3  F (36.8  C) (Oral) 96%          Blood Pressure from Last 3 Encounters:   10/02/17 114/66   17 130/72   17 120/76    Weight from Last 3 Encounters:   17 220 lb (99.8 kg)   17 220 lb (99.8 kg)   17 165 lb (74.8 kg)                 Today's Medication Changes          These changes are accurate as of: 10/2/17  2:21 PM.  If you have any questions, ask your nurse or doctor.               Start taking these medicines.        Dose/Directions    oxyCODONE 5 MG IR tablet   Commonly known as:  ROXICODONE   Used for:  Low back pain, unspecified back pain laterality, unspecified chronicity, with sciatica presence unspecified   Started by:  Len Krishnan MD        Dose:  5 mg   Take 1 tablet (5 mg) by mouth 3 times daily   Quantity:  90 tablet   Refills:  0         These medicines have changed or have updated prescriptions.        Dose/Directions    loperamide 2 MG capsule   Commonly known as:  IMODIUM   This may have changed:    - how much to take  - how to take this  - when to take this  - additional instructions   Used for:  Functional diarrhea   Changed by:  Len Krishnan MD        Dose:  2 mg   Take 1 capsule (2 mg) by mouth 2 times daily TAKE 2 CAPSULE PO BID; AND MAY TAKE 1 CAPSULE PO QID PRN   Quantity:  120 capsule   Refills:  3            Where to get your medicines      These medications were sent to [facility] - [facility] [address] [place] [address]  [address] , [facility] [address]     Phone:  [phone]     loperamide 2 MG capsule         Some of these will need a paper prescription and others can be bought over the counter.  Ask your nurse if you have questions.     Bring a paper prescription for each of these medications     oxyCODONE 5 MG IR tablet                Primary Care Provider Office Phone # Fax #    Len Krishnan [phone]       [address]  [facility] [address]        Equal Access to Services     NEY NEWELL : Hadii aad ku hadasho Soomaali, waaxda luqadaha, qaybta kaalmada adeegyada, waxay idiin hayaan adeeg kharash lashira . So [facility] [phone].    ATENCIÓN: Si habla español, tiene a costello disposición servicios gratuitos de asistencia lingüística. [facility] [phone].    We comply with applicable federal civil rights laws and [place] laws. We do not discriminate on the basis of race, color, national origin, age, disability, sex, sexual orientation, or gender identity.            Thank you!     Thank you for choosing [facility]  for your care. Our goal is always to provide you with excellent care. Hearing back from our patients is one way we can continue to improve our services. Please take a few minutes to complete the written survey that you may receive in the mail after your visit with us. Thank you!             Your Updated Medication List - Protect others around you: Learn how to safely use, store and throw away your medicines at www.disposemymeds.org.          This list is accurate as of: 10/2/17  2:21 PM.  Always use your most recent med list.                   Brand Name Dispense Instructions for use Diagnosis    albuterol 108 (90 BASE) MCG/ACT Inhaler    PROAIR HFA/PROVENTIL HFA/VENTOLIN HFA    1 Inhaler    Inhale 2 puffs into the lungs 2 times daily as needed for shortness of breath / dyspnea or wheezing    COPD (chronic obstructive pulmonary disease) (H)       amLODIPine 10 MG tablet     NORVASC    31 tablet    TAKE 1 TABLET BY MOUTH ONCE DAILY    Essential hypertension, benign       baclofen 20 MG tablet    LIORESAL    90 tablet    TAKE 1 TABLET BY MOUTH THREE TIMES DAILY    Other seizures (H)       budesonide 0.25 MG/2ML neb solution    PULMICORT    120 mL    NEBULIZE THE CONTENT OF 1 VIAL TWICE DAILY FOR COPD    Chronic bronchitis, unspecified chronic bronchitis type (H)       carBAMazepine 200 MG tablet    TEGretol    90 tablet    TAKE 1 TABLET BY MOUTH THREE TIMES DAILY    Seizure disorder (H)       citalopram 20 MG tablet    celeXA     Take 20 mg by mouth daily        clopidogrel 75 MG tablet    PLAVIX    90 tablet    Take 1 tablet (75 mg) by mouth daily    Essential hypertension, benign, History of stroke       diclofenac 1 % Gel topical gel    VOLTAREN    100 g    Place 2 g onto the skin 2 times daily as needed Apply 4 grams to knees or 2 grams to hands    Chronic pain syndrome       divalproex 500 MG EC tablet    DEPAKOTE    60 tablet    TAKE THREE TABLETS (1500MG) BY MOUTH TWICE DAILY (TAKE WITH 250MG FOR A TOTAL OF 1750MG)    Seizure disorder (H)       furosemide 20 MG tablet    LASIX    31 tablet    TAKE 1 TABLET BY MOUTH ONCE DAILY    Bilateral edema of lower extremity       gabapentin 600 MG tablet    NEURONTIN    93 tablet    TAKE THREE TABLETS (1800MG) BY MOUTH ONCE DAILY    Seizure disorder (H)       * ipratropium - albuterol 0.5 mg/2.5 mg/3 mL 0.5-2.5 (3) MG/3ML neb solution    DUONEB     Take 1 vial by nebulization every 6 hours as needed for shortness of breath / dyspnea or wheezing        * ipratropium - albuterol 0.5 mg/2.5 mg/3 mL 0.5-2.5 (3) MG/3ML neb solution    DUONEB    360 mL    NEBULIZE THE CONTENT OF 1 VIAL THREE TIMES DAILY;AND MAY NEBULIZE THE CONTENT OF 1 VIAL AS NEEDED FOR SHORTNESS OF BREATH    Chronic bronchitis, unspecified chronic bronchitis type (H)       levalbuterol 45 MCG/ACT Inhaler    XOPENEX HFA    1 Inhaler    Inhale 2 puffs into the lungs every 6  hours as needed for shortness of breath / dyspnea or wheezing    Chronic obstructive pulmonary disease, unspecified COPD type (H)       levETIRAcetam 500 MG tablet    KEPPRA    62 tablet    Take 1 tablet (500 mg) by mouth 2 times daily    Seizure disorder (H)       lidocaine 2 % topical gel    XYLOCAINE    30 mL    Apply to anus qid as needed for pain    External hemorrhoids       loperamide 2 MG capsule    IMODIUM    120 capsule    Take 1 capsule (2 mg) by mouth 2 times daily TAKE 2 CAPSULE PO BID; AND MAY TAKE 1 CAPSULE PO QID PRN    Functional diarrhea       MAPAP 325 MG tablet   Generic drug:  acetaminophen     60 tablet    TAKE TWO TABLETS (650MG) BY MOUTH EVERY 4 HOURS AS NEEDED FOR PAIN    Low back pain, unspecified back pain laterality, unspecified chronicity, with sciatica presence unspecified, Neck pain       nebulizer/adult mask Kit     1 kit    1 Device 4 times daily.    Wheezing       niacin 500 MG CR capsule     31 capsule    TAKE 1 CAPSULE BY MOUTH AT BEDTIME    Hyperlipidemia LDL goal <100       nystatin ointment    MYCOSTATIN    30 g    Apply topically 2 times daily for 14 days Small amounts to affected areas.    Rash       order for DME     1 each    Equipment being ordered: shower benck chair    Stroke (H)       * order for DME     1 each    Equipment being ordered: Hospital Bed  Fax number: [phone] [facility]  Attn:  Yisel     History of stroke       * order for DME     1 Device    Daivd Dawn requires an electric hospital bed for lifetime.    History of CVA (cerebrovascular accident)       * order for DME     90 tablet    Pull-ups: size XL    Hemiplegia affecting left nondominant side (H), History of CVA (cerebrovascular accident)       * order for DME     1 Device    Motorized wheelchair seat cushion    History of stroke       * order for DME     1 each    Nebulizer machine, tubing, face mask.  Please send replacement tubing and face mask every 3 months    Chronic  obstructive pulmonary disease, unspecified COPD type (H)       oxyCODONE 5 MG IR tablet    ROXICODONE    90 tablet    Take 1 tablet (5 mg) by mouth 3 times daily    Low back pain, unspecified back pain laterality, unspecified chronicity, with sciatica presence unspecified       potassium chloride 10 MEQ tablet    K-TAB,KLOR-CON    31 tablet    TAKE 1 TABLET BY MOUTH ONCE DAILY    Bilateral edema of lower extremity       SEROQUEL PO      Take 150 mg by mouth 2 times daily        simvastatin 40 MG tablet    ZOCOR    30 tablet    TAKE 1 TABLET BY MOUTH AT BEDTIME    Hyperlipidemia LDL goal <100       tamsulosin 0.4 MG capsule    FLOMAX    31 capsule    TAKE 1 CAPSULE BY MOUTH ONCE DAILY    Benign non-nodular prostatic hyperplasia with lower urinary tract symptoms       vitamin D 53527 UNIT capsule    ERGOCALCIFEROL    12 capsule    TAKE ONE CAPSULE BY MOUTH ONCE WEEKLY ON MONDAY    Vitamin D deficiency       * Notice:  This list has 7 medication(s) that are the same as other medications prescribed for you. Read the directions carefully, and ask your doctor or other care provider to review them with you.

## 2017-10-02 NOTE — PROGRESS NOTES
SUBJECTIVE:   David Dawn is a 57 year old male who presents to clinic today for the following health issues:          Hospital Follow-up Visit:    Hospital[place]Nursing Home/[place]hab [place] [facility] [place]  Date of Admission: 8/18/17  Date of Discharge: 8/28/17  Reason(s) for Admission: Pain/ diarrhea            Problems taking medications regularly:  None       Medication changes since discharge:        Problems adhering to non-medication therapy:      Summary of hospitalization:  [facility] discharge summary reviewed  Diagnostic Tests/Treatments reviewed.  Follow up needed: none  Other Healthcare Providers Involved in Patient s Care:         [facility] [place]  Update since discharge: improved.     Post Discharge Medication Reconciliation: discharge medications reconciled, continue medications without change.  Plan of care communicated with patient     Coding guidelines for this visit:  Type of Medical   Decision Making Face-to-Face Visit       within 7 Days of discharge Face-to-Face Visit        within 14 days of discharge   Moderate Complexity [id number]   High Complexity 18129 [id number]        Patient is seen for a follow up visit.  Had hospital stay for pneumonia. Now improved. Not on antibiotic. In a group home.   Concern for recurrent diarrhea. Has h/o chronic diarrhea , but now more frequent. On imodium as needed. Not helping.   No fever. No abdominal pain.   Has h/o COPD. Still smoking. Has chronic cough, SOB, wheezing. No change in symptoms.   Has h/o stroke with left paralysis. Has concern for chronic leg pain, buttocks pain, in a wheelchair.   No new neurologic symptoms.   Reports episodes of rectal bleeding from hemorrhoids.   Has h/o HTN. on medical treatment. BP has been controlled. No side effects from medications. No CP, HA, dizziness. good compliance with medications and low salt diet.  Has h/o depression. On medical treatment, controlled, no side effects. No depressive symptoms or suicidal  ideation.          Problem list and histories reviewed & adjusted, as indicated.  Additional history: as documented    Patient Active Problem List   Diagnosis     Seizure disorder (H)     Chronic fatigue     Low back pain     Mild major depression (H)     GERD (gastroesophageal reflux disease)     Hyperlipidemia LDL goal <100     COPD (chronic obstructive pulmonary disease) (H)     Hemiplegia affecting left nondominant side (H)     Radicular syndrome of upper limbs     Neck pain     Advance Care Planning     Osteoarthritis of glenohumeral joint     Osteoarthritis of acromioclavicular joint     Metabolic encephalopathy     Health Care Home     Dysphagia     Hallucinations     Cough     Generalized muscle weakness     Alcohol abuse     Behavior problem, adult     Neuropathy     History of CVA (cerebrovascular accident)     Cognitive impairment     Essential hypertension, benign     COPD exacerbation (H)     Benign non-nodular prostatic hyperplasia with lower urinary tract symptoms     Past Surgical History:   Procedure Laterality Date     COLONOSCOPY  12/19/2012     COLONOSCOPY  8/6/2014    Procedure: COMBINED COLONOSCOPY, SINGLE BIOPSY/POLYPECTOMY BY BIOPSY;  Surgeon: Jose Elias Mclain MD;  Location:  GI     EXCISE TUMOR RECTUM VILLUS  2/28/2013    Procedure: EXCISE TUMOR RECTUM VILLOUS;  Trans Anal Excision Rectal Villous Tumor, Proctoscopy;  Surgeon: Milton Jacobs MD;  Location:  OR     KNEE SURGERY       NECK SURGERY       SIGMOIDOSCOPY FLEXIBLE  1/31/2013    Procedure: SIGMOIDOSCOPY FLEXIBLE;   flexible sigmoidoscopy with anoscope;  Surgeon: Milton Jacobs MD;  Location:  GI     SIGMOIDOSCOPY FLEXIBLE  4/25/2013    Procedure: SIGMOIDOSCOPY FLEXIBLE;  SIGMOIDOSCOPY FLEXIBLE;  Surgeon: Milton Jacobs MD;  Location:  GI       Social History   Substance Use Topics     Smoking status: Current Every Day Smoker     Packs/day: 1.00     Years: 35.00     Types: Cigarettes     Smokeless tobacco: Never Used      Alcohol use No      Comment: quit 20 years ago.     Family History   Problem Relation Age of Onset     HEART DISEASE Mother      MI     CEREBROVASCULAR DISEASE Father      alzheimers disease     CEREBROVASCULAR DISEASE Brother      Neurologic Disorder Brother      stroke     Neurologic Disorder Son      seizure     CANCER Sister          Current Outpatient Prescriptions   Medication Sig Dispense Refill     loperamide (IMODIUM) 2 MG capsule Take 1 capsule (2 mg) by mouth 2 times daily TAKE 2 CAPSULE PO BID; AND MAY TAKE 1 CAPSULE PO QID  capsule 3     oxyCODONE (ROXICODONE) 5 MG IR tablet Take 1 tablet (5 mg) by mouth 3 times daily 90 tablet 0     nystatin (MYCOSTATIN) ointment Apply topically 2 times daily for 14 days Small amounts to affected areas. 30 g 1     gabapentin (NEURONTIN) 600 MG tablet TAKE THREE TABLETS (1800MG) BY MOUTH ONCE DAILY 93 tablet PRN     levETIRAcetam (KEPPRA) 500 MG tablet Take 1 tablet (500 mg) by mouth 2 times daily 62 tablet PRN     simvastatin (ZOCOR) 40 MG tablet TAKE 1 TABLET BY MOUTH AT BEDTIME 30 tablet 11     baclofen (LIORESAL) 20 MG tablet TAKE 1 TABLET BY MOUTH THREE TIMES DAILY 90 tablet 11     divalproex (DEPAKOTE) 500 MG EC tablet TAKE THREE TABLETS (1500MG) BY MOUTH TWICE DAILY (TAKE WITH 250MG FOR A TOTAL OF 1750MG) 60 tablet 11     carBAMazepine (TEGRETOL) 200 MG tablet TAKE 1 TABLET BY MOUTH THREE TIMES DAILY 90 tablet 11     order for [facility] Nebulizer machine, tubing, face mask.  Please send replacement tubing and face mask every 3 months 1 each 4     levalbuterol (XOPENEX HFA) 45 MCG/ACT Inhaler Inhale 2 puffs into the lungs every 6 hours as needed for shortness of breath / dyspnea or wheezing 1 Inhaler 8     MAPAP 325 MG tablet TAKE TWO TABLETS (650MG) BY MOUTH EVERY 4 HOURS AS NEEDED FOR PAIN 60 tablet 11     clopidogrel (PLAVIX) 75 MG tablet Take 1 tablet (75 mg) by mouth daily 90 tablet 2     diclofenac (VOLTAREN) 1 % GEL topical gel Place 2 g onto the skin  2 times daily as needed Apply 4 grams to knees or 2 grams to hands 100 g 0     vitamin D (ERGOCALCIFEROL) 54248 UNIT capsule TAKE ONE CAPSULE BY MOUTH ONCE WEEKLY ON MONDAY 12 capsule 3     amLODIPine (NORVASC) 10 MG tablet TAKE 1 TABLET BY MOUTH ONCE DAILY 31 tablet 11     furosemide (LASIX) 20 MG tablet TAKE 1 TABLET BY MOUTH ONCE DAILY 31 tablet 5     niacin 500 MG CR capsule TAKE 1 CAPSULE BY MOUTH AT BEDTIME 31 capsule 11     potassium chloride (K-TAB,KLOR-CON) 10 MEQ tablet TAKE 1 TABLET BY MOUTH ONCE DAILY 31 tablet 11     tamsulosin (FLOMAX) 0.4 MG capsule TAKE 1 CAPSULE BY MOUTH ONCE DAILY 31 capsule 11     budesonide (PULMICORT) 0.25 MG/2ML neb solution NEBULIZE THE CONTENT OF 1 VIAL TWICE DAILY FOR COPD 120 mL 10     ipratropium - albuterol 0.5 mg/2.5 mg/3 mL (DUONEB) 0.5-2.5 (3) MG/3ML neb solution NEBULIZE THE CONTENT OF 1 VIAL THREE TIMES DAILY;AND MAY NEBULIZE THE CONTENT OF 1 VIAL AS NEEDED FOR SHORTNESS OF BREATH 360 mL 10     order for DME Motorized wheelchair seat cushion 1 Device 0     order for DME Pull-ups: size XL 90 tablet 0     order for DME David López requires an electric hospital bed for lifetime. 1 Device 0     order for DME Equipment being ordered: Hospital Bed    Fax number: [phone]  [address]   Attn:  Yisel  1 each 0     citalopram (CELEXA) 20 MG tablet Take 20 mg by mouth daily       ipratropium - albuterol 0.5 mg/2.5 mg/3 mL (DUONEB) 0.5-2.5 (3) MG/3ML nebulization Take 1 vial by nebulization every 6 hours as needed for shortness of breath / dyspnea or wheezing        QUEtiapine Fumarate (SEROQUEL PO) Take 150 mg by mouth 2 times daily        albuterol (PROAIR HFA, PROVENTIL HFA, VENTOLIN HFA) 108 (90 BASE) MCG/ACT inhaler Inhale 2 puffs into the lungs 2 times daily as needed for shortness of breath / dyspnea or wheezing 1 Inhaler 5     lidocaine (XYLOCAINE) 2 % jelly Apply to anus qid as needed for pain 30 mL 3     Placebo (ORDER FOR DME) Equipment  being ordered: shower benck chair 1 each 0     Respiratory Therapy Supplies (NEBULIZER/ADULT MASK) KIT 1 Device 4 times daily. 1 kit 3         Reviewed and updated as needed this visit by clinical staffTobacco  Allergies  Meds       Reviewed and updated as needed this visit by Provider         ROS:  Constitutional, HEENT, cardiovascular, pulmonary, GI, , musculoskeletal, neuro, skin, endocrine and psych systems are negative, except as otherwise noted.      OBJECTIVE:   /66 (BP Location: Right arm, Patient Position: Chair, Cuff Size: Adult Large)  Pulse 71  Temp 98.3  F (36.8  C) (Oral)  SpO2 96%  There is no height or weight on file to calculate BMI.   GENERAL: weak, in a motorized wheelchair, alert and no distress  NECK: no adenopathy, no asymmetry, masses, or scars and thyroid normal to palpation  RESP: lungs clear to auscultation - no rales,scattered  rhonchi , mild wheezes  CV: regular rate and rhythm, normal S1 S2, no S3 or S4, no murmur, click or rub, no peripheral edema and peripheral pulses strong  ABDOMEN: soft, nontender, no hepatosplenomegaly, no masses and bowel sounds normal  MS: no gross musculoskeletal defects noted, trace LE edema  Neuro : left arm and leg paralysis     Diagnostic Test Results:  Results for orders placed or performed in visit on 06/02/17   Anti Treponema   Result Value Ref Range    Treponema pallidum Antibody Negative NEG   CBC with platelets   Result Value Ref Range    WBC 5.4 4.0 - 11.0 10e9/L    RBC Count 3.58 (L) 4.4 - 5.9 10e12/L    Hemoglobin 12.7 (L) 13.3 - 17.7 g/dL    Hematocrit 37.9 (L) 40.0 - 53.0 %     (H) 78 - 100 fl    MCH 35.5 (H) 26.5 - 33.0 pg    MCHC 33.5 31.5 - 36.5 g/dL    RDW 13.4 10.0 - 15.0 %    Platelet Count 225 150 - 450 10e9/L   Comprehensive metabolic panel   Result Value Ref Range    Sodium 143 133 - 144 mmol/L    Potassium 4.9 3.4 - 5.3 mmol/L    Chloride 110 (H) 94 - 109 mmol/L    Carbon Dioxide 24 20 - 32 mmol/L    Anion Gap 9 3 -  14 mmol/L    Glucose 84 70 - 99 mg/dL    Urea Nitrogen 12 7 - 30 mg/dL    Creatinine 0.92 0.66 - 1.25 mg/dL    GFR Estimate 85 >60 mL/min/1.7m2    GFR Estimate If Black >90   GFR Calc   >60 mL/min/1.7m2    Calcium 8.4 (L) 8.5 - 10.1 mg/dL    Bilirubin Total 0.2 0.2 - 1.3 mg/dL    Albumin 3.2 (L) 3.4 - 5.0 g/dL    Protein Total 6.9 6.8 - 8.8 g/dL    Alkaline Phosphatase 77 40 - 150 U/L    ALT 16 0 - 70 U/L    AST 10 0 - 45 U/L   Erythrocyte sedimentation rate auto   Result Value Ref Range    Sed Rate 16 0 - 20 mm/h   CRP inflammation   Result Value Ref Range    CRP Inflammation 10.0 (H) 0.0 - 8.0 mg/L       ASSESSMENT/PLAN:     Problem List Items Addressed This Visit     Low back pain    Relevant Medications    oxyCODONE (ROXICODONE) 5 MG IR tablet    Mild major depression (H)    COPD (chronic obstructive pulmonary disease) (H)    Hemiplegia affecting left nondominant side (H)    Benign non-nodular prostatic hyperplasia with lower urinary tract symptoms      Other Visit Diagnoses     Diarrhea of presumed infectious origin    -  Primary    Relevant Orders    Clostridium difficile toxin B PCR    Functional diarrhea        Relevant Medications    loperamide (IMODIUM) 2 MG capsule           Assess C diff toxin with recent antibiotic treatment for pneumonia   Start scheduled imodium if negative   Cont treatment   Start scheduled Oxycodone for recurrent leg and back pain , consider long acting medication   Advised smoking cessation     Follow-Up:in 3 months or as needed     Len Krishnan MD  [facility]

## 2017-10-02 NOTE — NURSING NOTE
"Chief Complaint   Patient presents with     Hospital F/U       Initial /66 (BP Location: Right arm, Patient Position: Chair, Cuff Size: Adult Large)  Pulse 71  Temp 98.3  F (36.8  C) (Oral)  SpO2 96% Estimated body mass index is 34.46 kg/(m^2) as calculated from the following:    Height as of 9/8/17: 5' 7\" (1.702 m).    Weight as of 9/8/17: 220 lb (99.8 kg).  Medication Reconciliation: william Rosas, CMA8/28/1    "

## 2017-10-02 NOTE — PROGRESS NOTES
"Chief Complaint   Patient presents with     Hospital F/U       Initial /66 (BP Location: Right arm, Patient Position: Chair, Cuff Size: Adult Large)  Pulse 71  Temp 98.3  F (36.8  C) (Oral)  SpO2 96% Estimated body mass index is 34.46 kg/(m^2) as calculated from the following:    Height as of 9/8/17: 5' 7\" (1.702 m).    Weight as of 9/8/17: 220 lb (99.8 kg).  Medication Reconciliation: incomplete    "

## 2017-10-04 NOTE — TELEPHONE ENCOUNTER
Fax received from [facility] for review and signature.  Put in Dr. Krishnan's in basket.

## 2017-10-06 NOTE — TELEPHONE ENCOUNTER
Cleot calling from [facility] asking about clarification request for Imodium.  Form completed by provider with verbal order for Imodium 2 mg Capsules, 2 capsules BID; May take 1 capsule by mouth Four times daily prn.   Rx re-faxed to [phone].   Rx sent to [facility] pharmacy as requested per verbal order by Dr. Krishnan

## 2017-10-20 NOTE — TELEPHONE ENCOUNTER
Gage COBIAN waiver calling from [facility]. [facility], calling stating patient is being evicted from current [facility] assisted living; patient is being evicted on Karlos 10/22/17 due to patient's physical and verbally aggressive behaviors and refusal of care. Patient has agreed to go back to [facility]. Needing an order to be placed for patient to go to [place].  Fax #  [phone].  Provider please review and advise. Thank you.

## 2017-10-20 NOTE — TELEPHONE ENCOUNTER
Evan with Dr. Krishnan, provider gave verbal order, [facility].   Called Gage from [facility] and relayed verbal order from provider above.   Advise ARANZA Almonte  to have [facility] fax over admission paper work for provider to sign and fax back.

## 2017-10-24 NOTE — MR AVS SNAPSHOT
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  Normal or non-critical lab and imaging results will be communicated to you by MyChart, letter or phone within 4 business days after the clinic has received the results. If you do not hear from us within 7 days, please contact the clinic through MyChart or phone. If you have a critical or abnormal lab result, we will notify you by phone as soon as possible.  Submit refill requests through [facility] or call your pharmacy and they will forward the refill request to us. Please allow 3 business days for your refill to be completed.          Additional Information About Your Visit        MyChart Information     [facility] lets you send messages to your doctor, view your test results, renew your prescriptions, schedule appointments and more. To sign up, go to [email or web address] . Click on \"Log in\" on the left side of the screen, which will take you to the Welcome page. Then click on \"Sign up Now\" on the right side of the page.     You will be asked to enter the access code listed below, as well as some " "personal information. Please follow the directions to create your username and password.     Your access code is: [email or web address]  Expires: 2017  3:56 PM     Your access code will  in 90 days. If you need help or a new code, please call your [place] clinic or [phone].        Care EveryWhere ID     This is your Care EveryWhere ID. This could be used by other organizations to access your [place] medical records  [id number]        Your Vitals Were     Pulse Temperature Height Pulse Oximetry BMI (Body Mass Index)       78 98.4  F (36.9  C) (Oral) 5' 7\" (1.702 m) 95% 35.24 kg/m2        Blood Pressure from Last 3 Encounters:   10/24/17 116/60   10/02/17 114/66   17 130/72    Weight from Last 3 Encounters:   10/24/17 225 lb (102.1 kg)   17 220 lb (99.8 kg)   17 220 lb (99.8 kg)              Today, you had the following     No orders found for display         Today's Medication Changes          These changes are accurate as of: 10/24/17 11:59 PM.  If you have any questions, ask your nurse or doctor.               These medicines have changed or have updated prescriptions.        Dose/Directions    oxyCODONE 5 MG IR tablet   Commonly known as:  ROXICODONE   This may have changed:  when to take this   Used for:  Low back pain, unspecified back pain laterality, unspecified chronicity, with sciatica presence unspecified   Changed by:  Len Krishnan MD        Dose:  5 mg   Take 1 tablet (5 mg) by mouth 4 times daily   Quantity:  90 tablet   Refills:  0         Stop taking these medicines if you haven't already. Please contact your care team if you have questions.     albuterol 108 (90 BASE) MCG/ACT Inhaler   Commonly known as:  PROAIR HFA/PROVENTIL HFA/VENTOLIN HFA   Stopped by:  Len Krishnan MD                    Primary Care Provider Office Phone # Fax #    Len Krishnan [phone]       [address] [facility] [facility] [address]        Equal Access to " Services     [facility]: Hadii aad ku hadcaraalfreda Sojoaquin, waaxda luqadaha, qaybta kaalmada avtar, deny mancilla . So [facility] [phone].    ATENCIÓN: Si jadenla shanell, tiene a costello disposición servicios gratuitos de asistencia lingüística. Llame al [phone].    We comply with applicable federal civil rights laws and [place] laws. We do not discriminate on the basis of race, color, national origin, age, disability, sex, sexual orientation, or gender identity.            Thank you!     Thank you for choosing [facility]  for your care. Our goal is always to provide you with excellent care. Hearing back from our patients is one way we can continue to improve our services. Please take a few minutes to complete the written survey that you may receive in the mail after your visit with us. Thank you!             Your Updated Medication List - Protect others around you: Learn how to safely use, store and throw away your medicines at www.disposemymeds.org.          This list is accurate as of: 10/24/17 11:59 PM.  Always use your most recent med list.                   Brand Name Dispense Instructions for use Diagnosis    amLODIPine 10 MG tablet    NORVASC    31 tablet    TAKE 1 TABLET BY MOUTH ONCE DAILY    Essential hypertension, benign       baclofen 20 MG tablet    LIORESAL    90 tablet    TAKE 1 TABLET BY MOUTH THREE TIMES DAILY    Other seizures (H)       budesonide 0.25 MG/2ML neb solution    PULMICORT    120 mL    NEBULIZE THE CONTENT OF 1 VIAL TWICE DAILY FOR COPD    Chronic bronchitis, unspecified chronic bronchitis type (H)       carBAMazepine 200 MG tablet    TEGretol    90 tablet    TAKE 1 TABLET BY MOUTH THREE TIMES DAILY    Seizure disorder (H)       citalopram 20 MG tablet    celeXA     Take 20 mg by mouth daily        clopidogrel 75 MG tablet    PLAVIX    90 tablet    Take 1 tablet (75 mg) by mouth daily    Essential hypertension, benign, History of stroke        diclofenac 1 % Gel topical gel    VOLTAREN    100 g    Place 2 g onto the skin 2 times daily as needed Apply 4 grams to knees or 2 grams to hands    Chronic pain syndrome       divalproex 500 MG EC tablet    DEPAKOTE    60 tablet    TAKE THREE TABLETS (1500MG) BY MOUTH TWICE DAILY (TAKE WITH 250MG FOR A TOTAL OF 1750MG)    Seizure disorder (H)       furosemide 20 MG tablet    LASIX    31 tablet    TAKE 1 TABLET BY MOUTH ONCE DAILY    Bilateral edema of lower extremity       gabapentin 600 MG tablet    NEURONTIN    93 tablet    TAKE THREE TABLETS (1800MG) BY MOUTH ONCE DAILY    Seizure disorder (H)       * ipratropium - albuterol 0.5 mg/2.5 mg/3 mL 0.5-2.5 (3) MG/3ML neb solution    DUONEB     Take 1 vial by nebulization every 6 hours as needed for shortness of breath / dyspnea or wheezing        * ipratropium - albuterol 0.5 mg/2.5 mg/3 mL 0.5-2.5 (3) MG/3ML neb solution    DUONEB    360 mL    NEBULIZE THE CONTENT OF 1 VIAL THREE TIMES DAILY;AND MAY NEBULIZE THE CONTENT OF 1 VIAL AS NEEDED FOR SHORTNESS OF BREATH    Chronic bronchitis, unspecified chronic bronchitis type (H)       levalbuterol 45 MCG/ACT Inhaler    XOPENEX HFA    1 Inhaler    Inhale 2 puffs into the lungs every 6 hours as needed for shortness of breath / dyspnea or wheezing    Chronic obstructive pulmonary disease, unspecified COPD type (H)       levETIRAcetam 500 MG tablet    KEPPRA    62 tablet    Take 1 tablet (500 mg) by mouth 2 times daily    Seizure disorder (H)       lidocaine 2 % topical gel    XYLOCAINE    30 mL    Apply to anus qid as needed for pain    External hemorrhoids       loperamide 2 MG capsule    IMODIUM    120 capsule    Take 1 capsule (2 mg) by mouth 2 times daily TAKE 2 CAPSULE PO BID; AND MAY TAKE 1 CAPSULE PO QID PRN    Functional diarrhea       MAPAP 325 MG tablet   Generic drug:  acetaminophen     60 tablet    TAKE TWO TABLETS (650MG) BY MOUTH EVERY 4 HOURS AS NEEDED FOR PAIN    Low back pain, unspecified back pain  laterality, unspecified chronicity, with sciatica presence unspecified, Neck pain       nebulizer/adult mask Kit     1 kit    1 Device 4 times daily.    Wheezing       niacin 500 MG CR capsule     31 capsule    TAKE 1 CAPSULE BY MOUTH AT BEDTIME    Hyperlipidemia LDL goal <100       oxyCODONE 5 MG IR tablet    ROXICODONE    90 tablet    Take 1 tablet (5 mg) by mouth 4 times daily    Low back pain, unspecified back pain laterality, unspecified chronicity, with sciatica presence unspecified       potassium chloride 10 MEQ tablet    K-TAB,KLOR-CON    31 tablet    TAKE 1 TABLET BY MOUTH ONCE DAILY    Bilateral edema of lower extremity       SEROQUEL PO      Take 150 mg by mouth 2 times daily        simvastatin 40 MG tablet    ZOCOR    30 tablet    TAKE 1 TABLET BY MOUTH AT BEDTIME    Hyperlipidemia LDL goal <100       tamsulosin 0.4 MG capsule    FLOMAX    31 capsule    TAKE 1 CAPSULE BY MOUTH ONCE DAILY    Benign non-nodular prostatic hyperplasia with lower urinary tract symptoms       vitamin D 11950 UNIT capsule    ERGOCALCIFEROL    12 capsule    TAKE ONE CAPSULE BY MOUTH ONCE WEEKLY ON MONDAY    Vitamin D deficiency       * Notice:  This list has 2 medication(s) that are the same as other medications prescribed for you. Read the directions carefully, and ask your doctor or other care provider to review them with you.

## 2017-10-24 NOTE — PROGRESS NOTES
SUBJECTIVE:   David Dawn is a 57 year old male who presents to clinic today for the following health issues:    Patient is seen for a follow up visit.    Acute problems :   Bilateral foot pain and left knee pain. Patient with history of CVA. In a wheelchair. Has left paralysis. Has chronic pain, affecting lower back, feet. Increased pain reported in the right foot and heel, left lower leg and knee. Recent X rays done showed mild OA of the knee. No redness, fever, chills. On analgesics , not sufficient pain control.   Has COPD, still smokes cigarettes. Has chronic cough, wheezing, on inhaled steroid/ bronchodilators. No change in symptoms.   Has h/o HTN. on medical treatment. BP has been controlled. No side effects from medications. No CP, HA, dizziness. good compliance with medications and low salt diet.  Has paralysis of the left arm and leg, post stroke. Uses a motorized wheelchair, no change in neurologic symptoms.   Has h/o seizures. On preventive treatment, no recurrence. In a NH , needs assistance for his ADL. Has h/o anxiety and depression. No acute issues.         Problem list and histories reviewed & adjusted, as indicated.  Additional history: as documented    Patient Active Problem List   Diagnosis     Seizure disorder (H)     Chronic fatigue     Low back pain     Mild major depression (H)     GERD (gastroesophageal reflux disease)     Hyperlipidemia LDL goal <100     COPD (chronic obstructive pulmonary disease) (H)     Hemiplegia affecting left nondominant side (H)     Radicular syndrome of upper limbs     Neck pain     Advance Care Planning     Osteoarthritis of glenohumeral joint     Osteoarthritis of acromioclavicular joint     Metabolic encephalopathy     Health Care Home     Dysphagia     Hallucinations     Cough     Generalized muscle weakness     Alcohol abuse     Behavior problem, adult     Neuropathy     History of CVA (cerebrovascular accident)     Cognitive impairment     Essential  hypertension, benign     COPD exacerbation (H)     Benign non-nodular prostatic hyperplasia with lower urinary tract symptoms     Past Surgical History:   Procedure Laterality Date     COLONOSCOPY  12/19/2012     COLONOSCOPY  8/6/2014    Procedure: COMBINED COLONOSCOPY, SINGLE BIOPSY/POLYPECTOMY BY BIOPSY;  Surgeon: Jose Elias Mclain MD;  Location:  GI     EXCISE TUMOR RECTUM VILLUS  2/28/2013    Procedure: EXCISE TUMOR RECTUM VILLOUS;  Trans Anal Excision Rectal Villous Tumor, Proctoscopy;  Surgeon: Milton Jacobs MD;  Location:  OR     KNEE SURGERY       NECK SURGERY       SIGMOIDOSCOPY FLEXIBLE  1/31/2013    Procedure: SIGMOIDOSCOPY FLEXIBLE;   flexible sigmoidoscopy with anoscope;  Surgeon: Milton Jacobs MD;  Location:  GI     SIGMOIDOSCOPY FLEXIBLE  4/25/2013    Procedure: SIGMOIDOSCOPY FLEXIBLE;  SIGMOIDOSCOPY FLEXIBLE;  Surgeon: Milton Jacobs MD;  Location:  GI       Social History   Substance Use Topics     Smoking status: Current Every Day Smoker     Packs/day: 1.00     Years: 35.00     Types: Cigarettes     Smokeless tobacco: Never Used     Alcohol use No      Comment: quit 20 years ago.     Family History   Problem Relation Age of Onset     HEART DISEASE Mother      MI     CEREBROVASCULAR DISEASE Father      alzheimers disease     CEREBROVASCULAR DISEASE Brother      Neurologic Disorder Brother      stroke     Neurologic Disorder Son      seizure     CANCER Sister          Current Outpatient Prescriptions   Medication Sig Dispense Refill     oxyCODONE (ROXICODONE) 5 MG IR tablet Take 1 tablet (5 mg) by mouth 4 times daily 90 tablet 0     loperamide (IMODIUM) 2 MG capsule Take 1 capsule (2 mg) by mouth 2 times daily TAKE 2 CAPSULE PO BID; AND MAY TAKE 1 CAPSULE PO QID  capsule 3     gabapentin (NEURONTIN) 600 MG tablet TAKE THREE TABLETS (1800MG) BY MOUTH ONCE DAILY 93 tablet PRN     levETIRAcetam (KEPPRA) 500 MG tablet Take 1 tablet (500 mg) by mouth 2 times daily 62 tablet PRN      simvastatin (ZOCOR) 40 MG tablet TAKE 1 TABLET BY MOUTH AT BEDTIME 30 tablet 11     baclofen (LIORESAL) 20 MG tablet TAKE 1 TABLET BY MOUTH THREE TIMES DAILY 90 tablet 11     divalproex (DEPAKOTE) 500 MG EC tablet TAKE THREE TABLETS (1500MG) BY MOUTH TWICE DAILY (TAKE WITH 250MG FOR A TOTAL OF 1750MG) 60 tablet 11     carBAMazepine (TEGRETOL) 200 MG tablet TAKE 1 TABLET BY MOUTH THREE TIMES DAILY 90 tablet 11     levalbuterol (XOPENEX HFA) 45 MCG/ACT Inhaler Inhale 2 puffs into the lungs every 6 hours as needed for shortness of breath / dyspnea or wheezing 1 Inhaler 8     MAPAP 325 MG tablet TAKE TWO TABLETS (650MG) BY MOUTH EVERY 4 HOURS AS NEEDED FOR PAIN 60 tablet 11     clopidogrel (PLAVIX) 75 MG tablet Take 1 tablet (75 mg) by mouth daily 90 tablet 2     diclofenac (VOLTAREN) 1 % GEL topical gel Place 2 g onto the skin 2 times daily as needed Apply 4 grams to knees or 2 grams to hands 100 g 0     vitamin D (ERGOCALCIFEROL) 36389 UNIT capsule TAKE ONE CAPSULE BY MOUTH ONCE WEEKLY ON MONDAY 12 capsule 3     amLODIPine (NORVASC) 10 MG tablet TAKE 1 TABLET BY MOUTH ONCE DAILY 31 tablet 11     furosemide (LASIX) 20 MG tablet TAKE 1 TABLET BY MOUTH ONCE DAILY 31 tablet 5     niacin 500 MG CR capsule TAKE 1 CAPSULE BY MOUTH AT BEDTIME 31 capsule 11     potassium chloride (K-TAB,KLOR-CON) 10 MEQ tablet TAKE 1 TABLET BY MOUTH ONCE DAILY 31 tablet 11     tamsulosin (FLOMAX) 0.4 MG capsule TAKE 1 CAPSULE BY MOUTH ONCE DAILY 31 capsule 11     budesonide (PULMICORT) 0.25 MG/2ML neb solution NEBULIZE THE CONTENT OF 1 VIAL TWICE DAILY FOR COPD 120 mL 10     ipratropium - albuterol 0.5 mg/2.5 mg/3 mL (DUONEB) 0.5-2.5 (3) MG/3ML neb solution NEBULIZE THE CONTENT OF 1 VIAL THREE TIMES DAILY;AND MAY NEBULIZE THE CONTENT OF 1 VIAL AS NEEDED FOR SHORTNESS OF BREATH 360 mL 10     citalopram (CELEXA) 20 MG tablet Take 20 mg by mouth daily       ipratropium - albuterol 0.5 mg/2.5 mg/3 mL (DUONEB) 0.5-2.5 (3) MG/3ML nebulization Take 1  "vial by nebulization every 6 hours as needed for shortness of breath / dyspnea or wheezing        QUEtiapine Fumarate (SEROQUEL PO) Take 150 mg by mouth 2 times daily        lidocaine (XYLOCAINE) 2 % jelly Apply to anus qid as needed for pain 30 mL 3     Respiratory Therapy Supplies (NEBULIZER/ADULT MASK) KIT 1 Device 4 times daily. 1 kit 3         Reviewed and updated as needed this visit by clinical staff       Reviewed and updated as needed this visit by Provider         ROS:  Constitutional, HEENT, cardiovascular, pulmonary, GI, , musculoskeletal, neuro, skin, endocrine and psych systems are negative, except as otherwise noted.      OBJECTIVE:   /60  Pulse 78  Temp 98.4  F (36.9  C) (Oral)  Ht 5' 7\" (1.702 m)  Wt 225 lb (102.1 kg)  SpO2 95%  BMI 35.24 kg/m2  Body mass index is 35.24 kg/(m^2).   GENERAL: weak, in a wheelchair, alert and no distress  NECK: no adenopathy, no asymmetry, masses, or scars and thyroid normal to palpation  RESP: lungs  - no rales, scattered rhonchi and mild wheezes  CV: regular rate and rhythm, normal S1 S2, no S3 or S4, no murmur, click or rub, no peripheral edema and peripheral pulses strong  ABDOMEN: soft, nontender, no hepatosplenomegaly, no masses and bowel sounds normal  MS: no gross musculoskeletal defects noted, 1+ LE edema, left knee with normal passive ROM, mild pain with flexion, no crepitus,  Right foot with skin calluses on the heel, mild tenderness, no open skin areas normal ROM   Neuro: left hemiparalysis     Diagnostic Test Results:  none     ASSESSMENT/PLAN:     Problem List Items Addressed This Visit     Seizure disorder (H)    Low back pain    Relevant Medications    oxyCODONE (ROXICODONE) 5 MG IR tablet    Mild major depression (H)    COPD (chronic obstructive pulmonary disease) (H)    Hemiplegia affecting left nondominant side (H)    Essential hypertension, benign      Other Visit Diagnoses     Primary osteoarthritis of left knee    -  Primary    " Relevant Medications    oxyCODONE (ROXICODONE) 5 MG IR tablet           Chronic pain, exacerbated, no evidence of infection, increase Oxycodone to 5 mg qid  Continue Gabapentin, Tylenol, Baclofen   Continue current medications   Needs periodic lab work for monitoring metabolic tests with multiple medications   Will be moving to a new NH     Follow-Up:in 3 months     Len Krishnan MD  [facility]  '

## 2017-10-24 NOTE — NURSING NOTE
"Chief Complaint   Patient presents with     Pain     Bilateral foot pain       Initial /60  Pulse 78  Temp 98.4  F (36.9  C) (Oral)  Ht 5' 7\" (1.702 m)  Wt 225 lb (102.1 kg)  SpO2 95%  BMI 35.24 kg/m2 Estimated body mass index is 35.24 kg/(m^2) as calculated from the following:    Height as of this encounter: 5' 7\" (1.702 m).    Weight as of this encounter: 225 lb (102.1 kg).  Medication Reconciliation: complete   Danielle Taylor MA      "

## 2017-10-25 NOTE — TELEPHONE ENCOUNTER
Jorge WANG at [facility] ([phone]) calling.  States pt has a very old wheelchair and it has been repaired several times but he could really use a new power chair.  Patient also complains of being uncomfortable in his chair, currently missing a piece and recently broke an arm rest off of it.  She will be faxing over a certificate of medical necessity and would like an order for power chair attached to it before faxing it back.  JOHN Toussaint R.N.

## 2017-10-26 NOTE — TELEPHONE ENCOUNTER
Nurse calling (Celestina) requesting a hard copy of Oxycodone script be sent to pharmacy. [phone] is the best number to reach her at for questions.

## 2017-10-27 NOTE — TELEPHONE ENCOUNTER
As RX given to patient in clinic during OV 10/24/17 or mailed to pharmacy? No documentation of either.   Provider please review and advise. Thank you.

## 2017-10-27 NOTE — TELEPHONE ENCOUNTER
Called number list x 2 and got a fax tone when no answer.  Looked up main number for [facility] at [phone] to find out which pharmacy to send Rx to. Left detailed message on nurses phone.  Rx with [facility] triage nurse.

## 2017-11-05 NOTE — IP AVS SNAPSHOT
"` `           [address] [facility]: [phone]                                              INTERAGENCY TRANSFER FORM - NURSING   2017                    Hospital Admission Date: 2017  MILTON LAMBERT   : 1960  Sex: Male        Attending Provider: Alfonso Morris MD     Allergies:  Ibuprofen Sodium, Tuberculin Tests    Infection:  None   Service:  NEUROLOGY    Ht:  1.727 m (5' 8\")   Wt:  104.3 kg (229 lb 15 oz)   Admission Wt:  114.8 kg (253 lb)    BMI:  34.96 kg/m 2   BSA:  2.24 m 2            Patient PCP Information     Provider PCP Type    Len Krishnan MD General      Current Code Status     Date Active Code Status Order ID Comments User Context       Prior      Code Status History     Date Active Date Inactive Code Status Order ID Comments User Context    2017 10:33 AM  DNR/DNI [id number]  Miguel Valero MD Outpatient    2017  2:12 AM 2017 10:33 AM DNR/DNI [id number]  Refugio Muniz MD Inpatient    2017  2:02 AM 2017  2:12 AM Full Code [id number]  Refugio Muniz MD Inpatient    2017 12:19 PM 2017  2:02 AM DNR/DNI [id number]  Heber Rod MD Outpatient    2017  9:05 PM 2017 12:19 PM DNR/DNI [id number]  Bonifacio Le MD Inpatient    2015  1:20 PM 2017  9:05 PM DNR/DNI [id number]  Nick Quarles PA-C Outpatient    2015 12:29 AM 2015  1:20 PM DNR/DNI [id number]  Humberto Ansari APRN CNP Inpatient    2015 11:40 AM 2015 12:29 AM Full Code [id number]  Evelin Mai MD Outpatient    2015  9:45 PM 2015 11:40 AM Full Code [id number]  Demetrius Corona MD Inpatient    2015  8:32 PM 2015  8:07 PM Full Code [id number]  Ephraim Kim MD Inpatient    2014 10:30 AM 2015  8:32 PM Full Code [id number]  Roger Sagastume DO Outpatient    2014  5:11 PM 2014 10:30 AM Full Code [id number]  Heber Rod MD Inpatient    2014 " 11:52 AM 6/23/2014  5:11 PM Full Code [id number]  Elias Staley MD Outpatient    4/29/2014 11:43 PM 6/10/2014  7:15 PM Full Code [id number]  Andrae Stover MD Inpatient    4/8/2012  2:43 AM 4/10/2012  8:45 PM Full Code [id number]  Abbie Waters RN Inpatient      Advance Directives        Does patient have a scanned Advance Directive/ACP document in EPIC?           No        Hospital Problems as of 11/7/2017              Priority Class Noted POA    Seizure (H) Medium  11/6/2017 Yes      Non-Hospital Problems as of 11/7/2017              Priority Class Noted    Seizure disorder (H) Medium  9/15/2011    Chronic fatigue Medium  9/15/2011    Low back pain Medium  9/15/2011    Mild major depression (H) Medium  9/15/2011    GERD (gastroesophageal reflux disease) Medium  9/15/2011    Hyperlipidemia LDL goal <100 Medium  9/15/2011    COPD (chronic obstructive pulmonary disease) (H) Medium  9/15/2011    Hemiplegia affecting left nondominant side (H) Medium  4/27/2013    Radicular syndrome of upper limbs Medium  4/27/2013    Neck pain Medium  4/27/2013    ACP (advance care planning) Medium  7/15/2013    Osteoarthritis of glenohumeral joint Medium  12/20/2013    Osteoarthritis of acromioclavicular joint Medium  12/20/2013    Metabolic encephalopathy Medium  6/9/2014    Health Care Home Medium  6/11/2014    Dysphagia Medium  7/2/2014    Hallucinations Medium  4/15/2015    Cough Medium  4/15/2015    Generalized muscle weakness Medium  4/15/2015    Alcohol abuse Medium  6/17/2015    Behavior problem, adult Medium  7/22/2015    Neuropathy Medium  10/14/2015    History of CVA (cerebrovascular accident) Medium  1/11/2016    Cognitive impairment Medium  1/11/2016    Essential hypertension, benign Medium  1/11/2016    COPD exacerbation (H) Medium  4/26/2017    Benign non-nodular prostatic hyperplasia with lower urinary tract symptoms Medium  6/9/2017      Immunizations     Name Date      Influenza (IIV3) 10/17/15      Influenza (IIV3) 11/10/14     Influenza (IIV3) 01/11/13     Influenza (IIV3) 11/09/11     Influenza Vaccine IM 3yrs+ 4 Valent IIV4 09/08/17     Influenza Vaccine IM 3yrs+ 4 Valent IIV4 11/08/16     Influenza Vaccine IM 3yrs+ 4 Valent IIV4 11/15/13     Pneumococcal (PCV 13) 09/27/15     Pneumococcal 23 valent 05/14/14     Pneumococcal 23 valent 07/15/13     TD (ADULT, 7+) 01/01/10     TDAP Vaccine (Adacel) 07/15/13          END      ASSESSMENT     Discharge Profile Flowsheet     DISCHARGE NEEDS ASSESSMENT     Resources List  Skilled Nursing Facility 05/01/17 1006    # of Referrals Placed by CTS  Scheduled Follow-up appointments;Communication hand-offs to next level of Care Providers (Assess for need of ) 04/28/17 1109   PAS Number  [id number] 02/25/15 1627    Equipment Used at Home  wheelchair;cane, straight 02/18/15 1237   Existing Resources/Services  None 02/17/15 1014    FUNCTIONAL LEVEL CURRENT     SKIN      Ambulation  4 - completely dependent 11/07/17 0940   Inspection of bony prominences  Full 11/07/17 0929    Transferring  4 - completely dependent 11/07/17 0940   Inspection under devices  Full 11/06/17 0547    Toileting  4 - completely dependent 11/07/17 0940   Skin WDL  ex 11/07/17 0929    Bathing  4 - completely dependent 11/07/17 0940   Skin Color/Characteristics  bruised (ecchymotic) 11/07/17 0929    Dressing  4 - completely dependent 11/07/17 0940   Skin Temperature  warm 11/07/17 0929    Eating  2 - assistive person 11/07/17 0940   Skin Moisture  dry 11/07/17 0929    Communication  0 - understands/communicates without difficulty 11/07/17 0940   Skin Elasticity  slow return to original state 11/06/17 0547    Swallowing  0 - swallows foods/liquids without difficulty 11/07/17 0940   Skin Integrity  abrasion(s);bruise(s);scab(s);scar(s) 11/07/17 0929    GASTROINTESTINAL (ADULT,PEDIATRIC,OB)     Additional Documentation  Wound (LDA) 11/06/17 0547    GI WDL  WDL 11/07/17 0929   SAFETY      Abdominal Appearance  " obese;rounded 11/07/17 0333   Safety WDL  ex 11/07/17 0929    Last Bowel Movement  -- (unknown) 11/06/17 0547   Safety Factors  ID band on;upper side rails raised x 2;call light in reach;wheels locked;bed in low position;side rails raised x 3 11/07/17 0929    Passing flatus  yes 11/07/17 0940   Aspiration Risk Screen  -- (passed swallow eval with speech) 11/06/17 1748    COMMUNICATION ASSESSMENT     Airway Safety Measures  oxygen flowmeter 11/07/17 0945    Patient's communication style  spoken language (English or Bilingual) 11/05/17 2318   All Alarms  alarm(s) activated and audible 11/07/17 0945    FINAL RESOURCES     Additional Documentation  Airway Safety Measures (Row);Aspiration Risk Screen (Row) 11/06/17 0547                 Assessment WDL (Within Defined Limits) Definitions           Safety WDL     Effective: 09/28/15    Row Information: <b>WDL Definition:</b> Bed in low position, wheels locked; call light in reach; upper side rails up x 2; ID band on<br> <font color=\"gray\"><i>Item=AS safety wdl>>List=AS safety wdl>>Version=F14</i></font>      Skin WDL     Effective: 09/28/15    Row Information: <b>WDL Definition:</b> Warm; dry; intact; elastic; without discoloration; pressure points without redness<br> <font color=\"gray\"><i>Item=AS skin wdl>>List=AS skin wdl>>Version=F14</i></font>      Vitals     Vital Signs Flowsheet     COMMENTS     Vocalization (extubated patients)  0 11/06/17 0618    Comments  MD paged, discussed loading dose IV due to patient unsafe to swallow anything PO at this time. Meds ordered. See MAR 11/06/17 0418   Muscle Tension  1 11/06/17 0618    VITAL SIGNS     Total  2 11/06/17 0618    Temp  97.6  F (36.4  C) 11/06/17 1918   HEIGHT AND WEIGHT      Temp src  Oral 11/06/17 1918   Height  1.727 m (5' 8\") 11/05/17 2330    Resp  18 11/06/17 1918   Height Method  Estimated 11/05/17 2330    Pulse  82 11/06/17 0028   Weight  104.3 kg (229 lb 15 oz) 11/06/17 0300    Heart Rate  68 11/07/17 0743   " Weight Method  Bed scale 11/06/17 0300    Pulse/Heart Rate Source  Monitor 11/07/17 0743   BSA (Calculated - sq m)  2.35 11/05/17 2330    BP  110/63 11/07/17 0742   BMI (Calculated)  38.55 11/05/17 2330    BP Location  Right arm 11/06/17 1918   CLAUDIO COMA SCALE      OXYGEN THERAPY     Best Eye Response  4-->(E4) spontaneous 11/07/17 0929    SpO2  93 % 11/07/17 0743   Best Motor Response  6-->(M6) obeys commands 11/07/17 0929    O2 Device  None (Room air) 11/07/17 0743   Best Verbal Response  5-->(V5) oriented 11/07/17 0929    Oxygen Delivery  2 LPM 11/06/17 1020   [place] Coma Scale Score  15 11/07/17 0929    PAIN/COMFORT     EKG MONITORING      Patient Currently in Pain  yes 11/07/17 0753   Cardiac Regularity  Regular 11/06/17 0026    Preferred Pain Scale  CAPA (Clinically Aligned Pain Assessment) ([facility] Adults Only) 11/07/17 0753   Cardiac Rhythm  NSR 11/06/17 0026    Pain Location  Generalized 11/07/17 0753   POSITIONING      Pain Descriptors  Aching 11/07/17 0753   Body Position  side-lying, right 11/07/17 0945    Pain Intervention(s)  Medication (See eMAR) 11/07/17 0753   Head of Bed (HOB)  HOB at 30-45 degrees 11/07/17 0945    CLINICALLY ALIGNED PAIN ASSESSMENT (CAPA) ([facility] ADULTS ONLY)     Positioning/Transfer Devices  pillows;in use 11/07/17 0945    Comfort  -- 11/07/17 0753   DAILY CARE      Change in Pain  getting better 11/06/17 0146   Activity Management  activity encouraged;activity adjusted per tolerance 11/07/17 0945    Pain Control  fully effective 11/06/17 0146   Activity Assistance Provided  assistance, 2 people 11/07/17 0945    Functioning  can do everything I need to 11/06/17 0146   ECG      Sleep  normal sleep 11/06/17 0146   ECG Rhythm  Normal sinus rhythm 11/06/17 1355    CRITICAL-CARE PAIN OBSERVATION TOOL (CPOT)     Ectopy  None 11/06/17 1355    Facial Expression  1 11/06/17 0618   Lead Monitored  Lead II;V 1 11/06/17 1020    Body Movements  0  11/06/17 0618   Equipment  electrodes changed 11/06/17 1020    Compliance w/ventilator (intubated patients)  Extubated 11/06/17 0618                 Patient Lines/Drains/Airways Status    Active LINES/DRAINS/AIRWAYS     Name: Placement date: Placement time: Site: Days: Last dressing change:    Peripheral IV 11/05/17 Left Hand 11/05/17      Hand   2     Peripheral IV 11/05/17 Right Upper forearm 11/05/17   2355   Upper forearm   1     Pressure Ulcer 05/20/14 Right Buttocks 05/20/14   2024    1266     Wound 12/19/15 Right;Upper Buttocks Suspected pressure ulcer dime size nonblanchable erythema 12/19/15   0045   Buttocks   689     Wound 12/19/15 Posterior Knee Suspected pressure ulcer nonblanchable line of erythema 12/19/15   0045   Knee   689     Wound 12/19/15 Left;Outer Ankle Suspected pressure ulcer nonblanchable erythema dime size 12/19/15   0045   Ankle   689     Wound 12/19/15 Right Heel Other (comment) blisters on heel of foot 12/19/15   0045   Heel   689     Wound 12/19/15 Right;Anterior Leg scrapes on R leg 12/19/15   0045   Leg   689     Wound 04/27/17 Left;Inner;Right Foot Other (comment) Left foot blister, Right foot scabbing 04/27/17   0001   Foot   194     Wound 11/06/17 Left;Right;Lower Leg Abrasion(s) multiple abrasions from fall pta 11/06/17   0200   Leg   1     Incision/Surgical Site 02/28/13 Rectum 02/28/13   1415    1712             Patient Lines/Drains/Airways Status    Active PICC/CVC     None            Intake/Output Detail Report     Date Intake     Output Net    Shift P.O. I.V. IV Piggyback Total Urine Total       Day 11/06/17 0000 - 11/06/17 0659 -- 278.75 -- 278.75 -- -- 278.75    [place] 11/06/17 0700 - 11/06/17 1459 100 600 -- 700 -- -- 700    Noc 11/06/17 1500 - 11/06/17 2359 -- 248.75 -- 248.75 450 450 -201.25    Day 11/07/17 0000 - 11/07/17 0659 250 -- -- 250 -- -- 250    [place] 11/07/17 0700 - 11/07/17 1459 -- -- -- -- -- -- 0      Last Void/BM       Most Recent Value    Urine Occurrence 1 at  11/07/2017 0100    Stool Occurrence       Case Management/Discharge Planning     Case Management/Discharge Planning Flowsheet     REFERRAL INFORMATION     Resources List  Skilled Nursing Facility 05/01/17 1006    Admission Type  inpatient 11/07/17 1203   PAS Number  [id number] 02/25/15 1627    Arrived From  home or self-care 04/28/17 1108   Existing Resources/Services  None 02/17/15 1014    # of Referrals Placed by CTS  Scheduled Follow-up appointments;Communication hand-offs to next level of Care Providers (Assess for need of ) 04/28/17 1109   ABUSE RISK SCREEN      Primary Care [facility] Name  [facility]  04/28/17 1108   QUESTION TO PATIENT:  Has a member of your family or a partner(now or in the past) intimidated, hurt, manipulated, or controlled you in any way?  no 11/05/17 2332    Primary Care MD Name  Cedricjustinvalentin 04/28/17 1108   QUESTION TO PATIENT: Do you feel safe going back to the place where you are living?  yes 11/05/17 2332    LIVING ENVIRONMENT     OBSERVATION: Is there reason to believe there has been maltreatment of a vulnerable adult (ie. Physical/Sexual/Emotional abuse, self neglect, lack of adequate food, shelter, medical care, or financial exploitation)?  no 11/05/17 2332    Lives With  facility resident 11/06/17 0314   (R) MENTAL HEALTH SUICIDE RISK      Living Arrangements  assisted living ([facility]. P: [phone]) 11/07/17 1203   Are you depressed or being treated for depression?  Yes (pt on celexa pta) 11/06/17 0318    COPING/STRESS     HOMICIDE RISK      Major Change/Loss/Stressor  unable to assess 11/06/17 0310   Feels Like Hurting Others  no 11/05/17 2332    DISCHARGE PLANNING     MH/BH CAREGIVER      Equipment Used at Home  wheelchair;cane, straight 02/18/15 1237   Filed Complexity Screen Score  16 11/07/17 1205    FINAL RESOURCES

## 2017-11-05 NOTE — IP AVS SNAPSHOT
"    UNIT 6A [facility] BANK: [phone]                                              INTERAGENCY TRANSFER FORM - LAB / IMAGING / EKG / EMG RESULTS   2017                    Hospital Admission Date: 2017  MILTON LAMBERT   : 1960  Sex: Male        Attending Provider: Alfonso Morris MD     Allergies:  Ibuprofen Sodium, Tuberculin Tests    Infection:  None   Service:  NEUROLOGY    Ht:  1.727 m (5' 8\")   Wt:  104.3 kg (229 lb 15 oz)   Admission Wt:  114.8 kg (253 lb)    BMI:  34.96 kg/m 2   BSA:  2.24 m 2            Patient PCP Information     Provider PCP Type    Len Krishnan MD General         Lab Results - 3 Days      Comprehensive metabolic panel [[id number]] (Abnormal)  Resulted: 17 0910, Result status: Final result    Ordering provider: Refugio Muniz MD  17 2200 Resulting lab: [facility]    Specimen Information    Type Source Collected On   Blood  17 0824          Components       Value Reference Range Flag Lab   Sodium 145 133 - 144 mmol/L H 51   Potassium 4.6 3.4 - 5.3 mmol/L  51   Comment:  Specimen slightly hemolyzed, potassium may be falsely elevated   Chloride 111 94 - 109 mmol/L H 51   Carbon Dioxide 27 20 - 32 mmol/L  51   Anion Gap 8 3 - 14 mmol/L  51   Glucose 80 70 - 99 mg/dL  51   Urea Nitrogen 10 7 - 30 mg/dL  51   Creatinine 0.75 0.66 - 1.25 mg/dL  51   GFR Estimate >90 >60 mL/min/1.7m2  51   Comment:  Non  GFR Calc   GFR Estimate If Black >90 >60 mL/min/1.7m2  51   Comment:  African American GFR Calc   Calcium 8.4 8.5 - 10.1 mg/dL L 51   Bilirubin Total 0.3 0.2 - 1.3 mg/dL  51   Albumin 2.8 3.4 - 5.0 g/dL L 51   Protein Total 6.2 6.8 - 8.8 g/dL L 51   Alkaline Phosphatase 58 40 - 150 U/L  51   ALT 16 0 - 70 U/L  51   AST 45 0 - 45 U/L  51   Comment:         Specimen is hemolyzed which can falsely elevate AST. Analysis of a   non-hemolyzed specimen may result in a lower value.              CBC with " platelets [[id number]] (Abnormal)  Resulted: 11/07/17 0847, Result status: Final result    Ordering provider: Refugio Muniz MD  11/06/17 2200 Resulting lab: [facility]    Specimen Information    Type Source Collected On   Blood  11/07/17 0824          Components       Value Reference Range Flag Lab   WBC 4.3 4.0 - 11.0 10e9/L  51   RBC Count 3.44 4.4 - 5.9 10e12/L L 51   Hemoglobin 11.7 13.3 - 17.7 g/dL L 51   Hematocrit 37.1 40.0 - 53.0 % L 51    78 - 100 fl H 51   MCH 34.0 26.5 - 33.0 pg H 51   MCHC 31.5 31.5 - 36.5 g/dL  51   RDW 13.7 10.0 - 15.0 %  51   Platelet Count 167 150 - 450 10e9/L  51            Keppra (Levetiracetam) Level [[id number]]  Resulted: 11/07/17 0139, Result status: Final result    Ordering provider: Refugio Muniz MD  11/06/17 0202 Resulting lab: [facility]    Specimen Information    Type Source Collected On   Blood  11/06/17 0229          Components       Value Reference Range Flag Lab   Keppra (Levetiracetam) Level 21 12 - 46 ug/mL  51   Comment:         (Note)  INTERPRETIVE INFORMATION: Keppra (Levetiracetam)  Therapeutic Range:  12-46 ug/mL             Toxic:  Not well Established  Pharmacokinetics of levetiracetam are affected by renal   function. Adverse effects may include somnolence, weakness,   headache and vomiting.  Performed by [facility],  [address] [place] [id number] [phone]  [email or web address], Gucci Yoo MD, Lab. Director              Hemoglobin A1c [[id number]]  Resulted: 11/06/17 1331, Result status: Final result    Ordering provider: Kp Amezquita MD  11/06/17 0229 Resulting lab: [facility]    Specimen Information    Type Source Collected On     11/06/17 0229          Components       Value Reference Range Flag Lab   Hemoglobin A1C 5.3 4.3 - 6.0 %  51            UA with Microscopic reflex to Culture [[id number]] (Abnormal)  Resulted: 11/06/17 0842,  Result status: Final result    Ordering provider: Refugio Muniz MD  11/06/17 0202 Resulting lab: [facility]    Specimen Information    Type Source Collected On   Midstream Urine Urine clean catch 11/06/17 0817          Components       Value Reference Range Flag Lab   Color Urine Yellow   51   Appearance Urine Clear   51   Glucose Urine Negative NEG^Negative mg/dL  51   Bilirubin Urine Negative NEG^Negative  51   Ketones Urine Negative NEG^Negative mg/dL  51   Specific Gravity Urine 1.010 1.003 - 1.035  51   Blood Urine Negative NEG^Negative  51   pH Urine 6.5 5.0 - 7.0 pH  51   Protein Albumin Urine 10 NEG^Negative mg/dL A 51   Urobilinogen mg/dL Normal 0.0 - 2.0 mg/dL  51   Nitrite Urine Negative NEG^Negative  51   Leukocyte Esterase Urine Negative NEG^Negative  51   Source Midstream Urine   51   WBC Urine 1 0 - 2 /HPF  51   RBC Urine 1 0 - 2 /HPF  51   Squamous Epithelial /HPF Urine <1 0 - 1 /HPF  51            Ammonia (AM Draw) [[id number]]  Resulted: 11/06/17 0523, Result status: Final result    Ordering provider: Sofia Mendenhall MD  11/06/17 0247 Resulting lab: [facility]    Specimen Information    Type Source Collected On   Blood  11/06/17 0458          Components       Value Reference Range Flag Lab   Ammonia 39 10 - 50 umol/L  51            Vitamin B1 whole blood [[id number]]  Resulted: 11/06/17 0459, Result status: In process    Ordering provider: Refugio Muniz MD  11/06/17 0302 Resulting lab: [facility]    Specimen Information    Type Source Collected On   Blood  11/06/17 0458            Vitamin B12 [[id number]]  Resulted: 11/06/17 0359, Result status: Final result    Ordering provider: Refugio Muniz MD  11/06/17 0207 Resulting lab: [facility]    Specimen Information    Type Source Collected On   Blood  11/06/17 0229          Components       Value Reference Range Flag Lab   Vitamin B12 786 193 - 986 pg/mL   51            Carbamazepine total [[id number]]  Resulted: 11/06/17 0348, Result status: Final result    Ordering provider: Refugio Muniz MD  11/06/17 0202 Resulting lab: [facility]    Specimen Information    Type Source Collected On   Blood  11/06/17 0229          Components       Value Reference Range Flag Lab   Carbamazepine Level 9.4 4.0 - 12.0 mg/L  51            Magnesium [[id number]]  Resulted: 11/06/17 0332, Result status: Final result    Ordering provider: Kp Amezquita MD  11/06/17 0229 Resulting lab: [facility]    Specimen Information    Type Source Collected On     11/06/17 0229          Components       Value Reference Range Flag Lab   Magnesium 2.2 1.6 - 2.3 mg/dL  51            Phosphorus [[id number]]  Resulted: 11/06/17 0332, Result status: Final result    Ordering provider: Kp Amezquita MD  11/06/17 0229 Resulting lab: [facility]    Specimen Information    Type Source Collected On     11/06/17 0229          Components       Value Reference Range Flag Lab   Phosphorus 3.0 2.5 - 4.5 mg/dL  51            Folate [[id number]]  Resulted: 11/06/17 0317, Result status: Final result    Ordering provider: Kp Amezquita MD  11/06/17 0229 Resulting lab: [facility]    Specimen Information    Type Source Collected On     11/06/17 0229          Components       Value Reference Range Flag Lab   Folate 10.6 >5.4 ng/mL  51            Comprehensive metabolic panel [[id number]] (Abnormal)  Resulted: 11/06/17 0309, Result status: Final result    Ordering provider: Refugio Muniz MD  11/06/17 0202 Resulting lab: [facility]    Specimen Information    Type Source Collected On   Blood  11/06/17 0229          Components       Value Reference Range Flag Lab   Sodium 143 133 - 144 mmol/L  51   Potassium 4.3 3.4 - 5.3 mmol/L  51   Chloride 108  94 - 109 mmol/L  51   Carbon Dioxide 27 20 - 32 mmol/L  51   Anion Gap 7 3 - 14 mmol/L  51   Glucose 118 70 - 99 mg/dL H 51   Urea Nitrogen 15 7 - 30 mg/dL  51   Creatinine 1.00 0.66 - 1.25 mg/dL  51   GFR Estimate 76 >60 mL/min/1.7m2  51   Comment:  Non  GFR Calc   GFR Estimate If Black >90 >60 mL/min/1.7m2  51   Comment:  African American GFR Calc   Calcium 8.3 8.5 - 10.1 mg/dL L 51   Bilirubin Total 0.2 0.2 - 1.3 mg/dL  51   Albumin 3.2 3.4 - 5.0 g/dL L 51   Protein Total 6.5 6.8 - 8.8 g/dL L 51   Alkaline Phosphatase 62 40 - 150 U/L  51   ALT 11 0 - 70 U/L  51   AST 17 0 - 45 U/L  51            CBC with platelets [[id number]] (Abnormal)  Resulted: 11/06/17 0244, Result status: Final result    Ordering provider: Refugio Muniz MD  11/06/17 0202 Resulting lab: [facility]    Specimen Information    Type Source Collected On   Blood  11/06/17 0229          Components       Value Reference Range Flag Lab   WBC 7.8 4.0 - 11.0 10e9/L  51   RBC Count 3.66 4.4 - 5.9 10e12/L L 51   Hemoglobin 12.6 13.3 - 17.7 g/dL L 51   Hematocrit 40.0 40.0 - 53.0 %  51    78 - 100 fl H 51   MCH 34.4 26.5 - 33.0 pg H 51   MCHC 31.5 31.5 - 36.5 g/dL  51   RDW 13.7 10.0 - 15.0 %  51   Platelet Count 160 150 - 450 10e9/L  51            Methylmalonic acid [[id number]]  Resulted: 11/06/17 0235, Result status: In process    Ordering provider: Refugio Muniz MD  11/06/17 0200 Resulting lab: [facility]    Specimen Information    Type Source Collected On   Blood  11/06/17 0229            Valproic acid [[id number]] (Abnormal)  Resulted: 11/06/17 0046, Result status: Final result    Ordering provider: Kp Amezquita MD  11/05/17 6159 Resulting lab: [facility]    Specimen Information    Type Source Collected On     11/05/17 6917          Components       Value Reference Range Flag Lab   Valproic Acid Level 123 50 - 100 mg/L H 51            Basic metabolic panel  [[id number]] (Abnormal)  Resulted: 11/05/17 2359, Result status: Final result    Ordering provider: Kp Amezquita MD  11/05/17 2333 Resulting lab: [facility]    Specimen Information    Type Source Collected On     11/05/17 2333          Components       Value Reference Range Flag Lab   Sodium 141 133 - 144 mmol/L  51   Potassium 4.6 3.4 - 5.3 mmol/L  51   Comment:  Specimen slightly hemolyzed, potassium may be falsely elevated   Chloride 106 94 - 109 mmol/L  51   Carbon Dioxide 23 20 - 32 mmol/L  51   Anion Gap 12 3 - 14 mmol/L  51   Glucose 146 70 - 99 mg/dL H 51   Urea Nitrogen 14 7 - 30 mg/dL  51   Creatinine 1.00 0.66 - 1.25 mg/dL  51   GFR Estimate 76 >60 mL/min/1.7m2  51   Comment:  Non  GFR Calc   GFR Estimate If Black >90 >60 mL/min/1.7m2  51   Comment:  African American GFR Calc   Calcium 8.4 8.5 - 10.1 mg/dL L 51            Troponin I [[id number]]  Resulted: 11/05/17 2359, Result status: Final result    Ordering provider: Kp Amezquita MD  11/05/17 2333 Resulting lab: [facility]    Specimen Information    Type Source Collected On     11/05/17 2333          Components       Value Reference Range Flag Lab   Troponin I ES <0.015 0.000 - 0.045 ug/L  51   Comment:         The 99th percentile for upper reference range is 0.045 ug/L.  Troponin values   in the range of 0.045 - 0.120 ug/L may be associated with risks of adverse   clinical events.              INR [[id number]]  Resulted: 11/05/17 2349, Result status: Final result    Ordering provider: Kp Amezquita MD  11/05/17 2333 Resulting lab: [facility]    Specimen Information    Type Source Collected On     11/05/17 2333          Components       Value Reference Range Flag Lab   INR 1.01 0.86 - 1.14  51            CBC with platelets [[id number]] (Abnormal)  Resulted: 11/05/17 2343, Result status: Final result    Ordering  provider: Kp Amezquita MD  11/05/17 2333 Resulting lab: [facility]    Specimen Information    Type Source Collected On     11/05/17 2333          Components       Value Reference Range Flag Lab   WBC 6.3 4.0 - 11.0 10e9/L  51   RBC Count 3.74 4.4 - 5.9 10e12/L L 51   Hemoglobin 13.0 13.3 - 17.7 g/dL L 51   Hematocrit 41.0 40.0 - 53.0 %  51    78 - 100 fl H 51   MCH 34.8 26.5 - 33.0 pg H 51   MCHC 31.7 31.5 - 36.5 g/dL  51   RDW 13.7 10.0 - 15.0 %  51   Platelet Count 162 150 - 450 10e9/L  51            Creatinine POCT [[id number]]  Resulted: 11/05/17 2340, Result status: Final result    Ordering provider: Kp Amezquita MD  11/05/17 2335 Resulting lab: POINT OF CARE TEST, HANDHELD METER    Specimen Information    Type Source Collected On     11/05/17 2335          Components       Value Reference Range Flag Lab   Creatinine 1.1 0.66 - 1.25 mg/dL  171   GFR Estimate 69 >60 mL/min/1.7m2  171   GFR Estimate If Black 83 >60 mL/min/1.7m2  171            Glucose by meter [[id number]] (Abnormal)  Resulted: 11/05/17 2335, Result status: Final result    Ordering provider: Kp Amezquita MD  11/05/17 2330 Resulting lab: POINT OF CARE TEST, GLUCOSE    Specimen Information    Type Source Collected On     11/05/17 2330          Components       Value Reference Range Flag Lab   Glucose 164 70 - 99 mg/dL H 170            Testing Performed By     Lab - Abbreviation Name Director Address Valid Date Range    45 - NUR120 [facility] Unknown Unknown 01/28/02 0000 - Present    51 - Unknown [facility] Unknown [address] [facility] [address] 12/31/14 1010 - Present    170 - Unknown POINT OF CARE TEST, GLUCOSE Unknown Unknown 10/31/11 1114 - Present    171 - Unknown POINT OF CARE TEST, HANDHELD METER Unknown Unknown 10/31/11 1113 - Present            Unresulted Labs (24h ago through future)    Start       Ordered    11/07/17 0400  CBC (AM Draw)   "AM DRAW,   Routine     Comments:  Last Lab Result: Hemoglobin (g/dL)       Date                     Value                 11/06/2017               12.6 (L)         ----------    11/06/17 1301    11/06/17 0400  CBC with platelets  AM DRAW,   Routine     Comments:  Last Lab Result: Hemoglobin (g/dL)       Date                     Value                 11/05/2017               13.0 (L)         ----------    11/06/17 0202    11/06/17 0400  Comprehensive metabolic panel  AM DRAW,   Routine      11/06/17 0202    Unscheduled  Potassium  (Potassium Replacement - \"High\" - Replacement for all levels less than 4.1 mmol/L - UU,UR,UA,RH,SH,PH,WY )  CONDITIONAL (SPECIFY),   Routine     Comments:  Obtain Potassium Level for these conditions:  *IF no potassium result within 24 hrs before initiation of order set, draw potassium level with next lab collect.    *2 HOURS AFTER last IV potassium replacement dose and 4 hours after an oral replacement dose when potassium replacement given for level less than 3.4.  *Next morning after potassium dose.     Repeat Potassium Replacement if necessary.    11/06/17 0225    Unscheduled  Magnesium  (Magnesium Replacement - Adult - \"High\" - Replacement for all levels less than or equal to 2 mg/dL)  CONDITIONAL (SPECIFY),   Routine     Comments:  Obtain Magnesium Level for these conditions:  *IF no magnesium result within 24 hrs before initiation of order set, draw magnesium level with next lab collect.    *2 HOURS AFTER last magnesium replacement dose when magnesium replacement given for level less than 1.6  *Next morning after magnesium dose.     Repeat Magnesium Replacement if necessary.    11/06/17 0225    Unscheduled  Phosphorus  (POTASSIUM Phosphate - \"High\" - Replacement for all levels less than 2.8 mg/dL )  CONDITIONAL (SPECIFY),   Routine     Comments:  Obtain Phosphorus Level for these conditions:  *IF no phosphorus result within 24 hrs before initiation of order set, draw phosphorus level " with next lab collect.    *2 HOURS AFTER last phosphorus replacement dose when phosphorus replacement given for level less than 2.0  *Next morning after phosphorus dose.     Repeat Phosphorus Replacement if necessary.    11/06/17 0225         Imaging Results - 3 Days      CT Head Neck Angio w/o & w Contrast [[id number]]  Resulted: 11/06/17 0902, Result status: Final result    Ordering provider: Refugio Muniz MD  11/05/17 2339 Resulted by: Ruddy Sharpe MD Pog[employer]chnik, Brian P, MD    Performed: 11/05/17 2341 - 11/06/17 0004 Resulting lab: RADIOLOGY RESULTS    Narrative:        CTA ANGIOGRAM HEAD NECK 11/6/2017 12:04 AM    Head CT without contrast  CT angiogram of the neck   CT angiogram of the base of the brain with contrast  Reconstruction by the Radiologist on the 3D workstation    Provided History:  found down, worried about strkoe    Comparison:  4/26/2017, chest CT 2/16/2015.      Technique:  HEAD CT:  Using multidetector thin collimation helical acquisition  technique, axial, coronal and sagittal CT images from the skull base  to the vertex were obtained without intravenous contrast.   HEAD and NECK CTA: During rapid bolus intravenous injection of  nonionic contrast material, axial images were obtained using thin  collimation multidetector helical technique from the base of the skull  through the [place]. This CT angiogram data was reconstructed  at thin intervals with mild overlap. Images were sent to the [employer]  workstation, and 3D reconstructions were obtained. The axial source  images, multiplanar reformations, 3D reconstructions in both maximum  intensity projection display and volume rendered models were reviewed,  with reconstructions performed by the technologist and the  radiologist.    Contrast: 75 mL [id number]     Findings:  Head CT: There is no evidence of intracranial hemorrhage, mass effect,  or midline shift. Unchanged chronic right frontal and temporal  lobe  infarctions. No new loss of gray-white matter differentiation. There  is mild patchy low attenuation within the periventricular and  supraventricular white matter of both cerebral hemispheres,  nonspecific but most likely representing chronic small vessel ischemic  disease given the patient's age. Ventricles are proportionate to the  cerebral sulci.     Head CTA demonstrates high-grade narrowing of the cavernous segment of  the left internal carotid artery (1.7 mm patency, measuring 4.9 mm  proximally and now narrowed segment, and about a 65-75% stenosis  within the mid cavernous segment). The anterior communicating artery  is patent. The posterior communicating arteries are patent  bilaterally. Right MCA is again tiny or attenuated post remote  infarctions.    Neck CTA demonstrates occlusion of the right internal carotid artery  from the carotid bifurcation through the supraclinoid internal carotid  artery.. The origins of the great vessels from the aortic arch are  patent. The normal distal left internal carotid artery measures 4 mm.  Severe left internal carotid artery atherosclerosis in the mid and  upper neck and the bifurcation without significant stenoses in those  locations.    Regarding the aortic arch, there are moderate to severe  atherosclerotic calcifications of the great vessel origins,  particularly the left subclavian and left common carotid artery  origins, without significant stenoses. Right maxillary sinus  inflammatory findings again present. Findings of left medial  antrostomy. Multilevel cervical degenerative disease present, with  foraminal narrowing on the left at C5-6 being the most prominent  finding.  No substantial change in the right apical scarring since 2015.      Impression:       Impression:   1. Unchanged chronic right MCA territory infarction noncontrast head  CT.  2. Noncontrast head CT demonstrates no acute intracranial hemorrhage  or overt acute infarct or  hydrocephalus.  3. Head CTA demonstrates high-grade atherosclerotic narrowing of the  left internal carotid artery in the cavernous segment, likely 60-75%.  4. 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 There is also severe, diffuse atherosclerosis  of the left internal carotid artery in the neck without significant  stenosis in the cervical region.  5. Chronic attenuation (small caliber) of the right MCA secondary to  the large remote MCA distribution infarction.  6. Chronic right maxillary sinusitis and findings of left maxillary  sinus medial antrostomy.    I have personally reviewed the examination and initial interpretation  and I agree with the findings.    SHA RAMOS MD      CT Cervical Spine w/o Contrast [[id number]]  Resulted: 11/06/17 0819, Result status: Final result    Ordering provider: Refugio Muniz MD  11/05/17 2340 Resulted by: Kp Gong MD P[employer]chnik, Brian P, MD    Performed: 11/06/17 0015 - 11/06/17 0026 Resulting lab: RADIOLOGY RESULTS    Narrative:       Cervical spine CT without contrast 11/6/2017 12:19 AM    Provided History: Found down    Comparison: CT angiogram on the same day. Body CT 4/26/2017. CT  cervical spine 8/19/2015    Technique: Using multidetector thin collimation helical acquisition  technique, axial, coronal and sagittal CT images through the cervical  spine were reconstructed from a CT angiogram performed on the same  day.     Findings:  The cervical vertebrae are normally aligned. Normal cervical lordosis.  No acute fracture or subluxation. No prevertebral edema. Mild disc  height narrowing at C6-7, unchanged. No high-grade neural foraminal or  spinal canal stenosis.    Partially visualized paranasal sinus mucosal thickening.    Unchanged right apical scarring. Partially visualized occlusion of the  right internal carotid artery just beyond its  origin and right MCA  distribution encephalomalacia.    Partially visualized scarring, atelectasis, and emphysema in the right  lung.      Impression:       Impression:   No acute fracture or traumatic subluxation. No substantial change in  cervical spondylosis since 2015.    I have personally reviewed the examination and initial interpretation  and I agree with the findings.    NIMESH BHAKTA MD      XR Chest Port 1 View [[id number]]  Resulted: 11/06/17 0755, Result status: Final result    Ordering provider: Refugio Muniz MD  11/06/17 0201 Resulted by: Jony Phillips MD P[employer]atchnik, Brian P, MD    Performed: 11/06/17 0409 - 11/06/17 0425 Resulting lab: RADIOLOGY RESULTS    Narrative:       XR CHEST PORT 1 VW  11/6/2017 4:25 AM      HISTORY: r/o pna    COMPARISON: 4/26/2017, 4/25/2014    FINDINGS: AP view of the chest. Unchanged right apical scarring. No  acute airspace opacity. No pneumothorax or pleural effusion. Heart  size is unchanged.      Impression:       IMPRESSION: No acute cardiopulmonary findings.    I have personally reviewed the examination and initial interpretation  and I agree with the findings.    JONY PHILLIPS MD      Testing Performed By     Lab - Abbreviation Name Director Address Valid Date Range    104 - Rad R[facility]ts RADIOLOGY RESULTS Unknown Unknown 02/16/05 1553 - Present            Encounter-Level Documents:     There are no encounter-level documents.      Order-Level Documents:     There are no order-level documents.

## 2017-11-05 NOTE — IP AVS SNAPSHOT
"    [facility] [address]: [phone]                                              INTERAGENCY TRANSFER FORM - PHYSICIAN ORDERS   2017                    Hospital Admission Date: 2017  MILTON LAMBERT   : 1960  Sex: Male        Attending Provider: Alfonso Morris MD     Allergies:  Ibuprofen Sodium, Tuberculin Tests    Infection:  None   Service:  NEUROLOGY    Ht:  1.727 m (5' 8\")   Wt:  104.3 kg (229 lb 15 oz)   Admission Wt:  114.8 kg (253 lb)    BMI:  34.96 kg/m 2   BSA:  2.24 m 2            Patient PCP Information     Provider PCP Type    Len Krishnan MD General      ED Clinical Impression     Diagnosis Description Comment Added By Time Added    Seizure (H) [R56.9] Seizure (H) [R56.9]  Kp Amezquita MD 2017 12:19 AM      Hospital Problems as of 2017              Priority Class Noted POA    Seizure (H) Medium  2017 Yes      Non-Hospital Problems as of 2017              Priority Class Noted    Seizure disorder (H) Medium  9/15/2011    Chronic fatigue Medium  9/15/2011    Low back pain Medium  9/15/2011    Mild major depression (H) Medium  9/15/2011    GERD (gastroesophageal reflux disease) Medium  9/15/2011    Hyperlipidemia LDL goal <100 Medium  9/15/2011    COPD (chronic obstructive pulmonary disease) (H) Medium  9/15/2011    Hemiplegia affecting left nondominant side (H) Medium  2013    Radicular syndrome of upper limbs Medium  2013    Neck pain Medium  2013    ACP (advance care planning) Medium  7/15/2013    Osteoarthritis of glenohumeral joint Medium  2013    Osteoarthritis of acromioclavicular joint Medium  2013    Metabolic encephalopathy Medium  2014    Health Care Home Medium  2014    Dysphagia Medium  2014    Hallucinations Medium  4/15/2015    Cough Medium  4/15/2015    Generalized muscle weakness Medium  4/15/2015    Alcohol abuse Medium  2015    Behavior problem, adult Medium  2015    " Neuropathy Medium  10/14/2015    History of CVA (cerebrovascular accident) Medium  1/11/2016    Cognitive impairment Medium  1/11/2016    Essential hypertension, benign Medium  1/11/2016    COPD exacerbation (H) Medium  4/26/2017    Benign non-nodular prostatic hyperplasia with lower urinary tract symptoms Medium  6/9/2017      Code Status History     Date Active Date Inactive Code Status Order ID Comments User Context    11/7/2017 10:33 AM  DNR/DNI [id number]  Miguel Valero MD Outpatient    11/6/2017  2:12 AM 11/7/2017 10:33 AM DNR/DNI [id number]  Refugio Muniz MD Inpatient    11/6/2017  2:02 AM 11/6/2017  2:12 AM Full Code [id number]  Refugio Muniz MD Inpatient    4/29/2017 12:19 PM 11/6/2017  2:02 AM DNR/DNI [id number]  Heber Rod MD Outpatient    4/26/2017  9:05 PM 4/29/2017 12:19 PM DNR/DNI [id number]  Bonifacio Le MD Inpatient    12/22/2015  1:20 PM 4/26/2017  9:05 PM DNR/DNI [id number]  Nick Quarles PA-C Outpatient    12/19/2015 12:29 AM 12/22/2015  1:20 PM DNR/DNI [id number]  Humberto Ansari APRN CNP Inpatient    2/25/2015 11:40 AM 12/19/2015 12:29 AM Full Code [id number]  Evelin Mai MD Outpatient    2/16/2015  9:45 PM 2/25/2015 11:40 AM Full Code [id number]  Demetrius Corona MD Inpatient    1/26/2015  8:32 PM 1/30/2015  8:07 PM Full Code [id number]  Ephraim Kim MD Inpatient    6/30/2014 10:30 AM 1/26/2015  8:32 PM Full Code [id number]  Roger Sagastume DO Outpatient    6/23/2014  5:11 PM 6/30/2014 10:30 AM Full Code [id number]  Heber Rod MD Inpatient    6/12/2014 11:52 AM 6/23/2014  5:11 PM Full Code [id number]  Elias Staley MD Outpatient    4/29/2014 11:43 PM 6/10/2014  7:15 PM Full Code [id number]  Andrae Stover MD Inpatient    4/8/2012  2:43 AM 4/10/2012  8:45 PM Full Code [id number]  Abbie Waters RN Inpatient         Medication Review      CONTINUE these medications which may have CHANGED, or have new prescriptions. If  we are uncertain of the size of tablets/capsules you have at home, strength may be listed as something that might have changed.        Dose / Directions Comments    gabapentin 600 MG tablet   Commonly known as:  NEURONTIN   This may have changed:    - how much to take  - when to take this  - additional instructions   Used for:  Seizure disorder (H)        TAKE THREE TABLETS (1800MG) BY MOUTH ONCE DAILY   Quantity:  93 tablet   Refills:  PRN        levETIRAcetam 1000 MG Tabs   This may have changed:    - medication strength  - how much to take   Used for:  Seizure disorder (H)        Dose:  1000 mg   Take 1,000 mg by mouth 2 times daily   Quantity:  60 tablet   Refills:  0        loperamide 2 MG capsule   Commonly known as:  IMODIUM   This may have changed:    - how much to take  - additional instructions   Used for:  Functional diarrhea        Dose:  2 mg   Take 1 capsule (2 mg) by mouth 2 times daily TAKE 2 CAPSULE PO BID; AND MAY TAKE 1 CAPSULE PO QID PRN   Quantity:  120 capsule   Refills:  3          CONTINUE these medications which have NOT CHANGED        Dose / Directions Comments    amLODIPine 10 MG tablet   Commonly known as:  NORVASC   Used for:  Essential hypertension, benign        TAKE 1 TABLET BY MOUTH ONCE DAILY   Quantity:  31 tablet   Refills:  11    PLEASE AUTHORIZE QTY 31 WITH 12 REFILLS FOR ASSISTED LIVING PATIENT       AVEENO DAILY MOISTURIZER Lotn        Apply daily to hands and ears for dry skin.   Refills:  0        baclofen 20 MG tablet   Commonly known as:  LIORESAL   Used for:  Other seizures (H)        TAKE 1 TABLET BY MOUTH THREE TIMES DAILY   Quantity:  90 tablet   Refills:  11    PLEASE AUTHORIZE REFILLS QTY OF 98 WITH 12 REFILLS FOR ASSISTING LIVING PATIENT. THANKS       budesonide 0.25 MG/2ML neb solution   Commonly known as:  PULMICORT   Used for:  Chronic bronchitis, unspecified chronic bronchitis type (H)        NEBULIZE THE CONTENT OF 1 VIAL TWICE DAILY FOR COPD   Quantity:  120  mL   Refills:  10        carBAMazepine 200 MG tablet   Commonly known as:  TEGretol   Used for:  Seizure disorder (H)        TAKE 1 TABLET BY MOUTH THREE TIMES DAILY   Quantity:  90 tablet   Refills:  11    PLEASE AUTHORIZE REFILLS QTY OF 93 WITH 12 REFILLS FOR ASSISTING LIVING PATIENT. THANKS       citalopram 20 MG tablet   Commonly known as:  celeXA        Dose:  20 mg   Take 20 mg by mouth daily   Refills:  0        clopidogrel 75 MG tablet   Commonly known as:  PLAVIX   Used for:  Essential hypertension, benign, History of stroke        Dose:  75 mg   Take 1 tablet (75 mg) by mouth daily   Quantity:  90 tablet   Refills:  2        diclofenac 1 % Gel topical gel   Commonly known as:  VOLTAREN   Used for:  Chronic pain syndrome        Dose:  2 g   Place 2 g onto the skin 2 times daily as needed Apply 4 grams to knees or 2 grams to hands   Quantity:  100 g   Refills:  0        * divalproex 250 MG EC tablet   Commonly known as:  DEPAKOTE        Dose:  250 mg   Take 250 mg by mouth 2 times daily   Refills:  0        * divalproex 500 MG EC tablet   Commonly known as:  DEPAKOTE   Used for:  Seizure disorder (H)        TAKE THREE TABLETS (1500MG) BY MOUTH TWICE DAILY (TAKE WITH 250MG FOR A TOTAL OF 1750MG)   Quantity:  60 tablet   Refills:  11    PLEASE AUTHORIZE REFILLS QTY  WITH 12 REFILLS FOR ASSISTING LIVING PATIENT. THANKS       furosemide 20 MG tablet   Commonly known as:  LASIX   Used for:  Bilateral edema of lower extremity        TAKE 1 TABLET BY MOUTH ONCE DAILY   Quantity:  31 tablet   Refills:  5    PLEASE AUTHORIZE QTY 31 WITH 12 REFILLS FOR ASSISTED LIVING PATIENT       ipratropium - albuterol 0.5 mg/2.5 mg/3 mL 0.5-2.5 (3) MG/3ML neb solution   Commonly known as:  DUONEB   Used for:  Chronic bronchitis, unspecified chronic bronchitis type (H)        NEBULIZE THE CONTENT OF 1 VIAL THREE TIMES DAILY;AND MAY NEBULIZE THE CONTENT OF 1 VIAL AS NEEDED FOR SHORTNESS OF BREATH   Quantity:  360 mL   Refills:   10        levalbuterol 45 MCG/ACT Inhaler   Commonly known as:  XOPENEX HFA   Used for:  Chronic obstructive pulmonary disease, unspecified COPD type (H)        Dose:  2 puff   Inhale 2 puffs into the lungs every 6 hours as needed for shortness of breath / dyspnea or wheezing   Quantity:  1 Inhaler   Refills:  8        MAPAP 325 MG tablet   Used for:  Low back pain, unspecified back pain laterality, unspecified chronicity, with sciatica presence unspecified, Neck pain   Generic drug:  acetaminophen        TAKE TWO TABLETS (650MG) BY MOUTH EVERY 4 HOURS AS NEEDED FOR PAIN   Quantity:  60 tablet   Refills:  11    PT HAS 3 DOSES LEFT-PLEASE SEND 2ND REQUEST       mineral oil-hydrophilic petrolatum        Apply topically as needed Apply daily to left wound.   Refills:  0        nebulizer/adult mask Kit   Used for:  Wheezing        Dose:  1 Device   1 Device 4 times daily.   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   Refills:  0        SEROQUEL PO        Dose:  200 mg   Take 200 mg by mouth 2 times daily   Refills:  0        simvastatin 40 MG tablet   Commonly known as:   ZOCOR   Used for:  Hyperlipidemia LDL goal <100        TAKE 1 TABLET BY MOUTH AT BEDTIME   Quantity:  30 tablet   Refills:  11    PLEASE AUTHORIZE REFILLS QTY OF 31 WITH 12 REFILLS FOR ASSISTING LIVING PATIENT. THANKS       tamsulosin 0.4 MG capsule   Commonly known as:  FLOMAX   Used for:  Benign non-nodular prostatic hyperplasia with lower urinary tract symptoms        TAKE 1 CAPSULE BY MOUTH ONCE DAILY   Quantity:  31 capsule   Refills:  11    PLEASE AUTHORIZE QTY 31 WITH 12 REFILLS FOR ASSISTED LIVING PATIENT       vitamin D 73159 UNIT capsule   Commonly known as:  ERGOCALCIFEROL   Used for:  Vitamin D deficiency        TAKE ONE CAPSULE BY MOUTH ONCE WEEKLY ON MONDAY   Quantity:  12 capsule   Refills:  3        * Notice:  This list has 2 medication(s) that are the same as other medications prescribed for you. Read the directions carefully, and ask your doctor or other care provider to review them with you.      STOP taking     oxyCODONE IR 5 MG tablet   Commonly known as:  ROXICODONE                   Summary of Visit     Reason for your hospital stay       You were hospitalized for seizures. We have increased one of your seizure medications.             After Care     Activity - Up with nursing assistance           Additional Discharge Instructions       -Keppra has been increased to 1000mg twice daily  -Continue all other medications as before admission       Advance Diet as Tolerated       Follow this diet upon discharge: Orders Placed This Encounter      Regular Diet Adult Thin Liquids (water, ice chips, juice, milk, gelatin, ice cream, etc)       Fall precautions           General info for SNF       [facility]  Phone:  [phone]  Fax:  [phone]    Length of Stay Estimate: Long Term Care  Condition at Discharge: Stable  Level of care:skilled   Rehabilitation Potential: Fair  Admission H&P remains valid and up-to-date: Yes  Recent Chemotherapy: N/A  Use Nursing Home Standing Orders:  Yes       Mantoux instructions       Give two-step Mantoux (PPD) Per Facility Policy Yes             Follow-Up Appointment Instructions     Future Labs/Procedures    Follow Up and recommended labs and tests     Comments:    Follow up with Nursing home physician.  No follow up labs or test are needed.  Follow up with primary care provider in 7 days.  No follow up labs or test are needed.  Follow up in epilepsy clinic in 4-8 weeks.      Follow-Up Appointment Instructions     Follow Up and recommended labs and tests       Follow up with Nursing home physician.  No follow up labs or test are needed.  Follow up with primary care provider in 7 days.  No follow up labs or test are needed.  Follow up in epilepsy clinic in 4-8 weeks.             Statement of Approval     Ordered          11/07/17 1034  I have reviewed and agree with all the recommendations and orders detailed in this document.  EFFECTIVE NOW     Approved and electronically signed by:  Miguel Valero MD

## 2017-11-05 NOTE — IP AVS SNAPSHOT
` `     [address] [facility] BANK: [phone]            Medication Administration Report for David Dawn as of 11/07/17 1348   Legend:    Given Hold Not Given Due Canceled Entry Other Actions    Time Time (Time) Time  Time-Action       Inactive    Active    Linked        Medications 11/01/17 11/02/17 11/03/17 11/04/17 11/05/17 11/06/17 11/07/17      Rate: 75 mL/hr Freq: CONTINUOUS Route: IV  Last Dose: Stopped (11/06/17 1719)  Start: 11/06/17 0230   End: 11/06/17 1429         0337 ( )-New Bag       1429-Med Discontinued  1719-Stopped            acetaminophen (TYLENOL) tablet 650 mg  Dose: 650 mg Freq: EVERY 4 HOURS PRN Route: PO  PRN Reason: mild pain  Start: 11/06/17 0201   Admin Instructions: Maximum acetaminophen dose from all sources = 75 mg/kg/day not to exceed 4 grams/day.               amLODIPine (NORVASC) tablet 10 mg  Dose: 10 mg Freq: DAILY Route: PO  Start: 11/06/17 0800         1254 (10 mg)-Given        0741 (10 mg)-Given           bacitracin ointment  Freq: 2 TIMES DAILY PRN Route: Top  PRN Reason: wound care  Start: 11/06/17 0235   Admin Instructions: Apply to abrasions               bacitracin ointment  Freq: 3 TIMES DAILY PRN Route: Top  PRN Reason: other  PRN Comment: for sores on head after electrodes removed  Start: 11/06/17 0201   Admin Instructions: Apply to sores after electrodes removed.               baclofen (LIORESAL) tablet 20 mg  Dose: 20 mg Freq: 3 TIMES DAILY Route: PO  Start: 11/06/17 0800         (1258)-Not Given       1302 (20 mg)-Given       2055 (20 mg)-Given        0741 (20 mg)-Given       [ ] 1400       [ ] 2000           budesonide (PULMICORT) neb solution 0.25 mg  Dose: 0.25 mg Freq: 2 TIMES DAILY Route: NEBULIZATION  Start: 11/06/17 0800         (0854)-Not Given [C]       (2000)-Not Given        0920 (0.25 mg)-Given       [ ] 2000           carBAMazepine (TEGretol) tablet 200 mg  Dose: 200 mg Freq: 3 TIMES DAILY Route: PO  Start: 11/06/17 0800         (1300)-Not Given  [C]       1302 (200 mg)-Given       2055 (200 mg)-Given        0741 (200 mg)-Given       [ ] 1400       [ ] 2000           citalopram (celeXA) tablet 20 mg  Dose: 20 mg Freq: DAILY Route: PO  Start: 11/06/17 0800         1253 (20 mg)-Given        0740 (20 mg)-Given           clopidogrel (PLAVIX) tablet 75 mg  Dose: 75 mg Freq: DAILY Route: PO  Start: 11/06/17 0800         1252 (75 mg)-Given        0740 (75 mg)-Given           divalproex (DEPAKOTE) EC tablet 1,750 mg  Dose: 1,750 mg Freq: EVERY 12 HOURS SCHEDULED Route: PO  Start: 11/06/17 0800   Admin Instructions: DO NOT CRUSH.          1259 (1,750 mg)-Given       2054 (1,750 mg)-Given        0741 (1,750 mg)-Given       [ ] 2000           enoxaparin (LOVENOX) injection 40 mg  Dose: 40 mg Freq: EVERY 24 HOURS Route: SC  Start: 11/06/17 0700         0832 (40 mg)-Given        0740 (40 mg)-Given           furosemide (LASIX) tablet 20 mg  Dose: 20 mg Freq: DAILY Route: PO  Start: 11/06/17 0800         1253 (20 mg)-Given        0740 (20 mg)-Given           gabapentin (NEURONTIN) tablet 600 mg  Dose: 600 mg Freq: 3 TIMES DAILY Route: PO  Start: 11/06/17 0800         (1258)-Not Given       1302 (600 mg)-Given       2055 (600 mg)-Given        0741 (600 mg)-Given       [ ] 1400       [ ] 2000           hydrALAZINE (APRESOLINE) injection 10 mg  Dose: 10 mg Freq: EVERY 1 HOUR PRN Route: IV  PRN Reason: high blood pressure  PRN Comment: SBP>180  Start: 11/06/17 0301   Admin Instructions: For ordered doses up to 40 mg, give IV Push undiluted over 1 minute.               ipratropium - albuterol 0.5 mg/2.5 mg/3 mL (DUONEB) neb solution 3 mL  Dose: 3 mL Freq: EVERY 4 HOURS WHILE AWAKE Route: NEBULIZATION  Start: 11/06/17 0600         0851 (3 mL)-Given       1321 (3 mL)-Given       1653 (3 mL)-Given       (2000)-Not Given        0018 (3 mL)-Given       0920 (3 mL)-Given       1206 (3 mL)-Given       [ ] 1600       [ ] 2000       [ ] 2200           labetalol (NORMODYNE/TRANDATE)  "injection 20 mg  Dose: 20 mg Freq: EVERY 1 HOUR PRN Route: IV  PRN Reason: high blood pressure  PRN Comment: SBP>180  Start: 11/06/17 0300   Admin Instructions: For ordered doses up to 80 mg, give IV Push undiluted. Give each 20 mg over 2 minutes.               levalbuterol (XOPENEX HFA) Inhaler 2 puff  Dose: 2 puff Freq: EVERY 6 HOURS PRN Route: IN  PRN Reasons: shortness of breath / dyspnea,wheezing  Start: 11/06/17 0201          1002 (2 puff)-Given           levETIRAcetam (KEPPRA) tablet 1,000 mg  Dose: 1,000 mg Freq: 2 TIMES DAILY Route: PO  Start: 11/06/17 0800         1252 (1,000 mg)-Given       2055 (1,000 mg)-Given        0740 (1,000 mg)-Given       [ ] 2000           lidocaine (LMX4) kit  Freq: EVERY 1 HOUR PRN Route: Top  PRN Reason: pain  PRN Comment: with VAD insertion or accessing implanted port.  Start: 11/06/17 0201   Admin Instructions: Do NOT give if patient has a history of allergy to any local anesthetic or any \"lindsey\" product.   Apply 30 minutes prior to VAD insertion or port access.  MAX Dose:  2.5 g (  of 5 g tube)               lidocaine 1 % 1 mL  Dose: 1 mL Freq: EVERY 1 HOUR PRN Route: OTHER  PRN Comment: mild pain with VAD insertion or accessing implanted port  Start: 11/06/17 0201   Admin Instructions: Do NOT give if patient has a history of allergy to any local anesthetic or any \"lindsey\" product. MAX dose 1 mL subcutaneous OR intradermal in divided doses.               LORazepam (ATIVAN) injection 2 mg  Dose: 2 mg Freq: EVERY 2 HOURS PRN Route: IV  PRN Reason: seizures  Start: 11/06/17 0201   Admin Instructions: Max total dose= 4 mg in a single 24 hour period. For a maximum of 2 doses.    2  mg lorazepam intravenously for 1  General Tonic Clonic Seizure or for 2 complex partial seizures within a 2 hour period once, and repeat for 1 additional  General Tonic Clonic seizure or for 2 additional complex partial seizures within a 2 hour period, not to exceed 4 mg in a single 24-hour period.  " Notify covering physician for each lorazepam administration.  For IV PUSH: Dilute with equal volume of NS. For ordered doses up to 4 mg give IV Push. Administer each 2mg over 1-5 minutes.               magnesium sulfate 2 g in NS intermittent infusion ([employer] or [facility])  Dose: 2 g Freq: DAILY PRN Route: IV  PRN Reason: magnesium supplementation  Start: 11/06/17 0225   Admin Instructions: For Serum Mg++ 1.6 - 2 mg/dL  Give 2 g and recheck magnesium level next AM.               magnesium sulfate 4 g in 100 mL sterile water (premade)  Dose: 4 g Freq: EVERY 4 HOURS PRN Route: IV  PRN Reason: magnesium supplementation  Start: 11/06/17 0225   Admin Instructions: For serum Mg++ less than 1.6 mg/dL  Give 4 g and recheck magnesium level 2 hours after dose, and next AM.               naloxone (NARCAN) injection 0.1-0.4 mg  Dose: 0.1-0.4 mg Freq: EVERY 2 MIN PRN Route: IV  PRN Reason: opioid reversal  Start: 11/06/17 0304   Admin Instructions: For respiratory rate LESS than or EQUAL to 8.  Partial reversal dose:  0.1 mg titrated q 2 minutes for Analgesia Side Effects Monitoring Sedation Level of 3 (frequently drowsy, arousable, drifts to sleep during conversation).Full reversal dose:  0.4 mg bolus for Analgesia Side Effects Monitoring Sedation Level of 4 (somnolent, minimal or no response to stimulation).  Give IV Push undiluted up to 2mg. Give each 0.4mg over 15 seconds in emergency situations. For non-emergent situations further dilute in 9mL of NS to facilitate titration of response.               niacin CR capsule 500 mg  Dose: 500 mg Freq: AT BEDTIME Route: PO  Start: 11/06/17 2200         (2318)-Not Given        [ ] 2200           nicotine (NICODERM CQ) 21 MG/24HR 24 hr patch 1 patch  Dose: 1 patch Freq: DAILY Route: TD  Start: 11/06/17 0800         0833 (1 patch)-Given        0736 (1 patch)-Given           nicotine Patch in Place  Freq: EVERY 8 HOURS Route: TD  Start: 11/06/17 0800   Admin Instructions: Chart every  shift, confirming that patch is still in place on patient (no barcode scan needed). See patch order for dose information.          0834 ( )-Patch in Place       1719 ( )-Patch in Place        0000 ( )-Patch in Place       0742 ( )-Patch in Place       [ ] 1600           nicotine patch REMOVAL  Freq: DAILY Route: TD  Start: 11/07/17 0800   Admin Instructions: Remove patch when new patch is applied or patch is discontinued.           0742 ( )-Patch Removed           oxyCODONE IR (ROXICODONE) tablet 5 mg  Dose: 5 mg Freq: 3 TIMES DAILY Route: PO  Start: 11/06/17 0800         (1258)-Not Given       1424 (5 mg)-Given       2054 (5 mg)-Given        0750 (5 mg)-Given       [ ] 1400       [ ] 2000           potassium chloride (KLOR-CON) Packet 20-40 mEq  Dose: 20-40 mEq Freq: EVERY 2 HOURS PRN Route: ORAL OR FEED  PRN Reason: potassium supplementation  Start: 11/06/17 0225   Admin Instructions: Use if unable to tolerate tablets.    If Serum K+ 3.4-4.0, dose = 20 mEq x1. Recheck K+ level the next AM.  If Serum K+ 3.0-3.3, dose = 60 mEq po total dose (40 mEq x 1 followed in 2 hours by 20 mEq X1). Recheck K+ level 4 hours after dose and the next AM.  If Serum K+ 2.5-2.9, dose = 80 mEq po total dose (40 mEq Q2H x2). Recheck K+ level 4 hours after dose and the next AM.  If Serum K+ less than 2.5, See IV order.  Dissolve packet contents in 4-8 ounces of cold water or juice.               potassium chloride 10 mEq in 100 mL intermittent infusion with 10 mg lidocaine  Dose: 10 mEq Freq: EVERY 1 HOUR PRN Route: IV  PRN Reason: potassium supplementation  Start: 11/06/17 0225   Admin Instructions: Infuse via PERIPHERAL LINE. Use potassium with lidocaine for pain with peripheral administration.  If Serum K+ 3.4-4.0, dose = 10 mEq/hr x2 doses. Recheck K+ level the next AM.  If Serum K+ 3.0-3.3, dose = 10 mEq/hr x4 doses (40 mEq IV total dose). Recheck K+ level 2 hours after dose and the next AM.  If Serum K+ less than 3.0, dose = 10  mEq/hr x6 doses (60 mEq IV total dose). Recheck K+ level 2 hours after dose and the next AM.               potassium chloride 10 mEq in 100 mL sterile water intermittent infusion (premix)  Dose: 10 mEq Freq: EVERY 1 HOUR PRN Route: IV  PRN Reason: potassium supplementation  Start: 11/06/17 0225   Admin Instructions: Infuse via PERIPHERAL LINE or CENTRAL LINE. Use for central line replacement if patient weight less than 65 kg, if patient is on TPN with high potassium content or if unit does not stock 20 mEq bags.  If Serum K+ 3.4-4.0, dose = 10 mEq/hr x2 doses. Recheck K+ level the next AM.  If Serum K+ 3.0-3.3, dose = 10 mEq/hr x4 doses (40 mEq IV total dose). Recheck K+ level 2 hours after dose and the next AM.  If Serum K+ less than 3.0, dose = 10 mEq/hr x6 doses (60 mEq IV total dose). Recheck K+ level 2 hours after dose and the next AM.               potassium chloride 20 mEq in 50 mL intermittent infusion  Dose: 20 mEq Freq: EVERY 1 HOUR PRN Route: IV  PRN Reason: potassium supplementation  Start: 11/06/17 0225   Admin Instructions: Infuse via CENTRAL LINE Only.  May need EKG if less than 65 kg or on TPN - Max rate is 0.3 mEq/kg/hr for patients not on EKG monitoring.    If Serum K+ 3.4-4.0, dose = 20 mEq/hr x1 doses. Recheck K+ level the next AM.  If Serum K+ 3.0-3.3, dose = 20 mEq/hr x2 doses (40 mEq IV total dose).  Recheck K+ level 2 hours after dose and the next AM.  If Serum K+ less than 3.0, dose = 20 mEq/hr x3 doses (60 mEq IV total dose). Recheck K+ level 2 hours after dose and the next AM.               potassium chloride SA (K-DUR/KLOR-CON M) CR tablet 20-40 mEq  Dose: 20-40 mEq Freq: EVERY 2 HOURS PRN Route: PO  PRN Reason: potassium supplementation  Start: 11/06/17 0225   Admin Instructions: Use if able to take PO.   If Serum K+ 3.4-4.0, dose = 20 mEq x1. Recheck K+ level the next AM.  If Serum K+ 3.0-3.3, dose = 60 mEq po total dose (40 mEq x1 followed in 2 hours by 20 mEq x1). Recheck K+ level 4  hours after dose and the next AM.  If Serum K+ 2.5-2.9, dose = 80 mEq po total dose (40 mEq Q2H x2). Recheck K+ level 4 hours after dose and the next AM.  If Serum K+ less than 2.5, See IV order.  DO NOT CRUSH.               potassium phosphate 10 mmol in D5W 250 mL intermittent infusion  Dose: 10 mmol Freq: DAILY PRN Route: IV  PRN Reason: phosphorous supplementation  Start: 11/06/17 0225   Admin Instructions: For serum phosphorus level 2.5-2.7  Do not infuse Phosphorus in the same line as TPN.   Give 10 mmol and recheck phosphorus level the next AM.               potassium phosphate 15 mmol in D5W 250 mL intermittent infusion  Dose: 15 mmol Freq: DAILY PRN Route: IV  PRN Reason: phosphorous supplementation  Start: 11/06/17 0225   Admin Instructions: For serum phosphorus level 2.0-2.4  Do not infuse Phosphorus in the same line as TPN.   Give 15 mmol and recheck phosphorus level next AM.               potassium phosphate 20 mmol in D5W 250 mL intermittent infusion  Dose: 20 mmol Freq: EVERY 6 HOURS PRN Route: IV  PRN Reason: phosphorous supplementation  Start: 11/06/17 0225   Admin Instructions: For serum phosphorus level 1.1-1.9  For CENTRAL Line ONLY  Do not infuse Phosphorus in the same line as TPN.   Give 20 mmol and recheck phosphorus level 2 hours after dose and next AM.               potassium phosphate 20 mmol in D5W 500 mL intermittent infusion  Dose: 20 mmol Freq: EVERY 6 HOURS PRN Route: IV  PRN Reason: phosphorous supplementation  Start: 11/06/17 0225   Admin Instructions: For serum phosphorus level 1.1-1.9  For peripheral line  Do not infuse Phosphorus in the same line as TPN.   Give 20 mmol and recheck phosphorus level 2 hours after dose and next AM. Repeat if necessary.               potassium phosphate 25 mmol in D5W 500 mL intermittent infusion  Dose: 25 mmol Freq: EVERY 8 HOURS PRN Route: IV  PRN Reason: phosphorous supplementation  Start: 11/06/17 0225   Admin Instructions: For serum phosphorus  level less than 1.1  Do not infuse Phosphorus in the same line as TPN.   Give 25 mmol and recheck phosphorus level 2 hours after dose and next AM.               QUEtiapine (SEROquel) tablet 200 mg  Dose: 200 mg Freq: 2 TIMES DAILY Route: PO  Start: 11/06/17 0800         1253 (200 mg)-Given       2055 (200 mg)-Given        0740 (200 mg)-Given       [ ] 2000           simvastatin (ZOCOR) tablet 40 mg  Dose: 40 mg Freq: AT BEDTIME Route: PO  Start: 11/06/17 0215         (0321)-Not Given [C]       2057 (40 mg)-Given               [ ] 2200           sodium chloride (PF) 0.9% PF flush 3 mL  Dose: 3 mL Freq: EVERY 8 HOURS Route: IK  Start: 11/06/17 0215   Admin Instructions: And Q1H PRN, to lock peripheral IV dormant line.          0336 (3 mL)-Given       1122 (3 mL)-Given       1737 (3 mL)-Given        0237 (3 mL)-Given       1002 (3 mL)-Given       [ ] 1815           sodium chloride (PF) 0.9% PF flush 3 mL  Dose: 3 mL Freq: EVERY 1 HOUR PRN Route: IK  PRN Reason: line flush  PRN Comment: for peripheral IV flush post IV meds  Start: 11/06/17 0201              tamsulosin (FLOMAX) capsule 0.4 mg  Dose: 0.4 mg Freq: DAILY Route: PO  Start: 11/06/17 0800   Admin Instructions: Administer 30 minutes after the same meal each day.  Capsules should be swallowed whole; do not crush chew or open.          1257 (0.4 mg)-Given        0741 (0.4 mg)-Given           vitamin D (ERGOCALCIFEROL) capsule 50,000 Units  Dose: 50,000 Units Freq: ONCE Route: PO  Start: 11/06/17 0300         (0321)-Not Given [C]           Completed Medications  Medications 11/01/17 11/02/17 11/03/17 11/04/17 11/05/17 11/06/17 11/07/17         Dose: 75 mL Freq: ONCE Route: IV  Start: 11/05/17 2345   End: 11/05/17 2357        2357 (75 mL)-Given               Dose: 3 mL Freq: ONCE Route: NEBULIZATION  Start: 11/06/17 0019   End: 11/06/17 0057         0057 (3 mL)-Given              Dose: 1,500 mg Freq: ONCE Route: IV  Start: 11/06/17 0430   End: 11/06/17 0442          0427 (1,500 mg)-Given              Dose: 10 mg Freq: ONCE Route: PO  Start: 11/06/17 2345   End: 11/06/17 2358         2358 (10 mg)-Given              Dose: 91 mL Freq: ONCE Route: IV  Start: 11/05/17 2345   End: 11/05/17 2357        2357 (91 mL)-Given            Discontinued Medications  Medications 11/01/17 11/02/17 11/03/17 11/04/17 11/05/17 11/06/17 11/07/17         Freq: CONTINUOUS PRN Route: IV  PRN Comment: Give if on IV Insulin Infusion, and Parenteral or Enteral nutrition held or cycled off.   Start: 11/06/17 0221   End: 11/06/17 1740   Admin Instructions: Infuse IV D10W at TPN/TF rate whenever nutrition is held or cycled off.          1740-Med Discontinued          Dose: 15-30 g Freq: EVERY 15 MIN PRN Route: PO  PRN Reason: low blood sugar  Start: 11/06/17 0221   End: 11/06/17 1740   Admin Instructions: Give 15 g for BG 51 to 69 mg/dL IF patient is conscious and able to swallow. Give 30 g for BG less than or equal to 50 mg/dL IF patient is conscious and able to swallow. Do NOT give glucose gel via enteral tube.  IF patient has enteral tube: give apple juice 120 mL (4 oz or 15 g of CHO) via enteral tube for BG 51 to 69 mg/dL.  Give apple juice 240 mL (8 oz or 30 g of CHO) via enteral tube for BG less than or equal to 50 mg/dL.    ~Oral gel is preferable for conscious and able to swallow patient.   ~IF gel unavailable or patient refuses may provide apple juice 120 mL (4 oz or 15 g of CHO). Document juice on I and O flowsheet.          1740-Med Discontinued       Or    Dose: 25-50 mL Freq: EVERY 15 MIN PRN Route: IV  PRN Reason: low blood sugar  Start: 11/06/17 0221   End: 11/06/17 1740   Admin Instructions: Use if have IV access, BG less than 70 mg/dL and meet dose criteria below:  Dose if conscious and alert (or disorientated) and NPO = 25 mL  Dose if unconscious / not alert = 50 mL  Vesicant. For ordered doses up to 25 mg, give IV Push undiluted. Give each 5g over 1 minute.          1740-Med Discontinued        Or    Dose: 1 mg Freq: EVERY 15 MIN PRN Route: SC  PRN Reason: low blood sugar  PRN Comment: May repeat x 1 only  Start: 11/06/17 0221   End: 11/06/17 1740   Admin Instructions: May give SQ or IM. ONLY use glucagon IF patient has NO IV access AND is UNABLE to swallow AND blood glucose is LESS than or EQUAL to 50 mg/dL.  Give IV Push over 1 minute. Reconstitute with 1mL sterile water.          1740-Med Discontinued          Rate: 0-24 mL/hr Freq: CONTINUOUS Route: IV  Start: 11/06/17 0230   End: 11/06/17 1740   Admin Instructions: Initiate drip with Algorithm #1 (see hyperlink to protocol). Start protocol only if glucose greater than 150 mg/dL. Maintain glucose level between 100-150 mg/dL.  Discontinue when glycemic control achieved and transitioning to SQ insulin, or Insulin therapy no longer required. When blood glucose has stabilized and patient is tolerating PO intake, call provider for transition to SQ insulin.  For Infusion Instructions, Click on ADULT DRIP PROTOCOL hyperlink below.          (1661)-Not Given       1740-Med Discontinued     Medications 11/01/17 11/02/17 11/03/17 11/04/17 11/05/17 11/06/17 11/07/17

## 2017-11-05 NOTE — IP AVS SNAPSHOT
` ` Patient Information     Patient Name Sex     David Dawn ([id number]) Male 1960       Room Bed    6216 6216-01      Patient Demographics     Address Phone    [address] [facility] [id number] [phone] (Home)  [phone] (Work)  NONE (Mobile)      Patient Ethnicity & Race     Ethnic Group Patient Race    American White      Emergency Contact(s)     Name Relation Home Work Mobile    Caitlin Jefferson [phone]      Documents on File        Status Date Received Description       Documents for the Patient    Privacy Notice - [place] Received 11     Insurance Card Received () 12     External Medication Information Consent       Patient ID Received () 11     Consent for Services - Hospital/Clinic  ()      Consent for Services - Hospital/Clinic Received () 11     External Medication Information Consent Accepted () 11     Waiver - Payment Accepted 11     Waiver - Payment Accepted 11     Waiver - Payment Accepted 12     HIM JACKSON Authorization  04/10/12 DEC CONSENT FOR RELEASE OF INFORMATION    HIM JACKSON Authorization - File Only  12 AUTHORIZATION FOR RELEASE OF PROTECTED HEALTH INFORMATION    HIM JACKSON Authorization - File Only  12 AUTHORIZATION FOR RELEASE OF PROTECTED HEALTH INFORMATION    Consent for Services - Hospital/Clinic Received () 12     Waiver - Payment Received 12     Privacy Notice - [place] Received 12     Consent to Communicate Received () 12     Occupational Therapy Certification Sent 12     External Medication Information Consent Accepted 12     Insurance Card Received 12 [employer] CONNECT MEDICARE PRIMARY     Occupational Therapy Re-Certification Received 12     Insurance Card  () 12 nothing scanned    Insurance Card Received 12 [employer] CONNECT MEDICARE PRIMARY      Consent to Communicate Received () 12     Physical Therapy Certification Received 12     Business/Insurance/Care Coordination/Health Form - Patient.1  12 ADULT REHABILITATION ATTENDANCE POLICY    Business/Insurance/Care Coordination/Health Form - Patient.1  12 ADULT REHABILITATION ATTENDANCE POLICY    Physical Therapy Re-Certification Received 13     Consent to Communicate Received () 12     HIM JACKSON Authorization  12     Occupational Therapy Certification Sent 13     Patient ID  ()      Patient ID Received () 02/16/15 [place]  2016    Physical Therapy Re-Certification Received 13     Consent for EHR Access  13 Copied from existing Consent for services - C/HOD collected on 2012    [facility] Specified Other       Consent for Services - Hospital/Clinic Received () 13     Consent to Communicate Received () 13     Business/Insurance/Care Coordination/Health Form - Patient.1  06/10/13 ADULT REHABILITATION ATTENDANCE POLICY    Power of  Received 10/24/13 Power of  1/10/2011    HIM JACKSON Authorization - File Only   [facility]  JACKSON 9/3/13    HIM JACKSON Authorization - File Only   [employer]  JACKSON 3/7/14    Advance Directives and Living Will Not Received  INVALID DIRECTIVE DATED 01/10/2011 RECEIVED    Power of  Received 14 POWER OF  01/10/2011    Legal Documentation  14 TERMINATION OF GUARDIANSHIP 2014    Consent for Services - Hospital/Clinic Received () 14     Legal Documentation  14 STATE [facility] PHYSICIAN'S STATEMENT IN SUPPORT OF GUARDIANSHIP 14    HIM JAKCSON Authorization  14     Consent to Communicate Received () 14 Invalid consent to communicate missing authority to sign    Speech Therapy Certification Received 14 dysphagia eval only    HIM JACKSON Authorization - File Only   Home  Health Care 2014    Speech Therapy Certification Received 14 Eval Only    Business/Insurance/Care Coordination/Health Form - Patient   [facility] Transition Notification-14    Legal Documentation  03/02/15 PETITION FOR GUARDIANSHIP    Consent for Services - Hospital/Clinic Received () 07/21/15     HIM JACKSON Authorization - File Only  07/22/15 DEC RELEASE OF INFORMATION    Advance Directives and Living Will Received 08/21/15 RESUSCITATION GUIDELINES 2015     Business/Insurance/Care Coordination/Health Form - Patient  16 PHYSICIAN ORDER, NUMOTION- 2015    Consent for Services - UMP       Consent for Services/Privacy Notice - Hospital/Clinic-Esign Received () 16     Advance Directives and Living Will Received 16 RESUSCITATION GUIDELINES 2016     Consent for Services/Privacy Notice - Hospital/Clinic Received 17     Consent to Communicate Received 17     Advance Directives and Living Will Received 17 POLST  2017    Care Everywhere Prospective Auth Received 10/24/17     CMS IM for Patient Signature  (Deleted)      Consent for Services - Hospital/Clinic  (Deleted)      Power of  Not Received (Deleted)  duplicate scan    Advance Directives and Living Will Not Received (Deleted)  duplicate- to be deleted    Advance Directives and Living Will Not Received (Deleted)  To be deleted - Not signed by provider    Advance Directives and Living Will Not Received (Deleted)  to be deleted not valid    Consent for Services/Privacy Notice - Hospital/Clinic  (Deleted)      Consent for Services/Privacy Notice - Hospital/Clinic  (Deleted)         Documents for the Encounter    CMS IM for Patient Signature       Preliminary Result  17 [facility] PRELIMINARY RADIOLOGY REPORT, CT HEAD AND NECK    Preliminary Result  17 [facility] PRELIMINARY RADIOLOGY REPORT, CT C-SPINE    CE Point of Care Auth Received        Admission Information      Attending Provider Admitting Provider Admission Type Admission Date/Time    Alfonso Morris MD Felt, Sofia Escalante MD Emergency 11/05/17  2318    Discharge Date Hospital Service Auth/Cert Status Service Area     Neurology Incomplete [facility]    Unit Room/Bed Admission Status       UU U6A 6216/6216-01 Admission (Confirmed)       Admission     Complaint    Seizure (H)      Hospital Account     Name Acct ID Class Status Primary Coverage    David Dawn [id number] Inpatient Open MEDICARE - MEDICARE            Guarantor Account (for Hospital Account #[id number])     Name Relation to Pt Service Area Active? Acct Type    David Dawn Self FCS Yes Personal/Family    Address Phone          [address] [place] [address] [phone](H)              Coverage Information (for Hospital Account #[id number])     1. MEDICARE/MEDICARE     F/O Payor/Plan Precert #    MEDICARE/MEDICARE     Subscriber Subscriber #    David Dawn [id number]    Address Phone    [employer]  [address]  [place] [address] [phone]          2. UCARE/UCARE CONNECT [place] ONLY     F/O Payor/Plan Precert #    UCARE/UCARE CONNECT [place] ONLY     Subscriber Subscriber #    David Dawn [id number]    Address Phone    [address]  [place] [address] [phone]

## 2017-11-05 NOTE — IP AVS SNAPSHOT
MRN:[id number]                      After Visit Summary   11/5/2017    David Dawn    MRN: [id number]           Thank you!     Thank you for choosing [facility] for your care. Our goal is always to provide you with excellent care. Hearing back from our patients is one way we can continue to improve our services. Please take a few minutes to complete the written survey that you may receive in the mail after you visit with us. Thank you!        Patient Information     Date Of Birth          1960        About your hospital stay     You were admitted on:  November 6, 2017 You last received care in the:  [address] [facility]    You were discharged on:  November 7, 2017        Reason for your hospital stay       You were hospitalized for seizures. We have increased one of your seizure medications.                  Who to Call     For medical emergencies, please call 911.  For non-urgent questions about your medical care, please call your primary care provider or clinic, [phone]          Attending Provider     Provider Specialty    Kp Amezquita MD Emergency Medicine    [place], Sofia Escalante MD Neurology    [place], Alfonso Barrett MD Psychiatry & Neurology - Neurology       Primary Care Provider Office Phone # Fax #    Len Krishnan [phone]      After Care Instructions     Activity - Up with nursing assistance           Additional Discharge Instructions       -Keppra has been increased to 1000mg twice daily  -Continue all other medications as before admission            Advance Diet as Tolerated       Follow this diet upon discharge: Orders Placed This Encounter      Regular Diet Adult Thin Liquids (water, ice chips, juice, milk, gelatin, ice cream, etc)            Fall precautions           General info for SNF       [facility]  Phone:  [phone]  Fax:  [phone]    Length of Stay Estimate: Long Term Care  Condition at Discharge:  "Stable  Level of care:skilled   Rehabilitation Potential: Fair  Admission H&P remains valid and up-to-date: Yes  Recent Chemotherapy: N/A  Use Nursing Home Standing Orders: Yes            Mantoux instructions       Give two-step Mantoux (PPD) Per Facility Policy Yes                  Follow-up Appointments     Follow Up and recommended labs and tests       Follow up with Nursing home physician.  No follow up labs or test are needed.  Follow up with primary care provider in 7 days.  No follow up labs or test are needed.  Follow up in epilepsy clinic in 4-8 weeks.                  Pending Results     Date and Time Order Name Status Description    11/6/2017 0302 Vitamin B1 whole blood In process     11/6/2017 0207 Methylmalonic acid In process             Statement of Approval     Ordered          11/07/17 1034  I have reviewed and agree with all the recommendations and orders detailed in this document.  EFFECTIVE NOW     Approved and electronically signed by:  Miguel Valero MD             Admission Information     Date & Time Provider Department Dept. Phone    11/5/2017 Alfonso Morris MD [address] [facility] [place] [phone]      Your Vitals Were     Blood Pressure Pulse Temperature Respirations Height Weight    110/63 82 97.6  F (36.4  C) (Oral) 18 1.727 m (5' 8\") 104.3 kg (229 lb 15 oz)    Pulse Oximetry BMI (Body Mass Index)                93% 34.96 kg/m2          MyChart Information     iLike lets you send messages to your doctor, view your test results, renew your prescriptions, schedule appointments and more. To sign up, go to [email or web address] . Click on \"Log in\" on the left side of the screen, which will take you to the Welcome page. Then click on \"Sign up Now\" on the right side of the page.     You will be asked to enter the access code listed below, as well as some personal information. Please follow the directions to create your username and password.     Your access code is: " [email or web address]  Expires: 2017  2:56 PM     Your access code will  in 90 days. If you need help or a new code, please call your [facility] clinic or [phone].        Care EveryWhere ID     This is your Care EveryWhere ID. This could be used by other organizations to access your [facility] medical records  [id number]        Equal Access to Services     [facility] : Hadii aad ku hadasho Soomaali, waaxda luqadaha, qaybta kaalmada adeegyada, waxbrant morisin hayjose martinn adegabino cartwright laTadeobrad . So [facility] [phone].    ATENCIÓN: Si habla shanell, tiene a costello disposición servicios gratuitos de asistencia lingüística. Hood al [phone].    We comply with applicable federal civil rights laws and [place] laws. We do not discriminate on the basis of race, color, national origin, age, disability, sex, sexual orientation, or gender identity.               Review of your medicines      CONTINUE these medicines which may have CHANGED, or have new prescriptions. If we are uncertain of the size of tablets/capsules you have at home, strength may be listed as something that might have changed.        Dose / Directions    gabapentin 600 MG tablet   Commonly known as:  NEURONTIN   This may have changed:    - how much to take  - when to take this  - additional instructions   Used for:  Seizure disorder (H)        TAKE THREE TABLETS (1800MG) BY MOUTH ONCE DAILY   Quantity:  93 tablet   Refills:  PRN       levETIRAcetam 1000 MG Tabs   This may have changed:    - medication strength  - how much to take   Used for:  Seizure disorder (H)        Dose:  1000 mg   Take 1,000 mg by mouth 2 times daily   Quantity:  60 tablet   Refills:  0       loperamide 2 MG capsule   Commonly known as:  IMODIUM   This may have changed:    - how much to take  - additional instructions   Used for:  Functional diarrhea        Dose:  2 mg   Take 1 capsule (2 mg) by mouth 2 times daily TAKE 2 CAPSULE PO BID; AND MAY TAKE 1 CAPSULE PO QID PRN   Quantity:  120  capsule   Refills:  3         CONTINUE these medicines which have NOT CHANGED        Dose / Directions    amLODIPine 10 MG tablet   Commonly known as:  NORVASC   Used for:  Essential hypertension, benign        TAKE 1 TABLET BY MOUTH ONCE DAILY   Quantity:  31 tablet   Refills:  11       AVEENO DAILY MOISTURIZER Lotn        Apply daily to hands and ears for dry skin.   Refills:  0       baclofen 20 MG tablet   Commonly known as:  LIORESAL   Used for:  Other seizures (H)        TAKE 1 TABLET BY MOUTH THREE TIMES DAILY   Quantity:  90 tablet   Refills:  11       budesonide 0.25 MG/2ML neb solution   Commonly known as:  PULMICORT   Used for:  Chronic bronchitis, unspecified chronic bronchitis type (H)        NEBULIZE THE CONTENT OF 1 VIAL TWICE DAILY FOR COPD   Quantity:  120 mL   Refills:  10       carBAMazepine 200 MG tablet   Commonly known as:  TEGretol   Used for:  Seizure disorder (H)        TAKE 1 TABLET BY MOUTH THREE TIMES DAILY   Quantity:  90 tablet   Refills:  11       citalopram 20 MG tablet   Commonly known as:  celeXA        Dose:  20 mg   Take 20 mg by mouth daily   Refills:  0       clopidogrel 75 MG tablet   Commonly known as:  PLAVIX   Used for:  Essential hypertension, benign, History of stroke        Dose:  75 mg   Take 1 tablet (75 mg) by mouth daily   Quantity:  90 tablet   Refills:  2       diclofenac 1 % Gel topical gel   Commonly known as:  VOLTAREN   Used for:  Chronic pain syndrome        Dose:  2 g   Place 2 g onto the skin 2 times daily as needed Apply 4 grams to knees or 2 grams to hands   Quantity:  100 g   Refills:  0       * divalproex 250 MG EC tablet   Commonly known as:  DEPAKOTE        Dose:  250 mg   Take 250 mg by mouth 2 times daily   Refills:  0       * divalproex 500 MG EC tablet   Commonly known as:  DEPAKOTE   Used for:  Seizure disorder (H)        TAKE THREE TABLETS (1500MG) BY MOUTH TWICE DAILY (TAKE WITH 250MG FOR A TOTAL OF 1750MG)   Quantity:  60 tablet   Refills:  11        furosemide 20 MG tablet   Commonly known as:  LASIX   Used for:  Bilateral edema of lower extremity        TAKE 1 TABLET BY MOUTH ONCE DAILY   Quantity:  31 tablet   Refills:  5       ipratropium - albuterol 0.5 mg/2.5 mg/3 mL 0.5-2.5 (3) MG/3ML neb solution   Commonly known as:  DUONEB   Used for:  Chronic bronchitis, unspecified chronic bronchitis type (H)        NEBULIZE THE CONTENT OF 1 VIAL THREE TIMES DAILY;AND MAY NEBULIZE THE CONTENT OF 1 VIAL AS NEEDED FOR SHORTNESS OF BREATH   Quantity:  360 mL   Refills:  10       levalbuterol 45 MCG/ACT Inhaler   Commonly known as:  XOPENEX HFA   Used for:  Chronic obstructive pulmonary disease, unspecified COPD type (H)        Dose:  2 puff   Inhale 2 puffs into the lungs every 6 hours as needed for shortness of breath / dyspnea or wheezing   Quantity:  1 Inhaler   Refills:  8       MAPAP 325 MG tablet   Used for:  Low back pain, unspecified back pain laterality, unspecified chronicity, with sciatica presence unspecified, Neck pain   Generic drug:  acetaminophen        TAKE TWO TABLETS (650MG) BY MOUTH EVERY 4 HOURS AS NEEDED FOR PAIN   Quantity:  60 tablet   Refills:  11       mineral oil-hydrophilic petrolatum        Apply topically as needed Apply daily to left wound.   Refills:  0       nebulizer/adult mask Kit   Used for:  Wheezing        Dose:  1 Device   1 Device 4 times daily.   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   Refills:  0       SEROQUEL PO        Dose:  200 mg   Take 200 mg by mouth 2 times daily   Refills:  0       simvastatin 40 MG tablet   Commonly known as:  ZOCOR   Used for:  Hyperlipidemia LDL goal <100        TAKE 1 TABLET BY MOUTH AT BEDTIME   Quantity:  30 tablet   Refills:  11       tamsulosin 0.4 MG capsule   Commonly known as:  FLOMAX   Used for:  Benign non-nodular prostatic hyperplasia with lower urinary tract symptoms        TAKE 1 CAPSULE BY MOUTH ONCE DAILY   Quantity:  31 capsule   Refills:  11       vitamin D 69776 UNIT capsule   Commonly known as:  ERGOCALCIFEROL   Used for:  Vitamin D deficiency        TAKE ONE CAPSULE BY MOUTH ONCE WEEKLY ON MONDAY   Quantity:  12 capsule   Refills:  3       * Notice:  This list has 2 medication(s) that are the same as other medications prescribed for you. Read the directions carefully, and ask your doctor or other care provider to review them with you.      STOP taking     oxyCODONE IR 5 MG tablet   Commonly known as:  ROXICODONE                Where to get your medicines      Some of these will need a paper prescription and others can be bought over the counter. Ask your nurse if you have questions.     You don't need a prescription for these medications     levETIRAcetam 1000 MG Tabs                Protect others around you: Learn how to safely use, store and throw away your medicines at www.disposemymeds.org.             Medication List: This is a list of all your medications and when to take them. Check marks below indicate your daily home schedule. Keep this list as a reference.      Medications           Morning Afternoon Evening Bedtime As Needed    amLODIPine 10 MG tablet   Commonly known as:  NORVASC   TAKE 1 TABLET BY MOUTH ONCE DAILY   Last time this was given:  10 mg on  11/7/2017  7:41 AM                                AVEENO DAILY MOISTURIZER Lotn   Apply daily to hands and ears for dry skin.                                baclofen 20 MG tablet   Commonly known as:  LIORESAL   TAKE 1 TABLET BY MOUTH THREE TIMES DAILY   Last time this was given:  20 mg on 11/7/2017  7:41 AM                                budesonide 0.25 MG/2ML neb solution   Commonly known as:  PULMICORT   NEBULIZE THE CONTENT OF 1 VIAL TWICE DAILY FOR COPD   Last time this was given:  0.25 mg on 11/7/2017  9:20 AM                                carBAMazepine 200 MG tablet   Commonly known as:  TEGretol   TAKE 1 TABLET BY MOUTH THREE TIMES DAILY   Last time this was given:  200 mg on 11/7/2017  7:41 AM                                citalopram 20 MG tablet   Commonly known as:  celeXA   Take 20 mg by mouth daily   Last time this was given:  20 mg on 11/7/2017  7:40 AM                                clopidogrel 75 MG tablet   Commonly known as:  PLAVIX   Take 1 tablet (75 mg) by mouth daily   Last time this was given:  75 mg on 11/7/2017  7:40 AM                                diclofenac 1 % Gel topical gel   Commonly known as:  VOLTAREN   Place 2 g onto the skin 2 times daily as needed Apply 4 grams to knees or 2 grams to hands                                * divalproex 250 MG EC tablet   Commonly known as:  DEPAKOTE   Take 250 mg by mouth 2 times daily   Last time this was given:  1,750 mg on 11/7/2017  7:41 AM                                * divalproex 500 MG EC tablet   Commonly known as:  DEPAKOTE   TAKE THREE TABLETS (1500MG) BY MOUTH TWICE DAILY (TAKE WITH 250MG FOR A TOTAL OF 1750MG)   Last time this was given:  1,750 mg on 11/7/2017  7:41 AM                                furosemide 20 MG tablet   Commonly known as:  LASIX   TAKE 1 TABLET BY MOUTH ONCE DAILY   Last time this was given:  20 mg on 11/7/2017  7:40 AM                                gabapentin 600 MG tablet   Commonly known as:  NEURONTIN    TAKE THREE TABLETS (1800MG) BY MOUTH ONCE DAILY   Last time this was given:  600 mg on 11/7/2017  7:41 AM                                ipratropium - albuterol 0.5 mg/2.5 mg/3 mL 0.5-2.5 (3) MG/3ML neb solution   Commonly known as:  DUONEB   NEBULIZE THE CONTENT OF 1 VIAL THREE TIMES DAILY;AND MAY NEBULIZE THE CONTENT OF 1 VIAL AS NEEDED FOR SHORTNESS OF BREATH   Last time this was given:  3 mLs on 11/7/2017  9:20 AM                                levalbuterol 45 MCG/ACT Inhaler   Commonly known as:  XOPENEX HFA   Inhale 2 puffs into the lungs every 6 hours as needed for shortness of breath / dyspnea or wheezing   Last time this was given:  2 puffs on 11/7/2017 10:02 AM                                levETIRAcetam 1000 MG Tabs   Take 1,000 mg by mouth 2 times daily   Last time this was given:  1,000 mg on 11/7/2017  7:40 AM                                loperamide 2 MG capsule   Commonly known as:  IMODIUM   Take 1 capsule (2 mg) by mouth 2 times daily TAKE 2 CAPSULE PO BID; AND MAY TAKE 1 CAPSULE PO QID PRN                                MAPAP 325 MG tablet   TAKE TWO TABLETS (650MG) BY MOUTH EVERY 4 HOURS AS NEEDED FOR PAIN   Generic drug:  acetaminophen                                mineral oil-hydrophilic petrolatum   Apply topically as needed Apply daily to left wound.                                nebulizer/adult mask Kit   1 Device 4 times daily.                                niacin 500 MG CR capsule   TAKE 1 CAPSULE BY MOUTH AT BEDTIME                                order for DME   Power wheelchair                                potassium chloride 10 MEQ tablet   Commonly known as:  K-TAB,KLOR-CON   TAKE 1 TABLET BY MOUTH ONCE DAILY                                SARNA SENSITIVE 1 % Lotn lotion   as needed for itching One application as needed for itchy skin on posterior ears and hands.   Generic drug:  pramoxine                                SEROQUEL PO   Take 200 mg by mouth 2 times daily   Last  time this was given:  200 mg on 11/7/2017  7:40 AM                                simvastatin 40 MG tablet   Commonly known as:  ZOCOR   TAKE 1 TABLET BY MOUTH AT BEDTIME   Last time this was given:  40 mg on 11/6/2017  8:57 PM                                tamsulosin 0.4 MG capsule   Commonly known as:  FLOMAX   TAKE 1 CAPSULE BY MOUTH ONCE DAILY   Last time this was given:  0.4 mg on 11/7/2017  7:41 AM                                vitamin D 38992 UNIT capsule   Commonly known as:  ERGOCALCIFEROL   TAKE ONE CAPSULE BY MOUTH ONCE WEEKLY ON MONDAY                                * Notice:  This list has 2 medication(s) that are the same as other medications prescribed for you. Read the directions carefully, and ask your doctor or other care provider to review them with you.

## 2017-11-05 NOTE — IP AVS SNAPSHOT
` `     [address] [facility]: [phone]                 INTERAGENCY TRANSFER FORM - NOTES (H&P, Discharge Summary, Consults, Procedures, Therapies)   2017                    Hospital Admission Date: 2017  DAVID LAMBERT   : 1960  Sex: Male        Patient PCP Information     Provider PCP Type    Len Krishnan MD General         History & Physicals      H&P by Alfonso Morris MD at 2017  2:10 AM     Author:  Alfonso Morris MD Service:  Neurology Author Type:  Physician    Filed:  2017  4:38 PM Date of Service:  2017  2:10 AM Creation Time:  2017  2:10 AM    Status:  Addendum :  Alfonso Morris MD (Physician)         [facility]: [place]  Neurology History and Physical    Patient Name:  David Lambert  MRN:  [id number]    :  1960  Date of Admission:  2017  Date of Service:  2017  Primary care provider:  Len Krishnan      Chief Complaint:  Found down    History of Present Illness:   57 year old male smoker h/o R MCA stroke and localization-related epilepsy who presents from nursing home found down. The patient was in his usual state of health on the evening of admission at 21:30. Around 2300, the patient was found lying on the ground beside his wheelchair with multiple abrasions. Upon arrival, the patient was very somnolent but moving his R side. With sternal rub, the patient began to curse and speak intelligibly but then fall back asleep. CT/CTA not clearly different than before.     ROS: A 10-point ROS unable to be performed 2/2 diminished mental status.    PMH:[EL1.1]  Past Medical History:   Diagnosis Date     BPH (benign prostatic hyperplasia) 9/15/2011     COPD (chronic obstructive pulmonary disease) (H) 9/15/2011     Edema      ETOH abuse      GERD (gastroesophageal reflux disease) 9/15/2011     HTN (hypertension) 2014     Hyperlipidemia LDL goal <100 9/15/2011      Impulse control disorder      Mild major depression (H) 9/15/2011     Positive TB test      Seizures (H) 9/15/2011     Unspecified cerebral artery occlusion with cerebral infarction      Past Surgical History:   Procedure Laterality Date     COLONOSCOPY  12/19/2012     COLONOSCOPY  8/6/2014    Procedure: COMBINED COLONOSCOPY, SINGLE BIOPSY/POLYPECTOMY BY BIOPSY;  Surgeon: Jose Elias Mclain MD;  Location:  GI     EXCISE TUMOR RECTUM VILLUS  2/28/2013    Procedure: EXCISE TUMOR RECTUM VILLOUS;  Trans Anal Excision Rectal Villous Tumor, Proctoscopy;  Surgeon: Milton Jacobs MD;  Location:  OR     KNEE SURGERY       NECK SURGERY       SIGMOIDOSCOPY FLEXIBLE  1/31/2013    Procedure: SIGMOIDOSCOPY FLEXIBLE;   flexible sigmoidoscopy with anoscope;  Surgeon: Milton Jacobs MD;  Location:  GI     SIGMOIDOSCOPY FLEXIBLE  4/25/2013    Procedure: SIGMOIDOSCOPY FLEXIBLE;  SIGMOIDOSCOPY FLEXIBLE;  Surgeon: Milton Jacobs MD;  Location:  GI[EL1.2]       Allergies:[EL1.1]  Allergies   Allergen Reactions     Ibuprofen Sodium Diarrhea     Tuberculin Tests[EL1.2]        Medications:[EL1.1]      Current Facility-Administered Medications:      amLODIPine (NORVASC) tablet 10 mg, 10 mg, Oral, Daily, Refugio Muniz MD     baclofen (LIORESAL) tablet 20 mg, 20 mg, Oral, TID, Refugio Muniz MD     carBAMazepine (TEGretol) tablet 200 mg, 200 mg, Oral, TID, Refugio Muniz MD     budesonide (PULMICORT) neb solution 0.25 mg, 0.25 mg, Nebulization, BID, Refugio Muniz MD     clopidogrel (PLAVIX) tablet 75 mg, 75 mg, Oral, Daily, Refugio Muniz MD     divalproex (DEPAKOTE) EC tablet 1,750 mg, 1,750 mg, Oral, Q12H KATY, Refugio Muniz MD     furosemide (LASIX) tablet 20 mg, 20 mg, Oral, Daily, Refugio Muniz MD     citalopram (celeXA) tablet 20 mg, 20 mg, Oral, Daily, Refugio Muniz MD     gabapentin (NEURONTIN) tablet 600 mg, 600 mg, Oral, TID, Refugio Muniz MD     ipratropium - albuterol 0.5 mg/2.5 mg/3 mL (DUONEB) neb solution 3  mL, 3 mL, Nebulization, Q4H While awake, Refugio Muniz MD     levalbuterol (XOPENEX HFA) Inhaler 2 puff, 2 puff, Inhalation, Q6H PRN, Refugio Muniz MD     levETIRAcetam (KEPPRA) tablet 1,000 mg, 1,000 mg, Oral, BID, Refugio Muniz MD     [START ON 11/7/2017] niacin CR capsule 500 mg, 500 mg, Oral, At Bedtime, Refugio Muniz MD     QUEtiapine (SEROquel) tablet 200 mg, 200 mg, Oral, BID, Refugio Muniz MD     simvastatin (ZOCOR) tablet 40 mg, 40 mg, Oral, At Bedtime, Refugio Muniz MD     tamsulosin (FLOMAX) capsule 0.4 mg, 0.4 mg, Oral, Daily, Refugio Muniz MD     vitamin D (ERGOCALCIFEROL) capsule 50,000 Units, 50,000 Units, Oral, Once, Refugio Muniz MD     lidocaine 1 % 1 mL, 1 mL, Other, Q1H PRN, Refugio Muniz MD     lidocaine (LMX4) kit, , Topical, Q1H PRN, Refugio Muniz MD     sodium chloride (PF) 0.9% PF flush 3 mL, 3 mL, Intracatheter, Q1H PRN, Refugio Muniz MD     sodium chloride (PF) 0.9% PF flush 3 mL, 3 mL, Intracatheter, Q8H, Refugio Muniz MD     LORazepam (ATIVAN) injection 2 mg, 2 mg, Intravenous, Q2H PRN, Refugio Muniz MD     oxyCODONE IR (ROXICODONE) tablet 5 mg, 5 mg, Oral, TID, Refugio Muniz MD     acetaminophen (TYLENOL) tablet 650 mg, 650 mg, Oral, Q4H PRN, Refugio Muniz MD     bacitracin ointment, , Topical, TID PRN, Refugio Muniz MD     nicotine (NICODERM CQ) 21 MG/24HR 24 hr patch 1 patch, 1 patch, Transdermal, Daily, Refugio Muniz MD     nicotine Patch in Place, , Transdermal, Q8H, Refugio Muniz MD     [START ON 11/7/2017] nicotine patch REMOVAL, , Transdermal, Daily, Refugio Muniz MD[EL1.2]    Social History:[EL1.1]  Social History   Substance Use Topics     Smoking status: Current Every Day Smoker     Packs/day: 1.00     Years: 35.00     Types: Cigarettes     Smokeless tobacco: Never Used     Alcohol use No      Comment: quit 20 years ago.[EL1.2]       Family History:[EL1.1]    Family History   Problem Relation Age of Onset     HEART DISEASE Mother      MI     CEREBROVASCULAR  DISEASE Father      alzheimers disease     CEREBROVASCULAR DISEASE Brother      Neurologic Disorder Brother      stroke     Neurologic Disorder Son      seizure     CANCER Sister[EL1.2]        Physical Examination:   Vitals:  B/P: 133/88, T: 97.7, P: 82, R: 11  General: pt laying comfortably in bed, breathing easily on ra, in NAD   HEENT: no clear bruising on head  Chest: wheezing  Heart: rrr  Abdomen: soft, nt, nd, +BS  Ext: pitting edema to mid-shins  Skin: multiple superficial abrasions on lower extremities  Neuro:   -MS: somnolent, intermittently follows commands. Speaks in full sentences.   -CN: does not blink to threat on L; blinks on R, perrl, eomi to oculocephalic, L facial droop, hearing intact to conversation  -Motor: markedly increased tone in LUE/LLE  -moves RUE and RLE spontaneously and antigravity  -Sensory: grimaces to noxious stimuli in RUE/RLE  -Coordination: no clear ataxia  -Gait: deferred, nonambulatory at baseline    Investigations:    CT/CTA: old R MCA territory infarct, R ICA occluded at bifurcation        EEG 5/2014  SUMMARY:   1.  Posterior dominant alpha rhythm absent, with generalized background slowing.   2.  EKG showing a regular heart rate of 90 beats per minute.   3.  Intermittent isolated epileptiform discharge centered principally over C4, with occasional discharges with localization more likely at F8   4.  No sustained seizure activity.   5.  No clear triphasic waves.   6.  Prominent benzodiazepine effect.   CLINICAL INTERPRETATION:  This is an abnormal EEG.  There is evidence for generalized slow activity with intermittent single epileptiform discharges likely emanating from the right hemisphere, either over the C4 or C8 electrode.  No sustained seizure activity is present.  The epileptiform discharges appear to emanate from an area related to prior right hemispheric stroke.  The benzodiazepine effect is of unclear clinical significance.     Assessment and  Plan:  Neuro  #likely Seizure: found down at group home with multiple abrasions. Somnolent but moving R side and talking on arrival. Though records from [facility] not available, clear h/o seizures on 3 AEDs. Most likely symptomatic from large R MCA with this event representing a simple breakthrough seizure. Will r/o inciting infection.   -increase pta LEV from 500-500 to 0254-1948[EL1.1]  --too somnolent to take meds tonight, will load 1500[EL1.3]  -continue pta -200  -continue pta VPA 6747-4796  -continue pta MARLENE 600-600-600  -levels pending    #h/o R MCA stroke likely 2/2 ICA stenosis: CT unchanged from previous, CTA did not reveal LVO  -continue pta plavix 75  -continue pta statin    #Chronic Pain  -continue pta oxycodone, baclofen, gabapentin    #MDD/Anger  -cont pta citalopram  -pta quetiapine    Pulm:  #COPD, recent exacerbation 9/2017  -continue pta inhalers[EL1.1]  -pharmacy pointed out that patient has levalbuterol in pta meds but albuterol as part of duonebs-- changed to albuterol per pharmacy[EL1.4]  -CXR pending    #smoker  -nicotine patch    CV:  -BP[EL1.1] long term[EL1.5] goal <140/90[EL1.1]  --labetalol/hydralazine prn >180[EL1.6]     #HTN  -continue pta amlodipine    #HLD  -continue pta simvastatin    #LE edema  -pta furosemide  -lyte replacement protocol    Renal:  #no issues  -lyte replacement protocol    GI:  #h/o constipation  -hold pta imodium unless diarrhea    Endo:   #no issues  -A1c pending  -SSI    Hem:  #Macrocytic Anemia: possibly 2/2 VPA, does not drink per group home   -B12/MMA, folate pending  -B1 pending    :  # BPH  -cont pta tamsulosin    ID:  #r/o infxn as inciting factor for seizure  -UA, CXR    MSK:  #abrasions  -bacitracin bid prn    Misc:  Diet: regular  Fluids: NS 75cc/hr x12h  Lines: PIV  Ppx: SCDs, lovenox  Rehab recs: not needed[EL1.1]    DNR/DNI[EL1.7]    Patient was discussed with attending neurologist, Dr. Za Muniz MD  Neurology    [phone][EL1.1]    Neurology Staff Addendum  I have seen and evaluated the patient today and discussed with the resident team and agree with the documentation.  Patient was hooked up for EEG.  Greatly appreciate the assistance from our colleagues in epilepsy.  No change to explain presentation on CT.     LORIN ARRIAZA MD[MH1.1]           Revision History        User Key Date/Time User Provider Type Action    > MH1.1 11/6/2017  4:38 PM Lorin Arriaza MD Physician Addend     EL1.3 11/6/2017  4:12 AM Refugio Muniz MD Resident Sign     EL1.7 11/6/2017  3:01 AM Refugio Muniz MD Resident Sign     EL1.6 11/6/2017  3:00 AM Refugio Muniz MD Resident Sign     [N/A] 11/6/2017  2:59 AM Refugio Muniz MD Resident Sign     EL1.5 11/6/2017  2:59 AM Refugio Muniz MD Resident Sign     EL1.4 11/6/2017  2:58 AM Refugio Muniz MD Resident      EL1.2 11/6/2017  2:57 AM Refugio Muniz MD Resident Sign     EL1.1 11/6/2017  2:10 AM Refugio Muniz MD Resident                      Discharge Summaries      Discharge Summaries by Lorin Arriaza MD at 11/7/2017 10:34 AM     Author:  Lorin Arriaza MD Service:  Neurology Author Type:  Physician    Filed:  11/7/2017 12:34 PM Date of Service:  11/7/2017 10:34 AM Creation Time:  11/7/2017 10:34 AM    Status:  Signed :  Lorin Arriaza MD (Physician)         [facility]    Neurology Discharge Summary    Date of Admission: 11/5/2017  Date of Discharge: November 7, 2017    Disposition:[NM1.1] Discharged to nursing home[NM1.2]  Primary Care Physician: Len Krishnan     Admission Diagnosis:[NM1.1]   Seizure  H/O R MCA stroke likely 2/2 ICA stenosis  Chronic Pain  MDD/Anger  COPD  HTN  HLD  LE edema  Macrocytic anemia  BPH[NM1.2]    Discharge Diagnosis:[NM1.1]   Seizure  H/O R MCA stroke likely 2/2 ICA stenosis  Chronic Pain  MDD/Anger  COPD  HTN  HLD  LE edema  Macrocytic  anemia  BPH[NM1.2]    Consults:[NM1.1]  None[NM1.2]    Problem Leading to Hospitalization (from HPI):[NM1.1]   Briefly, patient is a 57 year old male with h/o R MCA stroke with localization-related epilepsy who was admitted on 11/6/17 after being found down at his nursing home. He was in his usual state of health when around 2300 he was found lying on the ground beside his wheelchair with multiple abrasions. Patient was somnolent on arrival. He was admitted to [facility] for further management and workup.[NM1.2]    Please see H&P dated 11/5/2017 for further details about presentation.    Brief Hospital Course:[NM1.1]   # Likely seizure:   Patient was found down at group home with multiple abrasions. On presentation he was somnolent but moving his R side (does not move left side 2/2 R MCA stroke). Patient usually seen at [facility] and was on 3 AEDs while at nursing home (keppra, carbamazepine, and depakote as well as taking gabapentin for pain). Keppra was increased from 500mg BID to 1000mg BID. 2 hour EEG was obtained which did not demonstrate any seizure activity. Infectious workup was negative. Keppra and carbamazepine levels in therapeutic range. Depakote level elevated, ammonia wnl.   -Keppra 1000mg BID  -Continue PTA carbamazepine 200-200mg  -Continue PTA depakote 1750-1750mg  -Continue PTA gabapentin [phone]  -F/U in epilepsy clinic in 1-2 months. May consider alternative treatment from keppra given behavioral disturbances[NM1.2] however considering how challenging his seizure control is we are recommending he stay on it for the time being.   -Return to ED with any worsening in medical condition.    [MH1.1]  # h/o R MCA stroke likely 2/2 ICA stenosis:   CT/CTA on admission were unchanged from previous, no LVO observed.   -continue pta plavix 75  -continue pta statin, LDL goal <70  -BP goal <140/90     # Chronic Pain  -continue pta oxycodone, baclofen, gabapentin     # MDD/Anger  -cont pta citalopram  -pta  "quetiapine     # COPD, recent exacerbation 9/2017:  Wheezing on exam, however O2 sats appear stable. Received duonebs as inpatient along with PTA medications.   -Continue PTA inhalers     # HTN  -continue pta amlodipine     # HLD  -continue pta simvastatin     # LE edema  -pta furosemide     # Macrocytic Anemia:   Likely 2/2 depakote, does not drink per group home.   -B12 wnl  -Folate wnl  -MMA pending  -B1 pending    # BPH  -cont pta tamsulosin[NM1.2]      PERTINENT INVESTIGATIONS    Labs  Lab Results   Component Value Date    WBC 4.3 11/07/2017     Lab Results   Component Value Date    RBC 3.44 11/07/2017     Lab Results   Component Value Date    HGB 11.7 11/07/2017     Lab Results   Component Value Date    HCT 37.1 11/07/2017     No components found for: MCT  Lab Results   Component Value Date     11/07/2017     Lab Results   Component Value Date    MCH 34.0 11/07/2017     Lab Results   Component Value Date    MCHC 31.5 11/07/2017     Lab Results   Component Value Date    RDW 13.7 11/07/2017     Lab Results   Component Value Date     11/07/2017     Last Basic Metabolic Panel:  Lab Results   Component Value Date     11/07/2017      Lab Results   Component Value Date    POTASSIUM 4.6 11/07/2017     Lab Results   Component Value Date    CHLORIDE 111 11/07/2017     Lab Results   Component Value Date    KADIE 8.4 11/07/2017     Lab Results   Component Value Date    CO2 27 11/07/2017     Lab Results   Component Value Date    BUN 10 11/07/2017     Lab Results   Component Value Date    CR 0.75 11/07/2017     Lab Results   Component Value Date    GLC 80 11/07/2017           PHYSICAL EXAMINATION  /63  Pulse 82  Temp 97.6  F (36.4  C) (Oral)  Resp 18  Ht 1.727 m (5' 8\")  Wt 104.3 kg (229 lb 15 oz)  SpO2 93%  BMI 34.96 kg/m2    Constitutional: NAD  Head: atraumatic, anicteric. See neuroexam  Eyes: see neuroexam  CVC: RRR, normal S1/S2   Lung:[NM1.1] bilateral expiratory wheezing, mildly " improved from admission, no respiratory distress on room air[NM1.2]  Abdomen: soft, nontender, nondistended  Extremities:[NM1.1] 1+ pitting edema to mid shin[NM1.2]  Psychiatric:[NM1.1] Irritable[NM1.2]    Neurologic:   Mental Status:[NM1.1] irritable, follows some commands, refuses others. Speaking in full sentences.[NM1.2]   Cranial Nerves:[NM1.1] Blinks to threat on right.[NM1.2] PERRL. EOMI.[NM1.1] L facial droop.[NM1.2] Hearing intact to conversation.   Motor:[NM1.1] Does not move left side, increased tone on LUE/LLE. Strength preserved in RUE/RLE[NM1.2]  Reflexes: 2+[NM1.1] in RUE/RLE. 3+ LUE/LLE[NM1.2].[NM1.1] Clonus in LLE, toe upgoing.[NM1.2]    Sensory: Intact[NM1.1] on RUE/RLE[NM1.2]  Coordination:[NM1.1] No clear ataxia[NM1.2]  Station/Gait: Deferred      Additional recommendations and follow up:[NM1.1]       Review of your medicines      CONTINUE these medicines which may have CHANGED, or have new prescriptions. If we are uncertain of the size of tablets/capsules you have at home, strength may be listed as something that might have changed.       Dose / Directions    gabapentin 600 MG tablet   Commonly known as:  NEURONTIN   This may have changed:    - how much to take  - when to take this  - additional instructions   Used for:  Seizure disorder (H)        TAKE THREE TABLETS (1800MG) BY MOUTH ONCE DAILY   Quantity:  93 tablet   Refills:  PRN       levETIRAcetam 1000 MG Tabs   This may have changed:    - medication strength  - how much to take   Used for:  Seizure disorder (H)        Dose:  1000 mg   Take 1,000 mg by mouth 2 times daily   Quantity:  60 tablet   Refills:  0       loperamide 2 MG capsule   Commonly known as:  IMODIUM   This may have changed:    - how much to take  - additional instructions   Used for:  Functional diarrhea        Dose:  2 mg   Take 1 capsule (2 mg) by mouth 2 times daily TAKE 2 CAPSULE PO BID; AND MAY TAKE 1 CAPSULE PO QID PRN   Quantity:  120 capsule   Refills:  3          CONTINUE these medicines which have NOT CHANGED       Dose / Directions    amLODIPine 10 MG tablet   Commonly known as:  NORVASC   Used for:  Essential hypertension, benign        TAKE 1 TABLET BY MOUTH ONCE DAILY   Quantity:  31 tablet   Refills:  11       AVEENO DAILY MOISTURIZER Lotn        Apply daily to hands and ears for dry skin.   Refills:  0       baclofen 20 MG tablet   Commonly known as:  LIORESAL   Used for:  Other seizures (H)        TAKE 1 TABLET BY MOUTH THREE TIMES DAILY   Quantity:  90 tablet   Refills:  11       budesonide 0.25 MG/2ML neb solution   Commonly known as:  PULMICORT   Used for:  Chronic bronchitis, unspecified chronic bronchitis type (H)        NEBULIZE THE CONTENT OF 1 VIAL TWICE DAILY FOR COPD   Quantity:  120 mL   Refills:  10       carBAMazepine 200 MG tablet   Commonly known as:  TEGretol   Used for:  Seizure disorder (H)        TAKE 1 TABLET BY MOUTH THREE TIMES DAILY   Quantity:  90 tablet   Refills:  11       citalopram 20 MG tablet   Commonly known as:  celeXA        Dose:  20 mg   Take 20 mg by mouth daily   Refills:  0       clopidogrel 75 MG tablet   Commonly known as:  PLAVIX   Used for:  Essential hypertension, benign, History of stroke        Dose:  75 mg   Take 1 tablet (75 mg) by mouth daily   Quantity:  90 tablet   Refills:  2       diclofenac 1 % Gel topical gel   Commonly known as:  VOLTAREN   Used for:  Chronic pain syndrome        Dose:  2 g   Place 2 g onto the skin 2 times daily as needed Apply 4 grams to knees or 2 grams to hands   Quantity:  100 g   Refills:  0       * divalproex 250 MG EC tablet   Commonly known as:  DEPAKOTE        Dose:  250 mg   Take 250 mg by mouth 2 times daily   Refills:  0       * divalproex 500 MG EC tablet   Commonly known as:  DEPAKOTE   Used for:  Seizure disorder (H)        TAKE THREE TABLETS (1500MG) BY MOUTH TWICE DAILY (TAKE WITH 250MG FOR A TOTAL OF 1750MG)   Quantity:  60 tablet   Refills:  11       furosemide 20 MG tablet    Commonly known as:  LASIX   Used for:  Bilateral edema of lower extremity        TAKE 1 TABLET BY MOUTH ONCE DAILY   Quantity:  31 tablet   Refills:  5       ipratropium - albuterol 0.5 mg/2.5 mg/3 mL 0.5-2.5 (3) MG/3ML neb solution   Commonly known as:  DUONEB   Used for:  Chronic bronchitis, unspecified chronic bronchitis type (H)        NEBULIZE THE CONTENT OF 1 VIAL THREE TIMES DAILY;AND MAY NEBULIZE THE CONTENT OF 1 VIAL AS NEEDED FOR SHORTNESS OF BREATH   Quantity:  360 mL   Refills:  10       levalbuterol 45 MCG/ACT Inhaler   Commonly known as:  XOPENEX HFA   Used for:  Chronic obstructive pulmonary disease, unspecified COPD type (H)        Dose:  2 puff   Inhale 2 puffs into the lungs every 6 hours as needed for shortness of breath / dyspnea or wheezing   Quantity:  1 Inhaler   Refills:  8       MAPAP 325 MG tablet   Used for:  Low back pain, unspecified back pain laterality, unspecified chronicity, with sciatica presence unspecified, Neck pain   Generic drug:  acetaminophen        TAKE TWO TABLETS (650MG) BY MOUTH EVERY 4 HOURS AS NEEDED FOR PAIN   Quantity:  60 tablet   Refills:  11       mineral oil-hydrophilic petrolatum        Apply topically as needed Apply daily to left wound.   Refills:  0       nebulizer/adult mask Kit   Used for:  Wheezing        Dose:  1 Device   1 Device 4 times daily.   Quantity:  1 kit   Refills:  3       niacin 500 MG CR capsule   Used for:  Hyperlipidemia LDL goal <100        TAKE 1 CAPSULE BY MOUTH AT BEDTIME   Quantity:  31 capsule   Refills:  11       order for DME   Used for:  History of CVA (cerebrovascular accident), Flaccid hemiplegia of left nondominant side due to infarction of brain (H)        Power wheelchair   Quantity:  1 Device   Refills:  0       potassium chloride 10 MEQ tablet   Commonly known as:  K-TAB,KLOR-CON   Used for:  Bilateral edema of lower extremity        TAKE 1 TABLET BY MOUTH ONCE DAILY   Quantity:  31 tablet   Refills:  11       SARNA  SENSITIVE 1 % Lotn lotion   Generic drug:  pramoxine        as needed for itching One application as needed for itchy skin on posterior ears and hands.   Refills:  0       SEROQUEL PO        Dose:  200 mg   Take 200 mg by mouth 2 times daily   Refills:  0       simvastatin 40 MG tablet   Commonly known as:  ZOCOR   Used for:  Hyperlipidemia LDL goal <100        TAKE 1 TABLET BY MOUTH AT BEDTIME   Quantity:  30 tablet   Refills:  11       tamsulosin 0.4 MG capsule   Commonly known as:  FLOMAX   Used for:  Benign non-nodular prostatic hyperplasia with lower urinary tract symptoms        TAKE 1 CAPSULE BY MOUTH ONCE DAILY   Quantity:  31 capsule   Refills:  11       vitamin D 09635 UNIT capsule   Commonly known as:  ERGOCALCIFEROL   Used for:  Vitamin D deficiency        TAKE ONE CAPSULE BY MOUTH ONCE WEEKLY ON MONDAY   Quantity:  12 capsule   Refills:  3       * Notice:  This list has 2 medication(s) that are the same as other medications prescribed for you. Read the directions carefully, and ask your doctor or other care provider to review them with you.      STOP taking          oxyCODONE IR 5 MG tablet   Commonly known as:  ROXICODONE                Where to get your medicines      Some of these will need a paper prescription and others can be bought over the counter. Ask your nurse if you have questions.     You don't need a prescription for these medications      levETIRAcetam 1000 MG Tabs             General info for SNF   [facility]  Phone:  [phone]  Fax:  [phone]    Length of Stay Estimate: Long Term Care  Condition at Discharge: Stable  Level of care:skilled   Rehabilitation Potential: Fair  Admission H&P remains valid and up-to-date: Yes  Recent Chemotherapy: N/A  Use Nursing Home Standing Orders: Yes     Mantoux instructions   Give two-step Mantoux (PPD) Per Facility Policy Yes     Reason for your hospital stay   You were hospitalized for seizures. We have increased one of your  seizure medications.     Activity - Up with nursing assistance     Additional Discharge Instructions   -Keppra has been increased to 1000mg twice daily  -Continue all other medications as before admission     Follow Up and recommended labs and tests   Follow up with Nursing home physician.  No follow up labs or test are needed.  Follow up with primary care provider in 7 days.  No follow up labs or test are needed.  Follow up in epilepsy clinic in 4-8 weeks.     DNR/DNI     Fall precautions     Advance Diet as Tolerated   Follow this diet upon discharge: Orders Placed This Encounter     Regular Diet Adult Thin Liquids (water, ice chips, juice, milk, gelatin, ice cream, etc)[NM1.3]         Patient was seen and discussed with attending physician [NM1.1] Arturo[NM1.2].    Alfonzo Valero  PGY2 Neurology  [phone][NM1.1]    Neurology Staff Addendum  I have seen and evaluated the patient today and discussed with the resident team.  Greater than 30 minutes spent in discharge preparation.        ALFONSO ARRIAZA MD[MH1.1]                 Revision History        User Key Date/Time User Provider Type Action    > MH1.1 11/7/2017 12:34 PM Alfonso Arriaza MD Physician Sign     NM1.3 11/7/2017 11:41 AM Miguel Valero MD Resident Sign     NM1.2 11/7/2017 10:59 AM Miguel Valero MD Resident      NM1.1 11/7/2017 10:34 AM Miguel Valero MD Resident                      Consult Notes      Consults by Quin Cartagena at 11/6/2017 11:19 AM     Author:  Quin Cartagena Service:  Social Work Author Type:      Filed:  11/6/2017 11:19 AM Date of Service:  11/6/2017 11:19 AM Creation Time:  11/6/2017 11:17 AM    Status:  Signed :  Quin Cartagena ()     Consult Orders:    1. Social Work IP Consult [[id number]] ordered by Refugio Muniz MD at 11/06/17 0208                Social Work Services Progress Note    Hospital Day: 2  Date of Initial Social Work Evaluation:  Not  "completed  Collaborated with:  Team rounds, chart review    Data:    Pt was admitted with isolated seizures.    Intervention:    Noted SW consult for \"return to facility.\" Per chart review and conversation during rounds, pt lives in an assisted living facility, [facility]. No noted SW needs.    Assessment:   As above    Plan:    Anticipated Disposition:  Return to assisted living facility.    Barriers to d/c plan:  Pt is not medically stable.    Follow Up:  None. As no SW needs were identified, will sign off. RNCC will follow for d/c to home. Please re-consult SW should needs arise.      SAM Bonilla, [facility]  Adult ICU Clinical   Pager [phone][HS1.1]         Revision History        User Key Date/Time User Provider Type Action    > HS1.1 11/6/2017 11:19 AM Quin Cartagena  Sign                     Progress Notes - Physician (Notes from 11/04/17 through 11/07/17)      Progress Notes by Edilma Fitzgerald RN at 11/7/2017 11:18 AM     Author:  Edilma Fitzgerald RN Service:  Care Coordinator Author Type:  Care Coordinator    Filed:  11/7/2017 12:03 PM Date of Service:  11/7/2017 11:18 AM Creation Time:  11/7/2017 11:18 AM    Status:  Signed :  Edilma Fitzgerald RN (Care Coordinator)           Care Coordinator Progress Note     Admission Date/Time:  11/5/2017  Attending MD:  Dr Alfonso Morris     Data  Chart reviewed, discussed with interdisciplinary team. Pt transferred from ICU to 6A last evening. In 6A Discharge Rounds informed by Dr Valero pt is ready for discharge today. Pt is from an assisted living.     Patient was admitted for:    Seizure (H)  Pt found down at assisted living; lying beside his wheelchair; multiple abrasions. Most likely breakthrough seizure; hx of large right MCA stroke.      Past history includes: COPD; ETOH abuse; GERD; HTN; hyperlipidemia; depression; positive TB test; BPH. See H&P for complete history.    Concerns with insurance coverage for " discharge needs: None. Pt's insurance is Medicare & Ron MA.   Current Living Situation: Patient lives in an assisted living facility. [facility] in [place].   Support System: Supportive  Services Involved: Assisted Living  Transportation:  [facility] stretcher transport. Pt has [facility].   Barriers to Discharge: None    Coordination of Care and Referrals  Chart reviewed. In nursing notes noted pt used an electric w/c prior to admission. Pt mentioned getting a new w/c. Pt refusing some cares here.   Tried to meet pt but he was sleeping both times.   Called [facility] at [phone] [phone] and left a message for the nurse to call me back.   Spoke with pt's nurse, Jenny. Jenny spoke with nurse at assisted [facility] and they are OK with pt returning today.     Noted pt's insurance on the facesheet was Medicare. Called  Financial Counselor and she confirmed pt's MA is active; she entered it on the facesheet.  Called [facility] and scheduled stretcher transport for 2pm today back to [facility].     Received call back from nurse Celestina at [facility]. Confirmed pt can return there today. She requested DC orders be faxed before discharge. Fax:  [phone]. I faxed the H&P and DC orders.       Assessment  Ready for discharge back to assisted living.      Plan  Anticipated Discharge Date:  Today  Anticipated Discharge Plan:   Discharge back to [facility] today.   [facility] will transport via stretcher at 2pm.       [facility]  Phone:  [phone]  Fax:  [phone]      Gaby Fitzgerald RN Care Coordinator  Unit 6A, [facility][SJ1.1]               Revision History        User Key Date/Time User Provider Type Action    > SJ1.1 11/7/2017 12:03 PM Edilma Fitzgerald RN Care Coordinator Sign            Progress Notes by Fredrick Franklin at 11/6/2017  4:36 PM     Author:  Fredrick Franklin Service:  Spiritual Health Author Type:      Filed:  11/6/2017   4:39 PM Date of Service:  11/6/2017  4:36 PM Creation Time:  11/6/2017  4:36 PM    Status:  Signed :  Fredrick Franklin ()         SPIRITUAL HEALTH SERVICES Progress Note  [facility] 4A       DATA:    Initial visit with pt per [facility] referral as a [employer]. Pt welcomed [facility]S, but was very confused at that time. He stated he was brought to the hospital because he fell from his wheelchair.       INTERVENTION:    Consultation with pt's nurse; offered support, [employer] priests' availability, and blessing.       OUTCOME:    Pt was very confused during this visit.       PLAN:    Will follow up with pt another day while on ICU.                                                                                                                                             Father Fredrick Noemi   [JO1.1]       Revision History        User Key Date/Time User Provider Type Action    > JO1.1 11/6/2017  4:39 PM Fredrick Franklin Sign            Progress Notes by Abbie Olsen SLP at 11/6/2017 12:35 PM     Author:  Abbie Olsen SLP Service:  (none) Author Type:  Speech Language Pathologist    Filed:  11/6/2017 12:36 PM Date of Service:  11/6/2017 12:35 PM Creation Time:  11/6/2017 12:35 PM    Status:  Signed :  Abbie Olsen SLP (Speech Language Pathologist)            11/06/17 1214   General Information   Onset Date 11/05/17   Start of Care Date 11/06/17   Referring Physician Refugio Muniz MD   Patient Profile Review/OT: Additional Occupational Profile Info See Profile for full history and prior level of function   Patient/Family Goals Statement To eat and drink   Swallowing Evaluation Bedside swallow evaluation   Behaviorial Observations Combative/agitated;Confused;Impulsive;Lethargic   Mode of current nutrition NPO   Respiratory Status O2 Supply  (wheezing and diminished cough at baseline)   Type of O2 supply Nasal cannula  (2 LPM)   Comments David Peña is a 57-year-old male  with a PMH significant for COPD, hx of ETOH abuse, tobacco use, GERD, HTY, HLD, seizures, and (R) MCA stroke w/ residual deficits. Pt presents to hospital after being found down at his care facility. Per chart, fall suspected 2/2 seizure. Per chart review, pt previously worked with SLP; last seen in 8/2014 for a VFSS where pt exhibited a normal swallow. Recommended regular textures and thin liquids. Clinical interview limited on this date d/t pt cooperation. Pt endorsed ongoing tolerance of regular diet and thin liquids without difficulty. Pt was agitated at times during evaluation and demonstrated reduced cooperation.   Clinical Swallow Evaluation   Oral Musculature anomalies present;unable to assess due to poor participation/comprehension   Structural Abnormalities none present   Mucosal Quality dry   Oral Labial Strength and Mobility impaired retraction   Lingual Strength and Mobility impaired protrusion;impaired left lateral movement  (reduced ROM, slow movements)   Laryngeal Function Swallow;Voicing initiated   Oral Musculature Comments Residual (L) facial droop at baseline. Slow movements and reduced ROM, suspect d/t residual deficits and sleepiness. Exam limited d/t pt cooperation.    Additional Documentation Yes   Clinical Swallow Eval: Thin Liquid Texture Trial   Mode of Presentation, Thin Liquids spoon;cup   Volume of Liquid or Food Presented ice chip x1, single sips by cup x5 (~3 oz)    Oral Phase of Swallow Premature pharyngeal entry  (suspected at times)   Pharyngeal Phase of Swallow intact   Diagnostic Statement Suspected occasional premature spillage at times; however, swallow response generally prompt with consistent hyolaryngeal elevation. 1x delayed cough; however, cough noted at baseline. No additional s/sx of aspiration.    Clinical Swallow Eval: Solid Food Texture Trial   Mode of Presentation, Solid self-fed   Volume of Solid Food Presented 1/2 mello cracker square   Oral Phase, Solid WFL    Pharyngeal Phase, Solid intact   Diagnostic Statement Adequate mastication. Prompt swallow response with consistent hyolaryngeal elevation. Pt denied feeling of pharyngeal stasis. No overt s/sx of aspiration.    Swallow Compensations   Swallow Compensations Alternate viscosity of consistencies;Pacing;Reduce amounts   Results No difficulties noted   Esophageal Phase of Swallow   Patient reports or presents with symptoms of esophageal dysphagia Yes   Esophageal comments Per chart, hx of GERD.   General Therapy Interventions   Planned Therapy Interventions Dysphagia Treatment   Dysphagia treatment Instruction of safe swallow strategies;Compensatory strategies for swallowing   Swallow Eval: Clinical Impressions   Skilled Criteria for Therapy Intervention Skilled criteria met.  Treatment indicated.   Functional Assessment Scale (FAS) 5   Treatment Diagnosis Suspected mild oral dysphagia   Diet texture recommendations Regular diet;Thin liquids  (Supervision during meals)   Recommended Feeding/Eating Techniques alternate between small bites and sips of food/liquid;check mouth frequently for oral residue/pocketing;small sips/bites;other (see comments)  (Slow pace, supervision, regular oral cares)   Therapy Frequency (1-2x follow-up)   Predicted Duration of Therapy Intervention (days/wks) 1-2 days pending progress   Anticipated Discharge Disposition home   Risks and Benefits of Treatment have been explained. Yes   Patient, family and/or staff in agreement with Plan of Care Yes   Clinical Impression Comments Pt presents with suspected mild oral dysphagia; however, swallow is functional. Pt exhibits weakness, reduced ROM, and slow movements of oral musculature which are suspected residual deficits from prior CVA. Oral phase is functional despite ongoing deficits of oral musculature. Suspect occasional premature spillage of thin liquids. Pharyngeal phase is WFL. 1x delayed cough noted after swallow; however, pt exhibits cough  at baseline. Pt is at increased risk of aspiration given impulsivity and lethargy at times. Recommend initiate regular diet textures and thin liquids with supervision and safe swallowing strategies including upright and fully awake, slow pace, small bites/sips, alternate solids/liquids, and regular oral cares. SLP will continue to follow to ensure tolerance of diet. Suspect pt will meet swallowing goals prior to d/c.    Total Evaluation Time   Total Evaluation Time (Minutes) 15[RS1.1]        Revision History        User Key Date/Time User Provider Type Action    > RS1.1 11/6/2017 12:36 PM Raúl, BERTRAND Leiva Speech Language Pathologist Sign            Progress Notes by Mami Head RD at 11/6/2017 11:42 AM     Author:  Mami Head RD Service:  Nutrition Author Type:  Registered Dietitian    Filed:  11/6/2017 11:53 AM Date of Service:  11/6/2017 11:42 AM Creation Time:  11/6/2017 11:42 AM    Status:  Signed :  Mami Head RD (Registered Dietitian)         CLINICAL NUTRITION SERVICES - ASSESSMENT NOTE     Nutrition Prescription    RECOMMENDATIONS FOR MDs/PROVIDERS TO ORDER:  -If pt's diet cannot be advanced in the next 2-3 days, would recommend initiating nutrition support (enteral nutrition)    Malnutrition Status:    Unable to determine due to lack of nutrition hx    Recommendations already ordered by Registered Dietitian (RD):  None today    Future/Additional Recommendations:  -Once diet advances, if PO intake poor start kcal count and offer ONS  -If EN is indicated, would recommend: TwoCal HN @ 40 mL/hr (960 mL/day) to provide 1920 kcals (24 kcal/kg/day), 81 g PRO (1.0 g/kg/day), 672 mL H2O, 210 g CHO and 5 g Fiber daily. + 15 mL liquid MVI + 30 mL water flush q4h for tube patency     REASON FOR ASSESSMENT  David MANNY AldrichLópez is a/an 57 year old male assessed by the dietitian for Admission Nutrition Risk Screen for reduced oral intake over the last month    NUTRITION  "HISTORY  Unable to obtain r/t pt sleeping during visit with intermittent agitation    CURRENT NUTRITION ORDERS  Diet: NPO  Intake/Tolerance: N/A    LABS  Labs reviewed    MEDICATIONS  Vitamin D    ANTHROPOMETRICS  Height: 172.7 cm (5' 8\")  Most Recent Weight: 104.3 kg (229 lb 15 oz)    IBW: 70 kg  BMI: 34.96; Obesity Grade I BMI 30-34.9  Weight History:[CM1.1]   Wt Readings from Last 10 Encounters:   11/06/17 104.3 kg (229 lb 15 oz)   10/24/17 102.1 kg (225 lb)   09/08/17 99.8 kg (220 lb)   06/02/17 99.8 kg (220 lb)   05/03/17 74.8 kg (165 lb)   04/26/17 74.8 kg (165 lb)   03/13/17 74.8 kg (164 lb 14.5 oz)   01/10/17 74.8 kg (165 lb)   11/08/16 80.7 kg (178 lb)   07/26/16 77.1 kg (170 lb)[CM1.2]   No recent weight loss noted  Dosing Weight: 79 kg (adjusted based on lowest admit wt of 104.3 kg, IBW = 70 kg)    ASSESSED NUTRITION NEEDS  Estimated Energy Needs: 0485-3818 kcals/day (20 - 25 kcals/kg)  Justification: Obese  Estimated Protein Needs:  grams protein/day (1.0 - 1.5 grams of pro/kg)  Justification: Increased needs  Estimated Fluid Needs: (1 mL/kcal)   Justification: Per provider pending fluid status    PHYSICAL FINDINGS  See malnutrition section below.  No abnormal nutrition-related physical findings observed.     MALNUTRITION  % Intake: Unable to assess  % Weight Loss: None noted  Subcutaneous Fat Loss: None observed  Muscle Loss: None observed  Fluid Accumulation/Edema: Moderate  Malnutrition Diagnosis: Unable to determine due to lack of nutrition hx    NUTRITION DIAGNOSIS  Inadequate protein-energy intake related to AMS/lethargy as evidenced by NPO day 0, without nutrition plans in place      INTERVENTIONS  Implementation  Nutrition Education: Unable to complete due to pt sleeping   Collaboration with other providers  Enteral Nutrition - Recs     Goals  Diet adv v nutrition support within 2-3 days.  Patient to consume % of nutritionally adequate meal trays TID, or the equivalent with " supplements/snacks.     Monitoring/Evaluation  Progress toward goals will be monitored and evaluated per protocol.    Mami Head RDN, LD  Pgr: [id number][CM1.1]     Revision History        User Key Date/Time User Provider Type Action    > CM1.2 11/6/2017 11:53 AM Mami Head RD Registered Dietitian Sign     CM1.1 11/6/2017 11:42 AM Mami Head RD Registered Dietitian             Progress Notes by Miguel Valero MD at 11/6/2017 10:38 AM     Author:  Miguel Valero MD Service:  Neurology Author Type:  Resident    Filed:  11/6/2017 10:38 AM Date of Service:  11/6/2017 10:38 AM Creation Time:  11/6/2017 10:38 AM    Status:  Signed :  Miguel Valero MD (Resident)         Brief Neurology Note:    Patient seen and examined, agree with assessment and plan as detailed in H&P. Will get EEG today.     Alfonzo Valero  Neurology PGY-2[NM1.1]     Revision History        User Key Date/Time User Provider Type Action    > NM1.1 11/6/2017 10:38 AM Miguel Valero MD Resident Sign            Progress Notes by Robbie Mosher RN at 11/6/2017  2:00 AM     Author:  Robbie Mosher RN Service:  (none) Author Type:  Registered Nurse    Filed:  11/6/2017  3:53 AM Date of Service:  11/6/2017  2:00 AM Creation Time:  11/6/2017  3:52 AM    Status:  Signed :  Robbie Mosher RN (Registered Nurse)         Assessment changes from last charted ED assessment in flowsheets, however is correlating with report given by ED nurse prior to arrival on unit, as well as done in person. Neurology MD at bedside and states that the assessment upon arrival is similar to assessment that was done in ED, no new changes or interventions needed at this time.[RK1.1]     Revision History        User Key Date/Time User Provider Type Action    > RK1.1 11/6/2017  3:53 AM Robbie Mosher RN Registered Nurse Sign            Progress Notes by Robbie Mosher RN at 11/6/2017  1:50 AM     Author:  Robbie Mosher RN  "Service:  (none) Author Type:  Registered Nurse    Filed:  11/6/2017  2:54 AM Date of Service:  11/6/2017  1:50 AM Creation Time:  11/6/2017  2:53 AM    Status:  Signed :  Robbie Mosher RN (Registered Nurse)         Pt admitted to  4217 as 6A overflow patient. Neurology paged to come assess. Pt arrived via cart with House Float RN and ED RN. Patient lethargic, arouses to hard sternal rub. VSS.     See flowsheet for further details[RK1.1]     Revision History        User Key Date/Time User Provider Type Action    > RK1.1 11/6/2017  2:54 AM Robbie Mosher RN Registered Nurse Sign            ED Provider Notes by Kp Amezquita MD at 11/5/2017 11:18 PM     Author:  Kp Amezquita MD Service:  Emergency Medicine Author Type:  Physician    Filed:  11/6/2017  1:50 AM Date of Service:  11/5/2017 11:18 PM Creation Time:  11/6/2017  1:27 AM    Status:  Signed :  Kp Amezquita MD (Physician)           History     Chief Complaint   Patient presents with     Fall     HPI  David Dawn is a 57 year old male who presents emergency Department found down in his assisted living facility.  Patient is normally wheelchair bound and when staff attempted to access the patient he was stuck in the door.  EMS required to break the door to gain access to the patient.  At the time he was nonverbal and lying on the floor with multiple abrasions to his arms legs and face.  He is hemiplegic at baseline on the left side secondary to an old stroke and is wheelchair bound.    I have reviewed the Medications, Allergies, Past Medical and Surgical History, and Social History in the Epic system.    Review of Systems   All other systems reviewed and are negative.      Physical Exam   BP: 112/80  Pulse: 85  Heart Rate: 84 (Simultaneous filing. User may not have seen previous data.)  Temp: 97.7  F (36.5  C)  Resp: 16  Height: 172.7 cm (5' 8\")  Weight: 114.8 kg (253 lb)  SpO2: 94 %      Physical Exam "   Constitutional: He appears well-developed and well-nourished. No distress.   Patient is awake but nonverbal   HENT:   Head: Normocephalic and atraumatic.   Eyes: EOM are normal. Pupils are equal, round, and reactive to light. No scleral icterus.   Neck: Normal range of motion. Neck supple.   Cardiovascular: Normal rate.    Pulmonary/Chest: Effort normal. No respiratory distress.   Abdominal: Soft. There is no tenderness.   Neurological:   Patient opens eyes and is awake to sternal rub.  Nonverbal.  Left-sided hemiplegia secondary to old CVA   Skin: Skin is warm and dry. No rash noted. He is not diaphoretic. No erythema. No pallor.   Multiple abrasions to arms and knees       ED Course     ED Course     Procedures             EKG Interpretation:      Interpreted by Kp Amezquita  Time reviewed:   Symptoms at time of EKG: stroke like symptoms   Rhythm: normal sinus   Rate: normal  Axis: normal  Ectopy: none  Conduction: normal  ST Segments/ T Waves: No ST-T wave changes  Q Waves: none  Comparison to prior: No old EKG available    Clinical Impression: normal EKG            Critical Care time:  none     The patient has stroke symptoms:           ED Stroke specific documentation           NIHSS PDF          Protocol PDF     Patient last known well time: 2100  ED Provider first to bedside at: 2321  CT Results received at: 0004  Patient was not treated with TPA due to the following reason(s):  Seizure at onset      National Institutes of Health Stroke Scale (Baseline)  Time Performed: 0015      Score    Level of consciousness: (2)   Not alert; repeat stim to attend strong stim/pain to move    LOC questions: (2)   Answers neither question correctly    LOC commands: (1)   Performs one task correctly    Best gaze: (0)   Normal    Visual: (0)   No visual loss    Facial palsy: (1)   Minor paralysis (flat nasolabial fold, smile asymmetry)    Motor arm (left): (3)   No effort against gravity    Motor arm (right): (1)    Drift    Motor leg (left): (1)   Drift    Motor leg (right): (3)   No effort against gravity    Limb ataxia: (0)   Absent    Sensory: (0)   Normal- no sensory loss    Best language: (1)   Mild to moderate aphasia    Dysarthria: (0)   Normal    Extinction and inattention: (0)   No abnormality        Total Score:  15        Stroke Mimics were considered (including migraine headache, seizure disorder, hypoglycemia (or hyperglycemia), head or spinal trauma, CNS infection, Toxin ingestion and shock state (e.g. sepsis) .                       Labs Ordered and Resulted from Time of ED Arrival Up to the Time of Departure from the ED   GLUCOSE BY METER - Abnormal; Notable for the following:        Result Value    Glucose 164 (*)     All other components within normal limits   BASIC METABOLIC PANEL - Abnormal; Notable for the following:     Glucose 146 (*)     Calcium 8.4 (*)     All other components within normal limits   CBC WITH PLATELETS - Abnormal; Notable for the following:     RBC Count 3.74 (*)     Hemoglobin 13.0 (*)      (*)     MCH 34.8 (*)     All other components within normal limits   VALPROIC ACID - Abnormal; Notable for the following:     Valproic Acid Level 123 (*)     All other components within normal limits   INR   TROPONIN I   CREATININE POCT   TROPONIN POCT           Results for orders placed or performed during the hospital encounter of 11/05/17   CT Head Neck Angio w/o & w Contrast    Narrative       1. Unchanged chronic right MCA territory infarction.  2. Noncontrast head CT demonstrates no acute intracranial hemorrhage.  3. Head CTA demonstrates high-grade atherosclerotic narrowing of the  left internal carotid artery in the cavernous segment.  4. 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   CT Cervical Spine w/o Contrast    Narrative    No substantial change in the mild  multilevel cervical spondylosis  since 2015..   Glucose by meter   Result Value Ref Range    Glucose 164 (H) 70 - 99 mg/dL   Basic metabolic panel   Result Value Ref Range    Sodium 141 133 - 144 mmol/L    Potassium 4.6 3.4 - 5.3 mmol/L    Chloride 106 94 - 109 mmol/L    Carbon Dioxide 23 20 - 32 mmol/L    Anion Gap 12 3 - 14 mmol/L    Glucose 146 (H) 70 - 99 mg/dL    Urea Nitrogen 14 7 - 30 mg/dL    Creatinine 1.00 0.66 - 1.25 mg/dL    GFR Estimate 76 >60 mL/min/1.7m2    GFR Estimate If Black >90 >60 mL/min/1.7m2    Calcium 8.4 (L) 8.5 - 10.1 mg/dL   CBC with platelets   Result Value Ref Range    WBC 6.3 4.0 - 11.0 10e9/L    RBC Count 3.74 (L) 4.4 - 5.9 10e12/L    Hemoglobin 13.0 (L) 13.3 - 17.7 g/dL    Hematocrit 41.0 40.0 - 53.0 %     (H) 78 - 100 fl    MCH 34.8 (H) 26.5 - 33.0 pg    MCHC 31.7 31.5 - 36.5 g/dL    RDW 13.7 10.0 - 15.0 %    Platelet Count 162 150 - 450 10e9/L   INR   Result Value Ref Range    INR 1.01 0.86 - 1.14   Troponin I   Result Value Ref Range    Troponin I ES <0.015 0.000 - 0.045 ug/L   Creatinine POCT   Result Value Ref Range    Creatinine 1.1 0.66 - 1.25 mg/dL    GFR Estimate 69 >60 mL/min/1.7m2    GFR Estimate If Black 83 >60 mL/min/1.7m2   Valproic acid   Result Value Ref Range    Valproic Acid Level 123 (H) 50 - 100 mg/L   Troponin POCT   Result Value Ref Range    Troponin I 0.00 0.00 - 0.10 ug/L         Assessments & Plan (with Medical Decision Making)   This 57-year-old male coming into the emergency room for being found down in his assisted living center.  He was trapped on the other side of the door in his room.  EMS needed to break the door to obtain access to the patient.  He is found to have multiple abrasions to his arms face and knees.  He was a phasic at the time and is the spine able to speak with left-sided hemiplegia secondary to an old CVA.  He also has a significant past medical history of seizures.  Initial evaluation found and an awake patient who is a phasic  with left-sided hemiplegia.  And a stroke code was immediately called.  Patient was emergently sent to CT scan with stroke team at the bedside.  At this time after evaluation he was found to have an old stroke but appears to have more symptoms of seizure.  He was most likely a postictal state and is much more arousable on reassessment.  All labs are reviewed.  Neurology at this time will admit to their service for continued care and management.    I have reviewed the nursing notes.    I have reviewed the findings, diagnosis, plan and need for follow up with the patient.    New Prescriptions    No medications on file       Final diagnoses:   Seizure (H)       11/5/2017   [facility], [place], EMERGENCY DEPARTMENT[JP1.1]     Kp Amezquita MD  11/06/17 0150  [JP1.2]     Revision History        User Key Date/Time User Provider Type Action    > JP1.2 11/6/2017  1:50 AM Kp Amezquita MD Physician Sign     JP1.1 11/6/2017  1:27 AM Kp Amezquita MD Physician             ED Notes by Alcira Pinto RN at 11/6/2017  1:24 AM     Author:  Alcira Pinto RN Service:  Emergency Medicine Author Type:  Registered Nurse    Filed:  11/6/2017  1:24 AM Date of Service:  11/6/2017  1:24 AM Creation Time:  11/6/2017  1:24 AM    Status:  Signed :  Alcira Pinto RN (Registered Nurse)         Ok to remove C collar per Dr. Amezquita. C collar removed at this time.[LM1.1]      Revision History        User Key Date/Time User Provider Type Action    > LM1.1 11/6/2017  1:24 AM Alcira Pinto RN Registered Nurse Sign            ED Notes by Alcira Pinto RN at 11/5/2017 11:18 PM     Author:  Alcira Pinto RN Service:  Emergency Medicine Author Type:  Registered Nurse    Filed:  11/6/2017 12:44 AM Date of Service:  11/5/2017 11:18 PM Creation Time:  11/5/2017 11:18 PM    Status:  Addendum :  Alcira Pinto RN (Registered Nurse)         From assisted living, wheelchair bound, had unwitnessed fall out of  wheel chair. Patient was found face down on ground. Staff at assisted living does not think that the patient had any loss of consciousness. Staff at assisted living feel that the patient is more lethargic than he normally is.[LM1.1] Cervical collar placed on patient upon arrival to ED.[LM1.2]      Revision History        User Key Date/Time User Provider Type Action    > LM1.2 11/6/2017 12:44 AM Alcira Pinto RN Registered Nurse Addend     LM1.1 11/5/2017 11:22 PM Alcira Pinto RN Registered Nurse Sign            ED Notes by Alcira Pinto RN at 11/6/2017 12:38 AM     Author:  Alcira Pinto RN Service:  Emergency Medicine Author Type:  Registered Nurse    Filed:  11/6/2017 12:43 AM Date of Service:  11/6/2017 12:38 AM Creation Time:  11/6/2017 12:38 AM    Status:  Signed :  Alcira Pinto RN (Registered Nurse)         Stroke code initiated by Dr. Amezquita at 2330. Neuro MD, Belem RN to bedside at 2331, lab at 2332, Vascular at 2337. Patient to CT scan with belem RN at 2334. Patient returned to ED from CT scan at 0000. Stoke code de-escalated at 1215 by Neuro MD, neuro checks to be completed per routine of unit.[LM1.1]      Revision History        User Key Date/Time User Provider Type Action    > LM1.1 11/6/2017 12:43 AM Alcira Pinto RN Registered Nurse Sign            Progress Notes by Tay Patel RN at 11/5/2017 11:30 PM     Author:  Jorge, Tay S, RN Service:  Nursing Author Type:  Registered Nurse    Filed:  11/6/2017 12:26 AM Date of Service:  11/5/2017 11:30 PM Creation Time:  11/6/2017 12:22 AM    Status:  Signed :  Tay Patel RN (Registered Nurse)           Stroke Code Nurse-Responder Note    Arrival Time to Stroke Code: 2330    Stroke Code Team interventions:   18 gauge IV placed.  CT/CTA done    ED/Bedside Nurse providing handoff: Alcira LEWIS    Time left for CT: 2333    Time arrived to next location (ED/Unit/IR):  8059    ED/Bedside Nurse given handoff (name/time): Alcira  JOSHUA  Stroke code deescalation at 00:15, no TPA given.[BU1.1]     Tay Patel RN[BU1.2]           Revision History        User Key Date/Time User Provider Type Action    > BU1.2 11/6/2017 12:26 AM Tay Patel, RN Registered Nurse Sign     BU1.1 11/6/2017 12:22 AM Tay Patel RN Registered Nurse                   Procedure Notes     No notes of this type exist for this encounter.      Progress Notes - Therapies (Notes from 11/04/17 through 11/07/17)     No notes of this type exist for this encounter.

## 2017-11-06 PROBLEM — R56.9 SEIZURE (H): Status: ACTIVE | Noted: 2017-01-01

## 2017-11-06 NOTE — PHARMACY
Pharmacy Stroke Code Response  Pharmacist responded as part of the Stroke Code Team activation to the Emergency Department.  The Stroke Team determined that the patient was not a candidate for IV alteplase therapy and the pharmacy team was dismissed at 0012.    Laquita WelchD.

## 2017-11-06 NOTE — PHARMACY-ADMISSION MEDICATION HISTORY
Admission medication history interview status for the 11/5/2017 admission is complete. See Epic admission navigator for allergy information, pharmacy, prior to admission medications and immunization status.     Medication history interview sources:  [facility] (spoke with Janina WANG)    Changes made to PTA medication list (reason)  Added: Aquaphor, divalproex 250mg tabs, Sarna lotion, Aveeno lotion  Deleted: Lidocaine 2% jelly (not on MAR)  Changed:    -Quetiapine 150mg BID to 200mg BID   -Gabapentin 3QD to 1TID   -Oxycodone 1QID to 1TID   -Duoneb Q6h to TID    Additional medication history information (including reliability of information, actions taken by pharmacist):   -Received all medication information from [facility] with the help of FELIPE Roa   -[facility] lists Tylenol for allergies --> this was removed from patient's allergy list during his 4/26/17 admission and patient has been taking somewhat frequently since then per facility's MAR (has taken 8 times from 10/25/17-11/5/17)   -Patient's PCP is Dr. Krishnan at [phone]   -Patient's general medication schedule at the facility is 8AM/8PM with the exception of oxycodone (8AM and either 6PM or 10PM)    Prior to Admission medications    Medication Sig Last Dose Taking? Auth Provider   mineral oil-hydrophilic petrolatum (AQUAPHOR) Apply topically as needed Apply daily to left wound. 11/5/2017 at 2000 Yes Unknown, Entered By History   divalproex (DEPAKOTE) 250 MG EC tablet Take 250 mg by mouth 2 times daily 11/5/2017 at 2000 Yes Unknown, Entered By History   Sunscreens (AVEENO DAILY MOISTURIZER) LOTN Apply daily to hands and ears for dry skin. 11/5/2017 at 0800 Yes Unknown, Entered By History   oxyCODONE (ROXICODONE) 5 MG IR tablet Take 1 tablet (5 mg) by mouth 4 times daily  Patient taking differently: Take 5 mg by mouth 3 times daily  11/5/2017 at 1800 Yes Len Krishnan MD   loperamide (IMODIUM) 2 MG capsule Take  1 capsule (2 mg) by mouth 2 times daily TAKE 2 CAPSULE PO BID; AND MAY TAKE 1 CAPSULE PO QID PRN  Patient taking differently: Take 4 mg by mouth 2 times daily TAKE 2 CAPSULE PO BID; AND MAY TAKE 1 CAPSULE PO QID PRN 11/5/2017 at 2000 Yes Len Krishnan MD   gabapentin (NEURONTIN) 600 MG tablet TAKE THREE TABLETS (1800MG) BY MOUTH ONCE DAILY  Patient taking differently: 600 mg 3 times daily TAKE THREE TABLETS (1800MG) BY MOUTH ONCE DAILY 11/5/2017 at 2000 Yes Len Krishnan MD   levETIRAcetam (KEPPRA) 500 MG tablet Take 1 tablet (500 mg) by mouth 2 times daily 11/5/2017 at 2000 Yes Len Krishnan MD   simvastatin (ZOCOR) 40 MG tablet TAKE 1 TABLET BY MOUTH AT BEDTIME 11/5/2017 at 2000 Yes Len Krishnan MD   baclofen (LIORESAL) 20 MG tablet TAKE 1 TABLET BY MOUTH THREE TIMES DAILY 11/5/2017 at 2000 Yes Len Krishnan MD   divalproex (DEPAKOTE) 500 MG EC tablet TAKE THREE TABLETS (1500MG) BY MOUTH TWICE DAILY (TAKE WITH 250MG FOR A TOTAL OF 1750MG) 11/5/2017 at 2000 Yes Len Krishnan MD   carBAMazepine (TEGRETOL) 200 MG tablet TAKE 1 TABLET BY MOUTH THREE TIMES DAILY 11/5/2017 at 2000 Yes Len Krishnan MD   MAPAP 325 MG tablet TAKE TWO TABLETS (650MG) BY MOUTH EVERY 4 HOURS AS NEEDED FOR PAIN 11/4/2017 at Unknown Yes Len Krishnan MD   clopidogrel (PLAVIX) 75 MG tablet Take 1 tablet (75 mg) by mouth daily 11/5/2017 at 0800 Yes Len Krishnan MD   vitamin D (ERGOCALCIFEROL) 44864 UNIT capsule TAKE ONE CAPSULE BY MOUTH ONCE WEEKLY ON MONDAY 10/30/2017 at 0800 Yes Len Krishnan MD   amLODIPine (NORVASC) 10 MG tablet TAKE 1 TABLET BY MOUTH ONCE DAILY 11/5/2017 at 0800 Yes Len Krishnan MD   furosemide (LASIX) 20 MG tablet TAKE 1 TABLET BY MOUTH ONCE DAILY 11/5/2017 at 0800 Yes Len Krishnan MD   niacin 500 MG CR capsule TAKE 1 CAPSULE BY MOUTH AT BEDTIME 11/5/2017 at 0800 Yes Len Krishnan MD   potassium chloride (K-TAB,KLOR-CON) 10 MEQ tablet TAKE 1  TABLET BY MOUTH ONCE DAILY 11/5/2017 at 0800 Yes Len Krishnan MD   tamsulosin (FLOMAX) 0.4 MG capsule TAKE 1 CAPSULE BY MOUTH ONCE DAILY 11/5/2017 at 0800 Yes Len Krishnan MD   budesonide (PULMICORT) 0.25 MG/2ML neb solution NEBULIZE THE CONTENT OF 1 VIAL TWICE DAILY FOR COPD 11/5/2017 at 2000 Yes Len Krishnan MD   ipratropium - albuterol 0.5 mg/2.5 mg/3 mL (DUONEB) 0.5-2.5 (3) MG/3ML neb solution NEBULIZE THE CONTENT OF 1 VIAL THREE TIMES DAILY;AND MAY NEBULIZE THE CONTENT OF 1 VIAL AS NEEDED FOR SHORTNESS OF BREATH 11/5/2017 at 2000 Yes Len Krishnan MD   citalopram (CELEXA) 20 MG tablet Take 20 mg by mouth daily 11/5/2017 at 0800 Yes Reported, Patient   QUEtiapine Fumarate (SEROQUEL PO) Take 200 mg by mouth 2 times daily  11/5/2017 at 2000 Yes Reported, Patient   pramoxine (SARNA SENSITIVE) 1 % LOTN lotion as needed for itching One application as needed for itchy skin on posterior ears and hands.   Unknown, Entered By History   order for [facility] wheelchair   Len Krishnan MD   levalbuterol (XOPENEX HFA) 45 MCG/ACT Inhaler Inhale 2 puffs into the lungs every 6 hours as needed for shortness of breath / dyspnea or wheezing   Maile Blankenship MD   diclofenac (VOLTAREN) 1 % GEL topical gel Place 2 g onto the skin 2 times daily as needed Apply 4 grams to knees or 2 grams to hands   Len Krishnan MD   Respiratory Therapy Supplies (NEBULIZER/ADULT MASK) KIT 1 Device 4 times daily.   Len Krishnan MD     Medication history completed by: Roma Cantu, PD4 Student

## 2017-11-06 NOTE — MR AVS SNAPSHOT
After Visit Summary   11/6/2017    David Dawn    MRN: [id number]           Patient Information     Date Of Birth          1960        Visit Information        Provider Department      11/6/2017 11:30 AM [facility] EEG TECH 4 [facility] EEG        Today's Diagnoses     Seizure (H)    -  1       Follow-ups after your visit        Future tests that were ordered for you today     Open Standing Orders        Priority Remaining Interval Expires Ordered    Basic metabolic panel Routine 1/1 AM DRAW  11/6/2017    CBC (AM Draw) Routine 1/1 AM DRAW  11/6/2017    Phosphorus Routine 100/100 CONDITIONAL (SPECIFY)  11/6/2017    Potassium Routine 100/100 CONDITIONAL (SPECIFY)  11/6/2017    Magnesium Routine 100/100 CONDITIONAL (SPECIFY)  11/6/2017    Glucose - Diabetes STAT 1/1 CONDITIONAL X 1 STAT  11/6/2017    Notify Physician - Diabetes - IF patient is on TPN or tube feeding which is held or cycled and patient on continuous insulin infusion.  Routine [id number] PRN  11/6/2017    Notify Physician - Diabetes - when blood glucose has stabilized and patient is tolerating PO intake, Call MD for transition to SQ insulin. Routine [id number] PRN  11/6/2017    Glucose monitor nursing POCT Routine [id number] PRN  11/6/2017    Glucose monitor nursing POCT Routine [id number] PRN  11/6/2017    Notify Provider Routine [id number] PRN  11/6/2017    Pulse oximetry nursing Routine [id number] PRN  11/6/2017    Oxygen: Oxygen mask Routine [id number] PRN  11/6/2017    CBC with platelets Routine 15/16 AM DRAW  11/6/2017    Comprehensive metabolic panel Routine 15/16 AM DRAW  11/6/2017            Who to contact     Please call your clinic at [phone] to:    Ask questions about your health    Make or cancel appointments    Discuss your medicines    Learn about your test results    Speak to your doctor   If you have compliments or concerns about an experience at your clinic, or if you wish to file a complaint, please contact  [facility] Patient Relations at [phone] or email us at [email or web address]         Additional Information About Your Visit        MyChart Information     [employer] is an electronic gateway that provides easy, online access to your medical records. With [employer], you can request a clinic appointment, read your test results, renew a prescription or communicate with your care team.     To sign up for [employer] visit the website at [email or web address]   You will be asked to enter the access code listed below, as well as some personal information. Please follow the directions to create your username and password.     Your access code is: [email or web address]  Expires: 2017  2:56 PM     Your access code will  in 90 days. If you need help or a new code, please contact your [facility] or call [phone] for assistance.        Care EveryWhere ID     This is your Care EveryWhere ID. This could be used by other organizations to access your [facility] medical records  [id number]         Blood Pressure from Last 3 Encounters:   17 107/56   10/24/17 116/60   10/02/17 114/66    Weight from Last 3 Encounters:   17 104.3 kg (229 lb 15 oz)   10/24/17 102.1 kg (225 lb)   17 99.8 kg (220 lb)              Today, you had the following     No orders found for display         Today's Medication Changes      Notice     This visit is during an admission. Changes to the med list made in this visit will be reflected in the After Visit Summary of the admission.             Primary Care Provider Office Phone # Fax #    Len Krishnan [phone]       [address] [facility] [address]        Equal Access to Services     NEY NEWELL : Romario Swan, walorraine garza, qadipti kaaltrent nova, deny pace. So [facility] [phone].    ATENCIÓN: Si kimberly reece, marv falk costello  disposición servicios gratuitos de asistencia lingüística. Hood kaur [phone].    We comply with applicable federal civil rights laws and [place] laws. We do not discriminate on the basis of race, color, national origin, age, disability, sex, sexual orientation, or gender identity.            Thank you!     Thank you for choosing [facility]  for your care. Our goal is always to provide you with excellent care. Hearing back from our patients is one way we can continue to improve our services. Please take a few minutes to complete the written survey that you may receive in the mail after your visit with us. Thank you!             Your Updated Medication List - Protect others around you: Learn how to safely use, store and throw away your medicines at www.disposemymeds.org.      Notice     This visit is during an admission. Changes to the med list made in this visit will be reflected in the After Visit Summary of the admission.

## 2017-11-06 NOTE — PROGRESS NOTES
Stroke Code Nurse-Responder Note    Arrival Time to Stroke Code: 2330    Stroke Code Team interventions:   18 gauge IV placed.  CT/CTA done    ED/Bedside Nurse providing handoff: Alcira LEWIS    Time left for CT: 2333    Time arrived to next location (ED/Unit/IR):  2355    ED/Bedside Nurse given handoff (name/time): Alcira LEWIS  Stroke code deescalation at 00:15, no TPA given.     Tay Patel RN

## 2017-11-06 NOTE — PROGRESS NOTES
Pt admitted to Rm 4217 as 6A overflow patient. Neurology paged to come assess. Pt arrived via cart with House Float RN and ED RN. Patient lethargic, arouses to hard sternal rub. VSS.     See flowsheet for further details

## 2017-11-06 NOTE — PROGRESS NOTES
"CLINICAL NUTRITION SERVICES - ASSESSMENT NOTE     Nutrition Prescription    RECOMMENDATIONS FOR MDs/PROVIDERS TO ORDER:  -If pt's diet cannot be advanced in the next 2-3 days, would recommend initiating nutrition support (enteral nutrition)    Malnutrition Status:    Unable to determine due to lack of nutrition hx    Recommendations already ordered by Registered Dietitian (RD):  None today    Future/Additional Recommendations:  -Once diet advances, if PO intake poor start kcal count and offer ONS  -If EN is indicated, would recommend: TwoCal HN @ 40 mL/hr (960 mL/day) to provide 1920 kcals (24 kcal/kg/day), 81 g PRO (1.0 g/kg/day), 672 mL H2O, 210 g CHO and 5 g Fiber daily. + 15 mL liquid MVI + 30 mL water flush q4h for tube patency     REASON FOR ASSESSMENT  David Dawn is a/an 57 year old male assessed by the dietitian for Admission Nutrition Risk Screen for reduced oral intake over the last month    NUTRITION HISTORY  Unable to obtain r/t pt sleeping during visit with intermittent agitation    CURRENT NUTRITION ORDERS  Diet: NPO  Intake/Tolerance: N/A    LABS  Labs reviewed    MEDICATIONS  Vitamin D    ANTHROPOMETRICS  Height: 172.7 cm (5' 8\")  Most Recent Weight: 104.3 kg (229 lb 15 oz)    IBW: 70 kg  BMI: 34.96; Obesity Grade I BMI 30-34.9  Weight History:   Wt Readings from Last 10 Encounters:   11/06/17 104.3 kg (229 lb 15 oz)   10/24/17 102.1 kg (225 lb)   09/08/17 99.8 kg (220 lb)   06/02/17 99.8 kg (220 lb)   05/03/17 74.8 kg (165 lb)   04/26/17 74.8 kg (165 lb)   03/13/17 74.8 kg (164 lb 14.5 oz)   01/10/17 74.8 kg (165 lb)   11/08/16 80.7 kg (178 lb)   07/26/16 77.1 kg (170 lb)   No recent weight loss noted  Dosing Weight: 79 kg (adjusted based on lowest admit wt of 104.3 kg, IBW = 70 kg)    ASSESSED NUTRITION NEEDS  Estimated Energy Needs: 1169-9474 kcals/day (20 - 25 kcals/kg)  Justification: Obese  Estimated Protein Needs:  grams protein/day (1.0 - 1.5 grams of pro/kg)  Justification: " Increased needs  Estimated Fluid Needs: (1 mL/kcal)   Justification: Per provider pending fluid status    PHYSICAL FINDINGS  See malnutrition section below.  No abnormal nutrition-related physical findings observed.     MALNUTRITION  % Intake: Unable to assess  % Weight Loss: None noted  Subcutaneous Fat Loss: None observed  Muscle Loss: None observed  Fluid Accumulation/Edema: Moderate  Malnutrition Diagnosis: Unable to determine due to lack of nutrition hx    NUTRITION DIAGNOSIS  Inadequate protein-energy intake related to AMS/lethargy as evidenced by NPO day 0, without nutrition plans in place      INTERVENTIONS  Implementation  Nutrition Education: Unable to complete due to pt sleeping   Collaboration with other providers  Enteral Nutrition - Recs     Goals  Diet adv v nutrition support within 2-3 days.  Patient to consume % of nutritionally adequate meal trays TID, or the equivalent with supplements/snacks.     Monitoring/Evaluation  Progress toward goals will be monitored and evaluated per protocol.    Mami Head RDN, LD  Pgr: [id number]

## 2017-11-06 NOTE — PROGRESS NOTES
SPIRITUAL HEALTH SERVICES Progress Note  [facility] 4A       DATA:    Initial visit with pt per [facility] referral as a [employer]. Pt welcomed SHS, but was very confused at that time. He stated he was brought to the hospital because he fell from his wheelchair.       INTERVENTION:    Consultation with pt's nurse; offered support, [employer] priests' availability, and blessing.       OUTCOME:    Pt was very confused during this visit.       PLAN:    Will follow up with pt another day while on ICU.                                                                                                                                             Father Fredrick Hazel

## 2017-11-06 NOTE — PLAN OF CARE
"Problem: Patient Care Overview  Goal: Plan of Care/Patient Progress Review  Outcome: No Change  S:Patient admitted this shift from ED. Reported fall at assisted living, pushed assistance button, EMS found him on the floor at base of wheelchair on knees and face on floor. ED did stroke work up, CT was negative. C-collar placed upon arrival, ED physician cleared patient and arrived on unit without c-collar present.  B: history of seizures, past CVA with left side deficits significant - wheelchair bound at baseline, COPD, HTN, GERD  A: Neuro: varies - patient will be extremely lethargic requiring hard sternal rubs, will also be shouting at times. Disoriented x 4, not following commands or answering appropriately. Did tell MD on admission to \"call me Gage, not David\". Attempts to strike out at staff at times. Was able to state last name and  once since admission. Pupils are large 5-6, brisk reaction with size constricting to a 4. Per discussion with Neurology MD and note, plan to increase home med dose and suspecting seizure breakthrough. Due to patient NPO for decreased LOC, Keppra loading dose ordered and given IV, will further discuss plan with morning rounds. Cardiac: sinus bud. BP WNL, goal SBP <180, no interventions needed. Resp: chest xray done. Patient sats in high 80s due to snoring, apnea frequently. sats maintained with oxiplus mask at 4L. Wheezing noted, gurgling/snoring with expiration. GI: Keep NPO due to decreased LOC. No Bm, unknown prior. : incontinent brief on from home, has not voided since arrival, urine sample still needed, urinal in place in hopes to collect sample. Skin abrasions to bilateral lower legs and a small abrasion on his head - most likely a rug burn. Abrasions all cleansed with soap and water and vaseline barrier applied, MD then ordered bacitracin.     Plan: Neurology to discuss plan on morning rounds and be in contact with patient's sister - paperwork from assisted living " has information listed for contacts. Patient DNR/DNI per advanced directive from assisted living    Problem: Fall Risk (Adult)  Goal: Identify Related Risk Factors and Signs and Symptoms  Related risk factors and signs and symptoms are identified upon initiation of Human Response Clinical Practice Guideline (CPG).  Outcome: Therapy, progress toward functional goals as expected  No falls this shift    Problem: Wound (Includes Pressure Injury) (Adult)  Intervention: Prevent/Manage Infection  Bilateral lower leg wounds were cleansed with soap and water and moisture barrier applied. No active indicators for infection

## 2017-11-06 NOTE — PLAN OF CARE
Problem: Patient Care Overview  Goal: Plan of Care/Patient Progress Review  Discharge Planner SLP   Patient plan for discharge: return to care facility   Current status: Clinical swallow eval completed per MD request. Pt presents with suspected mild oral dysphagia; however, swallow is functional.  Oral phase is functional despite suspected residual deficits of oral musculature from prior CVA. Suspect occasional premature spillage of thin liquids. Pharyngeal phase is WFL. 1x delayed cough noted after swallow; however, pt exhibits cough at baseline. Pt is at increased risk of aspiration given impulsivity and lethargy at times. Recommend initiate regular diet textures and thin liquids with supervision and safe swallowing strategies including upright and fully awake, slow pace, small bites/sips, alternate solids/liquids, and regular oral cares. SLP will continue to follow to ensure tolerance of diet. Suspect pt will meet swallowing goals prior to d/c.   Barriers to return to prior living situation: impulsivity, lethargy, tolerance of diet, medical status  Recommendations for discharge: return to care facility; suspect pt will meet SLP goals prior to d/c   Rationale for recommendations: need to demonstrate tolerance of diet and appropriate use of swallowing strategies without cueing       Entered by: Abbie Olsen 11/06/2017 12:38 PM

## 2017-11-06 NOTE — PROGRESS NOTES
11/06/17 1214   General Information   Onset Date 11/05/17   Start of Care Date 11/06/17   Referring Physician Refugio Muniz MD   Patient Profile Review/OT: Additional Occupational Profile Info See Profile for full history and prior level of function   Patient/Family Goals Statement To eat and drink   Swallowing Evaluation Bedside swallow evaluation   Behaviorial Observations Combative/agitated;Confused;Impulsive;Lethargic   Mode of current nutrition NPO   Respiratory Status O2 Supply  (wheezing and diminished cough at baseline)   Type of O2 supply Nasal cannula  (2 LPM)   Comments David Peña is a 57-year-old male with a PMH significant for COPD, hx of ETOH abuse, tobacco use, GERD, HTY, HLD, seizures, and (R) MCA stroke w/ residual deficits. Pt presents to hospital after being found down at his care facility. Per chart, fall suspected 2/2 seizure. Per chart review, pt previously worked with SLP; last seen in 8/2014 for a VFSS where pt exhibited a normal swallow. Recommended regular textures and thin liquids. Clinical interview limited on this date d/t pt cooperation. Pt endorsed ongoing tolerance of regular diet and thin liquids without difficulty. Pt was agitated at times during evaluation and demonstrated reduced cooperation.   Clinical Swallow Evaluation   Oral Musculature anomalies present;unable to assess due to poor participation/comprehension   Structural Abnormalities none present   Mucosal Quality dry   Oral Labial Strength and Mobility impaired retraction   Lingual Strength and Mobility impaired protrusion;impaired left lateral movement  (reduced ROM, slow movements)   Laryngeal Function Swallow;Voicing initiated   Oral Musculature Comments Residual (L) facial droop at baseline. Slow movements and reduced ROM, suspect d/t residual deficits and sleepiness. Exam limited d/t pt cooperation.    Additional Documentation Yes   Clinical Swallow Eval: Thin Liquid Texture Trial   Mode of Presentation, Thin  Liquids spoon;cup   Volume of Liquid or Food Presented ice chip x1, single sips by cup x5 (~3 oz)    Oral Phase of Swallow Premature pharyngeal entry  (suspected at times)   Pharyngeal Phase of Swallow intact   Diagnostic Statement Suspected occasional premature spillage at times; however, swallow response generally prompt with consistent hyolaryngeal elevation. 1x delayed cough; however, cough noted at baseline. No additional s/sx of aspiration.    Clinical Swallow Eval: Solid Food Texture Trial   Mode of Presentation, Solid self-fed   Volume of Solid Food Presented 1/2 mello cracker square   Oral Phase, Solid WFL   Pharyngeal Phase, Solid intact   Diagnostic Statement Adequate mastication. Prompt swallow response with consistent hyolaryngeal elevation. Pt denied feeling of pharyngeal stasis. No overt s/sx of aspiration.    Swallow Compensations   Swallow Compensations Alternate viscosity of consistencies;Pacing;Reduce amounts   Results No difficulties noted   Esophageal Phase of Swallow   Patient reports or presents with symptoms of esophageal dysphagia Yes   Esophageal comments Per chart, hx of GERD.   General Therapy Interventions   Planned Therapy Interventions Dysphagia Treatment   Dysphagia treatment Instruction of safe swallow strategies;Compensatory strategies for swallowing   Swallow Eval: Clinical Impressions   Skilled Criteria for Therapy Intervention Skilled criteria met.  Treatment indicated.   Functional Assessment Scale (FAS) 5   Treatment Diagnosis Suspected mild oral dysphagia   Diet texture recommendations Regular diet;Thin liquids  (Supervision during meals)   Recommended Feeding/Eating Techniques alternate between small bites and sips of food/liquid;check mouth frequently for oral residue/pocketing;small sips/bites;other (see comments)  (Slow pace, supervision, regular oral cares)   Therapy Frequency (1-2x follow-up)   Predicted Duration of Therapy Intervention (days/wks) 1-2 days pending  progress   Anticipated Discharge Disposition home   Risks and Benefits of Treatment have been explained. Yes   Patient, family and/or staff in agreement with Plan of Care Yes   Clinical Impression Comments Pt presents with suspected mild oral dysphagia; however, swallow is functional. Pt exhibits weakness, reduced ROM, and slow movements of oral musculature which are suspected residual deficits from prior CVA. Oral phase is functional despite ongoing deficits of oral musculature. Suspect occasional premature spillage of thin liquids. Pharyngeal phase is WFL. 1x delayed cough noted after swallow; however, pt exhibits cough at baseline. Pt is at increased risk of aspiration given impulsivity and lethargy at times. Recommend initiate regular diet textures and thin liquids with supervision and safe swallowing strategies including upright and fully awake, slow pace, small bites/sips, alternate solids/liquids, and regular oral cares. SLP will continue to follow to ensure tolerance of diet. Suspect pt will meet swallowing goals prior to d/c.    Total Evaluation Time   Total Evaluation Time (Minutes) 15

## 2017-11-06 NOTE — H&P
[facility]: [place]  Neurology History and Physical    Patient Name:  David Dawn  MRN:  [id number]    :  1960  Date of Admission:  2017  Date of Service:  2017  Primary care provider:  Len Krishnan      Chief Complaint:  Found down    History of Present Illness:   57 year old male smoker h/o R MCA stroke and localization-related epilepsy who presents from nursing home found down. The patient was in his usual state of health on the evening of admission at 21:30. Around 2300, the patient was found lying on the ground beside his wheelchair with multiple abrasions. Upon arrival, the patient was very somnolent but moving his R side. With sternal rub, the patient began to curse and speak intelligibly but then fall back asleep. CT/CTA not clearly different than before.     ROS: A 10-point ROS unable to be performed 2/2 diminished mental status.    PMH:  Past Medical History:   Diagnosis Date     BPH (benign prostatic hyperplasia) 9/15/2011     COPD (chronic obstructive pulmonary disease) (H) 9/15/2011     Edema      ETOH abuse      GERD (gastroesophageal reflux disease) 9/15/2011     HTN (hypertension) 2014     Hyperlipidemia LDL goal <100 9/15/2011     Impulse control disorder      Mild major depression (H) 9/15/2011     Positive TB test      Seizures (H) 9/15/2011     Unspecified cerebral artery occlusion with cerebral infarction      Past Surgical History:   Procedure Laterality Date     COLONOSCOPY  2012     COLONOSCOPY  2014    Procedure: COMBINED COLONOSCOPY, SINGLE BIOPSY/POLYPECTOMY BY BIOPSY;  Surgeon: Jose Elias Mclain MD;  Location: [facility]     EXCISE TUMOR RECTUM VILLUS  2013    Procedure: EXCISE TUMOR RECTUM VILLOUS;  Trans Anal Excision Rectal Villous Tumor, Proctoscopy;  Surgeon: Milton Jacobs MD;  Location: [facility] OR     KNEE SURGERY       NECK SURGERY       SIGMOIDOSCOPY FLEXIBLE  2013    Procedure: SIGMOIDOSCOPY  FLEXIBLE;   flexible sigmoidoscopy with anoscope;  Surgeon: Milton Jacobs MD;  Location:  GI     SIGMOIDOSCOPY FLEXIBLE  4/25/2013    Procedure: SIGMOIDOSCOPY FLEXIBLE;  SIGMOIDOSCOPY FLEXIBLE;  Surgeon: Milton Jacobs MD;  Location:  GI       Allergies:  Allergies   Allergen Reactions     Ibuprofen Sodium Diarrhea     Tuberculin Tests        Medications:      Current Facility-Administered Medications:      amLODIPine (NORVASC) tablet 10 mg, 10 mg, Oral, Daily, Refugio Muniz MD     baclofen (LIORESAL) tablet 20 mg, 20 mg, Oral, TID, Refugio Muniz MD     carBAMazepine (TEGretol) tablet 200 mg, 200 mg, Oral, TID, Refugio Muniz MD     budesonide (PULMICORT) neb solution 0.25 mg, 0.25 mg, Nebulization, BID, Refugio Muniz MD     clopidogrel (PLAVIX) tablet 75 mg, 75 mg, Oral, Daily, Refugio Muniz MD     divalproex (DEPAKOTE) EC tablet 1,750 mg, 1,750 mg, Oral, Q12H KATY, Refugio Muniz MD     furosemide (LASIX) tablet 20 mg, 20 mg, Oral, Daily, Refugio Muniz MD     citalopram (celeXA) tablet 20 mg, 20 mg, Oral, Daily, Refugio Muniz MD     gabapentin (NEURONTIN) tablet 600 mg, 600 mg, Oral, TID, Refugio Muniz MD     ipratropium - albuterol 0.5 mg/2.5 mg/3 mL (DUONEB) neb solution 3 mL, 3 mL, Nebulization, Q4H While awake, Refugio Muniz MD     levalbuterol (XOPENEX HFA) Inhaler 2 puff, 2 puff, Inhalation, Q6H PRN, Refugio Muniz MD     levETIRAcetam (KEPPRA) tablet 1,000 mg, 1,000 mg, Oral, BID, Refugio Muniz MD     [START ON 11/7/2017] niacin CR capsule 500 mg, 500 mg, Oral, At Bedtime, Refugio Muniz MD     QUEtiapine (SEROquel) tablet 200 mg, 200 mg, Oral, BID, Refugio Muniz MD     simvastatin (ZOCOR) tablet 40 mg, 40 mg, Oral, At Bedtime, Refugio Muniz MD     tamsulosin (FLOMAX) capsule 0.4 mg, 0.4 mg, Oral, Daily, Refugio Muniz MD     vitamin D (ERGOCALCIFEROL) capsule 50,000 Units, 50,000 Units, Oral, Once, Refugio Muniz MD     lidocaine 1 % 1 mL, 1 mL, Other, Q1H PRN, Refugio Muniz MD      lidocaine (LMX4) kit, , Topical, Q1H PRN, Refugio Muniz MD     sodium chloride (PF) 0.9% PF flush 3 mL, 3 mL, Intracatheter, Q1H PRN, Refugio Muniz MD     sodium chloride (PF) 0.9% PF flush 3 mL, 3 mL, Intracatheter, Q8H, Refugio Muniz MD     LORazepam (ATIVAN) injection 2 mg, 2 mg, Intravenous, Q2H PRN, Refugio Muniz MD     oxyCODONE IR (ROXICODONE) tablet 5 mg, 5 mg, Oral, TID, Refugio Muniz MD     acetaminophen (TYLENOL) tablet 650 mg, 650 mg, Oral, Q4H PRN, Refugio Muniz MD     bacitracin ointment, , Topical, TID PRN, Refugio Muniz MD     nicotine (NICODERM CQ) 21 MG/24HR 24 hr patch 1 patch, 1 patch, Transdermal, Daily, Refugio Muniz MD     nicotine Patch in Place, , Transdermal, Q8H, Refugio Muniz MD     [START ON 11/7/2017] nicotine patch REMOVAL, , Transdermal, Daily, Refugio Muniz MD    Social History:  Social History   Substance Use Topics     Smoking status: Current Every Day Smoker     Packs/day: 1.00     Years: 35.00     Types: Cigarettes     Smokeless tobacco: Never Used     Alcohol use No      Comment: quit 20 years ago.       Family History:    Family History   Problem Relation Age of Onset     HEART DISEASE Mother      MI     CEREBROVASCULAR DISEASE Father      alzheimers disease     CEREBROVASCULAR DISEASE Brother      Neurologic Disorder Brother      stroke     Neurologic Disorder Son      seizure     CANCER Sister        Physical Examination:   Vitals:  B/P: 133/88, T: 97.7, P: 82, R: 11  General: pt laying comfortably in bed, breathing easily on ra, in NAD   HEENT: no clear bruising on head  Chest: wheezing  Heart: rrr  Abdomen: soft, nt, nd, +BS  Ext: pitting edema to mid-shins  Skin: multiple superficial abrasions on lower extremities  Neuro:   -MS: somnolent, intermittently follows commands. Speaks in full sentences.   -CN: does not blink to threat on L; blinks on R, perrl, eomi to oculocephalic, L facial droop, hearing intact to conversation  -Motor: markedly increased tone in  LUE/LLE  -moves RUE and RLE spontaneously and antigravity  -Sensory: grimaces to noxious stimuli in RUE/RLE  -Coordination: no clear ataxia  -Gait: deferred, nonambulatory at baseline    Investigations:    CT/CTA: old R MCA territory infarct, R ICA occluded at bifurcation        EEG 5/2014  SUMMARY:   1.  Posterior dominant alpha rhythm absent, with generalized background slowing.   2.  EKG showing a regular heart rate of 90 beats per minute.   3.  Intermittent isolated epileptiform discharge centered principally over C4, with occasional discharges with localization more likely at F8   4.  No sustained seizure activity.   5.  No clear triphasic waves.   6.  Prominent benzodiazepine effect.   CLINICAL INTERPRETATION:  This is an abnormal EEG.  There is evidence for generalized slow activity with intermittent single epileptiform discharges likely emanating from the right hemisphere, either over the C4 or C8 electrode.  No sustained seizure activity is present.  The epileptiform discharges appear to emanate from an area related to prior right hemispheric stroke.  The benzodiazepine effect is of unclear clinical significance.     Assessment and Plan:  Neuro  #likely Seizure: found down at group home with multiple abrasions. Somnolent but moving R side and talking on arrival. Though [facility] not available, clear h/o seizures on 3 AEDs. Most likely symptomatic from large R MCA with this event representing a simple breakthrough seizure. Will r/o inciting infection.   -increase pta LEV from 500-500 to [id number]  --too somnolent to take meds tonight, will load 1500  -continue pta -200  -continue pta VPA [id number]  -continue pta MARLENE 600-600-600  -levels pending    #h/o R MCA stroke likely 2/2 ICA stenosis: CT unchanged from previous, CTA did not reveal LVO  -continue pta plavix 75  -continue pta statin    #Chronic Pain  -continue pta oxycodone, baclofen, gabapentin    #MDD/Anger  -cont pta  citalopram  -pta quetiapine    Pulm:  #COPD, recent exacerbation 9/2017  -continue pta inhalers  -pharmacy pointed out that patient has levalbuterol in pta meds but albuterol as part of duonebs-- changed to albuterol per pharmacy  -CXR pending    #smoker  -nicotine patch    CV:  -BP long term goal <140/90  --labetalol/hydralazine prn >180     #HTN  -continue pta amlodipine    #HLD  -continue pta simvastatin    #LE edema  -pta furosemide  -lyte replacement protocol    Renal:  #no issues  -lyte replacement protocol    GI:  #h/o constipation  -hold pta imodium unless diarrhea    Endo:   #no issues  -A1c pending  -SSI    Hem:  #Macrocytic Anemia: possibly 2/2 VPA, does not drink per group home   -B12/MMA, folate pending  -B1 pending    :  # BPH  -cont pta tamsulosin    ID:  #r/o infxn as inciting factor for seizure  -UA, CXR    MSK:  #abrasions  -bacitracin bid prn    Misc:  Diet: regular  Fluids: NS 75cc/hr x12h  Lines: PIV  Ppx: SCDs, lovenox  Rehab recs: not needed    DNR/DNI    Patient was discussed with attending neurologist, Dr. Za Muniz MD  Neurology G3  [phone]    Neurology Staff Addendum  I have seen and evaluated the patient today and discussed with the resident team and agree with the documentation.  Patient was hooked up for EEG.  Greatly appreciate the assistance from our colleagues in epilepsy.  No change to explain presentation on CT.     LORIN ARRIAZA MD

## 2017-11-06 NOTE — ED PROVIDER NOTES
"  History     Chief Complaint   Patient presents with     Fall     HPI  David Dawn is a 57 year old male who presents emergency Department found down in his assisted living facility.  Patient is normally wheelchair bound and when staff attempted to access the patient he was stuck in the door.  EMS required to break the door to gain access to the patient.  At the time he was nonverbal and lying on the floor with multiple abrasions to his arms legs and face.  He is hemiplegic at baseline on the left side secondary to an old stroke and is wheelchair bound.    I have reviewed the Medications, Allergies, Past Medical and Surgical History, and Social History in the Epic system.    Review of Systems   All other systems reviewed and are negative.      Physical Exam   BP: 112/80  Pulse: 85  Heart Rate: 84 (Simultaneous filing. User may not have seen previous data.)  Temp: 97.7  F (36.5  C)  Resp: 16  Height: 172.7 cm (5' 8\")  Weight: 114.8 kg (253 lb)  SpO2: 94 %      Physical Exam   Constitutional: He appears well-developed and well-nourished. No distress.   Patient is awake but nonverbal   HENT:   Head: Normocephalic and atraumatic.   Eyes: EOM are normal. Pupils are equal, round, and reactive to light. No scleral icterus.   Neck: Normal range of motion. Neck supple.   Cardiovascular: Normal rate.    Pulmonary/Chest: Effort normal. No respiratory distress.   Abdominal: Soft. There is no tenderness.   Neurological:   Patient opens eyes and is awake to sternal rub.  Nonverbal.  Left-sided hemiplegia secondary to old CVA   Skin: Skin is warm and dry. No rash noted. He is not diaphoretic. No erythema. No pallor.   Multiple abrasions to arms and knees       ED Course     ED Course     Procedures             EKG Interpretation:      Interpreted by Kp Amezquita  Time reviewed:   Symptoms at time of EKG: stroke like symptoms   Rhythm: normal sinus   Rate: normal  Axis: normal  Ectopy: none  Conduction: normal  ST " Segments/ T Waves: No ST-T wave changes  Q Waves: none  Comparison to prior: No old EKG available    Clinical Impression: normal EKG            Critical Care time:  none     The patient has stroke symptoms:           ED Stroke specific documentation           NIHSS PDF          Protocol PDF     Patient last known well time: 2100  ED Provider first to bedside at: 2321  CT Results received at: 0004  Patient was not treated with TPA due to the following reason(s):  Seizure at onset      [employer] Stroke Scale (Baseline)  Time Performed: 0015      Score    Level of consciousness: (2)   Not alert; repeat stim to attend strong stim/pain to move    LOC questions: (2)   Answers neither question correctly    LOC commands: (1)   Performs one task correctly    Best gaze: (0)   Normal    Visual: (0)   No visual loss    Facial palsy: (1)   Minor paralysis (flat nasolabial fold, smile asymmetry)    Motor arm (left): (3)   No effort against gravity    Motor arm (right): (1)   Drift    Motor leg (left): (1)   Drift    Motor leg (right): (3)   No effort against gravity    Limb ataxia: (0)   Absent    Sensory: (0)   Normal- no sensory loss    Best language: (1)   Mild to moderate aphasia    Dysarthria: (0)   Normal    Extinction and inattention: (0)   No abnormality        Total Score:  15        Stroke Mimics were considered (including migraine headache, seizure disorder, hypoglycemia (or hyperglycemia), head or spinal trauma, CNS infection, Toxin ingestion and shock state (e.g. sepsis) .                       Labs Ordered and Resulted from Time of ED Arrival Up to the Time of Departure from the ED   GLUCOSE BY METER - Abnormal; Notable for the following:        Result Value    Glucose 164 (*)     All other components within normal limits   BASIC METABOLIC PANEL - Abnormal; Notable for the following:     Glucose 146 (*)     Calcium 8.4 (*)     All other components within normal limits   CBC WITH PLATELETS -  Abnormal; Notable for the following:     RBC Count 3.74 (*)     Hemoglobin 13.0 (*)      (*)     MCH 34.8 (*)     All other components within normal limits   VALPROIC ACID - Abnormal; Notable for the following:     Valproic Acid Level 123 (*)     All other components within normal limits   INR   TROPONIN I   CREATININE POCT   TROPONIN POCT           Results for orders placed or performed during the hospital encounter of 11/05/17   CT Head Neck Angio w/o & w Contrast    Narrative       1. Unchanged chronic right MCA territory infarction.  2. Noncontrast head CT demonstrates no acute intracranial hemorrhage.  3. Head CTA demonstrates high-grade atherosclerotic narrowing of the  left internal carotid artery in the cavernous segment.  4. 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   CT Cervical Spine w/o Contrast    Narrative    No substantial change in the mild multilevel cervical spondylosis  since 2015..   Glucose by meter   Result Value Ref Range    Glucose 164 (H) 70 - 99 mg/dL   Basic metabolic panel   Result Value Ref Range    Sodium 141 133 - 144 mmol/L    Potassium 4.6 3.4 - 5.3 mmol/L    Chloride 106 94 - 109 mmol/L    Carbon Dioxide 23 20 - 32 mmol/L    Anion Gap 12 3 - 14 mmol/L    Glucose 146 (H) 70 - 99 mg/dL    Urea Nitrogen 14 7 - 30 mg/dL    Creatinine 1.00 0.66 - 1.25 mg/dL    GFR Estimate 76 >60 mL/min/1.7m2    GFR Estimate If Black >90 >60 mL/min/1.7m2    Calcium 8.4 (L) 8.5 - 10.1 mg/dL   CBC with platelets   Result Value Ref Range    WBC 6.3 4.0 - 11.0 10e9/L    RBC Count 3.74 (L) 4.4 - 5.9 10e12/L    Hemoglobin 13.0 (L) 13.3 - 17.7 g/dL    Hematocrit 41.0 40.0 - 53.0 %     (H) 78 - 100 fl    MCH 34.8 (H) 26.5 - 33.0 pg    MCHC 31.7 31.5 - 36.5 g/dL    RDW 13.7 10.0 - 15.0 %    Platelet Count 162 150 - 450 10e9/L   INR   Result Value Ref Range    INR 1.01 0.86 - 1.14    Troponin I   Result Value Ref Range    Troponin I ES <0.015 0.000 - 0.045 ug/L   Creatinine POCT   Result Value Ref Range    Creatinine 1.1 0.66 - 1.25 mg/dL    GFR Estimate 69 >60 mL/min/1.7m2    GFR Estimate If Black 83 >60 mL/min/1.7m2   Valproic acid   Result Value Ref Range    Valproic Acid Level 123 (H) 50 - 100 mg/L   Troponin POCT   Result Value Ref Range    Troponin I 0.00 0.00 - 0.10 ug/L         Assessments & Plan (with Medical Decision Making)   This 57-year-old male coming into the emergency room for being found down in his assisted living center.  He was trapped on the other side of the door in his room.  EMS needed to break the door to obtain access to the patient.  He is found to have multiple abrasions to his arms face and knees.  He was a phasic at the time and is the spine able to speak with left-sided hemiplegia secondary to an old CVA.  He also has a significant past medical history of seizures.  Initial evaluation found and an awake patient who is a phasic with left-sided hemiplegia.  And a stroke code was immediately called.  Patient was emergently sent to CT scan with stroke team at the bedside.  At this time after evaluation he was found to have an old stroke but appears to have more symptoms of seizure.  He was most likely a postictal state and is much more arousable on reassessment.  All labs are reviewed.  Neurology at this time will admit to their service for continued care and management.    I have reviewed the nursing notes.    I have reviewed the findings, diagnosis, plan and need for follow up with the patient.    New Prescriptions    No medications on file       Final diagnoses:   Seizure (H)       11/5/2017   [facility], [place], EMERGENCY DEPARTMENT     Kp Amezquita MD  11/06/17 0150

## 2017-11-06 NOTE — ED NOTES
From assisted living, wheelchair bound, had unwitnessed fall out of wheel chair. Patient was found face down on ground. Staff at assisted living does not think that the patient had any loss of consciousness. Staff at assisted living feel that the patient is more lethargic than he normally is. Cervical collar placed on patient upon arrival to ED.

## 2017-11-06 NOTE — ED NOTES
Stroke code initiated by Dr. Amezquita at 2330. Neuro MD, Belem RN to bedside at 2331, lab at 2332, Vascular at 2337. Patient to CT scan with belem RN at 2334. Patient returned to ED from CT scan at 0000. Stoke code de-escalated at 1215 by Neuro MD, neuro checks to be completed per routine of unit.

## 2017-11-06 NOTE — PROGRESS NOTES
Brief Neurology Note:    Patient seen and examined, agree with assessment and plan as detailed in H&P. Will get EEG today.     Alfonzo Valero  Neurology PGY-2

## 2017-11-06 NOTE — PROCEDURES
EEG#:        DATE OF SERVICE:  2017.       DURATION OF RECORDING:  Two hours and 0 minutes      CLINICAL HISTORY:  David Lambert is a 57-year-old male with a history of right MCA stroke and a focal epilepsy who presented with altered mental status.  EEG was requested for evaluation for seizures.      CURRENT MEDICATIONS:  Tegretol, Celexa, Depakote, Neurontin, Keppra and Seroquel.      TECHNICAL SUMMARY: This continuous video- EEG monitoring procedure was performed with 23 scalp electrodes in 10-20 electrode system placements, and additional scalp, precordial and other surface electrodes used for electrical referencing and artifact detection.  Video monitoring was utilized and periodically reviewed by EEG technologists and the physician for electroclinical correlations.       BACKGROUND ACTIVITIES:  The background activities of this EEG was symmetric and consisted of mild to moderate generalized slowing with mostly theta activities, no well defined posterior dominant rhythm was observed during this recording.      Hyperventilation and photic stimulation were not performed.  Sleep stages were recorded with well-formed sleep architectures.      No interictal epileptiform activities were observed.      ICTAL ACTIVITIES:  None.      IMPRESSION:  This is an abnormal EEG due to the presence of mild-to-moderate generalized slowing of the background activities.  No epileptiform activities or seizures were recorded.         MAHAMED FIELDS MD             D: 2017 17:12   T: 2017 17:22   MT: YUNG      Name:     DAVID LAMBERT   MRN:      [id number]        Account:        [id number]   :      1960           Procedure Date: 2017      Document: [id number]

## 2017-11-06 NOTE — PROGRESS NOTES
Assessment changes from last charted ED assessment in flowsheets, however is correlating with report given by ED nurse prior to arrival on unit, as well as done in person. Neurology MD at bedside and states that the assessment upon arrival is similar to assessment that was done in ED, no new changes or interventions needed at this time.

## 2017-11-07 NOTE — PLAN OF CARE
"Problem: Patient Care Overview  Goal: Plan of Care/Patient Progress Review  Outcome: No Change  Patient admitted after falling out of his electric w/c. Ox3-4 . Can be off on the date. More cooperative tonight with neuro checks. S/P stroke 8 years age and is out on the Lt. He seams to have sensation and will complain when moving that side for repositioning. Significant  Edema in Lt foot. Elevated on 1-2 pillows, compression boots on all night. ROM done this morning on lt side as much as patient would allow. Incontinent of urine x1 but patient states he knows when he is wet but this did not hold true tonight.MARTITA, possible Lt neglect, Rt UE strong but Rt LE weak. Patient on a regular diet but has issues with food causing loose stools. He could not tell me what his diet restriction is. Asking for almond milk. \" I can eat this and can't eat that\" Patient made call this morning about a new W/C since his old chair isn't working properly.Patient came from group home.Continue to monitor and treat per plan of care.      "

## 2017-11-07 NOTE — PLAN OF CARE
Problem: Patient Care Overview  Goal: Plan of Care/Patient Progress Review  Discharge Planner SLP   Patient plan for discharge: TCU  Current status: Pt continues to tolerate regular diet and thin liquids w/o outward s/sx of aspiration but at ongoing risk due to impulsivity and tending to take large bites at a fast rate.  Pt would benefit from supervision during PO to cue to slow pt's rate of intake and to take small bites/sips. Pt has met goals for swallowing. ST to sign off.  Rec. Continue with regular diet and thin liquids.   Barriers to return to prior living situation: none  Recommendations for discharge: return to prior living facility  Rationale for recommendations: No further swallow tx warranted.       Entered by: Saloni Barone 11/07/2017 2:29 PM

## 2017-11-07 NOTE — PLAN OF CARE
Problem: Seizure Disorder/Epilepsy (Adult)  Goal: Signs and Symptoms of Listed Potential Problems Will be Absent, Minimized or Managed (Seizure Disorder/Epilepsy)  Signs and symptoms of listed potential problems will be absent, minimized or managed by discharge/transition of care (reference Seizure Disorder/Epilepsy (Adult) CPG).   Outcome: Improving  Patient has been lethargic throughout the day. When awake he is sometimes oriented to person and place, but other times disoriented x4. His mood has been very labile, with episodes of swearing and refusing care, followed by calm behavior. Neuros have remained the same. No signs of seizures. Video EEG was done for about 2 hours today. Patient passed speech eval and is tolerating a regular diet. Denies pain.

## 2017-11-07 NOTE — PLAN OF CARE
Problem: Patient Care Overview  Goal: Plan of Care/Patient Progress Review  Outcome: Adequate for Discharge Date Met:  11/07/17  Pt is A&O to person, place and situation. Neuro unchanged. No event witnessed or reported this shift.Complained of leg lower extremities, managed with schedule oxycodone. LLE dependent edema, leg elevated on and off. Lung sound inspiratory wheezing and expiratory wheezing, neb Tx and inhaler received. Writer encouraged pt to do coughing and deep breathing. Pt refused IS. Report given to Rehab nurse. Pt can be impulsive at times. D/C via Transport.

## 2017-11-07 NOTE — DISCHARGE SUMMARY
[facility], [place]    Neurology Discharge Summary    Date of Admission: 11/5/2017  Date of Discharge: November 7, 2017    Disposition: Discharged to nursing home  Primary Care Physician: Len Krishnan     Admission Diagnosis:   Seizure  H/O R MCA stroke likely 2/2 ICA stenosis  Chronic Pain  MDD/Anger  COPD  HTN  HLD  LE edema  Macrocytic anemia  BPH    Discharge Diagnosis:   Seizure  H/O R MCA stroke likely 2/2 ICA stenosis  Chronic Pain  MDD/Anger  COPD  HTN  HLD  LE edema  Macrocytic anemia  BPH    Consults:  None    Problem Leading to Hospitalization (from HPI):   Briefly, patient is a 57 year old male with h/o R MCA stroke with localization-related epilepsy who was admitted on 11/6/17 after being found down at his nursing home. He was in his usual state of health when around 2300 he was found lying on the ground beside his wheelchair with multiple abrasions. Patient was somnolent on arrival. He was admitted to [facility] for further management and workup.    Please see H&P dated 11/5/2017 for further details about presentation.    Brief Hospital Course:   # Likely seizure:   Patient was found down at group home with multiple abrasions. On presentation he was somnolent but moving his R side (does not move left side 2/2 R MCA stroke). Patient usually seen at [facility] and was on 3 AEDs while at nursing home (keppra, carbamazepine, and depakote as well as taking gabapentin for pain). Keppra was increased from 500mg BID to 1000mg BID. 2 hour EEG was obtained which did not demonstrate any seizure activity. Infectious workup was negative. Keppra and carbamazepine levels in therapeutic range. Depakote level elevated, ammonia wnl.   -Keppra 1000mg BID  -Continue PTA carbamazepine 200-200mg  -Continue PTA depakote 1750-1750mg  -Continue PTA gabapentin [phone]  -F/U in epilepsy clinic in 1-2 months. May consider alternative treatment from keppra given behavioral disturbances however  considering how challenging his seizure control is we are recommending he stay on it for the time being.   -Return to ED with any worsening in medical condition.      # h/o R MCA stroke likely 2/2 ICA stenosis:   CT/CTA on admission were unchanged from previous, no LVO observed.   -continue pta plavix 75  -continue pta statin, LDL goal <70  -BP goal <140/90     # Chronic Pain  -continue pta oxycodone, baclofen, gabapentin     # MDD/Anger  -cont pta citalopram  -pta quetiapine     # COPD, recent exacerbation 9/2017:  Wheezing on exam, however O2 sats appear stable. Received duonebs as inpatient along with PTA medications.   -Continue PTA inhalers     # HTN  -continue pta amlodipine     # HLD  -continue pta simvastatin     # LE edema  -pta furosemide     # Macrocytic Anemia:   Likely 2/2 depakote, does not drink per group home.   -B12 wnl  -Folate wnl  -MMA pending  -B1 pending    # BPH  -cont pta tamsulosin      PERTINENT INVESTIGATIONS    Labs  Lab Results   Component Value Date    WBC 4.3 11/07/2017     Lab Results   Component Value Date    RBC 3.44 11/07/2017     Lab Results   Component Value Date    HGB 11.7 11/07/2017     Lab Results   Component Value Date    HCT 37.1 11/07/2017     No components found for: MCT  Lab Results   Component Value Date     11/07/2017     Lab Results   Component Value Date    MCH 34.0 11/07/2017     Lab Results   Component Value Date    MCHC 31.5 11/07/2017     Lab Results   Component Value Date    RDW 13.7 11/07/2017     Lab Results   Component Value Date     11/07/2017     Last Basic Metabolic Panel:  Lab Results   Component Value Date     11/07/2017      Lab Results   Component Value Date    POTASSIUM 4.6 11/07/2017     Lab Results   Component Value Date    CHLORIDE 111 11/07/2017     Lab Results   Component Value Date    KADIE 8.4 11/07/2017     Lab Results   Component Value Date    CO2 27 11/07/2017     Lab Results   Component Value Date    BUN 10 11/07/2017  "    Lab Results   Component Value Date    CR 0.75 11/07/2017     Lab Results   Component Value Date    GLC 80 11/07/2017           PHYSICAL EXAMINATION  /63  Pulse 82  Temp 97.6  F (36.4  C) (Oral)  Resp 18  Ht 1.727 m (5' 8\")  Wt 104.3 kg (229 lb 15 oz)  SpO2 93%  BMI 34.96 kg/m2    Constitutional: NAD  Head: atraumatic, anicteric. See neuroexam  Eyes: see neuroexam  CVC: RRR, normal S1/S2   Lung: bilateral expiratory wheezing, mildly improved from admission, no respiratory distress on room air  Abdomen: soft, nontender, nondistended  Extremities: 1+ pitting edema to mid shin  Psychiatric: Irritable    Neurologic:   Mental Status: irritable, follows some commands, refuses others. Speaking in full sentences.   Cranial Nerves: Blinks to threat on right. PERRL. EOMI. L facial droop. Hearing intact to conversation.   Motor: Does not move left side, increased tone on LUE/LLE. Strength preserved in RUE/RLE  Reflexes: 2+ in RUE/RLE. 3+ LUE/LLE. Clonus in LLE, toe upgoing.    Sensory: Intact on RUE/RLE  Coordination: No clear ataxia  Station/Gait: Deferred      Additional recommendations and follow up:       Review of your medicines      CONTINUE these medicines which may have CHANGED, or have new prescriptions. If we are uncertain of the size of tablets/capsules you have at home, strength may be listed as something that might have changed.       Dose / Directions    gabapentin 600 MG tablet   Commonly known as:  NEURONTIN   This may have changed:    - how much to take  - when to take this  - additional instructions   Used for:  Seizure disorder (H)        TAKE THREE TABLETS (1800MG) BY MOUTH ONCE DAILY   Quantity:  93 tablet   Refills:  PRN       levETIRAcetam 1000 MG Tabs   This may have changed:    - medication strength  - how much to take   Used for:  Seizure disorder (H)        Dose:  1000 mg   Take 1,000 mg by mouth 2 times daily   Quantity:  60 tablet   Refills:  0       loperamide 2 MG capsule "   Commonly known as:  IMODIUM   This may have changed:    - how much to take  - additional instructions   Used for:  Functional diarrhea        Dose:  2 mg   Take 1 capsule (2 mg) by mouth 2 times daily TAKE 2 CAPSULE PO BID; AND MAY TAKE 1 CAPSULE PO QID PRN   Quantity:  120 capsule   Refills:  3         CONTINUE these medicines which have NOT CHANGED       Dose / Directions    amLODIPine 10 MG tablet   Commonly known as:  NORVASC   Used for:  Essential hypertension, benign        TAKE 1 TABLET BY MOUTH ONCE DAILY   Quantity:  31 tablet   Refills:  11       AVEENO DAILY MOISTURIZER Lotn        Apply daily to hands and ears for dry skin.   Refills:  0       baclofen 20 MG tablet   Commonly known as:  LIORESAL   Used for:  Other seizures (H)        TAKE 1 TABLET BY MOUTH THREE TIMES DAILY   Quantity:  90 tablet   Refills:  11       budesonide 0.25 MG/2ML neb solution   Commonly known as:  PULMICORT   Used for:  Chronic bronchitis, unspecified chronic bronchitis type (H)        NEBULIZE THE CONTENT OF 1 VIAL TWICE DAILY FOR COPD   Quantity:  120 mL   Refills:  10       carBAMazepine 200 MG tablet   Commonly known as:  TEGretol   Used for:  Seizure disorder (H)        TAKE 1 TABLET BY MOUTH THREE TIMES DAILY   Quantity:  90 tablet   Refills:  11       citalopram 20 MG tablet   Commonly known as:  celeXA        Dose:  20 mg   Take 20 mg by mouth daily   Refills:  0       clopidogrel 75 MG tablet   Commonly known as:  PLAVIX   Used for:  Essential hypertension, benign, History of stroke        Dose:  75 mg   Take 1 tablet (75 mg) by mouth daily   Quantity:  90 tablet   Refills:  2       diclofenac 1 % Gel topical gel   Commonly known as:  VOLTAREN   Used for:  Chronic pain syndrome        Dose:  2 g   Place 2 g onto the skin 2 times daily as needed Apply 4 grams to knees or 2 grams to hands   Quantity:  100 g   Refills:  0       * divalproex 250 MG EC tablet   Commonly known as:  DEPAKOTE        Dose:  250 mg   Take 250  mg by mouth 2 times daily   Refills:  0       * divalproex 500 MG EC tablet   Commonly known as:  DEPAKOTE   Used for:  Seizure disorder (H)        TAKE THREE TABLETS (1500MG) BY MOUTH TWICE DAILY (TAKE WITH 250MG FOR A TOTAL OF 1750MG)   Quantity:  60 tablet   Refills:  11       furosemide 20 MG tablet   Commonly known as:  LASIX   Used for:  Bilateral edema of lower extremity        TAKE 1 TABLET BY MOUTH ONCE DAILY   Quantity:  31 tablet   Refills:  5       ipratropium - albuterol 0.5 mg/2.5 mg/3 mL 0.5-2.5 (3) MG/3ML neb solution   Commonly known as:  DUONEB   Used for:  Chronic bronchitis, unspecified chronic bronchitis type (H)        NEBULIZE THE CONTENT OF 1 VIAL THREE TIMES DAILY;AND MAY NEBULIZE THE CONTENT OF 1 VIAL AS NEEDED FOR SHORTNESS OF BREATH   Quantity:  360 mL   Refills:  10       levalbuterol 45 MCG/ACT Inhaler   Commonly known as:  XOPENEX HFA   Used for:  Chronic obstructive pulmonary disease, unspecified COPD type (H)        Dose:  2 puff   Inhale 2 puffs into the lungs every 6 hours as needed for shortness of breath / dyspnea or wheezing   Quantity:  1 Inhaler   Refills:  8       MAPAP 325 MG tablet   Used for:  Low back pain, unspecified back pain laterality, unspecified chronicity, with sciatica presence unspecified, Neck pain   Generic drug:  acetaminophen        TAKE TWO TABLETS (650MG) BY MOUTH EVERY 4 HOURS AS NEEDED FOR PAIN   Quantity:  60 tablet   Refills:  11       mineral oil-hydrophilic petrolatum        Apply topically as needed Apply daily to left wound.   Refills:  0       nebulizer/adult mask Kit   Used for:  Wheezing        Dose:  1 Device   1 Device 4 times daily.   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   Refills:  0       SEROQUEL PO        Dose:  200 mg   Take 200 mg by mouth 2 times daily   Refills:  0       simvastatin 40 MG tablet   Commonly known as:  ZOCOR   Used for:  Hyperlipidemia LDL goal <100        TAKE 1 TABLET BY MOUTH AT BEDTIME   Quantity:  30 tablet   Refills:  11       tamsulosin 0.4 MG capsule   Commonly known as:  FLOMAX   Used for:  Benign non-nodular prostatic hyperplasia with lower urinary tract symptoms        TAKE 1 CAPSULE BY MOUTH ONCE DAILY   Quantity:  31 capsule   Refills:  11       vitamin D 65575 UNIT capsule   Commonly known as:  ERGOCALCIFEROL   Used for:  Vitamin D deficiency        TAKE ONE CAPSULE BY MOUTH ONCE WEEKLY ON MONDAY   Quantity:  12 capsule   Refills:  3       * Notice:  This list has 2 medication(s) that are the same as other medications prescribed for you. Read the directions carefully, and ask your doctor or other care provider to review them with you.      STOP taking          oxyCODONE IR 5 MG tablet   Commonly known as:  ROXICODONE                Where to get your medicines      Some of these will need a paper prescription and others can be bought over the counter. Ask your nurse if you have questions.     You don't need a prescription for these medications      levETIRAcetam 1000 MG Tabs             General info for SNF   [facility]  Phone:  [phone]  Fax:  [phone]    Length of Stay Estimate: Long Term Care  Condition at Discharge: Stable  Level of care:skilled   Rehabilitation Potential:  Fair  Admission H&P remains valid and up-to-date: Yes  Recent Chemotherapy: N/A  Use Nursing Home Standing Orders: Yes     Mantoux instructions   Give two-step Mantoux (PPD) Per Facility Policy Yes     Reason for your hospital stay   You were hospitalized for seizures. We have increased one of your seizure medications.     Activity - Up with nursing assistance     Additional Discharge Instructions   -Keppra has been increased to 1000mg twice daily  -Continue all other medications as before admission     Follow Up and recommended labs and tests   Follow up with Nursing home physician.  No follow up labs or test are needed.  Follow up with primary care provider in 7 days.  No follow up labs or test are needed.  Follow up in epilepsy clinic in 4-8 weeks.     DNR/DNI     Fall precautions     Advance Diet as Tolerated   Follow this diet upon discharge: Orders Placed This Encounter     Regular Diet Adult Thin Liquids (water, ice chips, juice, milk, gelatin, ice cream, etc)         Patient was seen and discussed with attending physician Dr. Arriaza.    Alfonzo Valero  PGY2 Neurology  [phone]    Neurology Staff Addendum  I have seen and evaluated the patient today and discussed with the resident team.  Greater than 30 minutes spent in discharge preparation.        LORIN ARRIAZA MD

## 2017-11-07 NOTE — PROGRESS NOTES
Care Coordinator Progress Note     Admission Date/Time:  11/5/2017  Attending MD:  Dr Alfonso Morris     Data  Chart reviewed, discussed with interdisciplinary team. Pt transferred from ICU to 6A last evening. In 6A Discharge Rounds informed by Dr Valero pt is ready for discharge today. Pt is from an assisted living.     Patient was admitted for:    Seizure (H)  Pt found down at assisted living; lying beside his wheelchair; multiple abrasions. Most likely breakthrough seizure; hx of large right MCA stroke.      Past history includes: COPD; ETOH abuse; GERD; HTN; hyperlipidemia; depression; positive TB test; BPH. See H&P for complete history.    Concerns with insurance coverage for discharge needs: None. Pt's insurance is Medicare & [employer] [place].   Current Living Situation: Patient lives in an assisted living facility. [facility] in [place].   Support System: Supportive  Services Involved: Assisted Living  Transportation:  [employer] stretcher transport. Pt has [employer] [place].   Barriers to Discharge: None    Coordination of Care and Referrals  Chart reviewed. In nursing notes noted pt used an electric w/c prior to admission. Pt mentioned getting a new w/c. Pt refusing some cares here.   Tried to meet pt but he was sleeping both times.   Called [facility] at [phone] [phone] and left a message for the nurse to call me back.   Spoke with pt's nurse, Jenny. Jenny spoke with nurse at [facility] and they are OK with pt returning today.     Noted pt's insurance on the facesheet was Medicare. Called  Financial Counselor and she confirmed pt's MA is active; she entered it on the facesheet.  Called [facility] and scheduled stretcher transport for 2pm today back to [facility].     Received call back from nurse Celestina at [facility]. Confirmed pt can return there today. She requested DC orders be faxed before discharge. Fax:  [phone]. I faxed the H&P and DC orders.       Assessment  Ready for  discharge back to assisted living.      Plan  Anticipated Discharge Date:  Today  Anticipated Discharge Plan:   Discharge back to [facility] today.   [facility]       [facility]  Phone:  [phone]  Fax:  [phone]      Gaby Fitzgerald RN Care Coordinator  Unit 6A, [facility]

## 2017-11-07 NOTE — PLAN OF CARE
Problem: Patient Care Overview  Goal: Plan of Care/Patient Progress Review  Speech Language Therapy Discharge Summary     Reason for therapy discharge:    All goals and outcomes met, no further needs identified.     Progress towards therapy goal(s). See goals on Care Plan in [facility] electronic health record for goal details.  Goals met     Therapy recommendation(s):    No further therapy is recommended.Recommend continue with regular diet and thin liquids, small sips/bites, slow rate of intake.

## 2017-11-07 NOTE — TELEPHONE ENCOUNTER
Rajinder, Nurse at [facility] calls. She states the Hospital discontinued pts Oxycodone at discharge. After review of Epic, this looks like may have been in error.   They need new Rx FAXED to them,  and Rx MAILED to Pharmacy.     #[phone] Fax     Rajinder  [phone] or [phone]      11/7/17:   Discharge summaries:  # Chronic Pain  -continue pta oxycodone, baclofen, gabapentin

## 2017-11-07 NOTE — PLAN OF CARE
Problem: Patient Care Overview  Goal: Plan of Care/Patient Progress Review  Pt arrived on floor from 4A approx 1930. Pt quite agitated and not cooperative. Would not cooperate with most neuro exam, began swearing at nurse and staff. At one point was throwing call light because he was upset. Neuros difficult to assess. Left sided weakness, L neglect, L droop. PIV SL. Up with lift. Turn/repo Q2hrs, pt refused repositioning at times, yelling/cursing at staff. Multiple abrasions from fall at group home. Incontinent, pt refusing staff to assist/assess needs, will attempt again before end of shift. Bed alarm on. Seizure precautions in place. Cont with POC.     Update: Pt still refusing to be repositioned despite encouragement. Pt refusing to allow writer to check brief. Pt getting upset and refusing 2200 Niacin because he is upset that his leg/knee hurt and wants oxycodone. MD Cristy martinez.

## 2017-11-07 NOTE — PLAN OF CARE
Problem: Patient Care Overview  Goal: Plan of Care/Patient Progress Review  PT 6A:  Deferring PT consult. Acknowledge order placed for PT eval and treat.  Upon chart review and discussion with patient and RN, no acute needs identified requiring skilled PT intervention.      Current level of assist for mobility: Max A rolling; has been refusing mobility and repositioning with RN staff.  Unable to assess OOB transfers 2/2 poor pt compliance  Current level of assist for ADLs:  Variable.  Refusing assist and assessment.  Needs setup for feeding.    Barriers to discharge: Difficult to determine.  Pt refusing participation in mobility with RN staff and with PT.  Disciplines to follow: SLP has been following.  PT to sign off.  Pt pivots bed<>w/c without assist at baseline.  Pt requesting to d/c home and wants a new w/c.  Reports he has a  [facility].

## 2017-11-08 NOTE — TELEPHONE ENCOUNTER
Jorge calls to say that they didn't get the fax for the Oxycodone and asks if we'll refax it both to their facility @[phone] and the pharmacy @ [phone].  Envelope recovered from outgoing mail and faxed to both of these numbers.    When nurse called pharmacist he said that he just sent #[phone] out on 11/2/17.  He said he will call them because they must have it somewhere.  He will put the one we send to him in profile for use next month.

## 2017-11-08 NOTE — TELEPHONE ENCOUNTER
Update: Jorge nurse from  calling back to report that patient's behavior last evening and today has been very physically and verbally aggressive.  Last evening they nearly called police.  Today he ran into another client injuring himself, wound to knee that continues to bleed.  Nurse did not feel patient needed stitches.  He broke the joystick off of the power chair and cannot be independent in mobility as he cannot propel himself in a manual chair.  Working on getting him a new chair and/or getting his broken one repaired.  Jorge knows that rx had been approved and will await fax of rx then mail to [facility] pharmacy.  JOHN Toussaint R.N.

## 2017-11-08 NOTE — CONSULTS
[facility], [place]      Neurology Stroke Code    Patient Name: David Dawn  : 1960 MRN#: [id number]    STROKE DATA     Stroke Code:  Time called:  17  Time patient seen:  17  Onset of symptoms:  17  Last known normal (pt's baseline):  17  Head CT read by Dr. Muniz at:  17  Stroke Code de-escalated at 17 0015 after discussion with Dr. SAMANTHA Rich due to symptoms not likely caused by stroke.        TPA treatment:  Not given due to stroke mimic: seizure.    National Institutes of Health Stroke Scale (at presentation)  NIHSS done at:  time patient seen      Score    Level of consciousness:  (2)   Not alert; repeat stim to attend strong stim/pain to move    LOC questions:  (2)   Answers neither question correctly    LOC commands:  (2)   Performs neither task correctly    Best gaze:  (0)   Normal    Visual:  (2)   Complete hemianopia    Facial palsy:  (2)   Partial paralysis (total/near total of lower face)    Motor arm (left):  (3)   No effort against gravity    Motor arm (right):  (0)   No drift    Motor leg (left):  (3)   No effort against gravity    Motor leg (right):  (0)   No drift    Limb ataxia:  (0)   Absent    Sensory:  (1)   Mild to moderate sensory loss    Best language:  (3)   Mute- global aphasia    Dysarthria:  (2)   Severe dysaphria    Extinction and inattention:  (1)   Visual, tacile, auditory, spatial, person inattention        NIHSS Total Score:  23           ASSESSMENT & RECOMMENDATONS     Stroke code activated due to unresponsive. After vigorous stimulation, the patient awoke. He was then moving his R side normally and cursing in complete sentences without any significant dysarthria (near baseline for him). Given his h/o refractory seizures, the patient was likely post-ictal. Please see H&P for further details.    Recommendations:   -no further stroke w/u. See H&P for further details.    The patient will be  managed by the General Neurology team team and  we will sign off at this time.  Thank you for the consult.  Contact the stroke team if you have any further questions    The patient was discussed with the staff, Dr. Rich.  Refugio Muniz MD   Pager: [phone]

## 2017-11-08 NOTE — TELEPHONE ENCOUNTER
Fax received from [employer] [facility] for review and signature.  Put in Dr. Krishnan's in basket.    Please see below and complete forms asap.

## 2017-11-08 NOTE — TELEPHONE ENCOUNTER
Celestina from LT facility calling stating patient wheelchair is completely broken. [facility] requesting a face to face mobility assessment to replace the broken chair. They will fax over a form to [employer] fax machine. Arm of wheelchair where carleyk is, is completely broken off and pt is using a manual wheelchair currently borrowed from [facility].   Can 10/24/17 OV count, or could an addendum be created to include mobility assessment, or does pt need another visit for this?  Provider please review and advise. Thank you.

## 2017-11-17 NOTE — PROGRESS NOTES
SUBJECTIVE:   David Dawn is a 57 year old male who presents to clinic today for the following health issues:          Hospital Follow-up Visit:    Hospital/Nursing Home/IP Rehab Facility: [facility]  Date of Admission: 11/05/17  Date of Discharge: 11/07/17  Reason(s) for Admission: seizure disorder            Problems taking medications regularly:  None       Medication changes since discharge: per chart        Problems adhering to non-medication therapy:  None    Summary of hospitalization:  [facility] discharge summary reviewed  Diagnostic Tests/Treatments reviewed.  Follow up needed: neurology follow up   Other Healthcare Providers Involved in Patient s Care:         Specialist appointment - neurology   Update since discharge: improved.     Post Discharge Medication Reconciliation: discharge medications reconciled, continue medications without change.  Plan of care communicated with patient     Coding guidelines for this visit:  Type of Medical   Decision Making Face-to-Face Visit       within 7 Days of discharge Face-to-Face Visit        within 14 days of discharge   Moderate Complexity [id number]   High Complexity [id number]          Patient is seen for a follow up visit.  Recently hospitalized for possible recurrent seizure. Patient was found on the floor, confused, with scratches on his arms, face. Has h/o seizures. Has been on treatment   Has h/o CVA. Has left sided arm and leg paralysis, uses a motorized wheelchair. Started on Keppra for seizure prevention. No new changes on brain CT, no bleed. Feels better.     No evidence of infection. Has chronic cough related to COPD, still smoking cigarettes.   On inhaled steroid and bronchodilators.   Has h/o depression. On medical treatment, controlled, no side effects. No depressive symptoms or suicidal ideation.  Has H/O BPH. On treatment with Flomax  . Has chronic symptoms of frequency, decreased urinary stream , no  dysuria. No side effects from medications.    Patient evaluated for his mobility needs. He  Is unable to ambulate independently because of his prior stroke with left hemiparalysis. Unable to walk, except for steps when transferring. Motorized wheelchair helps to be moving at his living place, go to doctors appointments, stores. He is unable to use a cane or walker because of poor balance and frequent falls. He is unable to use a manual wheelchair because of his arm weakness, left arm paralysis and poor coordination/ chronic fatigue and SOB.   Patient mental status and right hand dexterity have allowed him to operate his motorized wheelchair.         Problem list and histories reviewed & adjusted, as indicated.  Additional history: as documented    Patient Active Problem List   Diagnosis     Seizure disorder (H)     Chronic fatigue     Low back pain     Mild major depression (H)     GERD (gastroesophageal reflux disease)     Hyperlipidemia LDL goal <100     COPD (chronic obstructive pulmonary disease) (H)     Hemiplegia affecting left nondominant side (H)     Radicular syndrome of upper limbs     Neck pain     ACP (advance care planning)     Osteoarthritis of glenohumeral joint     Osteoarthritis of acromioclavicular joint     Metabolic encephalopathy     Health Care Home     Dysphagia     Hallucinations     Cough     Generalized muscle weakness     Alcohol abuse     Behavior problem, adult     Neuropathy     History of CVA (cerebrovascular accident)     Cognitive impairment     Essential hypertension, benign     COPD exacerbation (H)     Benign non-nodular prostatic hyperplasia with lower urinary tract symptoms     Seizure (H)     Past Surgical History:   Procedure Laterality Date     COLONOSCOPY  12/19/2012     COLONOSCOPY  8/6/2014    Procedure: COMBINED COLONOSCOPY, SINGLE BIOPSY/POLYPECTOMY BY BIOPSY;  Surgeon: Jose Elias Mclain MD;  Location:  GI     EXCISE TUMOR RECTUM VILLUS  2/28/2013    Procedure: EXCISE  TUMOR RECTUM VILLOUS;  Trans Anal Excision Rectal Villous Tumor, Proctoscopy;  Surgeon: Milton Jacobs MD;  Location: [facility] OR     KNEE SURGERY       NECK SURGERY       SIGMOIDOSCOPY FLEXIBLE  1/31/2013    Procedure: SIGMOIDOSCOPY FLEXIBLE;   flexible sigmoidoscopy with anoscope;  Surgeon: Milton Jacobs MD;  Location:  GI     SIGMOIDOSCOPY FLEXIBLE  4/25/2013    Procedure: SIGMOIDOSCOPY FLEXIBLE;  SIGMOIDOSCOPY FLEXIBLE;  Surgeon: Milton Jacobs MD;  Location:  GI       Social History   Substance Use Topics     Smoking status: Current Every Day Smoker     Packs/day: 1.00     Years: 35.00     Types: Cigarettes     Smokeless tobacco: Never Used     Alcohol use No      Comment: quit 20 years ago.     Family History   Problem Relation Age of Onset     HEART DISEASE Mother      MI     CEREBROVASCULAR DISEASE Father      alzheimers disease     CEREBROVASCULAR DISEASE Brother      Neurologic Disorder Brother      stroke     Neurologic Disorder Son      seizure     CANCER Sister          Current Outpatient Prescriptions   Medication Sig Dispense Refill     levETIRAcetam 1000 MG TABS Take 1,000 mg by mouth 2 times daily 60 tablet      oxyCODONE IR (ROXICODONE) 5 MG tablet Take 1 tablet (5 mg) by mouth 4 times daily 120 tablet 0     mineral oil-hydrophilic petrolatum (AQUAPHOR) Apply topically as needed Apply daily to left wound.       divalproex (DEPAKOTE) 250 MG EC tablet Take 250 mg by mouth 2 times daily       pramoxine (SARNA SENSITIVE) 1 % LOTN lotion as needed for itching One application as needed for itchy skin on posterior ears and hands.       Sunscreens (AVEENO DAILY MOISTURIZER) LOTN Apply daily to hands and ears for dry skin.       order for DME Power wheelchair 1 Device 0     loperamide (IMODIUM) 2 MG capsule Take 1 capsule (2 mg) by mouth 2 times daily TAKE 2 CAPSULE PO BID; AND MAY TAKE 1 CAPSULE PO QID PRN (Patient taking differently: Take 4 mg by mouth 2 times daily TAKE 2 CAPSULE PO BID; AND MAY  TAKE 1 CAPSULE PO QID PRN) 120 capsule 3     gabapentin (NEURONTIN) 600 MG tablet TAKE THREE TABLETS (1800MG) BY MOUTH ONCE DAILY (Patient taking differently: 600 mg 3 times daily TAKE THREE TABLETS (1800MG) BY MOUTH ONCE DAILY) 93 tablet PRN     simvastatin (ZOCOR) 40 MG tablet TAKE 1 TABLET BY MOUTH AT BEDTIME 30 tablet 11     baclofen (LIORESAL) 20 MG tablet TAKE 1 TABLET BY MOUTH THREE TIMES DAILY 90 tablet 11     divalproex (DEPAKOTE) 500 MG EC tablet TAKE THREE TABLETS (1500MG) BY MOUTH TWICE DAILY (TAKE WITH 250MG FOR A TOTAL OF 1750MG) 60 tablet 11     carBAMazepine (TEGRETOL) 200 MG tablet TAKE 1 TABLET BY MOUTH THREE TIMES DAILY 90 tablet 11     levalbuterol (XOPENEX HFA) 45 MCG/ACT Inhaler Inhale 2 puffs into the lungs every 6 hours as needed for shortness of breath / dyspnea or wheezing 1 Inhaler 8     MAPAP 325 MG tablet TAKE TWO TABLETS (650MG) BY MOUTH EVERY 4 HOURS AS NEEDED FOR PAIN 60 tablet 11     clopidogrel (PLAVIX) 75 MG tablet Take 1 tablet (75 mg) by mouth daily 90 tablet 2     diclofenac (VOLTAREN) 1 % GEL topical gel Place 2 g onto the skin 2 times daily as needed Apply 4 grams to knees or 2 grams to hands 100 g 0     vitamin D (ERGOCALCIFEROL) 43810 UNIT capsule TAKE ONE CAPSULE BY MOUTH ONCE WEEKLY ON MONDAY 12 capsule 3     amLODIPine (NORVASC) 10 MG tablet TAKE 1 TABLET BY MOUTH ONCE DAILY 31 tablet 11     furosemide (LASIX) 20 MG tablet TAKE 1 TABLET BY MOUTH ONCE DAILY 31 tablet 5     niacin 500 MG CR capsule TAKE 1 CAPSULE BY MOUTH AT BEDTIME 31 capsule 11     potassium chloride (K-TAB,KLOR-CON) 10 MEQ tablet TAKE 1 TABLET BY MOUTH ONCE DAILY 31 tablet 11     tamsulosin (FLOMAX) 0.4 MG capsule TAKE 1 CAPSULE BY MOUTH ONCE DAILY 31 capsule 11     budesonide (PULMICORT) 0.25 MG/2ML neb solution NEBULIZE THE CONTENT OF 1 VIAL TWICE DAILY FOR COPD 120 mL 10     ipratropium - albuterol 0.5 mg/2.5 mg/3 mL (DUONEB) 0.5-2.5 (3) MG/3ML neb solution NEBULIZE THE CONTENT OF 1 VIAL THREE TIMES  "DAILY;AND MAY NEBULIZE THE CONTENT OF 1 VIAL AS NEEDED FOR SHORTNESS OF BREATH 360 mL 10     citalopram (CELEXA) 20 MG tablet Take 20 mg by mouth daily       QUEtiapine Fumarate (SEROQUEL PO) Take 200 mg by mouth 2 times daily        Respiratory Therapy Supplies (NEBULIZER/ADULT MASK) KIT 1 Device 4 times daily. 1 kit 3         Reviewed and updated as needed this visit by clinical staff       Reviewed and updated as needed this visit by Provider         ROS:  Constitutional, HEENT, cardiovascular, pulmonary, GI, , musculoskeletal, neuro, skin, endocrine and psych systems are negative, except as otherwise noted.      OBJECTIVE:   /68  Pulse 72  Temp 98.7  F (37.1  C) (Oral)  Ht 5' 8\" (1.727 m)  Wt 229 lb (103.9 kg)  SpO2 97%  BMI 34.82 kg/m2  Body mass index is 34.82 kg/(m^2).   GENERAL:weak, frail, alert and no distress  EYES: Eyes grossly normal to inspection, PERRL and conjunctivae and sclerae normal  HENT: ear canals and TM's normal, nose and mouth without ulcers or lesions, left ear pina defect superiorly   NECK: no adenopathy, no asymmetry, masses, or scars and thyroid normal to palpation  RESP: lungs  - no rales,  Scattered rhonchi and expiratory wheezes  CV: regular rate and rhythm, normal S1 S2, no S3 or S4, no murmur, click or rub, no peripheral edema and peripheral pulses strong  ABDOMEN: soft, obese, nontender, no hepatosplenomegaly, no masses and bowel sounds normal  MS: no gross musculoskeletal defects noted, 2+ LE edema  SKIN: no suspicious lesions or rashes  NEURO: left arm and leg paralysis, strength 1/5 left foot drop/. Uses a foot brace , mentation intact and speech normal    Diagnostic Test Results:  none     ASSESSMENT/PLAN:     Problem List Items Addressed This Visit     Seizure disorder (H)    COPD (chronic obstructive pulmonary disease) (H)    Hemiplegia affecting left nondominant side (H)    Benign non-nodular prostatic hyperplasia with lower urinary tract symptoms      Other " Visit Diagnoses     Hospital discharge follow-up    -  Primary           Continue current treatment   Follow up with neurology   No recurrent seizures   Continues to need motorized wheelchair and is able to safely operate it   Monitor lab periodically     Follow-Up:in 1 month     Len Krishnan MD  [facility]

## 2017-11-17 NOTE — MR AVS SNAPSHOT
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)        Benign non-nodular prostatic hyperplasia with lower urinary tract symptoms           Follow-ups after your visit        Your next 10 appointments already scheduled     Jan 03, 2018  2:20 PM CST   (Arrive by 2:05 PM)   New Seizure with Amelia Cloud MD   [facility] Neurology ([facility])    [address] [place] [id number] [phone]              Who to contact     If you have questions or need follow up information about today's clinic visit or your schedule please contact [facility] directly at [phone].  Normal or non-critical lab and imaging results will be communicated to you by MyChart, letter or phone within 4 business days after the clinic has received the results. If you do not hear from us within 7 days, please contact the clinic through MyChart or phone. If you have a critical or abnormal lab result, we will notify you by phone as soon as possible.  Submit refill requests through [employer] or call your pharmacy and they will forward the refill request to us. Please allow 3 business days for your refill to be completed.          Additional Information About Your Visit        MyChart Information     [facility] lets you send messages to your doctor, view your test results, renew your prescriptions, schedule appointments and more. To sign up, go to [email or web address] . Click on \"Log in\" on the left side of the screen, which will " "take you to the Welcome page. Then click on \"Sign up Now\" on the right side of the page.     You will be asked to enter the access code listed below, as well as some personal information. Please follow the directions to create your username and password.     Your access code is: [email or web address]  Expires: 2017  2:56 PM     Your access code will  in 90 days. If you need help or a new code, please call your [place] clinic or [phone].        Care EveryWhere ID     This is your Care EveryWhere ID. This could be used by other organizations to access your [place] medical records  [id number]        Your Vitals Were     Pulse Temperature Height Pulse Oximetry BMI (Body Mass Index)       72 98.7  F (37.1  C) (Oral) 5' 8\" (1.727 m) 97% 34.82 kg/m2        Blood Pressure from Last 3 Encounters:   17 104/68   17 110/63   10/24/17 116/60    Weight from Last 3 Encounters:   17 229 lb (103.9 kg)   17 229 lb 15 oz (104.3 kg)   10/24/17 225 lb (102.1 kg)              Today, you had the following     No orders found for display         Today's Medication Changes          These changes are accurate as of: 17  4:36 PM.  If you have any questions, ask your nurse or doctor.               These medicines have changed or have updated prescriptions.        Dose/Directions    gabapentin 600 MG tablet   Commonly known as:  NEURONTIN   This may have changed:    - how much to take  - when to take this  - additional instructions   Used for:  Seizure disorder (H)        TAKE THREE TABLETS (1800MG) BY MOUTH ONCE DAILY   Quantity:  93 tablet   Refills:  PRN       loperamide 2 MG capsule   Commonly known as:  IMODIUM   This may have changed:    - how much to take  - additional instructions   Used for:  Functional diarrhea        Dose:  2 mg   Take 1 capsule (2 mg) by mouth 2 times daily TAKE 2 CAPSULE PO BID; AND MAY TAKE 1 CAPSULE PO QID PRN   Quantity:  120 capsule   Refills:  3                Primary " Care Provider Office Phone # Fax #    Len Krishnan [phone]       [address] [facility] [address]        Equal Access to Services     MARIWHITNEY REECE : Hadcasey mei ku shero Sojoaquin, waaxda luqadaha, qaybta kaalmada adedebbie, deny cartwright laTadeobrad pace. So [facility] [phone].    ATENCIÓN: Si habla español, tiene a costello disposición servicios gratuitos de asistencia lingüística. Llame al [phone].    We comply with applicable federal civil rights laws and [place] laws. We do not discriminate on the basis of race, color, national origin, age, disability, sex, sexual orientation, or gender identity.            Thank you!     Thank you for choosing [facility]  for your care. Our goal is always to provide you with excellent care. Hearing back from our patients is one way we can continue to improve our services. Please take a few minutes to complete the written survey that you may receive in the mail after your visit with us. Thank you!             Your Updated Medication List - Protect others around you: Learn how to safely use, store and throw away your medicines at www.disposemymeds.org.          This list is accurate as of: 11/17/17  4:36 PM.  Always use your most recent med list.                   Brand Name Dispense Instructions for use Diagnosis    amLODIPine 10 MG tablet    NORVASC    31 tablet    TAKE 1 TABLET BY MOUTH ONCE DAILY    Essential hypertension, benign       AVEENO DAILY MOISTURIZER Lotn      Apply daily to hands and ears for dry skin.        baclofen 20 MG tablet    LIORESAL    90 tablet    TAKE 1 TABLET BY MOUTH THREE TIMES DAILY    Other seizures (H)       budesonide 0.25 MG/2ML neb solution    PULMICORT    120 mL    NEBULIZE THE CONTENT OF 1 VIAL TWICE DAILY FOR COPD    Chronic bronchitis, unspecified chronic bronchitis type (H)       carBAMazepine 200 MG tablet    TEGretol    90 tablet    TAKE 1 TABLET BY MOUTH THREE TIMES DAILY     Seizure disorder (H)       citalopram 20 MG tablet    celeXA     Take 20 mg by mouth daily        clopidogrel 75 MG tablet    PLAVIX    90 tablet    Take 1 tablet (75 mg) by mouth daily    Essential hypertension, benign, History of stroke       diclofenac 1 % Gel topical gel    VOLTAREN    100 g    Place 2 g onto the skin 2 times daily as needed Apply 4 grams to knees or 2 grams to hands    Chronic pain syndrome       * divalproex 250 MG EC tablet    DEPAKOTE     Take 250 mg by mouth 2 times daily        * divalproex 500 MG EC tablet    DEPAKOTE    60 tablet    TAKE THREE TABLETS (1500MG) BY MOUTH TWICE DAILY (TAKE WITH 250MG FOR A TOTAL OF 1750MG)    Seizure disorder (H)       furosemide 20 MG tablet    LASIX    31 tablet    TAKE 1 TABLET BY MOUTH ONCE DAILY    Bilateral edema of lower extremity       gabapentin 600 MG tablet    NEURONTIN    93 tablet    TAKE THREE TABLETS (1800MG) BY MOUTH ONCE DAILY    Seizure disorder (H)       ipratropium - albuterol 0.5 mg/2.5 mg/3 mL 0.5-2.5 (3) MG/3ML neb solution    DUONEB    360 mL    NEBULIZE THE CONTENT OF 1 VIAL THREE TIMES DAILY;AND MAY NEBULIZE THE CONTENT OF 1 VIAL AS NEEDED FOR SHORTNESS OF BREATH    Chronic bronchitis, unspecified chronic bronchitis type (H)       levalbuterol 45 MCG/ACT Inhaler    XOPENEX HFA    1 Inhaler    Inhale 2 puffs into the lungs every 6 hours as needed for shortness of breath / dyspnea or wheezing    Chronic obstructive pulmonary disease, unspecified COPD type (H)       levETIRAcetam 1000 MG Tabs     60 tablet    Take 1,000 mg by mouth 2 times daily    Seizure disorder (H)       loperamide 2 MG capsule    IMODIUM    120 capsule    Take 1 capsule (2 mg) by mouth 2 times daily TAKE 2 CAPSULE PO BID; AND MAY TAKE 1 CAPSULE PO QID PRN    Functional diarrhea       MAPAP 325 MG tablet   Generic drug:  acetaminophen     60 tablet    TAKE TWO TABLETS (650MG) BY MOUTH EVERY 4 HOURS AS NEEDED FOR PAIN    Low back pain, unspecified back pain  laterality, unspecified chronicity, with sciatica presence unspecified, Neck pain       mineral oil-hydrophilic petrolatum      Apply topically as needed Apply daily to left wound.        nebulizer/adult mask Kit     1 kit    1 Device 4 times daily.    Wheezing       niacin 500 MG CR capsule     31 capsule    TAKE 1 CAPSULE BY MOUTH AT BEDTIME    Hyperlipidemia LDL goal <100       order for DME     1 Device    Power wheelchair    History of CVA (cerebrovascular accident), Flaccid hemiplegia of left nondominant side due to infarction of brain (H)       oxyCODONE IR 5 MG tablet    ROXICODONE    120 tablet    Take 1 tablet (5 mg) by mouth 4 times daily    Low back pain, unspecified back pain laterality, unspecified chronicity, with sciatica presence unspecified       potassium chloride 10 MEQ tablet    K-TAB,KLOR-CON    31 tablet    TAKE 1 TABLET BY MOUTH ONCE DAILY    Bilateral edema of lower extremity       SARNA SENSITIVE 1 % Lotn lotion   Generic drug:  pramoxine      as needed for itching One application as needed for itchy skin on posterior ears and hands.        SEROQUEL PO      Take 200 mg by mouth 2 times daily        simvastatin 40 MG tablet    ZOCOR    30 tablet    TAKE 1 TABLET BY MOUTH AT BEDTIME    Hyperlipidemia LDL goal <100       tamsulosin 0.4 MG capsule    FLOMAX    31 capsule    TAKE 1 CAPSULE BY MOUTH ONCE DAILY    Benign non-nodular prostatic hyperplasia with lower urinary tract symptoms       vitamin D 12210 UNIT capsule    ERGOCALCIFEROL    12 capsule    TAKE ONE CAPSULE BY MOUTH ONCE WEEKLY ON MONDAY    Vitamin D deficiency       * Notice:  This list has 2 medication(s) that are the same as other medications prescribed for you. Read the directions carefully, and ask your doctor or other care provider to review them with you.

## 2017-11-17 NOTE — NURSING NOTE
"Chief Complaint   Patient presents with     Hospital F/U     Hospital F/U, also requesting order for new electric chair       Initial /68  Pulse 72  Temp 98.7  F (37.1  C) (Oral)  Ht 5' 8\" (1.727 m)  Wt 229 lb (103.9 kg)  SpO2 97%  BMI 34.82 kg/m2 Estimated body mass index is 34.82 kg/(m^2) as calculated from the following:    Height as of this encounter: 5' 8\" (1.727 m).    Weight as of this encounter: 229 lb (103.9 kg).  Medication Reconciliation: complete   Danielle Taylor MA      "

## 2017-11-21 NOTE — TELEPHONE ENCOUNTER
Rx faxed to pharm on 8/4/17 for #60 x11 refills, but directions on rx are 3 bid. Ok to change quantity?      Last Depakote level-11/5/ ()    Per 11/7 hosp d/c summary-Continue PTA depakote 1750-1750mg

## 2017-11-22 NOTE — TELEPHONE ENCOUNTER
[facility] called back-Stated pt was getting worse, and non responsive to verbal commands, so they sent pt to the ER

## 2017-11-22 NOTE — TELEPHONE ENCOUNTER
Called [facility]-left message       Need to know if they can draw blood there, or how they do this. Their fax# is [phone]

## 2017-11-22 NOTE — TELEPHONE ENCOUNTER
"[facility] (nurse from pt's facility) called. Stated pt's Keppra dose was increased from 500mg bid, to 1000mg bid when pt was in the hosp on 11/6. Since increase pt's behavior has been getting worse. Pt is hitting more, trying to transfer himself then will fall out of chair when doing so then staff has to call fire department to get pt back up. Pt seem pretty \"out of it\" at times. Staff will find pt slumped over in his chair, \"zonked out\". Pt does not have a neuro now, but will be seeing a Epileptologist in January. Can dose maybe be decreased a little til appt?         Last Keppra level-11/6/17-21  (12-46)  "

## 2017-11-28 NOTE — TELEPHONE ENCOUNTER
Calls saying directions for Keppra were to take one tablet two times daily, but quantity given was only 30.  Changed the amt to 62 per Dr Krishnan because he overlooked this.

## 2017-12-07 NOTE — TELEPHONE ENCOUNTER
Fax received from [employer] for review and signature.  Put in Dr. Krishnan's in basket.

## 2017-12-13 NOTE — TELEPHONE ENCOUNTER
Fax received from [employer] for review and signature.  Put in Dr. Krishnan's in basket.

## 2017-12-13 NOTE — TELEPHONE ENCOUNTER
Fax received from [facility] for review and signature.  Put in Dr. Krishnan's in basket.

## 2017-12-15 PROBLEM — G93.40 ENCEPHALOPATHY: Status: ACTIVE | Noted: 2017-01-01

## 2017-12-15 NOTE — MR AVS SNAPSHOT
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  Normal or non-critical lab and imaging results will be communicated to you by Smartsyhart, letter or phone within 4 business days after the clinic has received the results. If you do not hear from us within 7 days, please contact the clinic through [facility] or phone. If you have a critical or abnormal lab result, we will notify you by phone as soon as possible.  Submit refill requests through [facility] or call your pharmacy and they will forward the refill request to us. Please allow 3 business days for your refill to be completed.          Additional Information About Your Visit        Smartsyhart Information     R[facility] Scre[facility] lets you send messages to your doctor, view your test results, renew your prescriptions, schedule appointments and more. To sign up, go to [email or web address] . Click on \"Log in\" on the left side of the screen, which will take you to the Welcome page. Then click on \"Sign up Now\" " on the right side of the page.     You will be asked to enter the access code listed below, as well as some personal information. Please follow the directions to create your username and password.     Your access code is: [email or web address]  Expires: 3/15/2018  2:17 PM     Your access code will  in 90 days. If you need help or a new code, please call your [facility] clinic or [phone].        Care EveryWhere ID     This is your Care EveryWhere ID. This could be used by other organizations to access your [facility] medical records  [id number]        Your Vitals Were     Pulse Temperature                86 98.8  F (37.1  C) (Oral)           Blood Pressure from Last 3 Encounters:   12/15/17 (!) 121/97   12/15/17 128/84   17 104/68    Weight from Last 3 Encounters:   17 229 lb (103.9 kg)   17 229 lb 15 oz (104.3 kg)   10/24/17 225 lb (102.1 kg)              Today, you had the following     No orders found for display       Primary Care Provider Office Phone # Fax #    Len Krishnan [phone]       [address] [facility] [address]        Equal Access to Services     WHITNEY NEWELL : Hadii aad ku hadasho Soomaali, waaxda luqadaha, qaybta kaalmada adeegyada, waxay idiin hayjose martinn jasmeet mancilla . So [facility] [phone].    ATENCIÓN: Si habla español, tiene a costello disposición servicios gratuitos de asistencia lingüística. Llame al [phone].    We comply with applicable federal civil rights laws and [place] laws. We do not discriminate on the basis of race, color, national origin, age, disability, sex, sexual orientation, or gender identity.            Thank you!     Thank you for choosing [facility]  for your care. Our goal is always to provide you with excellent care. Hearing back from our patients is one way we can continue to improve our services. Please take a few minutes to complete the written survey that you may receive in the mail after your  visit with us. Thank you!             Your Updated Medication List - Protect others around you: Learn how to safely use, store and throw away your medicines at www.disposemymeds.org.          This list is accurate as of: 12/15/17  2:17 PM.  Always use your most recent med list.                   Brand Name Dispense Instructions for use Diagnosis    amLODIPine 10 MG tablet    NORVASC    31 tablet    TAKE 1 TABLET BY MOUTH ONCE DAILY    Essential hypertension, benign       AVEENO DAILY MOISTURIZER Lotn      Apply daily to hands and ears for dry skin.        baclofen 20 MG tablet    LIORESAL    90 tablet    TAKE 1 TABLET BY MOUTH THREE TIMES DAILY    Other seizures (H)       budesonide 0.25 MG/2ML neb solution    PULMICORT    120 mL    NEBULIZE THE CONTENT OF 1 VIAL TWICE DAILY FOR COPD    Chronic bronchitis, unspecified chronic bronchitis type (H)       carBAMazepine 200 MG tablet    TEGretol    90 tablet    TAKE 1 TABLET BY MOUTH THREE TIMES DAILY    Seizure disorder (H)       citalopram 20 MG tablet    celeXA     Take 20 mg by mouth daily        clopidogrel 75 MG tablet    PLAVIX    90 tablet    Take 1 tablet (75 mg) by mouth daily    Essential hypertension, benign, History of stroke       diclofenac 1 % Gel topical gel    VOLTAREN    100 g    Place 2 g onto the skin 2 times daily as needed Apply 4 grams to knees or 2 grams to hands    Chronic pain syndrome       * divalproex 250 MG EC tablet    DEPAKOTE     Take 250 mg by mouth 2 times daily        * divalproex 500 MG EC tablet    DEPAKOTE    180 tablet    TAKE THREE TABLETS (1500MG) BY MOUTH TWICE DAILY (TAKE WITH 250MG FOR A TOTAL OF 1750MG)    Seizure disorder (H)       furosemide 20 MG tablet    LASIX    30 tablet    TAKE 1 TABLET BY MOUTH ONCE DAILY    Bilateral edema of lower extremity       gabapentin 600 MG tablet    NEURONTIN    90 tablet    TAKE 1 TABLET BY MOUTH THREE TIMES DAILY    Seizure disorder (H)       ipratropium - albuterol 0.5 mg/2.5 mg/3 mL  0.5-2.5 (3) MG/3ML neb solution    DUONEB    360 mL    NEBULIZE THE CONTENT OF 1 VIAL THREE TIMES DAILY;AND MAY NEBULIZE THE CONTENT OF 1 VIAL AS NEEDED FOR SHORTNESS OF BREATH    Chronic bronchitis, unspecified chronic bronchitis type (H)       levalbuterol 45 MCG/ACT Inhaler    XOPENEX HFA    1 Inhaler    Inhale 2 puffs into the lungs every 6 hours as needed for shortness of breath / dyspnea or wheezing    Chronic obstructive pulmonary disease, unspecified COPD type (H)       * levETIRAcetam 1000 MG Tabs     60 tablet    Take 1,000 mg by mouth 2 times daily    Seizure disorder (H)       * levETIRAcetam 1000 MG Tabs     62 tablet    Take 1 tablet by mouth 2 times daily    Seizure disorder (H)       loperamide 2 MG capsule    IMODIUM    120 capsule    TAKE TWO CAPSULES (4MG) BY MOUTH TWICE DAILY;AND MAY TAKE ONE CAPSULE (2MG) BY MOUTH FOUR TIMES A DAY AS NEEDED    Functional diarrhea       MAPAP 325 MG tablet   Generic drug:  acetaminophen     60 tablet    TAKE TWO TABLETS (650MG) BY MOUTH EVERY 4 HOURS AS NEEDED FOR PAIN    Low back pain, unspecified back pain laterality, unspecified chronicity, with sciatica presence unspecified, Neck pain       mineral oil-hydrophilic petrolatum      Apply topically as needed Apply daily to left wound.        nebulizer/adult mask Kit     1 kit    1 Device 4 times daily.    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        SEROQUEL PO      Take 200 mg by mouth 2 times daily        simvastatin 40 MG tablet    ZOCOR    30 tablet    TAKE 1 TABLET BY MOUTH AT BEDTIME    Hyperlipidemia LDL goal <100       tamsulosin 0.4 MG capsule    FLOMAX    31 capsule    TAKE 1 CAPSULE BY MOUTH ONCE DAILY    Benign non-nodular prostatic hyperplasia with lower urinary tract symptoms       vitamin D 62484 UNIT capsule    ERGOCALCIFEROL    12 capsule    TAKE ONE CAPSULE BY MOUTH ONCE WEEKLY ON MONDAY    Vitamin D deficiency       * Notice:  This list has 4 medication(s) that are the same as other medications prescribed for you. Read the directions carefully, and ask your doctor or other care provider to review them with you.

## 2017-12-15 NOTE — ED PROVIDER NOTES
History     Chief Complaint:  Altered mental status     The history is provided by the EMS personnel. The history is limited by the condition of the patient.      David Dawn is a 57 year old male with a history of left-sided hemiplasia CVA, COPD, HTN, hyperlipidemia, and BPH who presents via EMS  form clinic with altered mental status. The patient is DNR, DNI and is not responsive to commands. Per son, the patient is normally able to converse and answer questions at baseline (son was contacted over the phone). The patient has dried blood from his right nostril. Per progress report from clinic today, the patient has had 2 recent falls and has been recently confused, lethargic, and has speech that does not make sense.    I spoke with the patient's assisted living facility facility[facility].  The nurse supervisor reported that he has been increasingly somnolent and intermittently agitated.  He has been seen twice at [facility] for similar presentations.  His assisted care living facility is concerned that he may be having seizures, although the patient has been administered medications including his antiepileptics and oxycodone 5 mg q.i.d.  The patient is not supposed to self administer medications.    CARDIAC RISK FACTORS:  Sex:    Male   Tobacco:   Pos  Hypertension:   Pos  Hyperlipidemia:  Pos  Diabetes:   Neg  Family History:  Pos    Allergies:  Ibuprofen Sodium  Tuberculin Tests     Medications:    levETIRAcetam 1000 MG TABS  furosemide (LASIX) 20 MG tablet  gabapentin (NEURONTIN) 600 MG tablet  loperamide (IMODIUM) 2 MG capsule  divalproex (DEPAKOTE) 500 MG EC tablet  levETIRAcetam 1000 MG TABS  oxyCODONE IR (ROXICODONE) 5 MG tablet  mineral oil-hydrophilic petrolatum (AQUAPHOR)  divalproex (DEPAKOTE) 250 MG EC tablet  pramoxine (SARNA SENSITIVE) 1 % LOTN lotion  Sunscreens (AVEENO DAILY MOISTURIZER) LOTN  order for DME  simvastatin (ZOCOR) 40 MG tablet  baclofen (LIORESAL) 20 MG tablet  carBAMazepine  (TEGRETOL) 200 MG tablet  levalbuterol (XOPENEX HFA) 45 MCG/ACT Inhaler  MAPAP 325 MG tablet  clopidogrel (PLAVIX) 75 MG tablet  diclofenac (VOLTAREN) 1 % GEL topical gel  vitamin D (ERGOCALCIFEROL) 14192 UNIT capsule  amLODIPine (NORVASC) 10 MG tablet  niacin 500 MG CR capsule  potassium chloride (K-TAB,KLOR-CON) 10 MEQ tablet  tamsulosin (FLOMAX) 0.4 MG capsule  budesonide (PULMICORT) 0.25 MG/2ML neb solution  ipratropium - albuterol 0.5 mg/2.5 mg/3 mL (DUONEB) 0.5-2.5 (3) MG/3ML neb solution  citalopram (CELEXA) 20 MG tablet  QUEtiapine Fumarate (SEROQUEL PO)  Respiratory Therapy Supplies (NEBULIZER/ADULT MASK) KIT    Past Medical History:    BPH (benign prostatic hyperplasia)   COPD (chronic obstructive pulmonary disease)   Edema   ETOH abuse   GERD (gastroesophageal reflux disease)   HTN (hypertension)   Hyperlipidemia LDL goal <100   Impulse control disorder   Mild major depression (H)   Positive TB test   Seizures (H)   Unspecified cerebral artery occlusion with cerebral infarction    Past Surgical History:    Colonoscopy  X 2  Excise tumor rectum villus   Knee surgery   Neck surgery   Sigmoidoscopy flexible X 2    Family History:    MI  Cerebrovascular disease   Stroke    Social History:  Presents via EMS   Tobacco use: 1.00 PPD  Alcohol use: No   PCP: Len Krishnan    Marital Status:  Single     Review of Systems   Unable to perform ROS: Mental status change       Physical Exam     Patient Vitals for the past 24 hrs:   BP Temp Temp src Pulse Resp SpO2   12/15/17 1715 126/74 - - - 20 -   12/15/17 1700 103/90 - - - 15 -   12/15/17 1645 96/60 - - - - 94 %   12/15/17 1630 90/59 - - - - 93 %   12/15/17 1615 (!) 89/63 - - - - 90 %   12/15/17 1600 93/59 - - - - (!) 88 %   12/15/17 1545 96/61 - - - - 94 %   12/15/17 1530 108/62 - - - - 94 %   12/15/17 1500 - - - - 15 -   12/15/17 1444 - - - - - 100 %   12/15/17 1415 (!) 121/97 97.7  F (36.5  C) Axillary 82 8 97 %        Physical Exam  Constitutional:  Somnolent, inattentive, snoring loudly with gargling mouth sounds  HENT:     Nose: Blood in right nares,    Mouth/Throat: Oropharynx is clear, drool from right side of his mouth   Ears: Normal external ears. TMs clear bilaterally, normal external canals bilaterally.  Eyes: EOM are normal. Pupils are equal, round, and reactive to light.   CV: Regular rate and rhythm, no murmurs, rubs or gallups.  Chest: Patient with inspiration and expiratory wheezes as well as rales,  Slow respiratory rate.  Shallow breathing.    GI: No distension. There is no tenderness.  Normal bowel sounds.   MSK: no evidence of leg or arm trauma, left hemiparesis  Neurological: Somonolent, GCS 11 E2V4M5, left hemiparesis, moving right arm and leg with painful stimulus  Skin: Skin is warm and dry.     Emergency Department Course   ECG (14:37:06):  Rate 81 bpm. MN interval 150. QRS duration 88. QT/QTc 390/453. P-R-T axes 57 -20 72. Normal sinus rhythm. Normal ECG. Agree with computer interpretation.  Interpreted at 1654 by Ivonne Rivas MD.     Imaging:  Radiographic findings were communicated with the patient and nursing home who voiced understanding of the findings.     CT Head without contrast:  IMPRESSION:   1. Atrophy.  2. Old right MCA territory infarct.  3. Nothing acute.  4. No interval change.    RENNY SKINNER MD    CT Cervical spine without contrast:  IMPRESSION:  1. No fracture or acute abnormality.  2. Mild degenerative changes.  3. No interval change.  4. Emphysematous changes right lung apex.    RENNY SKINNER MD    XR Chest, portable, 1 view:  IMPRESSION: No evidence of edema or pneumonia.    ALFREDO ALEXANDRA MD    Imaging independently reviewed and agree with radiologist interpretation.      Laboratory:  CBC:  HGB 12.9 (low), ow WNL (WBC 7.8,)    CMP: Potassium 5.7 (high), ow WNL (Creatinine 1.02)   UA with Microscopic: Pending  Drug screen 77 (urine): Pending  ISTAT gases lactate jennifer POCT: Ph venous 7.42, PCO2  Venous 42, PO2  Venous 53 (high), Bicarbonate Venous 27, O2 Sat 88, Lactic acid 1.0.      ISTAT Met ICa POCT: Anion Gap 5 (low), Glucose 102 (high)  Glucose by meter: 102 (high)  1439: Troponin POCT: 0.02      Interventions:  1423: Narcan 0.4 mg IV   1440: Narcan 0.4 mg IV   1444: Duoneb 3 mL nebulization    Emergency Department Course:  Past medical records, nursing notes, and vitals reviewed.  1418: I performed an exam of the patient and obtained history, as documented above.   1420: The patient was placed on continuous pulse oximetry and cardiac monitoring.   1425: IV inserted and blood drawn.   1425: /97 heart rate   1433: I contacted the patients son Angelo to discuss patient's DNR, DNI.   1434: Patient is alert and speaking after administration of 0.4 mg Narcan.   1443: I contacted [facility], the patient's nursing home facility who say that he is now full code.   1453: I update Angelo about the patient's treatment.   1530: I rechecked the patient. Explained findings to patient.   Findings and plan explained to the Patient who consents to admission.     1708: Discussed the patient with Dr. Vargas, who will admit the patient to a observation bed for further monitoring, evaluation, and treatment.      Impression & Plan      Medical Decision Makin-year-old male with complex medical history including seizures, stroke with left-sided hemiparesis, behavior issues, bipolar, and COPD, presenting with lethargy and altered mental status.  A broad differential diagnosis was considered including CVA, intracranial hemorrhage, COPD exacerbation, hypercarbia, polysubstance abuse, alcohol intoxication, opiate toxicity, urinary tract infection, pneumonia, delirium, sepsis.  Patient was originally quite somnolent with shallow slow respirations and minimal responsiveness.  He did have a response to 0.4 mg of Narcan temporarily.  Repeat dose did not show any further improvement.  He will wake up and become quite  agitated and shout profanities with painful stimulation, then becomes quite somnolent and difficult to arouse while sleeping.  This appears to be acute episode of encephalopathy, which could be multifactorial.  He also likely has a obstructive sleep apnea which may be contributing with hypercapnia, although it is not significantly elevated on VBG.  He has not had any seizure-like activity and his lactate is normal, although originally he could have presented with a post ictal period.  CT head does not show any evidence of intracranial hemorrhage although he has had falls recently.  Urinalysis and urine drug screen are pending.  Given his recent decline and his continued encephalopathic state, he should be admitted to internal medicine for further workup.  Notably, patient had been DNR/DNI previously, but according to his assisted living facility he had recently changed his POLST to full code.    Diagnosis:    ICD-10-CM    1. Encephalopathy G93.40    2. Delirium R41.0        Disposition:  Admitted to Dr. Vargas for further evaluation.     Scribe Disclosure:   I, Donavan Camara, am serving as a scribe at 2:18 PM on 12/15/2017 to document services personally performed by Ivonne Rivas MD based on my observations and the provider's statements to me.       Donavan Camara  12/15/2017   [facility] EMERGENCY DEPARTMENT       Ivonne Rivas MD  12/15/17 0302

## 2017-12-15 NOTE — IP AVS SNAPSHOT
MRN:[id number]                      After Visit Summary   12/15/2017    David Dawn    MRN: [id number]           Thank you!     Thank you for choosing [facility] for your care. Our goal is always to provide you with excellent care. Hearing back from our patients is one way we can continue to improve our services. Please take a few minutes to complete the written survey that you may receive in the mail after you visit. If you would like to speak to someone directly about your visit please contact Patient Relations at [phone]. Thank you!          Patient Information     Date Of Birth          1960        About your hospital stay     You were admitted on:  December 15, 2017 You last received care in the:  [facility] [address] [facility]    You were discharged on:  December 17, 2017        Reason for your hospital stay       Altered mental status                  Who to Call     For medical emergencies, please call 911.  For non-urgent questions about your medical care, please call your primary care provider or clinic, [phone]          Attending Provider     Provider Specialty    LumIvonne MD Emergency Medicine    Jesse Calvo MD Emergency Medicine    Sam, Yoselin Blakely MD Internal Medicine    Hernesto Dominguez MD Internal Medicine       Primary Care Provider Office Phone # Fax #    Len Krishnan [phone]      After Care Instructions     Activity - Up with nursing assistance           Advance Diet as Tolerated       Follow this diet upon discharge: Regular            Fall precautions           General info for SNF       Length of Stay Estimate: Long Term Care  Condition at Discharge: Improving  Level of care:skilled   Rehabilitation Potential: Poor  Admission H&P remains valid and up-to-date: Yes  Recent Chemotherapy: N/A  Use Nursing Home Standing Orders: Yes            Mantoux instructions       Give two-step Mantoux  "(PPD) Per Facility Policy Yes, if not current                  Follow-up Appointments     Follow Up and recommended labs and tests       Follow up with Nursing home physician in less than one week.  No follow up labs or test are needed.  Resume prior to admission home care orders and outpatient health and/or mental health appointments                  Your next 10 appointments already scheduled     Jan 03, 2018  2:20 PM CST   (Arrive by 2:05 PM)   New Seizure with Amelia Cloud MD   [facility] ([facility])    [address] [place] [address]  3rd [facility] [address] [phone]              Pending Results     Date and Time Order Name Status Description    12/15/2017 1421 EKG 12 lead Preliminary             Statement of Approval     Ordered          12/17/17 1121  I have reviewed and agree with all the recommendations and orders detailed in this document.  EFFECTIVE NOW     Approved and electronically signed by:  Hernesto Dominguez MD             Admission Information     Date & Time Provider Department Dept. Phone    12/15/2017 Hernesto Dominguez MD [place] [address] [facility] [phone]      Your Vitals Were     Blood Pressure Pulse Temperature Respirations Height Weight    136/80 (BP Location: Right arm) 82 97.5  F (36.4  C) (Axillary) 20 1.727 m (5' 8\") 97.8 kg (215 lb 9.6 oz)    Pulse Oximetry BMI (Body Mass Index)                96% 32.78 kg/m2          [employer] Information     [employer] lets you send messages to your doctor, view your test results, renew your prescriptions, schedule appointments and more. To sign up, go to [email or web address] . Click on \"Log in\" on the left side of the screen, which will take you to the Welcome page. Then click on \"Sign up Now\" on the right side of the page.     You will be asked to enter the access code listed below, as well as some personal information. Please follow the directions to create your username and password.     Your " access code is: [email or web address]  Expires: 3/15/2018  2:17 PM     Your access code will  in 90 days. If you need help or a new code, please call your [facility] or [phone].        Care EveryWhere ID     This is your Care EveryWhere ID. This could be used by other organizations to access your [facility] medical records  [id number]        Equal Access to Services     WHITNEY REECE : Hadii aad ku hadasho Soomaali, waaxda luqadaha, qaybta kaalmada adeegyada, waxay morisin lianetn adegabino cartwright lacarolamy . So [facility] [phone].    ATENCIÓN: Si kimberly shanell, tiene a costello disposición servicios gratuitos de asistencia lingüística. Llame al [phone].    We comply with applicable federal civil rights laws and [place] laws. We do not discriminate on the basis of race, color, national origin, age, disability, sex, sexual orientation, or gender identity.               Review of your medicines      CONTINUE these medicines which may have CHANGED, or have new prescriptions. If we are uncertain of the size of tablets/capsules you have at home, strength may be listed as something that might have changed.        Dose / Directions    oxyCODONE IR 5 MG tablet   Commonly known as:  ROXICODONE   This may have changed:    - how much to take  - when to take this   Used for:  Low back pain, unspecified back pain laterality, unspecified chronicity, with sciatica presence unspecified        Dose:  2.5 mg   Take 0.5 tablets (2.5 mg) by mouth 2 times daily   Quantity:  15 tablet   Refills:  0       QUEtiapine 100 MG tablet   Commonly known as:  SEROQUEL   This may have changed:    - medication strength  - how much to take   Used for:  Behavior problem, adult, Hallucinations        Dose:  100 mg   Take 1 tablet (100 mg) by mouth 2 times daily   Refills:  0         CONTINUE these medicines which have NOT CHANGED        Dose / Directions    amLODIPine 10 MG tablet   Commonly known as:  NORVASC   Used for:  Essential hypertension, benign         TAKE 1 TABLET BY MOUTH ONCE DAILY   Quantity:  31 tablet   Refills:  11       AVEENO DAILY MOISTURIZER Lotn        Apply daily to hands and ears for dry skin.   Refills:  0       baclofen 20 MG tablet   Commonly known as:  LIORESAL   Used for:  Other seizures (H)        TAKE 1 TABLET BY MOUTH THREE TIMES DAILY   Quantity:  90 tablet   Refills:  11       budesonide 0.25 MG/2ML neb solution   Commonly known as:  PULMICORT   Used for:  Chronic bronchitis, unspecified chronic bronchitis type (H)        NEBULIZE THE CONTENT OF 1 VIAL TWICE DAILY FOR COPD   Quantity:  120 mL   Refills:  10       carBAMazepine 200 MG tablet   Commonly known as:  TEGretol   Used for:  Seizure disorder (H)        TAKE 1 TABLET BY MOUTH THREE TIMES DAILY   Quantity:  90 tablet   Refills:  11       citalopram 20 MG tablet   Commonly known as:  celeXA        Dose:  20 mg   Take 20 mg by mouth daily   Refills:  0       clopidogrel 75 MG tablet   Commonly known as:  PLAVIX   Used for:  Essential hypertension, benign, History of stroke        Dose:  75 mg   Take 1 tablet (75 mg) by mouth daily   Quantity:  90 tablet   Refills:  2       diclofenac 1 % Gel topical gel   Commonly known as:  VOLTAREN   Used for:  Chronic pain syndrome        Dose:  2 g   Place 2 g onto the skin 2 times daily as needed Apply 4 grams to knees or 2 grams to hands   Quantity:  100 g   Refills:  0       * divalproex 250 MG EC tablet   Commonly known as:  DEPAKOTE        Dose:  250 mg   Take 250 mg by mouth 2 times daily   Refills:  0       * divalproex 500 MG EC tablet   Commonly known as:  DEPAKOTE   Used for:  Seizure disorder (H)        TAKE THREE TABLETS (1500MG) BY MOUTH TWICE DAILY (TAKE WITH 250MG FOR A TOTAL OF 1750MG)   Quantity:  180 tablet   Refills:  5       furosemide 20 MG tablet   Commonly known as:  LASIX   Used for:  Bilateral edema of lower extremity        TAKE 1 TABLET BY MOUTH ONCE DAILY   Quantity:  30 tablet   Refills:  5       gabapentin 600  MG tablet   Commonly known as:  NEURONTIN   Used for:  Seizure disorder (H)        TAKE 1 TABLET BY MOUTH THREE TIMES DAILY   Quantity:  90 tablet   Refills:  1       ipratropium - albuterol 0.5 mg/2.5 mg/3 mL 0.5-2.5 (3) MG/3ML neb solution   Commonly known as:  DUONEB        Dose:  1 vial   Take 1 vial by nebulization 3 times daily   Refills:  0       levalbuterol 45 MCG/ACT Inhaler   Commonly known as:  XOPENEX HFA   Used for:  Chronic obstructive pulmonary disease, unspecified COPD type (H)        Dose:  2 puff   Inhale 2 puffs into the lungs every 6 hours as needed for shortness of breath / dyspnea or wheezing   Quantity:  1 Inhaler   Refills:  8       levETIRAcetam 1000 MG Tabs   Used for:  Seizure disorder (H)        Dose:  1 tablet   Take 1 tablet by mouth 2 times daily   Quantity:  62 tablet   Refills:  1       lidocaine 2 % topical gel   Commonly known as:  XYLOCAINE        Apply to rectum four times daily as needed   Refills:  0       loperamide 2 MG capsule   Commonly known as:  IMODIUM   Used for:  Functional diarrhea        TAKE TWO CAPSULES (4MG) BY MOUTH TWICE DAILY;AND MAY TAKE ONE CAPSULE (2MG) BY MOUTH FOUR TIMES A DAY AS NEEDED   Quantity:  120 capsule   Refills:  1       MAPAP 325 MG tablet   Used for:  Low back pain, unspecified back pain laterality, unspecified chronicity, with sciatica presence unspecified, Neck pain   Generic drug:  acetaminophen        TAKE TWO TABLETS (650MG) BY MOUTH EVERY 4 HOURS AS NEEDED FOR PAIN   Quantity:  60 tablet   Refills:  11       mineral oil-hydrophilic petrolatum        Apply topically as needed Apply daily to left wound.   Refills:  0       nebulizer/adult mask Kit   Used for:  Wheezing        Dose:  1 Device   1 Device 4 times daily.   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   Refills:  0       simvastatin 40 MG tablet   Commonly known as:  ZOCOR   Used for:  Hyperlipidemia LDL goal <100        TAKE 1 TABLET BY MOUTH AT BEDTIME   Quantity:  30 tablet   Refills:  11       tamsulosin 0.4 MG capsule   Commonly known as:  FLOMAX   Used for:  Benign non-nodular prostatic hyperplasia with lower urinary tract symptoms        TAKE 1 CAPSULE BY MOUTH ONCE DAILY   Quantity:  31 capsule   Refills:  11       vitamin D 98311 UNIT capsule   Commonly known as:  ERGOCALCIFEROL   Used for:  Vitamin D deficiency        TAKE ONE CAPSULE BY MOUTH ONCE WEEKLY ON MONDAY   Quantity:  12 capsule   Refills:  3       * Notice:  This list has 2 medication(s) that are the same as other medications prescribed for you. Read the directions carefully, and ask your doctor or other care provider to review them with you.      STOP taking     HALOPERIDOL PO                Where to get your medicines      Some of these will need a paper prescription and others can be bought over the counter. Ask your nurse if you have questions.     Bring a paper prescription for each of these medications     oxyCODONE IR 5 MG tablet       You don't need a prescription for these medications     QUEtiapine 100 MG tablet                Protect others around you: Learn how to safely use, store and throw away your medicines at www.disposemymeds.org.             Medication List: This is a list of all your  medications and when to take them. Check marks below indicate your daily home schedule. Keep this list as a reference.      Medications           Morning Afternoon Evening Bedtime As Needed    amLODIPine 10 MG tablet   Commonly known as:  NORVASC   TAKE 1 TABLET BY MOUTH ONCE DAILY   Last time this was given:  10 mg on 12/17/2017  7:56 AM   Next Dose Due:  12-18-17                                   AVEENO DAILY MOISTURIZER Lotn   Apply daily to hands and ears for dry skin.                                   baclofen 20 MG tablet   Commonly known as:  LIORESAL   TAKE 1 TABLET BY MOUTH THREE TIMES DAILY   Last time this was given:  20 mg on 12/17/2017 11:14 AM   Next Dose Due:  12-17-17 due at 1600                                         budesonide 0.25 MG/2ML neb solution   Commonly known as:  PULMICORT   NEBULIZE THE CONTENT OF 1 VIAL TWICE DAILY FOR COPD   Last time this was given:  0.25 mg on 12/17/2017  7:27 AM   Next Dose Due:  12-17-17                                      carBAMazepine 200 MG tablet   Commonly known as:  TEGretol   TAKE 1 TABLET BY MOUTH THREE TIMES DAILY   Last time this was given:  200 mg on 12/17/2017  7:57 AM   Next Dose Due:  12-17-17                                         citalopram 20 MG tablet   Commonly known as:  celeXA   Take 20 mg by mouth daily   Last time this was given:  20 mg on 12/17/2017  7:56 AM   Next Dose Due:  12-18-17                                   clopidogrel 75 MG tablet   Commonly known as:  PLAVIX   Take 1 tablet (75 mg) by mouth daily   Last time this was given:  75 mg on 12/17/2017  7:56 AM   Next Dose Due:  12-18-17                                   diclofenac 1 % Gel topical gel   Commonly known as:  VOLTAREN   Place 2 g onto the skin 2 times daily as needed Apply 4 grams to knees or 2 grams to hands                                   * divalproex 250 MG EC tablet   Commonly known as:  DEPAKOTE   Take 250 mg by mouth 2 times daily   Last time this was given:   1,750 mg on 12/17/2017  9:04 AM   Next Dose Due:  12-17-17                                      * divalproex 500 MG EC tablet   Commonly known as:  DEPAKOTE   TAKE THREE TABLETS (1500MG) BY MOUTH TWICE DAILY (TAKE WITH 250MG FOR A TOTAL OF 1750MG)   Last time this was given:  1,750 mg on 12/17/2017  9:04 AM   Next Dose Due:  12-17-17                                         furosemide 20 MG tablet   Commonly known as:  LASIX   TAKE 1 TABLET BY MOUTH ONCE DAILY   Last time this was given:  20 mg on 12/17/2017  7:57 AM   Next Dose Due:  12-18-17                                   gabapentin 600 MG tablet   Commonly known as:  NEURONTIN   TAKE 1 TABLET BY MOUTH THREE TIMES DAILY   Next Dose Due:  12-17-17                                         ipratropium - albuterol 0.5 mg/2.5 mg/3 mL 0.5-2.5 (3) MG/3ML neb solution   Commonly known as:  DUONEB   Take 1 vial by nebulization 3 times daily   Last time this was given:  3 mLs on 12/17/2017 11:52 AM   Next Dose Due:  12-17-17                                         levalbuterol 45 MCG/ACT Inhaler   Commonly known as:  XOPENEX HFA   Inhale 2 puffs into the lungs every 6 hours as needed for shortness of breath / dyspnea or wheezing                                   levETIRAcetam 1000 MG Tabs   Take 1 tablet by mouth 2 times daily   Last time this was given:  1,000 mg on 12/17/2017  7:57 AM   Next Dose Due:  12-17-17                                      lidocaine 2 % topical gel   Commonly known as:  XYLOCAINE   Apply to rectum four times daily as needed                                   loperamide 2 MG capsule   Commonly known as:  IMODIUM   TAKE TWO CAPSULES (4MG) BY MOUTH TWICE DAILY;AND MAY TAKE ONE CAPSULE (2MG) BY MOUTH FOUR TIMES A DAY AS NEEDED                                   MAPAP 325 MG tablet   TAKE TWO TABLETS (650MG) BY MOUTH EVERY 4 HOURS AS NEEDED FOR PAIN   Last time this was given:  650 mg on 12/16/2017 11:45 PM   Generic drug:  acetaminophen                                    mineral oil-hydrophilic petrolatum   Apply topically as needed Apply daily to left wound.   Next Dose Due:  12-18-17                                   nebulizer/adult mask Kit   1 Device 4 times daily.                                niacin 500 MG CR capsule   TAKE 1 CAPSULE BY MOUTH AT BEDTIME   Next Dose Due:  12-17-17                                   order for DME   Power wheelchair                                oxyCODONE IR 5 MG tablet   Commonly known as:  ROXICODONE   Take 0.5 tablets (2.5 mg) by mouth 2 times daily   Last time this was given:  2.5 mg on 12/17/2017  7:57 AM   Next Dose Due:  12-17-17                                      potassium chloride 10 MEQ tablet   Commonly known as:  K-TAB,KLOR-CON   TAKE 1 TABLET BY MOUTH ONCE DAILY   Next Dose Due:  12-18-17                                   QUEtiapine 100 MG tablet   Commonly known as:  SEROQUEL   Take 1 tablet (100 mg) by mouth 2 times daily   Last time this was given:  100 mg on 12/17/2017 12:09 PM   Next Dose Due:  12-17-17                                      SARNA SENSITIVE 1 % Lotn lotion   as needed for itching One application as needed for itchy skin on posterior ears and hands.   Generic drug:  pramoxine                                   simvastatin 40 MG tablet   Commonly known as:  ZOCOR   TAKE 1 TABLET BY MOUTH AT BEDTIME   Last time this was given:  40 mg on 12/16/2017  9:16 PM   Next Dose Due:  12-17-17                                   tamsulosin 0.4 MG capsule   Commonly known as:  FLOMAX   TAKE 1 CAPSULE BY MOUTH ONCE DAILY   Last time this was given:  0.4 mg on 12/17/2017  7:57 AM   Next Dose Due:  12-18-17                                   vitamin D 13044 UNIT capsule   Commonly known as:  ERGOCALCIFEROL   TAKE ONE CAPSULE BY MOUTH ONCE WEEKLY ON MONDAY   Next Dose Due:  12-18-17                                   * Notice:  This list has 2 medication(s) that are the same as other medications prescribed for you.  Read the directions carefully, and ask your doctor or other care provider to review them with you.

## 2017-12-15 NOTE — PROGRESS NOTES
SUBJECTIVE:   David Dawn is a 57 year old male who presents to clinic today for the following health issues:      ED/UC Followup:    Facility:  [facility]  Date of visit: 12/12/17  Reason for visit: Fall: encephalpathic  Current Status: Pt unable to communicate current status       Patient is seen for a follow up visit.    Has been hospitalized in [facility] for confusion/ encephalopathy. Now is back in the group home.   Has been confused, lethargic, speech does not make sense, difficulty finding words.   Has left hemiparalysis post prior stroke. Able to move right arm and leg. Has fallen 2 times. Denies headache.   Has h/o COPD. Continuous smoking. Has chronic productive cough. No chest pain.   Has HTN, BP has been controlled.   Has chronic back pain, on Oxycodone and muscle relaxant for pain control. Denies new symptoms.   Has h/o seizures. On anti seizure treatment , no witnessed seizures.         Problem list and histories reviewed & adjusted, as indicated.  Additional history: as documented    Patient Active Problem List   Diagnosis     Seizure disorder (H)     Chronic fatigue     Low back pain     Mild major depression (H)     GERD (gastroesophageal reflux disease)     Hyperlipidemia LDL goal <100     COPD (chronic obstructive pulmonary disease) (H)     Hemiplegia affecting left nondominant side (H)     Radicular syndrome of upper limbs     Neck pain     ACP (advance care planning)     Osteoarthritis of glenohumeral joint     Osteoarthritis of acromioclavicular joint     Metabolic encephalopathy     Health Care Home     Dysphagia     Hallucinations     Cough     Generalized muscle weakness     Alcohol abuse     Behavior problem, adult     Neuropathy     History of CVA (cerebrovascular accident)     Cognitive impairment     Essential hypertension, benign     COPD exacerbation (H)     Benign non-nodular prostatic hyperplasia with lower urinary tract symptoms     Seizure (H)     Past Surgical History:   Procedure  Laterality Date     COLONOSCOPY  12/19/2012     COLONOSCOPY  8/6/2014    Procedure: COMBINED COLONOSCOPY, SINGLE BIOPSY/POLYPECTOMY BY BIOPSY;  Surgeon: Jose Elias Mclain MD;  Location:  GI     EXCISE TUMOR RECTUM VILLUS  2/28/2013    Procedure: EXCISE TUMOR RECTUM VILLOUS;  Trans Anal Excision Rectal Villous Tumor, Proctoscopy;  Surgeon: Milton Jacobs MD;  Location:  OR     KNEE SURGERY       NECK SURGERY       SIGMOIDOSCOPY FLEXIBLE  1/31/2013    Procedure: SIGMOIDOSCOPY FLEXIBLE;   flexible sigmoidoscopy with anoscope;  Surgeon: Milton Jacobs MD;  Location:  GI     SIGMOIDOSCOPY FLEXIBLE  4/25/2013    Procedure: SIGMOIDOSCOPY FLEXIBLE;  SIGMOIDOSCOPY FLEXIBLE;  Surgeon: Milton Jacobs MD;  Location:  GI       Social History   Substance Use Topics     Smoking status: Current Every Day Smoker     Packs/day: 1.00     Years: 35.00     Types: Cigarettes     Smokeless tobacco: Never Used     Alcohol use No      Comment: quit 20 years ago.     Family History   Problem Relation Age of Onset     HEART DISEASE Mother      MI     CEREBROVASCULAR DISEASE Father      alzheimers disease     CEREBROVASCULAR DISEASE Brother      Neurologic Disorder Brother      stroke     Neurologic Disorder Son      seizure     CANCER Sister          Current Outpatient Prescriptions   Medication Sig Dispense Refill     levETIRAcetam 1000 MG TABS Take 1 tablet by mouth 2 times daily 62 tablet 1     furosemide (LASIX) 20 MG tablet TAKE 1 TABLET BY MOUTH ONCE DAILY 30 tablet 5     gabapentin (NEURONTIN) 600 MG tablet TAKE 1 TABLET BY MOUTH THREE TIMES DAILY 90 tablet 1     loperamide (IMODIUM) 2 MG capsule TAKE TWO CAPSULES (4MG) BY MOUTH TWICE DAILY;AND MAY TAKE ONE CAPSULE (2MG) BY MOUTH FOUR TIMES A DAY AS NEEDED 120 capsule 1     divalproex (DEPAKOTE) 500 MG EC tablet TAKE THREE TABLETS (1500MG) BY MOUTH TWICE DAILY (TAKE WITH 250MG FOR A TOTAL OF 1750MG) 180 tablet 5     levETIRAcetam 1000 MG TABS Take 1,000 mg by mouth 2  times daily 60 tablet      oxyCODONE IR (ROXICODONE) 5 MG tablet Take 1 tablet (5 mg) by mouth 4 times daily 120 tablet 0     mineral oil-hydrophilic petrolatum (AQUAPHOR) Apply topically as needed Apply daily to left wound.       divalproex (DEPAKOTE) 250 MG EC tablet Take 250 mg by mouth 2 times daily       pramoxine (SARNA SENSITIVE) 1 % LOTN lotion as needed for itching One application as needed for itchy skin on posterior ears and hands.       Sunscreens (AVEENO DAILY MOISTURIZER) LOTN Apply daily to hands and ears for dry skin.       order for DME Power wheelchair 1 Device 0     simvastatin (ZOCOR) 40 MG tablet TAKE 1 TABLET BY MOUTH AT BEDTIME 30 tablet 11     baclofen (LIORESAL) 20 MG tablet TAKE 1 TABLET BY MOUTH THREE TIMES DAILY 90 tablet 11     carBAMazepine (TEGRETOL) 200 MG tablet TAKE 1 TABLET BY MOUTH THREE TIMES DAILY 90 tablet 11     levalbuterol (XOPENEX HFA) 45 MCG/ACT Inhaler Inhale 2 puffs into the lungs every 6 hours as needed for shortness of breath / dyspnea or wheezing 1 Inhaler 8     MAPAP 325 MG tablet TAKE TWO TABLETS (650MG) BY MOUTH EVERY 4 HOURS AS NEEDED FOR PAIN 60 tablet 11     clopidogrel (PLAVIX) 75 MG tablet Take 1 tablet (75 mg) by mouth daily 90 tablet 2     diclofenac (VOLTAREN) 1 % GEL topical gel Place 2 g onto the skin 2 times daily as needed Apply 4 grams to knees or 2 grams to hands 100 g 0     vitamin D (ERGOCALCIFEROL) 20195 UNIT capsule TAKE ONE CAPSULE BY MOUTH ONCE WEEKLY ON MONDAY 12 capsule 3     amLODIPine (NORVASC) 10 MG tablet TAKE 1 TABLET BY MOUTH ONCE DAILY 31 tablet 11     niacin 500 MG CR capsule TAKE 1 CAPSULE BY MOUTH AT BEDTIME 31 capsule 11     potassium chloride (K-TAB,KLOR-CON) 10 MEQ tablet TAKE 1 TABLET BY MOUTH ONCE DAILY 31 tablet 11     tamsulosin (FLOMAX) 0.4 MG capsule TAKE 1 CAPSULE BY MOUTH ONCE DAILY 31 capsule 11     budesonide (PULMICORT) 0.25 MG/2ML neb solution NEBULIZE THE CONTENT OF 1 VIAL TWICE DAILY FOR COPD 120 mL 10      ipratropium - albuterol 0.5 mg/2.5 mg/3 mL (DUONEB) 0.5-2.5 (3) MG/3ML neb solution NEBULIZE THE CONTENT OF 1 VIAL THREE TIMES DAILY;AND MAY NEBULIZE THE CONTENT OF 1 VIAL AS NEEDED FOR SHORTNESS OF BREATH 360 mL 10     citalopram (CELEXA) 20 MG tablet Take 20 mg by mouth daily       QUEtiapine Fumarate (SEROQUEL PO) Take 200 mg by mouth 2 times daily        Respiratory Therapy Supplies (NEBULIZER/ADULT MASK) KIT 1 Device 4 times daily. 1 kit 3         Reviewed and updated as needed this visit by clinical staffTobacco  Allergies  Meds  Med Hx  Surg Hx  Fam Hx  Soc Hx      Reviewed and updated as needed this visit by Provider         ROS:  Constitutional, HEENT, cardiovascular, pulmonary, gi and gu systems are negative, except as otherwise noted.      OBJECTIVE:   /84  Pulse 86  Temp 98.8  F (37.1  C) (Oral)  There is no height or weight on file to calculate BMI.   GENERAL: weak, chronically ill appearing, alert and no distress  EYES: Eyes grossly normal to inspection, PERRL and conjunctivae and sclerae normal  NECK: no adenopathy, no asymmetry, masses, or scars and thyroid normal to palpation  RESP: lungs - no rales,+ bilateral  rhonchi  And mild wheezes  CV: regular rate and rhythm, normal S1 S2, no S3 or S4, no murmur, click or rub, no peripheral edema   ABDOMEN: soft, nontender, no hepatosplenomegaly, no masses and bowel sounds normal  MS: muscle weakness, left leg in a brace   NEURO: confused to place and time, date, left hemiparalysis , chronic     Diagnostic Test Results:  none     ASSESSMENT/PLAN:     Problem List Items Addressed This Visit     Seizure disorder (H)    COPD (chronic obstructive pulmonary disease) (H)    Hemiplegia affecting left nondominant side (H)    Essential hypertension, benign      Other Visit Diagnoses     Disorientation    -  Primary           Patient with change from normal LOC, mentation impaired.   Of concern is new CVA, medications side effects, encephalopathy ,  infection, metabolic abnormality  Will be sent to ER for evaluation/ treatment     Follow-Up:post ER/ hospital     Len Krishnan MD  [facility]

## 2017-12-15 NOTE — IP AVS SNAPSHOT
"          [place] [address] MEDICAL SURGICAL: [phone]                                              INTERAGENCY TRANSFER FORM - LAB / IMAGING / EKG / EMG RESULTS   12/15/2017                    Hospital Admission Date: 12/15/2017  MILTON LAMBERT   : 1960  Sex: Male        Attending Provider: Hernesto Dominguez MD     Allergies:  Ibuprofen Sodium, Tuberculin Tests    Infection:  None   Service:  GENERAL MEDI    Ht:  1.727 m (5' 8\")   Wt:  97.8 kg (215 lb 9.6 oz)   Admission Wt:  97.8 kg (215 lb 9.6 oz)    BMI:  32.78 kg/m 2   BSA:  2.17 m 2            Patient PCP Information     Provider PCP Type    Len Krishnan MD General         Lab Results - 3 Days      Comprehensive metabolic panel [[id number]] (Abnormal)  Resulted: 12/15/17 1517, Result status: Final result    Ordering provider: Ivonne Rivas MD  12/15/17 1440 Resulting lab: [facility]    Specimen Information    Type Source Collected On   Blood  12/15/17 1430          Components       Value Reference Range Flag Lab   Sodium 138 133 - 144 mmol/L  FrRdHs   Potassium 5.7 3.4 - 5.3 mmol/L H FrRdHs   Comment:  Specimen slightly hemolyzed, potassium may be falsely elevated   Chloride 107 94 - 109 mmol/L  FrRdHs   Carbon Dioxide 25 20 - 32 mmol/L  FrRdHs   Anion Gap 6 3 - 14 mmol/L  FrRdHs   Glucose 96 70 - 99 mg/dL  FrRdHs   Urea Nitrogen 21 7 - 30 mg/dL  FrRdHs   Creatinine 1.02 0.66 - 1.25 mg/dL  FrRdHs   GFR Estimate 75 >60 mL/min/1.7m2  FrRdHs   Comment:  Non  GFR Calc   GFR Estimate If Black >90 >60 mL/min/1.7m2  FrRdHs   Comment:  African American GFR Calc   Calcium 8.5 8.5 - 10.1 mg/dL  FrRdHs   Bilirubin Total 0.3 0.2 - 1.3 mg/dL  FrRdHs   Albumin 3.5 3.4 - 5.0 g/dL  FrRdHs   Protein Total 7.7 6.8 - 8.8 g/dL  FrRdHs   Alkaline Phosphatase 68 40 - 150 U/L  FrRdHs   ALT 22 0 - 70 U/L  FrRdHs   AST 26 0 - 45 U/L  [facility]   Comment:         Specimen is hemolyzed which can falsely elevate AST. Analysis of a "   non-hemolyzed specimen may result in a lower value.              CBC with platelets differential [[id number]] (Abnormal)  Resulted: 12/15/17 1455, Result status: Final result    Ordering provider: Ivonne Rivas MD  12/15/17 1440 Resulting lab: [facility]    Specimen Information    Type Source Collected On   Blood  12/15/17 1430          Components       Value Reference Range Flag Lab   WBC 7.8 4.0 - 11.0 10e9/L  FrRdHs   RBC Count 3.67 4.4 - 5.9 10e12/L L FrRdHs   Hemoglobin 12.9 13.3 - 17.7 g/dL L FrRdHs   Hematocrit 39.7 40.0 - 53.0 % L FrRdHs    78 - 100 fl H FrRdHs   MCH 35.1 26.5 - 33.0 pg H FrRdHs   MCHC 32.5 31.5 - 36.5 g/dL  FrRdHs   RDW 12.7 10.0 - 15.0 %  FrRdHs   Platelet Count 259 150 - 450 10e9/L  FrRdHs   Diff Method Automated Method   FrRdHs   % Neutrophils 63.9 %  FrRdHs   % Lymphocytes 21.2 %  FrRdHs   % Monocytes 11.1 %  FrRdHs   % Eosinophils 2.7 %  FrRdHs   % Basophils 0.5 %  FrRdHs   % Immature Granulocytes 0.6 %  FrRdHs   Nucleated RBCs 0 0 /100  FrRdHs   Absolute Neutrophil 5.0 1.6 - 8.3 10e9/L  FrRdHs   Absolute Lymphocytes 1.7 0.8 - 5.3 10e9/L  FrRdHs   Absolute Monocytes 0.9 0.0 - 1.3 10e9/L  FrRdHs   Absolute Eosinophils 0.2 0.0 - 0.7 10e9/L  FrRdHs   Absolute Basophils 0.0 0.0 - 0.2 10e9/L  FrRdHs   Abs Immature Granulocytes 0.1 0 - 0.4 10e9/L  FrRdHs   Absolute Nucleated RBC 0.0   FrRdHs            Lactic acid whole blood [[id number]]  Resulted: 12/15/17 1440, Result status: In process    Ordering provider: Ivonne Rivas MD  12/15/17 1433 Resulting lab: [facility]    Specimen Information    Type Source Collected On     12/15/17 1433            Glucose by meter [[id number]] (Abnormal)  Resulted: 12/15/17 1431, Result status: Final result    Ordering provider: Ivonne Rivas MD  12/15/17 1426 Resulting lab: POINT OF CARE TEST, GLUCOSE    Specimen Information    Type Source Collected On     12/15/17 1426          Components       Value Reference Range  "Flag Lab   Glucose 102 70 - 99 mg/dL H 170            Testing Performed By     Lab - Abbreviation Name Director Address Valid Date Range    12 - [facility] Unknown [address]  [facility] [id number] 05/08/15 1057 - Present    45 - IT[facility] Unknown Unknown 01/28/02 0000 - Present    170 - Unknown POINT OF CARE TEST, GLUCOSE Unknown Unknown 10/31/11 1114 - Present            Unresulted Labs (24h ago through future)    Start       Ordered    12/18/17 0600  Platelet count  (Pharmacological Prophylaxis - enoxaparin (LOVENOX) *Use only if creatinine clearance is greater than 30 mL/min)  EVERY THREE DAYS,   Routine     Comments:  Repeat every 3 days while on VTE prophylaxis.  Notify provider and hold enoxaparin if platelet count falls by 50% of baseline. If no result is listed, this lab has not been done the past 365 days. LATEST LAB RESULT: Platelet Count (10e9/L)       Date                     Value                 12/15/2017               259              ----------    12/15/17 1944    12/18/17 0000  Creatinine  (Pharmacological Prophylaxis - enoxaparin (LOVENOX) *Use only if creatinine clearance is greater than 30 mL/min)  EVERY THREE DAYS,   Routine     Comments:  Repeat every 3 days while on VTE prophylaxis.    12/15/17 1944    Unscheduled  Potassium  (Potassium Replacement - \"Standard\" - For K levels less than 3.4 mmol/L - UU,UR,UA,RH,SH,PH,WY )  CONDITIONAL (SPECIFY),   Routine     Comments:  Obtain Potassium Level for these conditions:  *IF no potassium result within 24 hours before initiation of order set, draw potassium level with next lab collect.    *2 HOURS AFTER last IV potassium replacement dose and 4 hours after an oral replacement dose.  *Next morning after potassium dose.     Repeat Potassium Replacement if necessary.    12/15/17 1944         Imaging Results - 3 Days      CT Head w/o Contrast [[id number]]  Resulted: 12/15/17 1556, Result status: Final result    Ordering " provider: Ivonne Rivas MD  12/15/17 1440 Resulted by: Renny Skinner MD    Performed: 12/15/17 1530 - 12/15/17 1537 Resulting lab: RADIOLOGY RESULTS    Narrative:       CT HEAD WITHOUT CONTRAST  12/15/2017 3:37 PM    HISTORY: Facial trauma    TECHNIQUE: Scans were obtained through the head without IV contrast.   Radiation dose for this scan was reduced using automated exposure  control, adjustment of the mA and/or kV according to patient size, or  iterative reconstruction technique.    COMPARISON: Head CT 11/05/2017.    FINDINGS: Atrophy. Old right MCA territory infarct with  encephalomalacia as previously described.  No hemorrhage, mass lesion,  or focal area of acute infarction identified. Paranasal sinuses are  normal. No bony abnormality.      Impression:       IMPRESSION:   1. Atrophy.  2. Old right MCA territory infarct.  3. Nothing acute.  4. No interval change.    RENNY SKINNER MD      Cervical spine CT w/o contrast [[id number]]  Resulted: 12/15/17 1556, Result status: Final result    Ordering provider: Ivonne Rivas MD  12/15/17 1440 Resulted by: Renny Skinner MD    Performed: 12/15/17 1530 - 12/15/17 1538 Resulting lab: RADIOLOGY RESULTS    Narrative:       CT CERVICAL SPINE WITHOUT CONTRAST 12/15/2017 3:38 PM    HISTORY:  Fell.    COMPARISON: 11/5/2017.    TECHNIQUE: CT cervical spine through T1. Radiation dose for this scan  was reduced using automated exposure control, adjustment of the mA  and/or kV according to patient size, or iterative reconstruction  technique.    FINDINGS: Alignment is normal through T1. There is no evidence for  cervical spine fracture. Emphysematous changes in the right lung apex  noted. Degenerative changes as previously described and unchanged in  interval. No significant central or lateral stenosis.      Impression:       IMPRESSION:  1. No fracture or acute abnormality.  2. Mild degenerative changes.  3. No interval change.  4. Emphysematous  changes right lung apex.    RENNY SKINNER MD      Chest  XR, 1 view PORTABLE [[id number]]  Resulted: 12/15/17 1449, Result status: Final result    Ordering provider: Ivonne Rivas MD  12/15/17 1421 Resulted by: Alfredo Alexandra MD    Performed: 12/15/17 1421 - 12/15/17 1445 Resulting lab: RADIOLOGY RESULTS    Narrative:       XR CHEST PORT 1 VW 12/15/2017 2:45 PM    HISTORY: Altered mental status.    COMPARISON: 11/6/2017.    FINDINGS: Linear atelectasis in the right upper lung. No airspace  consolidation, pleural effusion or pneumothorax. Normal heart size.      Impression:       IMPRESSION: No evidence of edema or pneumonia.    ALFREDO ALEXANDRA MD      Testing Performed By     Lab - Abbreviation Name Director Address Valid Date Range    104 - [facility] RADIOLOGY RESULTS Unknown Unknown 02/16/05 1553 - Present            Encounter-Level Documents:     There are no encounter-level documents.      Order-Level Documents:     There are no order-level documents.

## 2017-12-15 NOTE — ED NOTES
From clinic md for increased altered mental status. Hx cva and dementia. resp 6-8. Calling md to bedside

## 2017-12-15 NOTE — IP AVS SNAPSHOT
"[place] [address] MEDICAL SURGICAL: [phone]                                              INTERAGENCY TRANSFER FORM - PHYSICIAN ORDERS   12/15/2017                    Hospital Admission Date: 12/15/2017  MILTON LAMBERT   : 1960  Sex: Male        Attending Provider: Hernesto Dominguez MD     Allergies:  Ibuprofen Sodium, Tuberculin Tests    Infection:  None   Service:  GENERAL [facility]    Ht:  1.727 m (5' 8\")   Wt:  97.8 kg (215 lb 9.6 oz)   Admission Wt:  97.8 kg (215 lb 9.6 oz)    BMI:  32.78 kg/m 2   BSA:  2.17 m 2            Patient PCP Information     Provider PCP Type    Len Krishnan MD General      ED Clinical Impression     Diagnosis Description Comment Added By Time Added    Encephalopathy [G93.40] Encephalopathy [G93.40]  Ivonne Rivas MD 12/15/2017  5:21 PM    Delirium [R41.0] Delirium [R41.0]  Ivonne Rivas MD 12/15/2017  5:21 PM      Hospital Problems as of 2017              Priority Class Noted POA    Encephalopathy Medium  12/15/2017 Yes    Altered mental status Medium  2017 Yes      Non-Hospital Problems as of 2017              Priority Class Noted    Seizure disorder (H) Medium  9/15/2011    Chronic fatigue Medium  9/15/2011    Low back pain Medium  9/15/2011    Mild major depression (H) Medium  9/15/2011    GERD (gastroesophageal reflux disease) Medium  9/15/2011    Hyperlipidemia LDL goal <100 Medium  9/15/2011    COPD (chronic obstructive pulmonary disease) (H) Medium  9/15/2011    Hemiplegia affecting left nondominant side (H) Medium  2013    Radicular syndrome of upper limbs Medium  2013    Neck pain Medium  2013    ACP (advance care planning) Medium  7/15/2013    Osteoarthritis of glenohumeral joint Medium  2013    Osteoarthritis of acromioclavicular joint Medium  2013    Metabolic encephalopathy Medium  2014    Health Care Home Medium  2014    Dysphagia Medium  2014    Hallucinations Medium  " 4/15/2015    Cough Medium  4/15/2015    Generalized muscle weakness Medium  4/15/2015    Alcohol abuse Medium  6/17/2015    Behavior problem, adult Medium  7/22/2015    Neuropathy Medium  10/14/2015    History of CVA (cerebrovascular accident) Medium  1/11/2016    Cognitive impairment Medium  1/11/2016    Essential hypertension, benign Medium  1/11/2016    COPD exacerbation (H) Medium  4/26/2017    Benign non-nodular prostatic hyperplasia with lower urinary tract symptoms Medium  6/9/2017    Seizure (H) Medium  11/6/2017      Code Status History     Date Active Date Inactive Code Status Order ID Comments User Context    12/17/2017 11:15 AM  Full Code [id number]  Hernesto Dominguez MD Outpatient    12/15/2017  7:44 PM 12/17/2017 11:15 AM Full Code [id number]  Yoselin Vargas MD Inpatient    11/7/2017 10:33 AM 12/15/2017  7:44 PM DNR/DNI [id number]  Miguel Valero MD Outpatient    11/6/2017  2:12 AM 11/7/2017 10:33 AM DNR/DNI [id number]  Refugio Muniz MD Inpatient    11/6/2017  2:02 AM 11/6/2017  2:12 AM Full Code [id number]  Refugio Muniz MD Inpatient    4/29/2017 12:19 PM 11/6/2017  2:02 AM DNR/DNI [id number]  Heber Rod MD Outpatient    4/26/2017  9:05 PM 4/29/2017 12:19 PM DNR/DNI [id number]  Bonifacio Le MD Inpatient    12/22/2015  1:20 PM 4/26/2017  9:05 PM DNR/DNI [id number]  Nick Quarles PA-C Outpatient    12/19/2015 12:29 AM 12/22/2015  1:20 PM DNR/DNI [id number]  Humberto Ansari APRN CNP Inpatient    2/25/2015 11:40 AM 12/19/2015 12:29 AM Full Code [id number]  Evelin Mai MD Outpatient    2/16/2015  9:45 PM 2/25/2015 11:40 AM Full Code [id number]  Demetrius Corona MD Inpatient    1/26/2015  8:32 PM 1/30/2015  8:07 PM Full Code [id number]  Ephraim Kim MD Inpatient    6/30/2014 10:30 AM 1/26/2015  8:32 PM Full Code [id number]  Roger Sagastume DO Outpatient    6/23/2014  5:11 PM 6/30/2014 10:30 AM Full Code [id number]  Heber Rod MD Inpatient     6/12/2014 11:52 AM 6/23/2014  5:11 PM Full Code [id number]  Elias Staley MD Outpatient    4/29/2014 11:43 PM 6/10/2014  7:15 PM Full Code [id number]  Andrae Stover MD Inpatient    4/8/2012  2:43 AM 4/10/2012  8:45 PM Full Code [id number]  Abbie Waters RN Inpatient         Medication Review      CONTINUE these medications which may have CHANGED, or have new prescriptions. If we are uncertain of the size of tablets/capsules you have at home, strength may be listed as something that might have changed.        Dose / Directions Comments    oxyCODONE IR 5 MG tablet   Commonly known as:  ROXICODONE   This may have changed:    - how much to take  - when to take this   Used for:  Low back pain, unspecified back pain laterality, unspecified chronicity, with sciatica presence unspecified        Dose:  2.5 mg   Take 0.5 tablets (2.5 mg) by mouth 2 times daily   Quantity:  15 tablet   Refills:  0        QUEtiapine 100 MG tablet   Commonly known as:  SEROQUEL   This may have changed:    - medication strength  - how much to take   Used for:  Behavior problem, adult, Hallucinations        Dose:  100 mg   Take 1 tablet (100 mg) by mouth 2 times daily   Refills:  0          CONTINUE these medications which have NOT CHANGED        Dose / Directions Comments    amLODIPine 10 MG tablet   Commonly known as:  NORVASC   Used for:  Essential hypertension, benign        TAKE 1 TABLET BY MOUTH ONCE DAILY   Quantity:  31 tablet   Refills:  11    PLEASE AUTHORIZE QTY 31 WITH 12 REFILLS FOR ASSISTED LIVING PATIENT       AVEENO DAILY MOISTURIZER Lotn        Apply daily to hands and ears for dry skin.   Refills:  0        baclofen 20 MG tablet   Commonly known as:  LIORESAL   Used for:  Other seizures (H)        TAKE 1 TABLET BY MOUTH THREE TIMES DAILY   Quantity:  90 tablet   Refills:  11    PLEASE AUTHORIZE REFILLS QTY OF 98 WITH 12 REFILLS FOR ASSISTING LIVING PATIENT. THANKS       budesonide 0.25 MG/2ML neb solution    Commonly known as:  PULMICORT   Used for:  Chronic bronchitis, unspecified chronic bronchitis type (H)        NEBULIZE THE CONTENT OF 1 VIAL TWICE DAILY FOR COPD   Quantity:  120 mL   Refills:  10        carBAMazepine 200 MG tablet   Commonly known as:  TEGretol   Used for:  Seizure disorder (H)        TAKE 1 TABLET BY MOUTH THREE TIMES DAILY   Quantity:  90 tablet   Refills:  11    PLEASE AUTHORIZE REFILLS QTY OF 93 WITH 12 REFILLS FOR ASSISTING LIVING PATIENT. THANKS       citalopram 20 MG tablet   Commonly known as:  celeXA        Dose:  20 mg   Take 20 mg by mouth daily   Refills:  0        clopidogrel 75 MG tablet   Commonly known as:  PLAVIX   Used for:  Essential hypertension, benign, History of stroke        Dose:  75 mg   Take 1 tablet (75 mg) by mouth daily   Quantity:  90 tablet   Refills:  2        diclofenac 1 % Gel topical gel   Commonly known as:  VOLTAREN   Used for:  Chronic pain syndrome        Dose:  2 g   Place 2 g onto the skin 2 times daily as needed Apply 4 grams to knees or 2 grams to hands   Quantity:  100 g   Refills:  0        * divalproex 250 MG EC tablet   Commonly known as:  DEPAKOTE        Dose:  250 mg   Take 250 mg by mouth 2 times daily   Refills:  0        * divalproex 500 MG EC tablet   Commonly known as:  DEPAKOTE   Used for:  Seizure disorder (H)        TAKE THREE TABLETS (1500MG) BY MOUTH TWICE DAILY (TAKE WITH 250MG FOR A TOTAL OF 1750MG)   Quantity:  180 tablet   Refills:  5        furosemide 20 MG tablet   Commonly known as:  LASIX   Used for:  Bilateral edema of lower extremity        TAKE 1 TABLET BY MOUTH ONCE DAILY   Quantity:  30 tablet   Refills:  5    PLEASE AUTHORIZE QTY 31 WITH 12 REFILLS FOR ASSISTING LIVING PATIENT. THANKS       gabapentin 600 MG tablet   Commonly known as:  NEURONTIN   Used for:  Seizure disorder (H)        TAKE 1 TABLET BY MOUTH THREE TIMES DAILY   Quantity:  90 tablet   Refills:  1    PLEASE AUTHORIZE QTY 93 WITH 12 REFILLS FOR ASSISTING  LIVING PATIENT. THANKS       ipratropium - albuterol 0.5 mg/2.5 mg/3 mL 0.5-2.5 (3) MG/3ML neb solution   Commonly known as:  DUONEB        Dose:  1 vial   Take 1 vial by nebulization 3 times daily   Refills:  0        levalbuterol 45 MCG/ACT Inhaler   Commonly known as:  XOPENEX HFA   Used for:  Chronic obstructive pulmonary disease, unspecified COPD type (H)        Dose:  2 puff   Inhale 2 puffs into the lungs every 6 hours as needed for shortness of breath / dyspnea or wheezing   Quantity:  1 Inhaler   Refills:  8        levETIRAcetam 1000 MG Tabs   Used for:  Seizure disorder (H)        Dose:  1 tablet   Take 1 tablet by mouth 2 times daily   Quantity:  62 tablet   Refills:  1        lidocaine 2 % topical gel   Commonly known as:  XYLOCAINE        Apply to rectum four times daily as needed   Refills:  0        loperamide 2 MG capsule   Commonly known as:  IMODIUM   Used for:  Functional diarrhea        TAKE TWO CAPSULES (4MG) BY MOUTH TWICE DAILY;AND MAY TAKE ONE CAPSULE (2MG) BY MOUTH FOUR TIMES A DAY AS NEEDED   Quantity:  120 capsule   Refills:  1    PLEASE AUTHORIZE  WITH 12 REFILLS FOR ASSISTING LIVING PATIENT. THANKS       MAPAP 325 MG tablet   Used for:  Low back pain, unspecified back pain laterality, unspecified chronicity, with sciatica presence unspecified, Neck pain   Generic drug:  acetaminophen        TAKE TWO TABLETS (650MG) BY MOUTH EVERY 4 HOURS AS NEEDED FOR PAIN   Quantity:  60 tablet   Refills:  11    PT HAS 3 DOSES LEFT-PLEASE SEND 2ND REQUEST       mineral oil-hydrophilic petrolatum        Apply topically as needed Apply daily to left wound.   Refills:  0        nebulizer/adult mask Kit   Used for:  Wheezing        Dose:  1 Device   1 Device 4 times daily.   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   Refills:  0        simvastatin 40 MG tablet   Commonly known as:  ZOCOR   Used for:  Hyperlipidemia LDL goal <100        TAKE 1 TABLET BY MOUTH AT BEDTIME   Quantity:  30 tablet   Refills:  11    PLEASE AUTHORIZE REFILLS QTY OF 31 WITH 12 REFILLS FOR ASSISTING LIVING PATIENT. THANKS       tamsulosin 0.4 MG capsule   Commonly known as:  FLOMAX   Used for:  Benign non-nodular prostatic hyperplasia with lower urinary tract symptoms        TAKE 1 CAPSULE BY MOUTH ONCE DAILY   Quantity:  31 capsule   Refills:  11    PLEASE AUTHORIZE QTY 31 WITH 12 REFILLS FOR ASSISTED LIVING PATIENT       vitamin D 13374 UNIT capsule   Commonly known as:  ERGOCALCIFEROL   Used for:  Vitamin D deficiency        TAKE ONE CAPSULE BY MOUTH ONCE WEEKLY ON MONDAY   Quantity:  12 capsule   Refills:  3        * Notice:  This list has 2 medication(s) that are the same as other medications prescribed for you. Read the directions carefully, and ask your doctor or other care provider to review them with you.      STOP taking     HALOPERIDOL PO                   Summary of Visit     Reason for your hospital stay       Altered mental status             After Care     Activity - Up with nursing assistance           Advance Diet as Tolerated       Follow this diet upon discharge:  Regular       Fall precautions           General info for SNF       Length of Stay Estimate: Long Term Care  Condition at Discharge: Improving  Level of care:skilled   Rehabilitation Potential: Poor  Admission H&P remains valid and up-to-date: Yes  Recent Chemotherapy: N/A  Use Nursing Home Standing Orders: Yes       Mantoux instructions       Give two-step Mantoux (PPD) Per Facility Policy Yes, if not current             Your next 10 appointments already scheduled     Jan 03, 2018  2:20 PM CST   (Arrive by 2:05 PM)   New Seizure with Amelia Cloud MD   [facility] ([facility])    [address] [place] [id number] [phone]              Follow-Up Appointment Instructions     Future Labs/Procedures    Follow Up and recommended labs and tests     Comments:    Follow up with Nursing home physician in less than one week.  No follow up labs or test are needed.  Resume prior to admission home care orders and outpatient health and/or mental health appointments      Follow-Up Appointment Instructions     Follow Up and recommended labs and tests       Follow up with Nursing home physician in less than one week.  No follow up labs or test are needed.  Resume prior to admission home care orders and outpatient health and/or mental health appointments             Statement of Approval     Ordered          12/17/17 1121  I have reviewed and agree with all the recommendations and orders detailed in this document.  EFFECTIVE NOW     Approved and electronically signed by:  Hernesto Dominguez MD

## 2017-12-15 NOTE — H&P
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  This is actually his third hospitalization for these issues, having been hospitalized at [place], then 2 days later at [facility] and discharged 3 days ago.  He was actually in to see his primary, as arranged from that discharge and while there was verbally nonresponsive and could not be aroused.  After being sent to the ER, his altered mental status was severe enough, they were considering intubation for airway protection.  After being given a second dose of Narcan, he became awake and verbally and physically abusive, especially after he was in and out cath for a urine sample.  Presently, he is using frequent cuss words,saying repeatedly is going to blow everyone up and is spitting in your face if close enough.  In reading the notes from the [facility] admission, his behavior was similar and it is thought to be secondary to the type of stroke that he had in his frontal lobe.  His admission lab work is not consistent with a recent stroke as his lactic acid is 1.0, is seen as CO2 is normal at 42, lab work and CBC is normal.  Chest x-ray is clear.    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 and frequent cussing  4 generalized seizure disorder related to the prior infarct  5 chronic lung disease with chronic  bronchitis  6 hypertension  7 chronic pain syndrome on chronic opioids  8 BPH  9 macrocytic anemia    Review of systems  Difficult with patient's behavior apparently where he lives the nurses give him his medications when he went for the neurological outpatient visit in November he was late because he was smoking in the parking lot, and no fevers no increased shortness of breath no diarrhea no nausea or vomiting otherwise negative percent 10 system review    Social history  He used to work as a  and is and has been a heavy cigarette smoker    Family history  Mother had myocardial infarction  Father had Alzheimer's  Brother had stroke, second brother stroke    Physical exam  Staring with eyes wide open as if in a trance  Repeatedly says I am going to blow you up especially the nurse using multiple curse words and name calling  If close enough repeatedly spit at you aiming for your face  HEENT exam mucous membranes are moist he has not had recent shave  Blood pressure 134/68 heart rate 70 respirations 18 no fever O2 sats are normal  Jugular venous pressures normal carotid upstroke and volume are normal  Lungs normal respiratory rate and effort he has a congested cough  No significant wheezing or rhonchi  CV regular heart rhythm no murmur  Abdomen mildly protuberant soft no tenderness active bowel sounds   no problems with voiding complains about the pain from the catheter insertion  Neurologic left sided paralysis full strength right side  He is oriented that it is going to be [place] where he sat and year  Musculoskeletal no joint inflammation  Skin is without significant bruises or lesions  He does have some irritation with redness  Psychologic altered behavior and hostility as described above    Medications  Norvasc 10 mg daily  Baclofen 20 mg 3 times a day  Pulmicort nebulizer twice a day  Tegretol 203 times a day  Celexa 20 mg daily  Plavix 75 mg daily  Depakote 1750 mg twice a day  Lasix 20 mg  daily  Gabapentin 600 mg 3 times a day  Duo nebulizer 3 times a day  Keppra 1000 mg twice a day  Imodium as needed  M APAP 2 tablets as needed for pain every 4 hours  Niacin 500 daily  Oxycodone 5 mg 4 times a day  Potassium 10 mEq daily  Seroquel 200 twice a day  Zocor 40 daily  Flomax daily    Results  Sodium 140 potassium 5.3 bicarb 25 creatinine 1.02   ALT 22 AST 26 lactic acid 1 glucose 102  Venous 7.42 PCO2 42  White count 7.8 hemoglobin 12.9 platelets 259  Chest x-ray is clear head CT no acute changes      Impression     David Dawn is a 57-year-old male with a history of prior right frontal lobe infarct, subsequent seizure disorder, 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          This is López's third hospitalization in the last 2 weeks for the same constellation of symptoms.  The first hospitalization( [facility]) he was treated for a possible pneumonitis with his last admission several days ago he was treated more for his abusive behavior, despite, also presenting with falling out of his wheelchair and with increased somnolence and decreased alertness.              Since he responded  to the several doses of Narcan, the most likely cause of his markedly decreased level of consciousness is likely related to narcotics. I told him that we are going to try to wean him off of it and he said he did not care and that he did not need them.  In addition, I am going to try to decrease the dosing and increase the interval for medications that can affect his sensorium, including baclofen ,gabapentin and Seroquel    Plan  1 admitted as an inpatient for investigation and treatment of his markedly decreased level of consciousness suspected to be from excessive medications, in particular narcotics.  2   He is routinely on  oxycodone 5 mg four times a day and I will change this to 2.5 twice a day to gradually taper him of.   3. decreased the dosing of gabapentin to one half of his present dose    4 decrease the Seroquel dosing and give only at night  5.  review all of his anticonvulsant drug levels in particular the amount of Depakote, his dose is very high.  6 There has been repeated mention about him having sleep apnea.  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   I see no signs of infection his respirations are adequate and his CO2 levels are normal.

## 2017-12-15 NOTE — IP AVS SNAPSHOT
` `     [place] [address] [facility]: [phone]            Medication Administration Report for David Dawn as of 12/17/17 1252   Legend:    Given Hold Not Given Due Canceled Entry Other Actions    Time Time (Time) Time  Time-Action       Inactive    Active    Linked        Medications 12/11/17 12/12/17 12/13/17 12/14/17 12/15/17 12/16/17 12/17/17    acetaminophen (TYLENOL) tablet 650 mg  Dose: 650 mg Freq: EVERY 4 HOURS PRN Route: PO  PRN Reason: mild pain  Start: 12/15/17 1944   Admin Instructions: Alternate ibuprofen (if ordered) with acetaminophen.  Maximum acetaminophen dose from all sources = 75 mg/kg/day not to exceed 4 grams/day.          2345 (650 mg)-Given            amLODIPine (NORVASC) tablet 10 mg  Dose: 10 mg Freq: DAILY Route: PO  Start: 12/16/17 0900         0921 (10 mg)-Given        0756 (10 mg)-Given                  baclofen (LIORESAL) tablet 20 mg  Dose: 20 mg Freq: 3 TIMES DAILY Route: PO  Start: 12/17/17 1115          1114 (20 mg)-Given       [ ] 1600       [ ] 2200           budesonide (PULMICORT) neb solution 0.25 mg  Dose: 0.25 mg Freq: 2 TIMES DAILY Route: NEBULIZATION  Start: 12/15/17 2000        2044 (0.25 mg)-Given        0801 (0.25 mg)-Given       (2017)-Not Given        0727 (0.25 mg)-Given       [ ] 2000           carBAMazepine (TEGretol) tablet 200 mg  Dose: 200 mg Freq: 3 TIMES DAILY Route: PO  Start: 12/15/17 2200        2137 (200 mg)-Given        0921 (200 mg)-Given       1644 (200 mg)-Given       2116 (200 mg)-Given        0757 (200 mg)-Given              [ ] 1600       [ ] 2200           citalopram (celeXA) tablet 20 mg  Dose: 20 mg Freq: DAILY Route: PO  Start: 12/16/17 0900         0921 (20 mg)-Given        0756 (20 mg)-Given                  clopidogrel (PLAVIX) tablet 75 mg  Dose: 75 mg Freq: DAILY Route: PO  Start: 12/16/17 0900         0922 (75 mg)-Given        0756 (75 mg)-Given                  divalproex (DEPAKOTE) EC tablet 1,500 mg  Dose: 1,500 mg  Freq: EVERY 12 HOURS SCHEDULED Route: PO  Start: 12/15/17 2000   Admin Instructions: DO NOT CRUSH.<br><br>TAKE THREE TABLETS (1500MG) BY MOUTH TWICE DAILY (TAKE WITH 250MG FOR A TOTAL OF 1750MG)         (2026)-Not Given [C]       2136 (1,500 mg)-Given        1953 (1,500 mg)-Given               0904 (1,500 mg)-Given       [ ] 2000           divalproex (DEPAKOTE) EC tablet 250 mg  Dose: 250 mg Freq: 2 TIMES DAILY Route: PO  Start: 12/15/17 2100   Admin Instructions: DO NOT CRUSH.         2137 (250 mg)-Given        0921 (250 mg)-Given       1952 (250 mg)-Given               0904 (250 mg)-Given       [ ] 2100           enoxaparin (LOVENOX) injection 40 mg  Dose: 40 mg Freq: EVERY 24 HOURS Route: SC  Start: 12/15/17 1945   Admin Instructions: HOLD if platelet count falls below 50% of baseline or less than 100,000/ L and notify provider.         2140 (40 mg)-Given        1954 (40 mg)-Given        [ ] 1945           furosemide (LASIX) tablet 20 mg  Dose: 20 mg Freq: DAILY Route: PO  Start: 12/16/17 0900         0921 (20 mg)-Given        0757 (20 mg)-Given                  gabapentin (NEURONTIN) capsule 600 mg  Dose: 600 mg Freq: 3 TIMES DAILY Route: PO  Start: 12/17/17 1115          1114 (600 mg)-Given       [ ] 1600       [ ] 2200           ipratropium - albuterol 0.5 mg/2.5 mg/3 mL (DUONEB) neb solution 3 mL  Dose: 3 mL Freq: EVERY 4 HOURS PRN Route: NEBULIZATION  PRN Reason: wheezing  Start: 12/17/17 0205              ipratropium - albuterol 0.5 mg/2.5 mg/3 mL (DUONEB) neb solution 3 mL  Dose: 3 mL Freq: 4 TIMES DAILY Route: NEBULIZATION  Start: 12/15/17 1945        2044 (3 mL)-Given        0801 (3 mL)-Given       1214 (3 mL)-Given       1544 (3 mL)-Given       (2016)-Not Given        0727 (3 mL)-Given       1152 (3 mL)-Given       [ ] 1600       [ ] 2000           levETIRAcetam (KEPPRA) tablet 1,000 mg  Dose: 1,000 mg Freq: 2 TIMES DAILY Route: PO  Start: 12/15/17 2100        2137 (1,000 mg)-Given        0922 (1,000  mg)-Given       1952 (1,000 mg)-Given               0757 (1,000 mg)-Given              [ ] 2100           naloxone (NARCAN) injection 0.1-0.4 mg  Dose: 0.1-0.4 mg Freq: EVERY 2 MIN PRN Route: IV  PRN Reason: opioid reversal  Start: 12/15/17 1944   Admin Instructions: For respiratory rate LESS than or EQUAL to 8.  Partial reversal dose:  0.1 mg titrated q 2 minutes for Analgesia Side Effects Monitoring Sedation Level of 3 (frequently drowsy, arousable, drifts to sleep during conversation).Full reversal dose:  0.4 mg bolus for Analgesia Side Effects Monitoring Sedation Level of 4 (somnolent, minimal or no response to stimulation).  For ordered doses up to 2mg give IVP. Give each 0.4mg over 15 seconds in emergency situations. For non-emergent situations further dilute in 9mL of NS to facilitate titration of response.               ondansetron (ZOFRAN-ODT) ODT tab 4 mg  Dose: 4 mg Freq: EVERY 6 HOURS PRN Route: PO  PRN Reasons: nausea,vomiting  Start: 12/15/17 1944   Admin Instructions: This is Step 1 of nausea and vomiting management.  If nausea not resolved in 15 minutes, go to Step 2 prochlorperazine (COMPAZINE). Do not push through foil backing. Peel back foil and gently remove. Place on tongue immediately. Administration with liquid unnecessary              Or  ondansetron (ZOFRAN) injection 4 mg  Dose: 4 mg Freq: EVERY 6 HOURS PRN Route: IV  PRN Reasons: nausea,vomiting  Start: 12/15/17 1944   Admin Instructions: This is Step 1 of nausea and vomiting management.  If nausea not resolved in 15 minutes, go to Step 2 prochlorperazine (COMPAZINE).  Irritant. For ordered doses up to 4 mg, give IV Push undiluted over 2-5 minutes.               oxyCODONE IR (ROXICODONE) half-tab 2.5 mg  Dose: 2.5 mg Freq: EVERY 12 HOURS Route: PO  Start: 12/15/17 1945        2137 (2.5 mg)-Given        0921 (2.5 mg)-Given       1953 (2.5 mg)-Given               0757 (2.5 mg)-Given       [ ] 2000           polyethylene glycol  (MIRALAX/GLYCOLAX) Packet 17 g  Dose: 17 g Freq: DAILY PRN Route: PO  PRN Reason: constipation  Start: 12/15/17 1944   Admin Instructions: Give in 8oz of  water, juice, or soda. Hold for loose stools.  This is the second step of a three step constipation treatment.  1 Packet = 17 grams. Mixed prescribed dose in 8 ounces of water. Follow with 8 oz. of water.               potassium chloride (KLOR-CON) Packet 20-40 mEq  Dose: 20-40 mEq Freq: EVERY 2 HOURS PRN Route: ORAL OR FEED  PRN Reason: potassium supplementation  Start: 12/15/17 1944   Admin Instructions: Use if unable to tolerate tablets.  If Serum K+ 3.0-3.3, dose = 60 mEq po total dose (40 mEq x1 followed in 2 hours by 20 mEq x1). Recheck K+ level 4 hours after dose and the next AM.  If Serum K+ 2.5-2.9, dose = 80 mEq po total dose (40 mEq Q2H x2). Recheck K+ level 4 hours after dose and the next AM.  If Serum K+ less than 2.5, See IV order.  Dissolve packet contents in 4-8 ounces of cold water or juice.               potassium chloride 10 mEq in 100 mL intermittent infusion with 10 mg lidocaine  Dose: 10 mEq Freq: EVERY 1 HOUR PRN Route: IV  PRN Reason: potassium supplementation  Start: 12/15/17 1944   Admin Instructions: Infuse via PERIPHERAL LINE. Use potassium with lidocaine for pain with peripheral administration.  If Serum K+ 3.0-3.3, dose = 10 mEq/hr x4 doses (40 mEq IV total dose). Recheck K+ level 2 hours after dose and the next AM.  If Serum K+ less than 3.0, dose = 10 mEq/hr x6 doses (60 mEq IV total dose). Recheck K+ level 2 hours after dose and the next AM.               potassium chloride 10 mEq in 100 mL sterile water intermittent infusion (premix)  Dose: 10 mEq Freq: EVERY 1 HOUR PRN Route: IV  PRN Reason: potassium supplementation  Start: 12/15/17 1944   Admin Instructions: Infuse via PERIPHERAL LINE or CENTRAL LINE. Use for central line replacement if patient weight less than 65 kg, if patient is on TPN with high potassium content or if unit  does not stock 20 mEq bags.   If Serum K+ 3.0-3.3, dose = 10 mEq/hr x4 doses (40 mEq IV total dose). Recheck K+ level 2 hours after dose and the next AM.   If Serum K+ less than 3.0, dose = 10 mEq/hr x6 doses (60 mEq IV total dose). Recheck K+ level 2 hours after dose and the next AM.               potassium chloride 20 mEq in 50 mL intermittent infusion  Dose: 20 mEq Freq: EVERY 1 HOUR PRN Route: IV  PRN Reason: potassium supplementation  Start: 12/15/17 1944   Admin Instructions: Infuse via CENTRAL LINE Only. May need EKG if less than 65 kg or on TPN - Max rate is 0.3 mEq/kg/hr for patients not on EKG monitoring.   If Serum K+ 3.0-3.3, dose = 20 mEq/hr x2 doses (40 mEq IV total dose). Recheck K+ level 2 hours after dose and the next AM.  If Serum K+ less than 3.0, dose = 20 mEq/hr x3 doses (60 mEq IV total dose). Recheck K+ level 2 hours after dose and the next AM.               potassium chloride SA (K-DUR/KLOR-CON M) CR tablet 20-40 mEq  Dose: 20-40 mEq Freq: EVERY 2 HOURS PRN Route: PO  PRN Reason: potassium supplementation  Start: 12/15/17 1944   Admin Instructions: Use if able to take PO.   If Serum K+ 3.0-3.3, dose = 60 mEq po total dose (40 mEq x1 followed in 2 hours by 20 mEq x1). Recheck K+ level 4 hours after dose and the next AM.  If Serum K+ 2.5-2.9, dose = 80 mEq po total dose (40 mEq Q2H x2). Recheck K+ level 4 hours after dose and the next AM.  If Serum K+ less than 2.5, See IV order.  DO NOT CRUSH               QUEtiapine (SEROquel) tablet 100 mg  Dose: 100 mg Freq: 2 TIMES DAILY Route: PO  Start: 12/17/17 1130          1209 (100 mg)-Given       [ ] 2100           senna-docusate (SENOKOT-S;PERICOLACE) 8.6-50 MG per tablet 1 tablet  Dose: 1 tablet Freq: 2 TIMES DAILY PRN Route: PO  PRN Reason: constipation  Start: 12/15/17 1944   Admin Instructions: If no bowel movement in 24 hours, increase to 2 tablets PO.  Hold for loose stools.              Or  senna-docusate (SENOKOT-S;PERICOLACE) 8.6-50 MG per  tablet 2 tablet  Dose: 2 tablet Freq: 2 TIMES DAILY PRN Route: PO  PRN Reason: constipation  Start: 12/15/17 1944   Admin Instructions: Hold for loose stools.               simvastatin (ZOCOR) tablet 40 mg  Dose: 40 mg Freq: AT BEDTIME Route: PO  Start: 12/15/17 2200        2137 (40 mg)-Given        2116 (40 mg)-Given        [ ] 2200           tamsulosin (FLOMAX) capsule 0.4 mg  Dose: 0.4 mg Freq: DAILY Route: PO  Start: 12/16/17 0900   Admin Instructions: Administer 30 minutes after the same meal each day.  Capsules should be swallowed whole; do not crush chew or open.          0921 (0.4 mg)-Given        0757 (0.4 mg)-Given                 Completed Medications  Medications 12/11/17 12/12/17 12/13/17 12/14/17 12/15/17 12/16/17 12/17/17         Start: 12/17/17 0201   End: 12/17/17 0202   Admin Instructions: Iris Godwin : cabinet override           0202 (2.5 mg)-Given             Start: 12/15/17 1740   End: 12/15/17 1748   Admin Instructions: Elena Middleton : cabinet override         1748 (2.5 mg)-Given               Start: 12/15/17 1441   End: 12/15/17 1444   Admin Instructions: Kaiser Verdin : cabinet override         1444 (3 mL)-Given               Start: 12/15/17 1439   End: 12/15/17 1440   Admin Instructions: Beatris Orellana : cabinet override  For ordered doses up to 2mg give IVP. Give each 0.4mg over 15 seconds in emergency situations. For non-emergent situations further dilute in 9mL of NS to facilitate titration of response.         1440 (0.4 mg)-Given               Start: 12/15/17 1422   End: 12/15/17 1423   Admin Instructions: Elena Middleton : cabinet override  For ordered doses up to 2mg give IVP. Give each 0.4mg over 15 seconds in emergency situations. For non-emergent situations further dilute in 9mL of NS to facilitate titration of response.         1423 (0.4 mg)-Given            Discontinued Medications  Medications 12/11/17 12/12/17 12/13/17 12/14/17 12/15/17 12/16/17 12/17/17         Dose: 3 mL  Freq: 4 TIMES DAILY Route: NEBULIZATION  Start: 12/17/17 0800   End: 12/17/17 0205          0205-Med Discontinued         Dose: 6 mL Freq: ONCE Route: NEBULIZATION  Start: 12/15/17 1442   End: 12/15/17 2016        [ ] 1442       2016-Med Discontinued           Dose: 0.2 mg Freq: ONCE Route: IV  Start: 12/15/17 1441   End: 12/15/17 1440   Admin Instructions: For respiratory rate LESS than or EQUAL to 8.  Partial reversal dose:  0.1 mg titrated q 2 minutes for Analgesia Side Effects Monitoring Sedation Level of 3 (frequently drowsy, arousable, drifts to sleep during conversation).Full reversal dose:  0.4 mg bolus for Analgesia Side Effects Monitoring Sedation Level of 4 (somnolent, minimal or no response to stimulation).  For ordered doses up to 2mg give IVP. Give each 0.4mg over 15 seconds in emergency situations. For non-emergent situations further dilute in 9mL of NS to facilitate titration of response.         1440-Med Discontinued           Dose: 0.4 mg Freq: EVERY 2 MIN PRN Route: IV  PRN Reason: opioid reversal  Start: 12/15/17 1440   End: 12/15/17 2016   Admin Instructions: For respiratory rate LESS than or EQUAL to 8.  Partial reversal dose:  0.1 mg titrated q 2 minutes for Analgesia Side Effects Monitoring Sedation Level of 3 (frequently drowsy, arousable, drifts to sleep during conversation).Full reversal dose:  0.4 mg bolus for Analgesia Side Effects Monitoring Sedation Level of 4 (somnolent, minimal or no response to stimulation).  For ordered doses up to 2mg give IVP. Give each 0.4mg over 15 seconds in emergency situations. For non-emergent situations further dilute in 9mL of NS to facilitate titration of response.         2016-Med Discontinued           Dose: 200 mg Freq: AT BEDTIME Route: PO  Start: 12/17/17 2200   End: 12/17/17 1116          1116-Med Discontinued    Medications 12/11/17 12/12/17 12/13/17 12/14/17 12/15/17 12/16/17 12/17/17

## 2017-12-15 NOTE — IP AVS SNAPSHOT
[place] [address] [facility]    [address]    [facility] [address]    Phone:  [phone]    Fax:  [phone]                                       After Visit Summary   12/15/2017    David Dawn    MRN: [id number]           After Visit Summary Signature Page     I have received my discharge instructions, and my questions have been answered. I have discussed any challenges I see with this plan with the nurse or doctor.    ..........................................................................................................................................  Patient/Patient Representative Signature      ..........................................................................................................................................  Patient Representative Print Name and Relationship to Patient    ..................................................               ................................................  Date                                            Time    ..........................................................................................................................................  Reviewed by Signature/Title    ...................................................              ..............................................  Date                                                            Time

## 2017-12-16 PROBLEM — R41.82 ALTERED MENTAL STATUS: Status: ACTIVE | Noted: 2017-01-01

## 2017-12-16 NOTE — PLAN OF CARE
Problem: Patient Care Overview  Goal: Plan of Care/Patient Progress Review  Outcome: No Change  VS stable. Diminshed LS with crackles, expiratory wheezes and a productive cough. +2 edema to BLE. Left hemiparesis. No complaints of pain. Can make sexually inappropriate comments. Slept on and off throughout the night.

## 2017-12-16 NOTE — ED NOTES
[facility]  ED Nurse Handoff Report    David Dawn is a 57 year old male   ED Chief complaint: Altered Mental Status  . ED Diagnosis:   Final diagnoses:   Encephalopathy   Delirium     Allergies:   Allergies   Allergen Reactions     Ibuprofen Sodium Diarrhea     Tuberculin Tests        Code Status: Full Code  Activity level - Baseline/Home:  Total Care. Activity Level - Current:   Total Care. Lift room needed: Yes. Bariatric: No   Needed: No   Isolation: No. Infection: Not Applicable.     Vital Signs:   Vitals:    12/15/17 1730 12/15/17 1745 12/15/17 1800 12/15/17 1815   BP: 134/68 111/74 112/68 112/57   Pulse:       Resp:  9 8    Temp:       TempSrc:       SpO2:    (!) 85%       Cardiac Rhythm:  ,      Pain level:    Patient confused: Yes. Patient Falls Risk: Yes.   Elimination Status: straight cath for urine in ED   Patient Report - Initial ComplaintFrom clinic md for increased altered mental status. Hx cva and dementia. resp 6-8. Calling md to bedside: . Focused Assessment: Cognitive/Perceptual/Neuro - Cognitive/Neuro/Behavioral WDL:  (obtunded. resp rate 6. md at bedside)  Tests Performed:  CT Head w/o Contrast   Final Result   IMPRESSION:    1. Atrophy.   2. Old right MCA territory infarct.   3. Nothing acute.   4. No interval change.      RENNY SKINNER MD      Cervical spine CT w/o contrast   Final Result   IMPRESSION:   1. No fracture or acute abnormality.   2. Mild degenerative changes.   3. No interval change.   4. Emphysematous changes right lung apex.      RENNY SKINNER MD      Chest  XR, 1 view PORTABLE   Final Result   IMPRESSION: No evidence of edema or pneumonia.      ALFREDO ALEXANDRA MD        . Abnormal Results:   Labs Ordered and Resulted from Time of ED Arrival Up to the Time of Departure from the ED   GLUCOSE BY METER - Abnormal; Notable for the following:        Result Value    Glucose 102 (*)     All other components within normal limits   CBC WITH PLATELETS  DIFFERENTIAL - Abnormal; Notable for the following:     RBC Count 3.67 (*)     Hemoglobin 12.9 (*)     Hematocrit 39.7 (*)      (*)     MCH 35.1 (*)     All other components within normal limits   COMPREHENSIVE METABOLIC PANEL - Abnormal; Notable for the following:     Potassium 5.7 (*)     All other components within normal limits   ISTAT BASIC MET ICA HCT POCT - Abnormal; Notable for the following:     Anion Gap 5 (*)     Glucose 102 (*)     All other components within normal limits   ISTAT  GASES LACTATE TRACIE POCT - Abnormal; Notable for the following:     PO2 Venous 53 (*)     All other components within normal limits   ROUTINE UA WITH MICROSCOPIC   DRUG ABUSE SCREEN 77 URINE (FL, RH, SH)   ISTAT CREATININE NURSING POCT   ISTAT CG4 GASES LACTATE TRACIE NURSING POCT   ISTAT TROPONIN NURSING POCT   STRAIGHT CATH FOR URINE   TROPONIN POCT     Treatments provided: monitoring, nebs, medications (narcanX2), iv, iv fluids  Family Comments: patient here from group home, increased confusion, pt. Agitated, threatening staff.  OBS brochure/video discussed/provided to patient:  N/A  ED Medications:   Medications   naloxone (NARCAN) injection 0.4 mg (not administered)   ipratropium - albuterol 0.5 mg/2.5 mg/3 mL (DUONEB) neb solution 6 mL (not administered)   naloxone (NARCAN) 0.4 MG/ML injection (0.4 mg  Given 12/15/17 1423)   naloxone (NARCAN) 0.4 MG/ML injection (0.4 mg  Given 12/15/17 1440)   ipratropium - albuterol 0.5 mg/2.5 mg/3 mL (DUONEB) 0.5-2.5 (3) MG/3ML neb solution (3 mLs  Given 12/15/17 1444)   albuterol (2.5 MG/3ML) 0.083% neb solution (2.5 mg  Given 12/15/17 1748)     Drips infusing:  No  For the majority of the shift, the patient's behavior Red. Interventions performed were security here as needed. Soft wrist/ankle restraints, mask when spitting.     Severe Sepsis OR Septic Shock Diagnosis Present: No      ED Nurse Name/Phone Number: Sadaf AburtoRECEIVING UNIT ED HANDOFF REVIEW    Above ED Nurse  Handoff Report was reviewed yes  Reviewed by: Helen Orellana on December 15, 2017 at 6:43 PM

## 2017-12-16 NOTE — ED NOTES
Patient oxygen sats down to 88-89% when asleep, when awakened sats increased to 99%. Neb finished. Patient threatening to hit and spitting at staff, mask back on. Two soft restraints continued as pt threatening to hit and kick, restraints on right wrist and right ankle as pt left sided stroke deficit.

## 2017-12-16 NOTE — PROGRESS NOTES
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  He presented to the ED with altered mental status and marked somnolence.   This is actually his third such hospitalization recently.  He was recently hospitalized at [facility] and then 2 days later at [facility] in their neuropsych unit.  He discharged from there to a facility 3 days prior to this presentation.  He was found in facility to be with decreased level of consciousness.  He was sent to the emergency department for evaluation.  He was almost intubated for airway protection.  He was given 2 doses of Narcan with significant improvement in mental status.  Of note, he has been on scheduled oxycodone.  He was admitted and oxycodone dose was decreased from 5 mg 4 times a day to 2.5 mg twice daily with plan to wean off completley, if possible.  Neurontin dose was cut in half and Seroquel dose was changed to just at at bedtime.      Problem List:   1.  Altered mental status, likely related to psychoactive medications including oxycodone, Neurontin, and Seroquel.  Continue oxycodone at 2.5 mg twice daily instead of 5 mg 4 times daily.  Continue Neurontin at half his prior to admission dose.  Continue Seroquel with at bedtime dosing only.  Monitor a bit longer.  If improvement continues discharged back to facility tomorrow.    DVT Prophylaxis:  -  Lovenox  Code Status: Full Code  Discharge Dispo: Possibly back to facility  "tomorrow.  Reina with utilization review and will change to observation status.          Interval History:      Mental status seems to be at baseline.  Patient curses frequently.  Overnight patient was spitting on people.  Overall, he seems better today.                  Physical Exam:      Last Vital Signs:  BP (!) 132/93 (BP Location: Right arm)  Pulse 82  Temp 97.1  F (36.2  C) (Oral)  Resp 18  Ht 1.727 m (5' 8\")  Wt 97.8 kg (215 lb 9.6 oz)  SpO2 94%  BMI 32.78 kg/m2    Intake/Output Summary (Last 24 hours) at 12/16/17 1717  Last data filed at 12/16/17 0936   Gross per 24 hour   Intake              300 ml   Output              550 ml   Net             -250 ml       GENERAL:  Comfortable. Cooperative. Curses often.  PSYCH: pleasant, oriented, No acute distress.  EYES: PERRLA, Normal conjunctiva.  HEART:  Regular rate and rhythm. No JVD. Pulses normal. No edema.  LUNGS:  Clear to auscultation, normal Respiratory effort.  ABDOMEN:  Soft, no hepatosplenomegaly, normal bowel sounds.  EXTREMETIES: No clubbing, cyanosis or ischemia  SKIN:  Dry to touch, No rash.           Medications:      All current medications were reviewed.         Data:      All new lab and imaging data was reviewed.   Labs:       Lab Results   Component Value Date     12/15/2017     12/15/2017     11/07/2017    Lab Results   Component Value Date    CHLORIDE 108 12/15/2017    CHLORIDE 107 12/15/2017    CHLORIDE 111 11/07/2017    Lab Results   Component Value Date    BUN 24 12/15/2017    BUN 21 12/15/2017    BUN 10 11/07/2017      Lab Results   Component Value Date    POTASSIUM 5.3 12/15/2017    POTASSIUM 5.7 12/15/2017    POTASSIUM 4.6 11/07/2017    Lab Results   Component Value Date    CO2 25 12/15/2017    CO2 27 11/07/2017    CO2 27 11/06/2017    Lab Results   Component Value Date    CR 1.02 12/15/2017    CR 0.75 11/07/2017    CR 1.00 11/06/2017          Recent Labs  Lab 12/15/17  1431 12/15/17  1430   WBC  --  7.8   HGB " 14.3 12.9*   HCT  --  39.7*   MCV  --  108*   PLT  --  259

## 2017-12-16 NOTE — UTILIZATION REVIEW
"  [facility] Utilization Review  Admission Status; Secondary Review Determination     Admission Date: 12/15/2017  2:05 PM      Under the authority of the Utilization Management Committee, the utilization review process indicated a secondary review on the above patient.  The review outcome is based on review of the medical records, discussions with staff, and applying clinical experience noted on the date of the review.        (X) Observation Status Appropriate - This patient does not meet hospital inpatient criteria and is placed in observation status. If this patient's primary payer is Medicare and was admitted as an inpatient, Condition Code 44 should be used and patient status changed to \"observation\".   () Observation Status concurrent Review           RATIONALE FOR DETERMINATION   David aDwn is a 57 year old male with complex past medical and psychiatric history, who presented to the emergency room with excessive somnolence and altered mental status. He responded to Narcan and his altered mental status is suspected to be due to narcotics. He continues to have behavioral issues, which are at baseline. Plan is to wean off opioids and minimize sedatives. Plan is to discharge back to current living facility. Based on severity of illness and intensity of service, patient does not meet criteria for inpatient admission. Observation status is appropriate for cares noted above. I discussed plan of care with Dr. Dominguez, who kindly concurred with the determination. However, he will make final determination once he evaluates patient at bedside and will notify utilization review if any change in plan of care.           The definitions of Inpatient Status and Observation Status used in making the determination above are those provided in the CMS Coverage Manual, Chapter 1 and Chapter 6, section 70.4.      Sincerely,       Sharmin Collado MD, MS  Physician Advisor  Utilization Review-[place]    Phone: " [phone]

## 2017-12-16 NOTE — PHARMACY-ADMISSION MEDICATION HISTORY
Admission medication history interview status for this patient is complete. See [facility] admission navigator for allergy information, prior to admission medications and immunization status.     Medication history interview source(s): [facility] Med List and MAR   Medication history resources (including written lists, pill bottles, clinic record): intermediate MAR    Changes made to PTA medication list:  Added: haloperidol   Deleted: none   Changed: duoneb (TID + prn --> TID)    Actions taken by pharmacist (provider contacted, etc):None     Additional medication history information:None    Medication reconciliation/reorder completed by provider prior to medication history? Yes    Do you take OTC medications (eg tylenol, ibuprofen, fish oil, eye/ear drops, etc)? N (Y/N)      Prior to Admission medications    Medication Sig Last Dose Taking? Auth Provider   HALOPERIDOL PO Take 2 mg by mouth daily 12/14/2017 Yes Unknown, Entered By History   ipratropium - albuterol 0.5 mg/2.5 mg/3 mL (DUONEB) 0.5-2.5 (3) MG/3ML neb solution Take 1 vial by nebulization 3 times daily 12/15/2017 at 0800 Yes Unknown, Entered By History   levETIRAcetam 1000 MG TABS Take 1 tablet by mouth 2 times daily 12/15/2017 at 0800 Yes Len Krishnan MD   furosemide (LASIX) 20 MG tablet TAKE 1 TABLET BY MOUTH ONCE DAILY 12/15/2017 at 0800 Yes Len Krishnan MD   gabapentin (NEURONTIN) 600 MG tablet TAKE 1 TABLET BY MOUTH THREE TIMES DAILY 12/15/2017 at 0800 Yes Len Krishnan MD   loperamide (IMODIUM) 2 MG capsule TAKE TWO CAPSULES (4MG) BY MOUTH TWICE DAILY;AND MAY TAKE ONE CAPSULE (2MG) BY MOUTH FOUR TIMES A DAY AS NEEDED 12/15/2017 at 0800 Yes Len Krishnan MD   divalproex (DEPAKOTE) 500 MG EC tablet TAKE THREE TABLETS (1500MG) BY MOUTH TWICE DAILY (TAKE WITH 250MG FOR A TOTAL OF 1750MG) 12/15/2017 at 0800 Yes Len Krishnan MD   oxyCODONE IR (ROXICODONE) 5 MG tablet Take 1 tablet (5 mg) by mouth 4 times daily 12/15/2017 at 0800 Yes  Len Krishnan MD   divalproex (DEPAKOTE) 250 MG EC tablet Take 250 mg by mouth 2 times daily 12/15/2017 at 0800 Yes Unknown, Entered By History   Sunscreens (AVEENO DAILY MOISTURIZER) LOTN Apply daily to hands and ears for dry skin. 12/15/2017 at 0800 Yes Unknown, Entered By History   simvastatin (ZOCOR) 40 MG tablet TAKE 1 TABLET BY MOUTH AT BEDTIME 12/14/2017 at 2000 Yes Len Krishnan MD   baclofen (LIORESAL) 20 MG tablet TAKE 1 TABLET BY MOUTH THREE TIMES DAILY 12/15/2017 at 0800 Yes Len Krishnan MD   carBAMazepine (TEGRETOL) 200 MG tablet TAKE 1 TABLET BY MOUTH THREE TIMES DAILY 12/15/2017 at 0800 Yes Len Krishnan MD   clopidogrel (PLAVIX) 75 MG tablet Take 1 tablet (75 mg) by mouth daily 12/15/2017 at 0800 Yes Len Krishnan MD   amLODIPine (NORVASC) 10 MG tablet TAKE 1 TABLET BY MOUTH ONCE DAILY 12/15/2017 at 0800 Yes Len Krishnan MD   niacin 500 MG CR capsule TAKE 1 CAPSULE BY MOUTH AT BEDTIME 12/15/2017 at 0800 Yes Len Krishnan MD   potassium chloride (K-TAB,KLOR-CON) 10 MEQ tablet TAKE 1 TABLET BY MOUTH ONCE DAILY 12/15/2017 at 0800 Yes Len Krishnan MD   tamsulosin (FLOMAX) 0.4 MG capsule TAKE 1 CAPSULE BY MOUTH ONCE DAILY 12/15/2017 at 0800 Yes Len Krishnan MD   budesonide (PULMICORT) 0.25 MG/2ML neb solution NEBULIZE THE CONTENT OF 1 VIAL TWICE DAILY FOR COPD 12/15/2017 at 0800 Yes Len Krishnan MD   citalopram (CELEXA) 20 MG tablet Take 20 mg by mouth daily 12/15/2017 at 0800 Yes Reported, Patient   QUEtiapine Fumarate (SEROQUEL PO) Take 200 mg by mouth 2 times daily  12/15/2017 at 0800 Yes Reported, Patient   lidocaine (XYLOCAINE) 2 % topical gel Apply to rectum four times daily as needed prn  Unknown, Entered By History   mineral oil-hydrophilic petrolatum (AQUAPHOR) Apply topically as needed Apply daily to left wound. prn  Unknown, Entered By History   pramoxine (SARNA SENSITIVE) 1 % LOTN lotion as needed for itching One application as  needed for itchy skin on posterior ears and hands. prn  Unknown, Entered By History   order for DME Power wheelchair   Len Krishnan MD   levalbuterol (XOPENEX HFA) 45 MCG/ACT Inhaler Inhale 2 puffs into the lungs every 6 hours as needed for shortness of breath / dyspnea or wheezing prn  Maile Blankenship MD   MAPAP 325 MG tablet TAKE TWO TABLETS (650MG) BY MOUTH EVERY 4 HOURS AS NEEDED FOR PAIN prn  Len Krishnan MD   diclofenac (VOLTAREN) 1 % GEL topical gel Place 2 g onto the skin 2 times daily as needed Apply 4 grams to knees or 2 grams to hands prn  Len Krishnan MD   vitamin D (ERGOCALCIFEROL) 17410 UNIT capsule TAKE ONE CAPSULE BY MOUTH ONCE WEEKLY ON MONDAY Unknown at Unknown time  Len Krishnan MD   Respiratory Therapy Supplies (NEBULIZER/ADULT MASK) KIT 1 Device 4 times daily.   Len Krishnan MD

## 2017-12-16 NOTE — PLAN OF CARE
Patient calm and cooperative except refused lab draw. No need for sitter. Sitter was initially ordered as we were informed that he had a sitter at his SNF. Patient has not pulled on any lines or attempted to get out of bed or chair. Up with lift to chair. Lungs decreased with crackles. Drsg to old abrasions CDI. 100% of meals eaten.

## 2017-12-17 NOTE — PLAN OF CARE
Patient discharged to home. Pivot transfer from bed to wheelchair with assist of one for safety. Discussed how patient transfers at home and nurse from his assisted living. She stated that patient refuses help and she was unsure as patient wont let people help him. Offered patient home care and increased services and he declined. Discharge packet and RX sent with patient. OBSERVATION patient END time: 1312.

## 2017-12-17 NOTE — PLAN OF CARE
"Problem: Patient Care Overview  Goal: Plan of Care/Patient Progress Review  Outcome: No Change  PRIMARY DIAGNOSIS: \"GENERIC\" NURSING  OUTPATIENT/OBSERVATION GOALS TO BE MET BEFORE DISCHARGE:  1. ADLs back to baseline: Yes    2. Activity and level of assistance: lift, wheelchair bound at baseline    3. Pain status: Improved-controlled with oral pain medications.    4. Return to near baseline physical activity: Yes     Discharge Planner Nurse   Safe discharge environment identified: Yes  Barriers to discharge: No       Entered by: Adelina Purdy 12/17/2017 4:30 AM     Please review provider order for any additional goals.   Nurse to notify provider when observation goals have been met and patient is ready for discharge.      "

## 2017-12-17 NOTE — DISCHARGE SUMMARY
"Discharge Summary    David Dawn MRN# [id number]   YOB: 1960 Age: 57 year old     Date of Admission:  12/15/2017  Date of Discharge:  12/17/2017  Admitting Physician:  Hernesto Dominguez MD  Discharge Physician:  Hernesto Dominguez MD  Discharging Service:  Hospitalist     Home clinic: [facility]  Primary Provider: Len Krishnan          Discharge Diagnosis:   1.  Altered mental status, likely due to sedating medications.  2.  History of right frontal lobe stroke with subsequent seizure disorder and behavior problems with agitation and verbal abuse.  3.  COPD without acute exacerbation.  4.  Essential hypertension             Discharge Disposition:   Discharged to nursing home           Allergies:   Allergies   Allergen Reactions     Ibuprofen Sodium Diarrhea     Tuberculin Tests                 Condition on Discharge:   Discharge condition: Stable   Discharge vitals: Blood pressure 136/80, pulse 82, temperature 97.5  F (36.4  C), temperature source Axillary, resp. rate 20, height 1.727 m (5' 8\"), weight 97.8 kg (215 lb 9.6 oz), SpO2 96 %.   Code status on discharge: Full Code   Physical exam on day of discharge:   GENERAL:  Comfortable. Cooperative.  PSYCH: pleasant, oriented, No acute distress.  EYES: PERRLA, Normal conjunctiva.  HEART:  Regular rate and rhythm. No JVD. Pulses normal. No edema.  LUNGS:  Clear to auscultation, normal Respiratory effort.  ABDOMEN:  Soft, no hepatosplenomegaly, normal bowel sounds.  EXTREMETIES: No clubbing, cyanosis or ischemia  SKIN:  Dry to touch, No rash.         History of Present Illness and Hospital Course:     See detailed admission note for full details.  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  He presented to the ED with altered mental status and marked somnolence.   This is actually his third such hospitalization recently.  He was recently hospitalized at [facility] twice with altered mental status, agitation, and behavioral issues.  He discharged most recently just 3 days prior to this presentation.  He was found in facility to be with decreased level of consciousness.  He was sent to the emergency department for evaluation.  He was almost intubated for airway protection. No evidence of infection or metabolic abnormality was found. He was given 2 doses of Narcan with significant improvement in mental status.  Of note, he has been on scheduled oxycodone.  He was admitted and oxycodone dose was decreased from 5 mg 4 times a day to 2.5 mg twice daily with plan to wean off completley, if possible.  Seroquel was held initially and restarted on the day of discharge at 100 mg BID (prior to admission was 200 mg BID).  Haldol 2 mg at bedtime had recently been started (12/4, I believe).  This was stopped while here.  Baclofen was initially held on admission but was restarted prior to admission.  David has been stable while here with no sedation.  He had some outbursts with cussing and spitting initially, but this seems to have stopped.  He has had no pain in spite of reduction of dose of Oxycodone.  This could likely be stopped in coming days.  David will discharge back to the nursing home today.            Procedures / Imaging:   CT of head and s-spine           Consultations:   No consultations were requested during this admission             Pending Results:   None           Discharge Instructions and Follow-Up:   Discharge diet: Regular   Discharge activity: Activity as tolerated   Discharge follow-up: Follow up with nursing home physician care team in <7 days   Outpatient therapy: None    Other instructions: Resume prior to admission home care orders and outpatient  health and/or mental health appointments             Discharge Medications:   Current Discharge Medication List      CONTINUE these medications which have CHANGED    Details   oxyCODONE IR (ROXICODONE) 5 MG tablet Take 0.5 tablets (2.5 mg) by mouth 2 times daily  Qty: 15 tablet, Refills: 0    Associated Diagnoses: Low back pain, unspecified back pain laterality, unspecified chronicity, with sciatica presence unspecified      QUEtiapine (SEROQUEL) 100 MG tablet Take 1 tablet (100 mg) by mouth 2 times daily    Associated Diagnoses: Behavior problem, adult; Hallucinations         CONTINUE these medications which have NOT CHANGED    Details   ipratropium - albuterol 0.5 mg/2.5 mg/3 mL (DUONEB) 0.5-2.5 (3) MG/3ML neb solution Take 1 vial by nebulization 3 times daily      levETIRAcetam 1000 MG TABS Take 1 tablet by mouth 2 times daily  Qty: 62 tablet, Refills: 1    Associated Diagnoses: Seizure disorder (H)      furosemide (LASIX) 20 MG tablet TAKE 1 TABLET BY MOUTH ONCE DAILY  Qty: 30 tablet, Refills: 5    Comments: PLEASE AUTHORIZE QTY 31 WITH 12 REFILLS FOR ASSISTING LIVING PATIENT. THANKS  Associated Diagnoses: Bilateral edema of lower extremity      gabapentin (NEURONTIN) 600 MG tablet TAKE 1 TABLET BY MOUTH THREE TIMES DAILY  Qty: 90 tablet, Refills: 1    Comments: PLEASE AUTHORIZE QTY 93 WITH 12 REFILLS FOR ASSISTING LIVING PATIENT. THANKS  Associated Diagnoses: Seizure disorder (H)      loperamide (IMODIUM) 2 MG capsule TAKE TWO CAPSULES (4MG) BY MOUTH TWICE DAILY;AND MAY TAKE ONE CAPSULE (2MG) BY MOUTH FOUR TIMES A DAY AS NEEDED  Qty: 120 capsule, Refills: 1    Comments: PLEASE AUTHORIZE  WITH 12 REFILLS FOR ASSISTING LIVING PATIENT. THANKS  Associated Diagnoses: Functional diarrhea      !! divalproex (DEPAKOTE) 500 MG EC tablet TAKE THREE TABLETS (1500MG) BY MOUTH TWICE DAILY (TAKE WITH 250MG FOR A TOTAL OF 1750MG)  Qty: 180 tablet, Refills: 5    Associated Diagnoses: Seizure disorder (H)      !!  divalproex (DEPAKOTE) 250 MG EC tablet Take 250 mg by mouth 2 times daily      Sunscreens (AVEENO DAILY MOISTURIZER) LOTN Apply daily to hands and ears for dry skin.      simvastatin (ZOCOR) 40 MG tablet TAKE 1 TABLET BY MOUTH AT BEDTIME  Qty: 30 tablet, Refills: 11    Comments: PLEASE AUTHORIZE REFILLS QTY OF 31 WITH 12 REFILLS FOR ASSISTING LIVING PATIENT. THANKS  Associated Diagnoses: Hyperlipidemia LDL goal <100      baclofen (LIORESAL) 20 MG tablet TAKE 1 TABLET BY MOUTH THREE TIMES DAILY  Qty: 90 tablet, Refills: 11    Comments: PLEASE AUTHORIZE REFILLS QTY OF 98 WITH 12 REFILLS FOR ASSISTING LIVING PATIENT. THANKS  Associated Diagnoses: Other seizures (H)      carBAMazepine (TEGRETOL) 200 MG tablet TAKE 1 TABLET BY MOUTH THREE TIMES DAILY  Qty: 90 tablet, Refills: 11    Comments: PLEASE AUTHORIZE REFILLS QTY OF 93 WITH 12 REFILLS FOR ASSISTING LIVING PATIENT. THANKS  Associated Diagnoses: Seizure disorder (H)      clopidogrel (PLAVIX) 75 MG tablet Take 1 tablet (75 mg) by mouth daily  Qty: 90 tablet, Refills: 2    Associated Diagnoses: Essential hypertension, benign; History of stroke      amLODIPine (NORVASC) 10 MG tablet TAKE 1 TABLET BY MOUTH ONCE DAILY  Qty: 31 tablet, Refills: 11    Comments: PLEASE AUTHORIZE QTY 31 WITH 12 REFILLS FOR ASSISTED LIVING PATIENT  Associated Diagnoses: Essential hypertension, benign      niacin 500 MG CR capsule TAKE 1 CAPSULE BY MOUTH AT BEDTIME  Qty: 31 capsule, Refills: 11    Comments: PLEASE AUTHORIZE QTY 31 WITH 12 REFILLS FOR ASSISTED LIVING PATIENT  Associated Diagnoses: Hyperlipidemia LDL goal <100      potassium chloride (K-TAB,KLOR-CON) 10 MEQ tablet TAKE 1 TABLET BY MOUTH ONCE DAILY  Qty: 31 tablet, Refills: 11    Comments: PLEASE AUTHORIZE QTY 31 WITH 12 REFILLS FOR ASSISTED LIVING PATIENT  Associated Diagnoses: Bilateral edema of lower extremity      tamsulosin (FLOMAX) 0.4 MG capsule TAKE 1 CAPSULE BY MOUTH ONCE DAILY  Qty: 31 capsule, Refills: 11    Comments:  PLEASE AUTHORIZE QTY 31 WITH 12 REFILLS FOR ASSISTED LIVING PATIENT  Associated Diagnoses: Benign non-nodular prostatic hyperplasia with lower urinary tract symptoms      budesonide (PULMICORT) 0.25 MG/2ML neb solution NEBULIZE THE CONTENT OF 1 VIAL TWICE DAILY FOR COPD  Qty: 120 mL, Refills: 10    Associated Diagnoses: Chronic bronchitis, unspecified chronic bronchitis type (H)      citalopram (CELEXA) 20 MG tablet Take 20 mg by mouth daily      lidocaine (XYLOCAINE) 2 % topical gel Apply to rectum four times daily as needed      mineral oil-hydrophilic petrolatum (AQUAPHOR) Apply topically as needed Apply daily to left wound.      pramoxine (SARNA SENSITIVE) 1 % LOTN lotion as needed for itching One application as needed for itchy skin on posterior ears and hands.      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)      levalbuterol (XOPENEX HFA) 45 MCG/ACT Inhaler Inhale 2 puffs into the lungs every 6 hours as needed for shortness of breath / dyspnea or wheezing  Qty: 1 Inhaler, Refills: 8    Associated Diagnoses: Chronic obstructive pulmonary disease, unspecified COPD type (H)      MAPAP 325 MG tablet TAKE TWO TABLETS (650MG) BY MOUTH EVERY 4 HOURS AS NEEDED FOR PAIN  Qty: 60 tablet, Refills: 11    Comments: PT HAS 3 DOSES LEFT-PLEASE SEND 2ND REQUEST  Associated Diagnoses: Low back pain, unspecified back pain laterality, unspecified chronicity, with sciatica presence unspecified; Neck pain      diclofenac (VOLTAREN) 1 % GEL topical gel Place 2 g onto the skin 2 times daily as needed Apply 4 grams to knees or 2 grams to hands  Qty: 100 g, Refills: 0    Associated Diagnoses: Chronic pain syndrome      vitamin D (ERGOCALCIFEROL) 12898 UNIT capsule TAKE ONE CAPSULE BY MOUTH ONCE WEEKLY ON MONDAY  Qty: 12 capsule, Refills: 3    Associated Diagnoses: Vitamin D  deficiency      Respiratory Therapy Supplies (NEBULIZER/ADULT MASK) KIT 1 Device 4 times daily.  Qty: 1 kit, Refills: 3    Associated Diagnoses: Wheezing       !! - Potential duplicate medications found. Please discuss with provider.      STOP taking these medications       HALOPERIDOL PO Comments:   Reason for Stopping:                  Total time spent in face to face contact with the patient and coordinating discharge was:  45 Minutes

## 2017-12-17 NOTE — PLAN OF CARE
Up in chair for meals. Alert to person and place. Able to make his needs known. Uses call light. INC of urine. Ask to be placed on BSC to attempt BM but was unable. 2 soft formed BM last night. Offered bowel med's but patient declined. This 12 hour shift patients behavior was appropriate. Sitter was discontinued at around 10am.

## 2017-12-17 NOTE — PLAN OF CARE
"Problem: Patient Care Overview  Goal: Plan of Care/Patient Progress Review  Outcome: No Change  PRIMARY DIAGNOSIS: \"GENERIC\" NURSING  OUTPATIENT/OBSERVATION GOALS TO BE MET BEFORE DISCHARGE:  1. ADLs back to baseline: Yes    2. Activity and level of assistance: lift. Wheelchair bound at baseline    3. Pain status: Improved-controlled with oral pain medications.    4. Return to near baseline physical activity: Yes     Discharge Planner Nurse   Safe discharge environment identified: Yes  Barriers to discharge: No       Entered by: Adelina Purdy 12/17/2017 4:35 AM     Please review provider order for any additional goals.   Nurse to notify provider when observation goals have been met and patient is ready for discharge.      "

## 2017-12-17 NOTE — PLAN OF CARE
Problem: Patient Care Overview  Goal: Plan of Care/Patient Progress Review  Outcome: No Change  VS stable. Diminished, course LS with non productive cough. +2 edema to BLE. Left hemiparesis. Had two bowel movements. Awake on and off throughout the night.

## 2017-12-17 NOTE — CONSULTS
Care Transition Initial Assessment - SHIVA  Reason For Consult: discharge planning  Met with: Nurse  Active Problems:    Encephalopathy    Altered mental status         DATA  Lives With: facility resident  Living Arrangements: assisted living     Identified issues/concerns regarding health management: Pt needs to return to [facility] [address].     Resources List: Assisted Living     Quality Of Family Relationships: non-existent, unable to assess  Transportation Available: agency transportation    ASSESSMENT  Concerns to be addressed: Pt needs to return to [facility] [address].  SHIVA called [facility] ([phone]) and spoke to the nurse Celestina. Celestina had nurse to nurse with [facility] RN. Pt is able to return today.  SHIVA arranged WC transportation through [facility] at 1:00pm. SHIVA sent the d/c order to [phone].     PLAN  Patient anticipates discharging to: [facility] today at 1:00pm.    SAM Goodman  Casual  [id number]

## 2017-12-17 NOTE — PLAN OF CARE
"Problem: Patient Care Overview  Goal: Plan of Care/Patient Progress Review  .rhobPRIMARY DIAGNOSIS: \"GENERIC\" NURSING  OUTPATIENT/OBSERVATION GOALS TO BE MET BEFORE DISCHARGE:  1. ADLs back to baseline: Yes    2. Activity and level of assistance: Up with maximum assistance. Consider SW and/or PT evaluation.     3. Pain status: Improved-controlled with oral pain medications.    4. Return to near baseline physical activity: Yes     Discharge Planner Nurse   Safe discharge environment identified: Yes  Barriers to discharge: No       Entered by: Helen Orellana 12/17/2017 9:46 AM     Please review provider order for any additional goals.   Nurse to notify provider when observation goals have been met and patient is ready for discharge.      "

## 2017-12-17 NOTE — PLAN OF CARE
"Problem: Patient Care Overview  Goal: Plan of Care/Patient Progress Review  Outcome: No Change  PRIMARY DIAGNOSIS: \"GENERIC\" NURSING  OUTPATIENT/OBSERVATION GOALS TO BE MET BEFORE DISCHARGE:  1. ADLs back to baseline: Yes    2. Activity and level of assistance: lift. Wheelchair bound at baseline    3. Pain status: Improved-controlled with oral pain medications.    4. Return to near baseline physical activity: Yes     Discharge Planner Nurse   Safe discharge environment identified: Yes  Barriers to discharge: No       Entered by: Adelina Purdy 12/17/2017 4:33 AM     Please review provider order for any additional goals.   Nurse to notify provider when observation goals have been met and patient is ready for discharge.      "

## 2017-12-18 NOTE — TELEPHONE ENCOUNTER
IP F/U    Date: 12/17/17  Diagnosis: Encephalopathy, Behavior Problem, Adult  Is patient active in care coordination? No  Was patient in TCU? No

## 2017-12-18 NOTE — TELEPHONE ENCOUNTER
Pt lives in a facility. Called facility at phone number listed in chart. Rang for a bit and then went to fax tone.    ED / Discharge Outreach Protocol    Patient Contact    Attempt # 1    Was call answered?  No.  Unable to leave message.    Please attempt to call pt again later.

## 2017-12-18 NOTE — TELEPHONE ENCOUNTER
Liz, PT with [facility] ([phone]) calling as they have to delay the start of care to 3 days after receiving the order as they went out within the 48 hour response window and pt was not home as he was in ER.    Just an FYI,.  JOHN Toussaint R.N.

## 2017-12-19 NOTE — TELEPHONE ENCOUNTER
Called pt's assisted living, was transferred to nurse diandra. vm left for nursing staff to call clinic back.

## 2017-12-20 NOTE — TELEPHONE ENCOUNTER
"ED/Discharge Protocol    \"Hi, my name is Mary Toussaint, a registered nurse, and I am calling on behalf of Dr. Krishnan's office at [place].  I am calling to follow up and see how things are going for you after your recent visit.\"  Spoke with facility nurse Janina.    \"I see that you were in the (ER/UC/IP) on 12/15/17.    How are you doing now that you are home?\" Patient is much less lethargic, seems to be eating and drinking, no behavioral problems in the past couple of days.     Is patient experiencing symptoms that may require a hospital visit?  no    Discharge Instructions    \"Let's review your discharge instructions.  What is/are the follow-up recommendations?  Pt. Response: none indicated    \"Were you instructed to make a follow-up appointment?\"  Pt. Response: No. Will check into setting up appt with psych for med management.      \"When you see the provider, I would recommend that you bring your discharge instructions with you.    Medications    \"How many new medications are you on since your hospitalization/ED visit?\"    0-1  \"How many of your current medicines changed (dose, timing, name, etc.) while you were in the hospital/ED visit?\"   0-1  \"Do you have questions about your medications?\"   No  \"Were you newly diagnosed with heart failure, COPD, diabetes or did you have a heart attack?\"   No  For patients on insulin: \"Did you start on insulin in the hospital or did you have your insulin dose changed?\"   No    Medication reconciliation completed? Yes    Was MTM referral placed (*Make sure to put transitions as reason for referral)?   No    Call Summary    \"Do you have any questions or concerns about your condition or care plan at the moment?\"    No  Triage nurse advice given: Call with any questions    Patient was in ER 2 in the past year (assess appropriateness of ER visits.)      \"If you have questions or things don't continue to improve, we encourage you contact us through the main clinic number,  " "[phone].  Even if the clinic is not open, triage nurses are available 24/7 to help you.     We would like you to know that our clinic has extended hours (provide information).  We also have urgent care (provide details on closest location and hours/contact info)\"      \"Thank you for your time and take care!\"        "

## 2017-12-20 NOTE — TELEPHONE ENCOUNTER
[facility] Mccl[facility] PT calling for orders:    PT: 2w 1, 1w 3 for Fall prevention, home exercise program, staff transfer training.     OT eval and treat for home bathing program and equipment eval.    PT and  OT Verbal order given to approve visits until certification received for provider signature.    Request SNV:  SNV to eval and treat due to skin integrity due to poor hygiene, reddened bottom, adhasion from banging into walls.    Ok to SNV eval and treat?    Provider please review and advise. Thank you.

## 2017-12-20 NOTE — TELEPHONE ENCOUNTER
APPT INFO    Date /Time: 1/3/18, 2:20pm   Reason for Appt: Seizure    Ref Provider/Clinic: Unknown    Are there internal records? Yes/No?  IF YES, list clinic names: [facility] ED   Are there outside records? Yes/No? no   Patient Contact (Y/N) & Call Details: No, records in epic   Action:

## 2018-01-01 ENCOUNTER — TELEPHONE (OUTPATIENT)
Dept: INTERNAL MEDICINE | Facility: CLINIC | Age: 58
End: 2018-01-01

## 2018-01-01 ENCOUNTER — APPOINTMENT (OUTPATIENT)
Dept: CT IMAGING | Facility: CLINIC | Age: 58
DRG: 064 | End: 2018-01-01
Attending: INTERNAL MEDICINE
Payer: MEDICARE

## 2018-01-01 ENCOUNTER — APPOINTMENT (OUTPATIENT)
Dept: SPEECH THERAPY | Facility: CLINIC | Age: 58
DRG: 064 | End: 2018-01-01
Attending: STUDENT IN AN ORGANIZED HEALTH CARE EDUCATION/TRAINING PROGRAM
Payer: MEDICARE

## 2018-01-01 ENCOUNTER — DOCUMENTATION ONLY (OUTPATIENT)
Dept: CARE COORDINATION | Facility: CLINIC | Age: 58
End: 2018-01-01

## 2018-01-01 ENCOUNTER — APPOINTMENT (OUTPATIENT)
Dept: SPEECH THERAPY | Facility: CLINIC | Age: 58
DRG: 064 | End: 2018-01-01
Attending: INTERNAL MEDICINE
Payer: MEDICARE

## 2018-01-01 ENCOUNTER — OFFICE VISIT (OUTPATIENT)
Dept: INTERNAL MEDICINE | Facility: CLINIC | Age: 58
End: 2018-01-01
Payer: MEDICARE

## 2018-01-01 ENCOUNTER — PRE VISIT (OUTPATIENT)
Dept: NEUROLOGY | Facility: CLINIC | Age: 58
End: 2018-01-01

## 2018-01-01 ENCOUNTER — APPOINTMENT (OUTPATIENT)
Dept: GENERAL RADIOLOGY | Facility: CLINIC | Age: 58
DRG: 064 | End: 2018-01-01
Attending: STUDENT IN AN ORGANIZED HEALTH CARE EDUCATION/TRAINING PROGRAM
Payer: MEDICARE

## 2018-01-01 ENCOUNTER — OFFICE VISIT (OUTPATIENT)
Dept: NEUROLOGY | Facility: CLINIC | Age: 58
End: 2018-01-01
Payer: MEDICARE

## 2018-01-01 ENCOUNTER — APPOINTMENT (OUTPATIENT)
Dept: CT IMAGING | Facility: CLINIC | Age: 58
DRG: 064 | End: 2018-01-01
Attending: STUDENT IN AN ORGANIZED HEALTH CARE EDUCATION/TRAINING PROGRAM
Payer: MEDICARE

## 2018-01-01 ENCOUNTER — HOSPITAL ENCOUNTER (INPATIENT)
Facility: CLINIC | Age: 58
LOS: 14 days | Discharge: HOSPICE | DRG: 064 | End: 2018-03-23
Attending: INTERNAL MEDICINE | Admitting: INTERNAL MEDICINE
Payer: MEDICARE

## 2018-01-01 ENCOUNTER — PATIENT OUTREACH (OUTPATIENT)
Dept: CARE COORDINATION | Facility: CLINIC | Age: 58
End: 2018-01-01

## 2018-01-01 ENCOUNTER — ALLIED HEALTH/NURSE VISIT (OUTPATIENT)
Dept: NEUROLOGY | Facility: CLINIC | Age: 58
DRG: 064 | End: 2018-01-01
Attending: PSYCHIATRY & NEUROLOGY
Payer: MEDICARE

## 2018-01-01 ENCOUNTER — APPOINTMENT (OUTPATIENT)
Dept: CARDIOLOGY | Facility: CLINIC | Age: 58
DRG: 064 | End: 2018-01-01
Attending: INTERNAL MEDICINE
Payer: MEDICARE

## 2018-01-01 VITALS
HEART RATE: 75 BPM | DIASTOLIC BLOOD PRESSURE: 74 MMHG | TEMPERATURE: 97.7 F | SYSTOLIC BLOOD PRESSURE: 118 MMHG | OXYGEN SATURATION: 95 % | WEIGHT: 210 LBS | BODY MASS INDEX: 31.83 KG/M2 | HEIGHT: 68 IN

## 2018-01-01 VITALS
RESPIRATION RATE: 16 BRPM | SYSTOLIC BLOOD PRESSURE: 138 MMHG | TEMPERATURE: 98.8 F | BODY MASS INDEX: 34.53 KG/M2 | DIASTOLIC BLOOD PRESSURE: 85 MMHG | OXYGEN SATURATION: 96 % | WEIGHT: 227.07 LBS | HEART RATE: 74 BPM

## 2018-01-01 VITALS
WEIGHT: 220 LBS | HEART RATE: 80 BPM | SYSTOLIC BLOOD PRESSURE: 107 MMHG | BODY MASS INDEX: 33.34 KG/M2 | HEIGHT: 68 IN | DIASTOLIC BLOOD PRESSURE: 73 MMHG

## 2018-01-01 DIAGNOSIS — G81.04 FLACCID HEMIPLEGIA OF LEFT NONDOMINANT SIDE DUE TO INFARCTION OF BRAIN (H): ICD-10-CM

## 2018-01-01 DIAGNOSIS — R21 SCROTAL RASH: ICD-10-CM

## 2018-01-01 DIAGNOSIS — R41.82 ALTERED MENTAL STATUS, UNSPECIFIED ALTERED MENTAL STATUS TYPE: ICD-10-CM

## 2018-01-01 DIAGNOSIS — I63.9 FLACCID HEMIPLEGIA OF LEFT NONDOMINANT SIDE DUE TO INFARCTION OF BRAIN (H): ICD-10-CM

## 2018-01-01 DIAGNOSIS — J44.9 CHRONIC OBSTRUCTIVE PULMONARY DISEASE, UNSPECIFIED COPD TYPE (H): ICD-10-CM

## 2018-01-01 DIAGNOSIS — J41.8 MIXED SIMPLE AND MUCOPURULENT CHRONIC BRONCHITIS (H): Primary | ICD-10-CM

## 2018-01-01 DIAGNOSIS — R47.9 SPEECH DISTURBANCE, UNSPECIFIED TYPE: ICD-10-CM

## 2018-01-01 DIAGNOSIS — G40.909 SEIZURE DISORDER (H): ICD-10-CM

## 2018-01-01 DIAGNOSIS — F69 BEHAVIOR PROBLEM, ADULT: ICD-10-CM

## 2018-01-01 DIAGNOSIS — M54.5 LOW BACK PAIN, UNSPECIFIED BACK PAIN LATERALITY, UNSPECIFIED CHRONICITY, WITH SCIATICA PRESENCE UNSPECIFIED: Primary | ICD-10-CM

## 2018-01-01 DIAGNOSIS — G62.9 NEUROPATHY: ICD-10-CM

## 2018-01-01 DIAGNOSIS — F17.219 CIGARETTE NICOTINE DEPENDENCE WITH NICOTINE-INDUCED DISORDER: ICD-10-CM

## 2018-01-01 DIAGNOSIS — R41.82 ALTERED MENTAL STATUS, UNSPECIFIED ALTERED MENTAL STATUS TYPE: Primary | ICD-10-CM

## 2018-01-01 DIAGNOSIS — R05.9 COUGH: Primary | ICD-10-CM

## 2018-01-01 DIAGNOSIS — R13.10 DYSPHAGIA, UNSPECIFIED TYPE: ICD-10-CM

## 2018-01-01 DIAGNOSIS — M54.5 LOW BACK PAIN, UNSPECIFIED BACK PAIN LATERALITY, UNSPECIFIED CHRONICITY, WITH SCIATICA PRESENCE UNSPECIFIED: ICD-10-CM

## 2018-01-01 DIAGNOSIS — Z86.73 HISTORY OF CVA (CEREBROVASCULAR ACCIDENT): ICD-10-CM

## 2018-01-01 DIAGNOSIS — G40.909 SEIZURE DISORDER (H): Primary | ICD-10-CM

## 2018-01-01 DIAGNOSIS — K59.1 FUNCTIONAL DIARRHEA: ICD-10-CM

## 2018-01-01 LAB
ALBUMIN UR-MCNC: NEGATIVE MG/DL
ANION GAP SERPL CALCULATED.3IONS-SCNC: 11 MMOL/L (ref 3–14)
ANION GAP SERPL CALCULATED.3IONS-SCNC: 13 MMOL/L (ref 3–14)
ANION GAP SERPL CALCULATED.3IONS-SCNC: 13 MMOL/L (ref 3–14)
ANION GAP SERPL CALCULATED.3IONS-SCNC: 8 MMOL/L (ref 3–14)
APPEARANCE UR: CLEAR
APTT PPP: 29 SEC (ref 22–37)
BACTERIA SPEC CULT: NO GROWTH
BASE DEFICIT BLDV-SCNC: 0.3 MMOL/L
BILIRUB UR QL STRIP: NEGATIVE
BUN SERPL-MCNC: 13 MG/DL (ref 7–30)
BUN SERPL-MCNC: 15 MG/DL (ref 7–30)
BUN SERPL-MCNC: 16 MG/DL (ref 7–30)
BUN SERPL-MCNC: 18 MG/DL (ref 7–30)
CALCIUM SERPL-MCNC: 8.7 MG/DL (ref 8.5–10.1)
CALCIUM SERPL-MCNC: 8.8 MG/DL (ref 8.5–10.1)
CALCIUM SERPL-MCNC: 9 MG/DL (ref 8.5–10.1)
CALCIUM SERPL-MCNC: 9.3 MG/DL (ref 8.5–10.1)
CARBAMAZEPINE SERPL-MCNC: 8.6 MG/L (ref 4–12)
CHLORIDE SERPL-SCNC: 108 MMOL/L (ref 94–109)
CHLORIDE SERPL-SCNC: 110 MMOL/L (ref 94–109)
CHLORIDE SERPL-SCNC: 110 MMOL/L (ref 94–109)
CHLORIDE SERPL-SCNC: 112 MMOL/L (ref 94–109)
CHOLEST SERPL-MCNC: 167 MG/DL
CO2 BLDCOV-SCNC: 30 MMOL/L (ref 21–28)
CO2 SERPL-SCNC: 20 MMOL/L (ref 20–32)
CO2 SERPL-SCNC: 22 MMOL/L (ref 20–32)
CO2 SERPL-SCNC: 23 MMOL/L (ref 20–32)
CO2 SERPL-SCNC: 26 MMOL/L (ref 20–32)
COLOR UR AUTO: ABNORMAL
CREAT BLD-MCNC: 0.8 MG/DL (ref 0.66–1.25)
CREAT SERPL-MCNC: 0.58 MG/DL (ref 0.66–1.25)
CREAT SERPL-MCNC: 0.62 MG/DL (ref 0.66–1.25)
CREAT SERPL-MCNC: 0.63 MG/DL (ref 0.66–1.25)
CREAT SERPL-MCNC: 0.78 MG/DL (ref 0.66–1.25)
ERYTHROCYTE [DISTWIDTH] IN BLOOD BY AUTOMATED COUNT: 13.1 % (ref 10–15)
ERYTHROCYTE [DISTWIDTH] IN BLOOD BY AUTOMATED COUNT: 13.4 % (ref 10–15)
ERYTHROCYTE [DISTWIDTH] IN BLOOD BY AUTOMATED COUNT: 13.4 % (ref 10–15)
GFR SERPL CREATININE-BSD FRML MDRD: >90 ML/MIN/1.7M2
GLUCOSE BLDC GLUCOMTR-MCNC: 112 MG/DL (ref 70–99)
GLUCOSE BLDC GLUCOMTR-MCNC: 123 MG/DL (ref 70–99)
GLUCOSE BLDC GLUCOMTR-MCNC: 72 MG/DL (ref 70–99)
GLUCOSE BLDC GLUCOMTR-MCNC: 79 MG/DL (ref 70–99)
GLUCOSE BLDC GLUCOMTR-MCNC: 97 MG/DL (ref 70–99)
GLUCOSE BLDC GLUCOMTR-MCNC: 98 MG/DL (ref 70–99)
GLUCOSE SERPL-MCNC: 100 MG/DL (ref 70–99)
GLUCOSE SERPL-MCNC: 104 MG/DL (ref 70–99)
GLUCOSE SERPL-MCNC: 71 MG/DL (ref 70–99)
GLUCOSE SERPL-MCNC: 94 MG/DL (ref 70–99)
GLUCOSE UR STRIP-MCNC: NEGATIVE MG/DL
HBA1C MFR BLD: 5.7 % (ref 4.3–6)
HCO3 BLDV-SCNC: 24 MMOL/L (ref 21–28)
HCT VFR BLD AUTO: 40.7 % (ref 40–53)
HCT VFR BLD AUTO: 40.9 % (ref 40–53)
HCT VFR BLD AUTO: 42.4 % (ref 40–53)
HDLC SERPL-MCNC: 82 MG/DL
HGB BLD-MCNC: 13 G/DL (ref 13.3–17.7)
HGB BLD-MCNC: 13.4 G/DL (ref 13.3–17.7)
HGB BLD-MCNC: 13.6 G/DL (ref 13.3–17.7)
HGB UR QL STRIP: NEGATIVE
INR BLD: 1.1 (ref 0.86–1.14)
INR PPP: 1.04 (ref 0.86–1.14)
INTERPRETATION ECG - MUSE: NORMAL
KETONES UR STRIP-MCNC: NEGATIVE MG/DL
LACTATE BLD-SCNC: 1.3 MMOL/L (ref 0.7–2)
LACTATE BLD-SCNC: 2.3 MMOL/L (ref 0.7–2.1)
LDLC SERPL CALC-MCNC: 51 MG/DL
LEUKOCYTE ESTERASE UR QL STRIP: NEGATIVE
LEVETIRACETAM SERPL-MCNC: 18 UG/ML (ref 12–46)
Lab: NORMAL
MCH RBC QN AUTO: 34.4 PG (ref 26.5–33)
MCH RBC QN AUTO: 34.4 PG (ref 26.5–33)
MCH RBC QN AUTO: 34.7 PG (ref 26.5–33)
MCHC RBC AUTO-ENTMCNC: 31.8 G/DL (ref 31.5–36.5)
MCHC RBC AUTO-ENTMCNC: 32.1 G/DL (ref 31.5–36.5)
MCHC RBC AUTO-ENTMCNC: 32.9 G/DL (ref 31.5–36.5)
MCV RBC AUTO: 105 FL (ref 78–100)
MCV RBC AUTO: 107 FL (ref 78–100)
MCV RBC AUTO: 108 FL (ref 78–100)
NITRATE UR QL: NEGATIVE
NONHDLC SERPL-MCNC: 85 MG/DL
O2/TOTAL GAS SETTING VFR VENT: 21 %
PCO2 BLDV: 37 MM HG (ref 40–50)
PCO2 BLDV: 59 MM HG (ref 40–50)
PH BLDV: 7.31 PH (ref 7.32–7.43)
PH BLDV: 7.42 PH (ref 7.32–7.43)
PH UR STRIP: 7.5 PH (ref 5–7)
PLATELET # BLD AUTO: 188 10E9/L (ref 150–450)
PLATELET # BLD AUTO: 191 10E9/L (ref 150–450)
PLATELET # BLD AUTO: 216 10E9/L (ref 150–450)
PO2 BLDV: 23 MM HG (ref 25–47)
PO2 BLDV: 65 MM HG (ref 25–47)
POTASSIUM SERPL-SCNC: 3.4 MMOL/L (ref 3.4–5.3)
POTASSIUM SERPL-SCNC: 3.9 MMOL/L (ref 3.4–5.3)
POTASSIUM SERPL-SCNC: 4 MMOL/L (ref 3.4–5.3)
POTASSIUM SERPL-SCNC: 4.5 MMOL/L (ref 3.4–5.3)
PROCALCITONIN SERPL-MCNC: <0.05 NG/ML
RBC # BLD AUTO: 3.78 10E12/L (ref 4.4–5.9)
RBC # BLD AUTO: 3.86 10E12/L (ref 4.4–5.9)
RBC # BLD AUTO: 3.95 10E12/L (ref 4.4–5.9)
RBC #/AREA URNS AUTO: 1 /HPF (ref 0–2)
SAO2 % BLDV FROM PO2: 33 %
SODIUM SERPL-SCNC: 143 MMOL/L (ref 133–144)
SODIUM SERPL-SCNC: 144 MMOL/L (ref 133–144)
SODIUM SERPL-SCNC: 144 MMOL/L (ref 133–144)
SODIUM SERPL-SCNC: 146 MMOL/L (ref 133–144)
SOURCE: ABNORMAL
SP GR UR STRIP: 1.05 (ref 1–1.03)
SPECIMEN SOURCE: NORMAL
TRIGL SERPL-MCNC: 171 MG/DL
TROPONIN I BLD-MCNC: 0 UG/L (ref 0–0.1)
TROPONIN I SERPL-MCNC: <0.015 UG/L (ref 0–0.04)
UROBILINOGEN UR STRIP-MCNC: NORMAL MG/DL (ref 0–2)
VALPROATE SERPL-MCNC: 76 MG/L (ref 50–100)
WBC # BLD AUTO: 10.5 10E9/L (ref 4–11)
WBC # BLD AUTO: 6.3 10E9/L (ref 4–11)
WBC # BLD AUTO: 6.5 10E9/L (ref 4–11)
WBC #/AREA URNS AUTO: <1 /HPF (ref 0–5)

## 2018-01-01 PROCEDURE — 25000125 ZZHC RX 250: Performed by: STUDENT IN AN ORGANIZED HEALTH CARE EDUCATION/TRAINING PROGRAM

## 2018-01-01 PROCEDURE — 40000225 ZZH STATISTIC SLP WARD VISIT: Performed by: SPEECH-LANGUAGE PATHOLOGIST

## 2018-01-01 PROCEDURE — A9270 NON-COVERED ITEM OR SERVICE: HCPCS | Mod: GY | Performed by: STUDENT IN AN ORGANIZED HEALTH CARE EDUCATION/TRAINING PROGRAM

## 2018-01-01 PROCEDURE — 92523 SPEECH SOUND LANG COMPREHEN: CPT | Mod: GN | Performed by: SPEECH-LANGUAGE PATHOLOGIST

## 2018-01-01 PROCEDURE — G0180 MD CERTIFICATION HHA PATIENT: HCPCS | Performed by: INTERNAL MEDICINE

## 2018-01-01 PROCEDURE — 25000132 ZZH RX MED GY IP 250 OP 250 PS 637: Mod: GY | Performed by: STUDENT IN AN ORGANIZED HEALTH CARE EDUCATION/TRAINING PROGRAM

## 2018-01-01 PROCEDURE — 25000128 H RX IP 250 OP 636: Performed by: NURSE PRACTITIONER

## 2018-01-01 PROCEDURE — 25000128 H RX IP 250 OP 636: Performed by: STUDENT IN AN ORGANIZED HEALTH CARE EDUCATION/TRAINING PROGRAM

## 2018-01-01 PROCEDURE — A9270 NON-COVERED ITEM OR SERVICE: HCPCS | Mod: GY | Performed by: INTERNAL MEDICINE

## 2018-01-01 PROCEDURE — 93010 ELECTROCARDIOGRAM REPORT: CPT | Mod: Z6 | Performed by: INTERNAL MEDICINE

## 2018-01-01 PROCEDURE — 12000003 ZZH R&B CRITICAL UMMC

## 2018-01-01 PROCEDURE — 40000275 ZZH STATISTIC RCP TIME EA 10 MIN

## 2018-01-01 PROCEDURE — 93306 TTE W/DOPPLER COMPLETE: CPT | Mod: 26 | Performed by: INTERNAL MEDICINE

## 2018-01-01 PROCEDURE — 25000132 ZZH RX MED GY IP 250 OP 250 PS 637: Mod: GY | Performed by: INTERNAL MEDICINE

## 2018-01-01 PROCEDURE — 99223 1ST HOSP IP/OBS HIGH 75: CPT | Performed by: INTERNAL MEDICINE

## 2018-01-01 PROCEDURE — 36415 COLL VENOUS BLD VENIPUNCTURE: CPT | Performed by: STUDENT IN AN ORGANIZED HEALTH CARE EDUCATION/TRAINING PROGRAM

## 2018-01-01 PROCEDURE — 85730 THROMBOPLASTIN TIME PARTIAL: CPT | Performed by: INTERNAL MEDICINE

## 2018-01-01 PROCEDURE — 85610 PROTHROMBIN TIME: CPT | Mod: QW

## 2018-01-01 PROCEDURE — 00000146 ZZHCL STATISTIC GLUCOSE BY METER IP

## 2018-01-01 PROCEDURE — 12000001 ZZH R&B MED SURG/OB UMMC

## 2018-01-01 PROCEDURE — 84484 ASSAY OF TROPONIN QUANT: CPT | Performed by: INTERNAL MEDICINE

## 2018-01-01 PROCEDURE — 25000128 H RX IP 250 OP 636: Performed by: INTERNAL MEDICINE

## 2018-01-01 PROCEDURE — 36415 COLL VENOUS BLD VENIPUNCTURE: CPT | Performed by: INTERNAL MEDICINE

## 2018-01-01 PROCEDURE — 70450 CT HEAD/BRAIN W/O DYE: CPT

## 2018-01-01 PROCEDURE — C9254 INJECTION, LACOSAMIDE: HCPCS | Performed by: STUDENT IN AN ORGANIZED HEALTH CARE EDUCATION/TRAINING PROGRAM

## 2018-01-01 PROCEDURE — 83605 ASSAY OF LACTIC ACID: CPT

## 2018-01-01 PROCEDURE — 83605 ASSAY OF LACTIC ACID: CPT | Performed by: STUDENT IN AN ORGANIZED HEALTH CARE EDUCATION/TRAINING PROGRAM

## 2018-01-01 PROCEDURE — 83036 HEMOGLOBIN GLYCOSYLATED A1C: CPT | Performed by: INTERNAL MEDICINE

## 2018-01-01 PROCEDURE — 80156 ASSAY CARBAMAZEPINE TOTAL: CPT | Performed by: INTERNAL MEDICINE

## 2018-01-01 PROCEDURE — 80061 LIPID PANEL: CPT | Performed by: INTERNAL MEDICINE

## 2018-01-01 PROCEDURE — 94640 AIRWAY INHALATION TREATMENT: CPT | Mod: 76

## 2018-01-01 PROCEDURE — 80048 BASIC METABOLIC PNL TOTAL CA: CPT | Performed by: STUDENT IN AN ORGANIZED HEALTH CARE EDUCATION/TRAINING PROGRAM

## 2018-01-01 PROCEDURE — G0463 HOSPITAL OUTPT CLINIC VISIT: HCPCS

## 2018-01-01 PROCEDURE — 25000125 ZZHC RX 250: Performed by: INTERNAL MEDICINE

## 2018-01-01 PROCEDURE — 12000008 ZZH R&B INTERMEDIATE UMMC

## 2018-01-01 PROCEDURE — 80048 BASIC METABOLIC PNL TOTAL CA: CPT | Performed by: INTERNAL MEDICINE

## 2018-01-01 PROCEDURE — 82803 BLOOD GASES ANY COMBINATION: CPT | Performed by: STUDENT IN AN ORGANIZED HEALTH CARE EDUCATION/TRAINING PROGRAM

## 2018-01-01 PROCEDURE — 99233 SBSQ HOSP IP/OBS HIGH 50: CPT | Mod: GC | Performed by: INTERNAL MEDICINE

## 2018-01-01 PROCEDURE — 99233 SBSQ HOSP IP/OBS HIGH 50: CPT | Performed by: INTERNAL MEDICINE

## 2018-01-01 PROCEDURE — 83605 ASSAY OF LACTIC ACID: CPT | Performed by: INTERNAL MEDICINE

## 2018-01-01 PROCEDURE — 20000002 ZZH R&B BMT INTERMEDIATE

## 2018-01-01 PROCEDURE — 92526 ORAL FUNCTION THERAPY: CPT | Mod: GN | Performed by: SPEECH-LANGUAGE PATHOLOGIST

## 2018-01-01 PROCEDURE — 99232 SBSQ HOSP IP/OBS MODERATE 35: CPT | Mod: GC | Performed by: INTERNAL MEDICINE

## 2018-01-01 PROCEDURE — 25000131 ZZH RX MED GY IP 250 OP 636 PS 637: Mod: GY | Performed by: STUDENT IN AN ORGANIZED HEALTH CARE EDUCATION/TRAINING PROGRAM

## 2018-01-01 PROCEDURE — 40000556 ZZH STATISTIC PERIPHERAL IV START W US GUIDANCE

## 2018-01-01 PROCEDURE — 40000894 ZZH STATISTIC OT IP EVAL DEFER: Performed by: OCCUPATIONAL THERAPIST

## 2018-01-01 PROCEDURE — 94640 AIRWAY INHALATION TREATMENT: CPT

## 2018-01-01 PROCEDURE — 25000125 ZZHC RX 250: Performed by: MARRIAGE & FAMILY THERAPIST

## 2018-01-01 PROCEDURE — 92526 ORAL FUNCTION THERAPY: CPT | Mod: GN

## 2018-01-01 PROCEDURE — 92610 EVALUATE SWALLOWING FUNCTION: CPT | Mod: GN

## 2018-01-01 PROCEDURE — 99231 SBSQ HOSP IP/OBS SF/LOW 25: CPT | Performed by: INTERNAL MEDICINE

## 2018-01-01 PROCEDURE — 93005 ELECTROCARDIOGRAM TRACING: CPT | Performed by: INTERNAL MEDICINE

## 2018-01-01 PROCEDURE — 84145 PROCALCITONIN (PCT): CPT | Performed by: INTERNAL MEDICINE

## 2018-01-01 PROCEDURE — 99214 OFFICE O/P EST MOD 30 MIN: CPT | Performed by: INTERNAL MEDICINE

## 2018-01-01 PROCEDURE — 25000128 H RX IP 250 OP 636: Performed by: MARRIAGE & FAMILY THERAPIST

## 2018-01-01 PROCEDURE — 82803 BLOOD GASES ANY COMBINATION: CPT

## 2018-01-01 PROCEDURE — 80177 DRUG SCRN QUAN LEVETIRACETAM: CPT | Performed by: INTERNAL MEDICINE

## 2018-01-01 PROCEDURE — 70498 CT ANGIOGRAPHY NECK: CPT

## 2018-01-01 PROCEDURE — 40000225 ZZH STATISTIC SLP WARD VISIT

## 2018-01-01 PROCEDURE — 80164 ASSAY DIPROPYLACETIC ACD TOT: CPT | Performed by: INTERNAL MEDICINE

## 2018-01-01 PROCEDURE — 25500064 ZZH RX 255 OP 636: Performed by: INTERNAL MEDICINE

## 2018-01-01 PROCEDURE — 95951 ZZHC EEG VIDEO < 12 HR: CPT | Mod: 52,ZF

## 2018-01-01 PROCEDURE — 87086 URINE CULTURE/COLONY COUNT: CPT | Performed by: INTERNAL MEDICINE

## 2018-01-01 PROCEDURE — 85610 PROTHROMBIN TIME: CPT | Performed by: INTERNAL MEDICINE

## 2018-01-01 PROCEDURE — 84484 ASSAY OF TROPONIN QUANT: CPT

## 2018-01-01 PROCEDURE — 40000895 ZZH STATISTIC SLP IP EVAL DEFER

## 2018-01-01 PROCEDURE — 85027 COMPLETE CBC AUTOMATED: CPT | Performed by: INTERNAL MEDICINE

## 2018-01-01 PROCEDURE — 81001 URINALYSIS AUTO W/SCOPE: CPT | Performed by: INTERNAL MEDICINE

## 2018-01-01 PROCEDURE — 85027 COMPLETE CBC AUTOMATED: CPT | Performed by: STUDENT IN AN ORGANIZED HEALTH CARE EDUCATION/TRAINING PROGRAM

## 2018-01-01 PROCEDURE — 71045 X-RAY EXAM CHEST 1 VIEW: CPT

## 2018-01-01 PROCEDURE — 99239 HOSP IP/OBS DSCHRG MGMT >30: CPT | Mod: GC | Performed by: INTERNAL MEDICINE

## 2018-01-01 PROCEDURE — 99285 EMERGENCY DEPT VISIT HI MDM: CPT | Mod: 25 | Performed by: INTERNAL MEDICINE

## 2018-01-01 PROCEDURE — 99232 SBSQ HOSP IP/OBS MODERATE 35: CPT | Performed by: INTERNAL MEDICINE

## 2018-01-01 PROCEDURE — 40000264 ECHO COMPLETE BUBBLE STUDY WITH OPTISON

## 2018-01-01 PROCEDURE — 82565 ASSAY OF CREATININE: CPT

## 2018-01-01 PROCEDURE — 99291 CRITICAL CARE FIRST HOUR: CPT | Mod: 25 | Performed by: INTERNAL MEDICINE

## 2018-01-01 PROCEDURE — 40000739 ZZH STATISTIC STROKE CODE W/O ACCESS

## 2018-01-01 RX ORDER — HALOPERIDOL 2 MG/ML
2-4 SOLUTION ORAL EVERY 6 HOURS PRN
Qty: 90 ML | Refills: 0 | Status: SHIPPED | OUTPATIENT
Start: 2018-01-01

## 2018-01-01 RX ORDER — METHYLPREDNISOLONE SODIUM SUCCINATE 40 MG/ML
40 INJECTION, POWDER, LYOPHILIZED, FOR SOLUTION INTRAMUSCULAR; INTRAVENOUS EVERY 6 HOURS
Status: DISCONTINUED | OUTPATIENT
Start: 2018-01-01 | End: 2018-01-01

## 2018-01-01 RX ORDER — ATROPINE SULFATE 10 MG/ML
1-2 SOLUTION/ DROPS OPHTHALMIC
Qty: 1 BOTTLE | Refills: 0 | Status: SHIPPED | OUTPATIENT
Start: 2018-01-01

## 2018-01-01 RX ORDER — LORAZEPAM 1 MG/1
1 TABLET ORAL EVERY 6 HOURS
Status: DISCONTINUED | OUTPATIENT
Start: 2018-01-01 | End: 2018-01-01

## 2018-01-01 RX ORDER — LOPERAMIDE HCL 2 MG
2 CAPSULE ORAL 4 TIMES DAILY PRN
Status: ON HOLD | COMMUNITY
End: 2018-01-01

## 2018-01-01 RX ORDER — BISACODYL 10 MG
10 SUPPOSITORY, RECTAL RECTAL
Qty: 30 SUPPOSITORY | Refills: 0 | Status: SHIPPED | OUTPATIENT
Start: 2018-01-01

## 2018-01-01 RX ORDER — CARBAMAZEPINE 200 MG/1
TABLET ORAL
Qty: 93 TABLET | Refills: 5 | Status: ON HOLD | OUTPATIENT
Start: 2018-01-01 | End: 2018-01-01

## 2018-01-01 RX ORDER — ONDANSETRON 4 MG/1
4 TABLET, ORALLY DISINTEGRATING ORAL EVERY 6 HOURS PRN
Status: DISCONTINUED | OUTPATIENT
Start: 2018-01-01 | End: 2018-01-01

## 2018-01-01 RX ORDER — LORAZEPAM 1 MG/1
1 TABLET ORAL EVERY 4 HOURS
Status: DISCONTINUED | OUTPATIENT
Start: 2018-01-01 | End: 2018-01-01 | Stop reason: HOSPADM

## 2018-01-01 RX ORDER — BISACODYL 10 MG
10 SUPPOSITORY, RECTAL RECTAL
Qty: 30 SUPPOSITORY | Refills: 0 | Status: SHIPPED | OUTPATIENT
Start: 2018-01-01 | End: 2018-01-01

## 2018-01-01 RX ORDER — HALOPERIDOL 2 MG/ML
2-4 SOLUTION ORAL EVERY 6 HOURS PRN
Qty: 90 ML | Refills: 0 | Status: SHIPPED | OUTPATIENT
Start: 2018-01-01 | End: 2018-01-01

## 2018-01-01 RX ORDER — DEXTROSE MONOHYDRATE, SODIUM CHLORIDE, AND POTASSIUM CHLORIDE 50; 1.49; 4.5 G/1000ML; G/1000ML; G/1000ML
INJECTION, SOLUTION INTRAVENOUS CONTINUOUS
Status: DISCONTINUED | OUTPATIENT
Start: 2018-01-01 | End: 2018-01-01

## 2018-01-01 RX ORDER — SCOLOPAMINE TRANSDERMAL SYSTEM 1 MG/1
1 PATCH, EXTENDED RELEASE TRANSDERMAL
Status: DISCONTINUED | OUTPATIENT
Start: 2018-01-01 | End: 2018-01-01 | Stop reason: HOSPADM

## 2018-01-01 RX ORDER — LOPERAMIDE HCL 2 MG
CAPSULE ORAL
Qty: 120 CAPSULE | Refills: 1 | Status: SHIPPED | OUTPATIENT
Start: 2018-01-01 | End: 2018-01-01

## 2018-01-01 RX ORDER — BISACODYL 10 MG
10 SUPPOSITORY, RECTAL RECTAL DAILY PRN
Status: DISCONTINUED | OUTPATIENT
Start: 2018-01-01 | End: 2018-01-01

## 2018-01-01 RX ORDER — ACETAMINOPHEN 650 MG/1
650 SUPPOSITORY RECTAL EVERY 4 HOURS PRN
Qty: 60 SUPPOSITORY | Refills: 0 | Status: SHIPPED | OUTPATIENT
Start: 2018-01-01

## 2018-01-01 RX ORDER — PROCHLORPERAZINE MALEATE 10 MG
10 TABLET ORAL EVERY 6 HOURS PRN
Status: DISCONTINUED | OUTPATIENT
Start: 2018-01-01 | End: 2018-01-01

## 2018-01-01 RX ORDER — METOCLOPRAMIDE HYDROCHLORIDE 5 MG/ML
10 INJECTION INTRAMUSCULAR; INTRAVENOUS EVERY 6 HOURS PRN
Status: DISCONTINUED | OUTPATIENT
Start: 2018-01-01 | End: 2018-01-01

## 2018-01-01 RX ORDER — HEPARIN SODIUM 5000 [USP'U]/.5ML
5000 INJECTION, SOLUTION INTRAVENOUS; SUBCUTANEOUS EVERY 12 HOURS
Status: DISCONTINUED | OUTPATIENT
Start: 2018-01-01 | End: 2018-01-01

## 2018-01-01 RX ORDER — LABETALOL HYDROCHLORIDE 5 MG/ML
10-20 INJECTION, SOLUTION INTRAVENOUS EVERY 10 MIN PRN
Status: DISCONTINUED | OUTPATIENT
Start: 2018-01-01 | End: 2018-01-01

## 2018-01-01 RX ORDER — MORPHINE SULFATE 2 MG/ML
2-4 INJECTION, SOLUTION INTRAMUSCULAR; INTRAVENOUS EVERY 4 HOURS PRN
Status: DISCONTINUED | OUTPATIENT
Start: 2018-01-01 | End: 2018-01-01

## 2018-01-01 RX ORDER — AMOXICILLIN 250 MG
1 CAPSULE ORAL 2 TIMES DAILY PRN
Status: DISCONTINUED | OUTPATIENT
Start: 2018-01-01 | End: 2018-01-01

## 2018-01-01 RX ORDER — SODIUM CHLORIDE, SODIUM LACTATE, POTASSIUM CHLORIDE, CALCIUM CHLORIDE 600; 310; 30; 20 MG/100ML; MG/100ML; MG/100ML; MG/100ML
INJECTION, SOLUTION INTRAVENOUS CONTINUOUS
Status: DISCONTINUED | OUTPATIENT
Start: 2018-01-01 | End: 2018-01-01

## 2018-01-01 RX ORDER — QUETIAPINE FUMARATE 100 MG/1
TABLET, FILM COATED ORAL
Qty: 60 TABLET | Refills: 3 | Status: ON HOLD | OUTPATIENT
Start: 2018-01-01 | End: 2018-01-01

## 2018-01-01 RX ORDER — MORPHINE SULFATE 10 MG/5ML
5-10 SOLUTION ORAL
Status: DISCONTINUED | OUTPATIENT
Start: 2018-01-01 | End: 2018-01-01

## 2018-01-01 RX ORDER — ONDANSETRON 4 MG/1
4 TABLET, ORALLY DISINTEGRATING ORAL EVERY 6 HOURS PRN
Status: DISCONTINUED | OUTPATIENT
Start: 2018-01-01 | End: 2018-01-01 | Stop reason: HOSPADM

## 2018-01-01 RX ORDER — IOPAMIDOL 755 MG/ML
75 INJECTION, SOLUTION INTRAVASCULAR ONCE
Status: COMPLETED | OUTPATIENT
Start: 2018-01-01 | End: 2018-01-01

## 2018-01-01 RX ORDER — IPRATROPIUM BROMIDE AND ALBUTEROL SULFATE 2.5; .5 MG/3ML; MG/3ML
3 SOLUTION RESPIRATORY (INHALATION)
Status: DISCONTINUED | OUTPATIENT
Start: 2018-01-01 | End: 2018-01-01

## 2018-01-01 RX ORDER — NYSTATIN 100000 U/G
OINTMENT TOPICAL 2 TIMES DAILY
Qty: 15 G | Refills: 0 | Status: SHIPPED | OUTPATIENT
Start: 2018-01-01 | End: 2018-01-01

## 2018-01-01 RX ORDER — LOPERAMIDE HCL 2 MG
4 CAPSULE ORAL 2 TIMES DAILY
Status: ON HOLD | COMMUNITY
End: 2018-01-01

## 2018-01-01 RX ORDER — LORAZEPAM 2 MG/ML
1 INJECTION INTRAMUSCULAR ONCE
Status: COMPLETED | OUTPATIENT
Start: 2018-01-01 | End: 2018-01-01

## 2018-01-01 RX ORDER — LORAZEPAM 2 MG/ML
2 INJECTION INTRAMUSCULAR EVERY 4 HOURS PRN
Status: DISCONTINUED | OUTPATIENT
Start: 2018-01-01 | End: 2018-01-01 | Stop reason: HOSPADM

## 2018-01-01 RX ORDER — NYSTATIN 100000 U/G
OINTMENT TOPICAL 2 TIMES DAILY
Qty: 15 G | Refills: 0 | Status: SHIPPED | OUTPATIENT
Start: 2018-01-01

## 2018-01-01 RX ORDER — SODIUM CHLORIDE 9 MG/ML
INJECTION, SOLUTION INTRAVENOUS CONTINUOUS
Status: DISCONTINUED | OUTPATIENT
Start: 2018-01-01 | End: 2018-01-01

## 2018-01-01 RX ORDER — HYDRALAZINE HYDROCHLORIDE 20 MG/ML
10-20 INJECTION INTRAMUSCULAR; INTRAVENOUS
Status: DISCONTINUED | OUTPATIENT
Start: 2018-01-01 | End: 2018-01-01

## 2018-01-01 RX ORDER — OLANZAPINE 10 MG/2ML
5-10 INJECTION, POWDER, FOR SOLUTION INTRAMUSCULAR EVERY 6 HOURS PRN
Status: DISCONTINUED | OUTPATIENT
Start: 2018-01-01 | End: 2018-01-01

## 2018-01-01 RX ORDER — DIVALPROEX SODIUM 250 MG/1
TABLET, DELAYED RELEASE ORAL
Qty: 60 TABLET | Refills: 11 | Status: ON HOLD | OUTPATIENT
Start: 2018-01-01 | End: 2018-01-01

## 2018-01-01 RX ORDER — NALOXONE HYDROCHLORIDE 0.4 MG/ML
.1-.4 INJECTION, SOLUTION INTRAMUSCULAR; INTRAVENOUS; SUBCUTANEOUS
Status: DISCONTINUED | OUTPATIENT
Start: 2018-01-01 | End: 2018-01-01 | Stop reason: HOSPADM

## 2018-01-01 RX ORDER — HALOPERIDOL 5 MG/ML
1-2 INJECTION INTRAMUSCULAR
Status: DISCONTINUED | OUTPATIENT
Start: 2018-01-01 | End: 2018-01-01

## 2018-01-01 RX ORDER — METHYLPREDNISOLONE SODIUM SUCCINATE 40 MG/ML
32 INJECTION, POWDER, LYOPHILIZED, FOR SOLUTION INTRAMUSCULAR; INTRAVENOUS 2 TIMES DAILY
Status: DISCONTINUED | OUTPATIENT
Start: 2018-01-01 | End: 2018-01-01

## 2018-01-01 RX ORDER — ACETAMINOPHEN 650 MG/1
650 SUPPOSITORY RECTAL EVERY 4 HOURS PRN
Qty: 60 SUPPOSITORY | Refills: 0 | Status: SHIPPED | OUTPATIENT
Start: 2018-01-01 | End: 2018-01-01

## 2018-01-01 RX ORDER — OLANZAPINE 10 MG/2ML
5-10 INJECTION, POWDER, FOR SOLUTION INTRAMUSCULAR DAILY PRN
Status: DISCONTINUED | OUTPATIENT
Start: 2018-01-01 | End: 2018-01-01

## 2018-01-01 RX ORDER — AMOXICILLIN 250 MG
2 CAPSULE ORAL 2 TIMES DAILY PRN
Status: DISCONTINUED | OUTPATIENT
Start: 2018-01-01 | End: 2018-01-01

## 2018-01-01 RX ORDER — METOCLOPRAMIDE 5 MG/1
10 TABLET ORAL EVERY 6 HOURS PRN
Status: DISCONTINUED | OUTPATIENT
Start: 2018-01-01 | End: 2018-01-01

## 2018-01-01 RX ORDER — ASPIRIN 300 MG/1
300 SUPPOSITORY RECTAL DAILY
Status: DISCONTINUED | OUTPATIENT
Start: 2018-01-01 | End: 2018-01-01

## 2018-01-01 RX ORDER — OXYCODONE HYDROCHLORIDE 5 MG/1
TABLET ORAL
Qty: 30 TABLET | Refills: 0 | Status: ON HOLD | OUTPATIENT
Start: 2018-01-01 | End: 2018-01-01

## 2018-01-01 RX ORDER — IPRATROPIUM BROMIDE AND ALBUTEROL SULFATE 2.5; .5 MG/3ML; MG/3ML
3 SOLUTION RESPIRATORY (INHALATION) EVERY 4 HOURS
Status: DISCONTINUED | OUTPATIENT
Start: 2018-01-01 | End: 2018-01-01

## 2018-01-01 RX ORDER — PROCHLORPERAZINE 25 MG
25 SUPPOSITORY, RECTAL RECTAL EVERY 12 HOURS PRN
Status: DISCONTINUED | OUTPATIENT
Start: 2018-01-01 | End: 2018-01-01

## 2018-01-01 RX ORDER — ALBUTEROL SULFATE 0.83 MG/ML
2.5 SOLUTION RESPIRATORY (INHALATION) EVERY 4 HOURS PRN
Status: DISCONTINUED | OUTPATIENT
Start: 2018-01-01 | End: 2018-01-01

## 2018-01-01 RX ORDER — LEVETIRACETAM 10 MG/ML
1000 INJECTION INTRAVASCULAR EVERY 12 HOURS
Status: DISCONTINUED | OUTPATIENT
Start: 2018-01-01 | End: 2018-01-01

## 2018-01-01 RX ORDER — SCOLOPAMINE TRANSDERMAL SYSTEM 1 MG/1
1 PATCH, EXTENDED RELEASE TRANSDERMAL
Qty: 12 PATCH | Refills: 0 | Status: SHIPPED | OUTPATIENT
Start: 2018-01-01 | End: 2018-01-01

## 2018-01-01 RX ORDER — CITALOPRAM HYDROBROMIDE 20 MG/1
TABLET ORAL
Qty: 90 TABLET | Refills: 3 | Status: ON HOLD | OUTPATIENT
Start: 2018-01-01 | End: 2018-01-01

## 2018-01-01 RX ORDER — HALOPERIDOL 2 MG/ML
2 SOLUTION ORAL EVERY 6 HOURS PRN
Status: DISCONTINUED | OUTPATIENT
Start: 2018-01-01 | End: 2018-01-01 | Stop reason: HOSPADM

## 2018-01-01 RX ORDER — OXYCODONE HYDROCHLORIDE 5 MG/1
TABLET ORAL
Qty: 30 TABLET | Refills: 0 | Status: SHIPPED | OUTPATIENT
Start: 2018-01-01 | End: 2018-01-01

## 2018-01-01 RX ORDER — LORAZEPAM 2 MG/ML
2-4 CONCENTRATE ORAL EVERY 8 HOURS PRN
Status: DISCONTINUED | OUTPATIENT
Start: 2018-01-01 | End: 2018-01-01

## 2018-01-01 RX ORDER — NYSTATIN 100000 U/G
OINTMENT TOPICAL 2 TIMES DAILY
Status: DISCONTINUED | OUTPATIENT
Start: 2018-01-01 | End: 2018-01-01 | Stop reason: HOSPADM

## 2018-01-01 RX ORDER — LORAZEPAM 2 MG/ML
1 CONCENTRATE ORAL EVERY 4 HOURS
Qty: 30 ML | Refills: 0 | Status: SHIPPED | OUTPATIENT
Start: 2018-01-01

## 2018-01-01 RX ORDER — ONDANSETRON 2 MG/ML
4 INJECTION INTRAMUSCULAR; INTRAVENOUS EVERY 6 HOURS PRN
Status: DISCONTINUED | OUTPATIENT
Start: 2018-01-01 | End: 2018-01-01 | Stop reason: HOSPADM

## 2018-01-01 RX ORDER — LORAZEPAM 2 MG/ML
2-4 CONCENTRATE ORAL
Qty: 30 ML | Refills: 0 | Status: SHIPPED | OUTPATIENT
Start: 2018-01-01

## 2018-01-01 RX ORDER — GABAPENTIN 600 MG/1
600 TABLET ORAL
Qty: 120 TABLET | Refills: 3 | Status: ON HOLD | OUTPATIENT
Start: 2018-01-01 | End: 2018-01-01

## 2018-01-01 RX ORDER — LORAZEPAM 1 MG/1
2 TABLET ORAL DAILY PRN
Status: DISCONTINUED | OUTPATIENT
Start: 2018-01-01 | End: 2018-01-01 | Stop reason: HOSPADM

## 2018-01-01 RX ORDER — ACETAMINOPHEN 650 MG/1
650 SUPPOSITORY RECTAL EVERY 4 HOURS PRN
Status: DISCONTINUED | OUTPATIENT
Start: 2018-01-01 | End: 2018-01-01 | Stop reason: HOSPADM

## 2018-01-01 RX ORDER — POLYETHYLENE GLYCOL 3350 17 G/17G
17 POWDER, FOR SOLUTION ORAL DAILY PRN
Status: DISCONTINUED | OUTPATIENT
Start: 2018-01-01 | End: 2018-01-01 | Stop reason: HOSPADM

## 2018-01-01 RX ORDER — HALOPERIDOL 5 MG/ML
2 INJECTION INTRAMUSCULAR EVERY 6 HOURS PRN
Status: DISCONTINUED | OUTPATIENT
Start: 2018-01-01 | End: 2018-01-01

## 2018-01-01 RX ORDER — MORPHINE SULFATE 100 MG/5ML
5-10 SOLUTION ORAL
Status: DISCONTINUED | OUTPATIENT
Start: 2018-01-01 | End: 2018-01-01 | Stop reason: HOSPADM

## 2018-01-01 RX ORDER — ACYCLOVIR 200 MG/1
20 CAPSULE ORAL ONCE
Status: DISCONTINUED | OUTPATIENT
Start: 2018-01-01 | End: 2018-01-01 | Stop reason: HOSPADM

## 2018-01-01 RX ORDER — ONDANSETRON 2 MG/ML
4 INJECTION INTRAMUSCULAR; INTRAVENOUS EVERY 6 HOURS PRN
Status: DISCONTINUED | OUTPATIENT
Start: 2018-01-01 | End: 2018-01-01

## 2018-01-01 RX ORDER — MORPHINE SULFATE 2 MG/ML
1-2 INJECTION, SOLUTION INTRAMUSCULAR; INTRAVENOUS EVERY 4 HOURS PRN
Status: DISCONTINUED | OUTPATIENT
Start: 2018-01-01 | End: 2018-01-01

## 2018-01-01 RX ORDER — LORAZEPAM 2 MG/ML
2-4 CONCENTRATE ORAL EVERY 8 HOURS PRN
Status: DISCONTINUED | OUTPATIENT
Start: 2018-01-01 | End: 2018-01-01 | Stop reason: HOSPADM

## 2018-01-01 RX ORDER — LEVETIRACETAM 1000 MG/1
TABLET ORAL
Qty: 60 TABLET | Refills: 11 | Status: ON HOLD | OUTPATIENT
Start: 2018-01-01 | End: 2018-01-01

## 2018-01-01 RX ORDER — LEVALBUTEROL TARTRATE 45 UG/1
AEROSOL, METERED ORAL
Qty: 15 G | Refills: 10 | Status: ON HOLD | OUTPATIENT
Start: 2018-01-01 | End: 2018-01-01

## 2018-01-01 RX ORDER — ATROPINE SULFATE 10 MG/ML
1-2 SOLUTION/ DROPS OPHTHALMIC
Qty: 1 BOTTLE | Refills: 0 | Status: SHIPPED | OUTPATIENT
Start: 2018-01-01 | End: 2018-01-01

## 2018-01-01 RX ORDER — MORPHINE SULFATE 10 MG/5ML
5-10 SOLUTION ORAL
Status: DISCONTINUED | OUTPATIENT
Start: 2018-01-01 | End: 2018-01-01 | Stop reason: HOSPADM

## 2018-01-01 RX ORDER — ATROPINE SULFATE 10 MG/ML
1-2 SOLUTION/ DROPS OPHTHALMIC
Status: DISCONTINUED | OUTPATIENT
Start: 2018-01-01 | End: 2018-01-01 | Stop reason: HOSPADM

## 2018-01-01 RX ORDER — SCOLOPAMINE TRANSDERMAL SYSTEM 1 MG/1
1 PATCH, EXTENDED RELEASE TRANSDERMAL
Qty: 12 PATCH | Refills: 0 | Status: SHIPPED | OUTPATIENT
Start: 2018-01-01

## 2018-01-01 RX ORDER — MORPHINE SULFATE 100 MG/5ML
5-10 SOLUTION ORAL
Status: DISCONTINUED | OUTPATIENT
Start: 2018-01-01 | End: 2018-01-01

## 2018-01-01 RX ORDER — ACETAMINOPHEN 500 MG
1000 TABLET ORAL 3 TIMES DAILY PRN
Qty: 100 TABLET | Refills: 0 | Status: SHIPPED | OUTPATIENT
Start: 2018-01-01 | End: 2018-01-01

## 2018-01-01 RX ORDER — BISACODYL 10 MG
10 SUPPOSITORY, RECTAL RECTAL
Status: DISCONTINUED | OUTPATIENT
Start: 2018-01-01 | End: 2018-01-01 | Stop reason: HOSPADM

## 2018-01-01 RX ORDER — MORPHINE SULFATE 100 MG/5ML
5-10 SOLUTION ORAL
Qty: 30 ML | Refills: 0 | Status: SHIPPED | OUTPATIENT
Start: 2018-01-01

## 2018-01-01 RX ORDER — LORAZEPAM 2 MG/ML
.5-1 CONCENTRATE ORAL EVERY 4 HOURS PRN
Qty: 30 ML | Refills: 0 | Status: SHIPPED | OUTPATIENT
Start: 2018-01-01

## 2018-01-01 RX ADMIN — Medication 1 MG: at 23:56

## 2018-01-01 RX ADMIN — IPRATROPIUM BROMIDE AND ALBUTEROL SULFATE 3 ML: .5; 3 SOLUTION RESPIRATORY (INHALATION) at 16:58

## 2018-01-01 RX ADMIN — Medication 1 MG: at 23:25

## 2018-01-01 RX ADMIN — IPRATROPIUM BROMIDE AND ALBUTEROL SULFATE 3 ML: .5; 3 SOLUTION RESPIRATORY (INHALATION) at 07:33

## 2018-01-01 RX ADMIN — SODIUM CHLORIDE 200 MG: 9 INJECTION, SOLUTION INTRAVENOUS at 08:23

## 2018-01-01 RX ADMIN — Medication 10 MG: at 08:25

## 2018-01-01 RX ADMIN — Medication 1 MG: at 23:55

## 2018-01-01 RX ADMIN — METHYLPREDNISOLONE 40 MG: 40 INJECTION, POWDER, LYOPHILIZED, FOR SOLUTION INTRAMUSCULAR; INTRAVENOUS at 04:13

## 2018-01-01 RX ADMIN — MORPHINE SULFATE 10 MG: 100 SOLUTION ORAL at 22:34

## 2018-01-01 RX ADMIN — VALPROATE SODIUM 875 MG: 100 INJECTION, SOLUTION INTRAVENOUS at 07:48

## 2018-01-01 RX ADMIN — SCOPALAMINE 1 PATCH: 1 PATCH, EXTENDED RELEASE TRANSDERMAL at 14:25

## 2018-01-01 RX ADMIN — LEVETIRACETAM 1000 MG: 10 INJECTION INTRAVENOUS at 09:29

## 2018-01-01 RX ADMIN — SODIUM CHLORIDE 200 MG: 9 INJECTION, SOLUTION INTRAVENOUS at 21:02

## 2018-01-01 RX ADMIN — Medication 1 MG: at 11:46

## 2018-01-01 RX ADMIN — VALPROATE SODIUM 875 MG: 100 INJECTION, SOLUTION INTRAVENOUS at 09:24

## 2018-01-01 RX ADMIN — VALPROATE SODIUM 875 MG: 100 INJECTION, SOLUTION INTRAVENOUS at 02:04

## 2018-01-01 RX ADMIN — Medication 1 MG: at 04:35

## 2018-01-01 RX ADMIN — IPRATROPIUM BROMIDE AND ALBUTEROL SULFATE 3 ML: .5; 3 SOLUTION RESPIRATORY (INHALATION) at 20:27

## 2018-01-01 RX ADMIN — IPRATROPIUM BROMIDE AND ALBUTEROL SULFATE 3 ML: .5; 3 SOLUTION RESPIRATORY (INHALATION) at 21:37

## 2018-01-01 RX ADMIN — OLANZAPINE 5 MG: 10 INJECTION, POWDER, LYOPHILIZED, FOR SOLUTION INTRAMUSCULAR at 12:02

## 2018-01-01 RX ADMIN — LEVETIRACETAM 1000 MG: 10 INJECTION INTRAVENOUS at 07:48

## 2018-01-01 RX ADMIN — METHYLPREDNISOLONE 32 MG: 40 INJECTION, POWDER, LYOPHILIZED, FOR SOLUTION INTRAMUSCULAR; INTRAVENOUS at 07:42

## 2018-01-01 RX ADMIN — MORPHINE SULFATE 5 MG: 10 SOLUTION ORAL at 12:46

## 2018-01-01 RX ADMIN — IPRATROPIUM BROMIDE AND ALBUTEROL SULFATE 3 ML: .5; 3 SOLUTION RESPIRATORY (INHALATION) at 08:46

## 2018-01-01 RX ADMIN — Medication 1 MG: at 00:16

## 2018-01-01 RX ADMIN — MORPHINE SULFATE 10 MG: 100 SOLUTION ORAL at 18:13

## 2018-01-01 RX ADMIN — VALPROATE SODIUM 875 MG: 100 INJECTION, SOLUTION INTRAVENOUS at 22:36

## 2018-01-01 RX ADMIN — NYSTATIN: 100000 OINTMENT TOPICAL at 19:33

## 2018-01-01 RX ADMIN — LORAZEPAM 1 MG: 2 INJECTION INTRAMUSCULAR; INTRAVENOUS at 15:56

## 2018-01-01 RX ADMIN — LEVETIRACETAM 1000 MG: 10 INJECTION INTRAVENOUS at 20:25

## 2018-01-01 RX ADMIN — VALPROATE SODIUM 875 MG: 100 INJECTION, SOLUTION INTRAVENOUS at 13:45

## 2018-01-01 RX ADMIN — Medication 1 MG: at 16:30

## 2018-01-01 RX ADMIN — Medication 1 MG: at 11:25

## 2018-01-01 RX ADMIN — OLANZAPINE 10 MG: 10 INJECTION, POWDER, LYOPHILIZED, FOR SOLUTION INTRAMUSCULAR at 00:01

## 2018-01-01 RX ADMIN — THIAMINE HYDROCHLORIDE 500 MG: 100 INJECTION, SOLUTION INTRAMUSCULAR; INTRAVENOUS at 09:31

## 2018-01-01 RX ADMIN — PANTOPRAZOLE SODIUM 40 MG: 40 INJECTION, POWDER, FOR SOLUTION INTRAVENOUS at 07:44

## 2018-01-01 RX ADMIN — MORPHINE SULFATE 5 MG: 100 SOLUTION ORAL at 20:32

## 2018-01-01 RX ADMIN — SODIUM CHLORIDE 200 MG: 9 INJECTION, SOLUTION INTRAVENOUS at 20:23

## 2018-01-01 RX ADMIN — Medication 1 MG: at 15:57

## 2018-01-01 RX ADMIN — LEVETIRACETAM 1000 MG: 10 INJECTION INTRAVENOUS at 20:38

## 2018-01-01 RX ADMIN — ATROPINE SULFATE 2 DROP: 10 SOLUTION/ DROPS OPHTHALMIC at 22:11

## 2018-01-01 RX ADMIN — Medication 1 MG: at 04:15

## 2018-01-01 RX ADMIN — LEVETIRACETAM 1000 MG: 10 INJECTION INTRAVENOUS at 09:49

## 2018-01-01 RX ADMIN — VALPROATE SODIUM 875 MG: 100 INJECTION, SOLUTION INTRAVENOUS at 13:36

## 2018-01-01 RX ADMIN — LEVETIRACETAM 1000 MG: 10 INJECTION INTRAVENOUS at 20:46

## 2018-01-01 RX ADMIN — Medication 1 MG: at 07:53

## 2018-01-01 RX ADMIN — ATROPINE SULFATE 2 DROP: 10 SOLUTION/ DROPS OPHTHALMIC at 12:06

## 2018-01-01 RX ADMIN — IPRATROPIUM BROMIDE AND ALBUTEROL SULFATE 3 ML: .5; 3 SOLUTION RESPIRATORY (INHALATION) at 15:45

## 2018-01-01 RX ADMIN — ATROPINE SULFATE 2 DROP: 10 SOLUTION/ DROPS OPHTHALMIC at 08:48

## 2018-01-01 RX ADMIN — VALPROATE SODIUM 875 MG: 100 INJECTION, SOLUTION INTRAVENOUS at 13:19

## 2018-01-01 RX ADMIN — NYSTATIN: 100000 OINTMENT TOPICAL at 19:56

## 2018-01-01 RX ADMIN — Medication 1 MG: at 20:32

## 2018-01-01 RX ADMIN — OLANZAPINE 10 MG: 10 INJECTION, POWDER, LYOPHILIZED, FOR SOLUTION INTRAMUSCULAR at 23:42

## 2018-01-01 RX ADMIN — Medication 1 MG: at 19:32

## 2018-01-01 RX ADMIN — NYSTATIN: 100000 OINTMENT TOPICAL at 08:46

## 2018-01-01 RX ADMIN — Medication 1 MG: at 08:59

## 2018-01-01 RX ADMIN — Medication 1 MG: at 16:14

## 2018-01-01 RX ADMIN — AZITHROMYCIN MONOHYDRATE 500 MG: 500 INJECTION, POWDER, LYOPHILIZED, FOR SOLUTION INTRAVENOUS at 16:37

## 2018-01-01 RX ADMIN — NYSTATIN: 100000 OINTMENT TOPICAL at 08:27

## 2018-01-01 RX ADMIN — NYSTATIN: 100000 OINTMENT TOPICAL at 09:31

## 2018-01-01 RX ADMIN — VALPROATE SODIUM 875 MG: 100 INJECTION, SOLUTION INTRAVENOUS at 01:52

## 2018-01-01 RX ADMIN — NICOTINE 1 PATCH: 7 PATCH, EXTENDED RELEASE TRANSDERMAL at 07:54

## 2018-01-01 RX ADMIN — SCOPALAMINE 1 PATCH: 1 PATCH, EXTENDED RELEASE TRANSDERMAL at 17:44

## 2018-01-01 RX ADMIN — THIAMINE HYDROCHLORIDE 500 MG: 100 INJECTION, SOLUTION INTRAMUSCULAR; INTRAVENOUS at 11:30

## 2018-01-01 RX ADMIN — IPRATROPIUM BROMIDE AND ALBUTEROL SULFATE 3 ML: .5; 3 SOLUTION RESPIRATORY (INHALATION) at 07:51

## 2018-01-01 RX ADMIN — LEVETIRACETAM 1000 MG: 10 INJECTION INTRAVENOUS at 19:57

## 2018-01-01 RX ADMIN — IPRATROPIUM BROMIDE AND ALBUTEROL SULFATE 3 ML: .5; 3 SOLUTION RESPIRATORY (INHALATION) at 13:09

## 2018-01-01 RX ADMIN — VALPROATE SODIUM 875 MG: 100 INJECTION, SOLUTION INTRAVENOUS at 20:25

## 2018-01-01 RX ADMIN — LEVETIRACETAM 1000 MG: 10 INJECTION INTRAVENOUS at 08:11

## 2018-01-01 RX ADMIN — ATROPINE SULFATE 2 DROP: 10 SOLUTION/ DROPS OPHTHALMIC at 22:25

## 2018-01-01 RX ADMIN — ASPIRIN 300 MG: 300 SUPPOSITORY RECTAL at 08:41

## 2018-01-01 RX ADMIN — Medication 1 MG: at 00:14

## 2018-01-01 RX ADMIN — HALOPERIDOL 2 MG: 2 SOLUTION ORAL at 23:49

## 2018-01-01 RX ADMIN — ATROPINE SULFATE 2 DROP: 10 SOLUTION/ DROPS OPHTHALMIC at 12:25

## 2018-01-01 RX ADMIN — VALPROATE SODIUM 875 MG: 100 INJECTION, SOLUTION INTRAVENOUS at 02:30

## 2018-01-01 RX ADMIN — LEVETIRACETAM 1000 MG: 10 INJECTION INTRAVENOUS at 21:50

## 2018-01-01 RX ADMIN — Medication 1 MG: at 15:42

## 2018-01-01 RX ADMIN — VALPROATE SODIUM 875 MG: 100 INJECTION, SOLUTION INTRAVENOUS at 19:19

## 2018-01-01 RX ADMIN — VALPROATE SODIUM 875 MG: 100 INJECTION, SOLUTION INTRAVENOUS at 13:21

## 2018-01-01 RX ADMIN — Medication 1 MG: at 19:50

## 2018-01-01 RX ADMIN — Medication 1 MG: at 07:32

## 2018-01-01 RX ADMIN — HALOPERIDOL 2 MG: 2 SOLUTION ORAL at 13:41

## 2018-01-01 RX ADMIN — OLANZAPINE 10 MG: 10 INJECTION, POWDER, LYOPHILIZED, FOR SOLUTION INTRAMUSCULAR at 07:00

## 2018-01-01 RX ADMIN — OLANZAPINE 10 MG: 10 INJECTION, POWDER, LYOPHILIZED, FOR SOLUTION INTRAMUSCULAR at 19:43

## 2018-01-01 RX ADMIN — PANTOPRAZOLE SODIUM 40 MG: 40 INJECTION, POWDER, FOR SOLUTION INTRAVENOUS at 07:51

## 2018-01-01 RX ADMIN — Medication 1 MG: at 07:30

## 2018-01-01 RX ADMIN — NYSTATIN: 100000 OINTMENT TOPICAL at 21:21

## 2018-01-01 RX ADMIN — IOPAMIDOL 75 ML: 755 INJECTION, SOLUTION INTRAVENOUS at 09:17

## 2018-01-01 RX ADMIN — NYSTATIN: 100000 OINTMENT TOPICAL at 20:33

## 2018-01-01 RX ADMIN — HALOPERIDOL LACTATE 2 MG: 5 INJECTION, SOLUTION INTRAMUSCULAR at 10:30

## 2018-01-01 RX ADMIN — THIAMINE HYDROCHLORIDE 500 MG: 100 INJECTION, SOLUTION INTRAMUSCULAR; INTRAVENOUS at 01:41

## 2018-01-01 RX ADMIN — NYSTATIN: 100000 OINTMENT TOPICAL at 07:32

## 2018-01-01 RX ADMIN — Medication 1 MG: at 19:59

## 2018-01-01 RX ADMIN — PANTOPRAZOLE SODIUM 40 MG: 40 INJECTION, POWDER, FOR SOLUTION INTRAVENOUS at 08:10

## 2018-01-01 RX ADMIN — VALPROATE SODIUM 875 MG: 100 INJECTION, SOLUTION INTRAVENOUS at 10:19

## 2018-01-01 RX ADMIN — HEPARIN SODIUM 5000 UNITS: 5000 INJECTION, SOLUTION INTRAVENOUS; SUBCUTANEOUS at 11:42

## 2018-01-01 RX ADMIN — NICOTINE 1 PATCH: 7 PATCH, EXTENDED RELEASE TRANSDERMAL at 22:00

## 2018-01-01 RX ADMIN — SODIUM CHLORIDE 200 MG: 9 INJECTION, SOLUTION INTRAVENOUS at 10:34

## 2018-01-01 RX ADMIN — Medication 1 MG: at 08:46

## 2018-01-01 RX ADMIN — SODIUM CHLORIDE 200 MG: 9 INJECTION, SOLUTION INTRAVENOUS at 21:50

## 2018-01-01 RX ADMIN — Medication 1 MG: at 03:42

## 2018-01-01 RX ADMIN — NICOTINE 1 PATCH: 7 PATCH, EXTENDED RELEASE TRANSDERMAL at 13:39

## 2018-01-01 RX ADMIN — MORPHINE SULFATE 10 MG: 100 SOLUTION ORAL at 23:57

## 2018-01-01 RX ADMIN — OLANZAPINE 10 MG: 10 INJECTION, POWDER, LYOPHILIZED, FOR SOLUTION INTRAMUSCULAR at 03:19

## 2018-01-01 RX ADMIN — HALOPERIDOL LACTATE 2 MG: 5 INJECTION, SOLUTION INTRAMUSCULAR at 01:52

## 2018-01-01 RX ADMIN — NYSTATIN: 100000 OINTMENT TOPICAL at 10:44

## 2018-01-01 RX ADMIN — NYSTATIN: 100000 OINTMENT TOPICAL at 19:55

## 2018-01-01 RX ADMIN — MORPHINE SULFATE 10 MG: 100 SOLUTION ORAL at 08:12

## 2018-01-01 RX ADMIN — OLANZAPINE 10 MG: 10 INJECTION, POWDER, LYOPHILIZED, FOR SOLUTION INTRAMUSCULAR at 13:26

## 2018-01-01 RX ADMIN — OLANZAPINE 5 MG: 10 INJECTION, POWDER, LYOPHILIZED, FOR SOLUTION INTRAMUSCULAR at 13:30

## 2018-01-01 RX ADMIN — NYSTATIN: 100000 OINTMENT TOPICAL at 07:54

## 2018-01-01 RX ADMIN — NICOTINE 1 PATCH: 7 PATCH, EXTENDED RELEASE TRANSDERMAL at 22:28

## 2018-01-01 RX ADMIN — MORPHINE SULFATE 10 MG: 100 SOLUTION ORAL at 13:26

## 2018-01-01 RX ADMIN — PANTOPRAZOLE SODIUM 40 MG: 40 INJECTION, POWDER, FOR SOLUTION INTRAVENOUS at 07:46

## 2018-01-01 RX ADMIN — MORPHINE SULFATE 10 MG: 100 SOLUTION ORAL at 10:36

## 2018-01-01 RX ADMIN — SCOPALAMINE 1 PATCH: 1 PATCH, EXTENDED RELEASE TRANSDERMAL at 00:19

## 2018-01-01 RX ADMIN — NYSTATIN: 100000 OINTMENT TOPICAL at 20:36

## 2018-01-01 RX ADMIN — LEVETIRACETAM 1000 MG: 10 INJECTION INTRAVENOUS at 09:01

## 2018-01-01 RX ADMIN — Medication 1 MG: at 04:44

## 2018-01-01 RX ADMIN — PANTOPRAZOLE SODIUM 40 MG: 40 INJECTION, POWDER, FOR SOLUTION INTRAVENOUS at 08:41

## 2018-01-01 RX ADMIN — Medication 1 MG: at 11:07

## 2018-01-01 RX ADMIN — HALOPERIDOL 2 MG: 2 SOLUTION ORAL at 09:35

## 2018-01-01 RX ADMIN — HALOPERIDOL LACTATE 1 MG: 5 INJECTION, SOLUTION INTRAMUSCULAR at 21:07

## 2018-01-01 RX ADMIN — VALPROATE SODIUM 875 MG: 100 INJECTION, SOLUTION INTRAVENOUS at 13:27

## 2018-01-01 RX ADMIN — ATROPINE SULFATE 2 DROP: 10 SOLUTION/ DROPS OPHTHALMIC at 23:56

## 2018-01-01 RX ADMIN — MORPHINE SULFATE 5 MG: 100 SOLUTION ORAL at 05:38

## 2018-01-01 RX ADMIN — METHYLPREDNISOLONE 40 MG: 40 INJECTION, POWDER, LYOPHILIZED, FOR SOLUTION INTRAMUSCULAR; INTRAVENOUS at 16:37

## 2018-01-01 RX ADMIN — LORAZEPAM 1 MG: 2 INJECTION INTRAMUSCULAR; INTRAVENOUS at 13:48

## 2018-01-01 RX ADMIN — AZITHROMYCIN MONOHYDRATE 500 MG: 500 INJECTION, POWDER, LYOPHILIZED, FOR SOLUTION INTRAVENOUS at 13:36

## 2018-01-01 RX ADMIN — Medication 1 MG: at 12:46

## 2018-01-01 RX ADMIN — MORPHINE SULFATE 10 MG: 100 SOLUTION ORAL at 04:44

## 2018-01-01 RX ADMIN — Medication 1 MG: at 12:04

## 2018-01-01 RX ADMIN — VALPROATE SODIUM 875 MG: 100 INJECTION, SOLUTION INTRAVENOUS at 02:20

## 2018-01-01 RX ADMIN — Medication 1 MG: at 11:27

## 2018-01-01 RX ADMIN — MORPHINE SULFATE 10 MG: 100 SOLUTION ORAL at 16:51

## 2018-01-01 RX ADMIN — NYSTATIN: 100000 OINTMENT TOPICAL at 07:55

## 2018-01-01 RX ADMIN — MORPHINE SULFATE 5 MG: 100 SOLUTION ORAL at 21:01

## 2018-01-01 RX ADMIN — ATROPINE SULFATE 2 DROP: 10 SOLUTION/ DROPS OPHTHALMIC at 04:49

## 2018-01-01 RX ADMIN — NICOTINE 1 PATCH: 7 PATCH, EXTENDED RELEASE TRANSDERMAL at 09:10

## 2018-01-01 RX ADMIN — HALOPERIDOL LACTATE 2 MG: 5 INJECTION, SOLUTION INTRAMUSCULAR at 16:09

## 2018-01-01 RX ADMIN — HEPARIN SODIUM 5000 UNITS: 5000 INJECTION, SOLUTION INTRAVENOUS; SUBCUTANEOUS at 22:07

## 2018-01-01 RX ADMIN — THIAMINE HYDROCHLORIDE 500 MG: 100 INJECTION, SOLUTION INTRAMUSCULAR; INTRAVENOUS at 08:27

## 2018-01-01 RX ADMIN — LEVETIRACETAM 1000 MG: 10 INJECTION INTRAVENOUS at 21:34

## 2018-01-01 RX ADMIN — Medication 1 MG: at 08:36

## 2018-01-01 RX ADMIN — ASPIRIN 300 MG: 300 SUPPOSITORY RECTAL at 09:30

## 2018-01-01 RX ADMIN — HEPARIN SODIUM 5000 UNITS: 5000 INJECTION, SOLUTION INTRAVENOUS; SUBCUTANEOUS at 21:53

## 2018-01-01 RX ADMIN — Medication 1 MG: at 03:49

## 2018-01-01 RX ADMIN — Medication 1 MG: at 21:19

## 2018-01-01 RX ADMIN — NICOTINE 1 PATCH: 7 PATCH, EXTENDED RELEASE TRANSDERMAL at 22:35

## 2018-01-01 RX ADMIN — NICOTINE 1 PATCH: 7 PATCH, EXTENDED RELEASE TRANSDERMAL at 08:41

## 2018-01-01 RX ADMIN — Medication 1 MG: at 16:21

## 2018-01-01 RX ADMIN — OLANZAPINE 10 MG: 10 INJECTION, POWDER, LYOPHILIZED, FOR SOLUTION INTRAMUSCULAR at 09:44

## 2018-01-01 RX ADMIN — VALPROATE SODIUM 875 MG: 100 INJECTION, SOLUTION INTRAVENOUS at 08:41

## 2018-01-01 RX ADMIN — VALPROATE SODIUM 875 MG: 100 INJECTION, SOLUTION INTRAVENOUS at 22:08

## 2018-01-01 RX ADMIN — IPRATROPIUM BROMIDE AND ALBUTEROL SULFATE 3 ML: .5; 3 SOLUTION RESPIRATORY (INHALATION) at 12:36

## 2018-01-01 RX ADMIN — SODIUM CHLORIDE: 9 INJECTION, SOLUTION INTRAVENOUS at 15:58

## 2018-01-01 RX ADMIN — ACETAMINOPHEN 650 MG: 650 SUPPOSITORY RECTAL at 12:28

## 2018-01-01 RX ADMIN — HEPARIN SODIUM 5000 UNITS: 5000 INJECTION, SOLUTION INTRAVENOUS; SUBCUTANEOUS at 11:44

## 2018-01-01 RX ADMIN — VALPROATE SODIUM 875 MG: 100 INJECTION, SOLUTION INTRAVENOUS at 07:51

## 2018-01-01 RX ADMIN — HEPARIN SODIUM 5000 UNITS: 5000 INJECTION, SOLUTION INTRAVENOUS; SUBCUTANEOUS at 10:39

## 2018-01-01 RX ADMIN — SODIUM CHLORIDE, POTASSIUM CHLORIDE, SODIUM LACTATE AND CALCIUM CHLORIDE: 600; 310; 30; 20 INJECTION, SOLUTION INTRAVENOUS at 07:53

## 2018-01-01 RX ADMIN — VALPROATE SODIUM 875 MG: 100 INJECTION, SOLUTION INTRAVENOUS at 19:11

## 2018-01-01 RX ADMIN — Medication 1 MG: at 15:34

## 2018-01-01 RX ADMIN — Medication 1 MG: at 12:25

## 2018-01-01 RX ADMIN — VALPROATE SODIUM 875 MG: 100 INJECTION, SOLUTION INTRAVENOUS at 20:11

## 2018-01-01 RX ADMIN — ASPIRIN 300 MG: 300 SUPPOSITORY RECTAL at 20:33

## 2018-01-01 RX ADMIN — ATROPINE SULFATE 2 DROP: 10 SOLUTION/ DROPS OPHTHALMIC at 18:33

## 2018-01-01 RX ADMIN — METHYLPREDNISOLONE 40 MG: 40 INJECTION, POWDER, LYOPHILIZED, FOR SOLUTION INTRAMUSCULAR; INTRAVENOUS at 20:23

## 2018-01-01 RX ADMIN — Medication 1 MG: at 16:51

## 2018-01-01 RX ADMIN — IPRATROPIUM BROMIDE AND ALBUTEROL SULFATE 3 ML: .5; 3 SOLUTION RESPIRATORY (INHALATION) at 08:20

## 2018-01-01 RX ADMIN — NICOTINE 1 PATCH: 7 PATCH, EXTENDED RELEASE TRANSDERMAL at 09:25

## 2018-01-01 RX ADMIN — LEVETIRACETAM 1000 MG: 10 INJECTION INTRAVENOUS at 10:14

## 2018-01-01 RX ADMIN — MORPHINE SULFATE 10 MG: 100 SOLUTION ORAL at 21:15

## 2018-01-01 RX ADMIN — Medication 1 MG: at 05:11

## 2018-01-01 RX ADMIN — HYDRALAZINE HYDROCHLORIDE 10 MG: 20 INJECTION INTRAMUSCULAR; INTRAVENOUS at 17:48

## 2018-01-01 RX ADMIN — NICOTINE 1 PATCH: 7 PATCH, EXTENDED RELEASE TRANSDERMAL at 13:40

## 2018-01-01 RX ADMIN — MORPHINE SULFATE 5 MG: 100 SOLUTION ORAL at 13:51

## 2018-01-01 RX ADMIN — NYSTATIN: 100000 OINTMENT TOPICAL at 08:36

## 2018-01-01 RX ADMIN — SODIUM CHLORIDE 200 MG: 9 INJECTION, SOLUTION INTRAVENOUS at 22:37

## 2018-01-01 RX ADMIN — THIAMINE HYDROCHLORIDE 500 MG: 100 INJECTION, SOLUTION INTRAMUSCULAR; INTRAVENOUS at 07:51

## 2018-01-01 RX ADMIN — NICOTINE 1 PATCH: 7 PATCH, EXTENDED RELEASE TRANSDERMAL at 10:37

## 2018-01-01 RX ADMIN — ATROPINE SULFATE 2 DROP: 10 SOLUTION/ DROPS OPHTHALMIC at 21:22

## 2018-01-01 RX ADMIN — Medication 1 MG: at 01:18

## 2018-01-01 RX ADMIN — HALOPERIDOL LACTATE 2 MG: 5 INJECTION, SOLUTION INTRAMUSCULAR at 03:08

## 2018-01-01 RX ADMIN — HUMAN ALBUMIN MICROSPHERES AND PERFLUTREN 6 ML: 10; .22 INJECTION, SOLUTION INTRAVENOUS at 14:15

## 2018-01-01 RX ADMIN — NYSTATIN: 100000 OINTMENT TOPICAL at 08:25

## 2018-01-01 RX ADMIN — MORPHINE SULFATE 10 MG: 100 SOLUTION ORAL at 16:25

## 2018-01-01 RX ADMIN — ASPIRIN 300 MG: 300 SUPPOSITORY RECTAL at 07:54

## 2018-01-01 RX ADMIN — Medication 1 MG: at 07:54

## 2018-01-01 RX ADMIN — Medication 1 MG: at 20:02

## 2018-01-01 RX ADMIN — MORPHINE SULFATE 5 MG: 10 SOLUTION ORAL at 20:03

## 2018-01-01 RX ADMIN — METHYLPREDNISOLONE 32 MG: 40 INJECTION, POWDER, LYOPHILIZED, FOR SOLUTION INTRAMUSCULAR; INTRAVENOUS at 20:08

## 2018-01-01 RX ADMIN — NICOTINE 1 PATCH: 7 PATCH, EXTENDED RELEASE TRANSDERMAL at 21:21

## 2018-01-01 RX ADMIN — METHYLPREDNISOLONE 40 MG: 40 INJECTION, POWDER, LYOPHILIZED, FOR SOLUTION INTRAMUSCULAR; INTRAVENOUS at 08:10

## 2018-01-01 RX ADMIN — ATROPINE SULFATE 2 DROP: 10 SOLUTION/ DROPS OPHTHALMIC at 03:42

## 2018-01-01 RX ADMIN — NYSTATIN: 100000 OINTMENT TOPICAL at 08:41

## 2018-01-01 RX ADMIN — Medication 1 MG: at 19:30

## 2018-01-01 RX ADMIN — MORPHINE SULFATE 10 MG: 100 SOLUTION ORAL at 03:49

## 2018-01-01 RX ADMIN — MORPHINE SULFATE 10 MG: 100 SOLUTION ORAL at 03:14

## 2018-01-01 RX ADMIN — ATROPINE SULFATE 2 DROP: 10 SOLUTION/ DROPS OPHTHALMIC at 03:44

## 2018-01-01 RX ADMIN — MORPHINE SULFATE 10 MG: 100 SOLUTION ORAL at 01:18

## 2018-01-01 RX ADMIN — HEPARIN SODIUM 5000 UNITS: 5000 INJECTION, SOLUTION INTRAVENOUS; SUBCUTANEOUS at 00:14

## 2018-01-01 RX ADMIN — MORPHINE SULFATE 10 MG: 100 SOLUTION ORAL at 22:04

## 2018-01-01 RX ADMIN — SCOPALAMINE 1 PATCH: 1 PATCH, EXTENDED RELEASE TRANSDERMAL at 12:04

## 2018-01-01 RX ADMIN — SODIUM CHLORIDE 200 MG: 9 INJECTION, SOLUTION INTRAVENOUS at 10:52

## 2018-01-01 RX ADMIN — HEPARIN SODIUM 5000 UNITS: 5000 INJECTION, SOLUTION INTRAVENOUS; SUBCUTANEOUS at 00:31

## 2018-01-01 RX ADMIN — VALPROATE SODIUM 875 MG: 100 INJECTION, SOLUTION INTRAVENOUS at 04:15

## 2018-01-01 RX ADMIN — Medication 1 MG: at 21:12

## 2018-01-01 RX ADMIN — VALPROATE SODIUM 875 MG: 100 INJECTION, SOLUTION INTRAVENOUS at 07:47

## 2018-01-01 RX ADMIN — MORPHINE SULFATE 4 MG: 2 INJECTION, SOLUTION INTRAMUSCULAR; INTRAVENOUS at 17:41

## 2018-01-01 RX ADMIN — HEPARIN SODIUM 5000 UNITS: 5000 INJECTION, SOLUTION INTRAVENOUS; SUBCUTANEOUS at 12:07

## 2018-01-01 RX ADMIN — SODIUM CHLORIDE 200 MG: 9 INJECTION, SOLUTION INTRAVENOUS at 10:48

## 2018-01-01 RX ADMIN — THIAMINE HYDROCHLORIDE 500 MG: 100 INJECTION, SOLUTION INTRAMUSCULAR; INTRAVENOUS at 12:07

## 2018-01-01 RX ADMIN — ATROPINE SULFATE 2 DROP: 10 SOLUTION/ DROPS OPHTHALMIC at 22:34

## 2018-01-01 RX ADMIN — NICOTINE 1 PATCH: 7 PATCH, EXTENDED RELEASE TRANSDERMAL at 22:10

## 2018-01-01 ASSESSMENT — ACTIVITIES OF DAILY LIVING (ADL)
COGNITION: 1 - ATTENTION OR MEMORY DEFICITS
RETIRED_EATING: 0-->INDEPENDENT
RETIRED_COMMUNICATION: 2-->DIFFICULTY SPEAKING (NOT RELATED TO LANGUAGE BARRIER)
FALL_HISTORY_WITHIN_LAST_SIX_MONTHS: NO
TOILETING: 3-->ASSISTIVE EQUIPMENT AND PERSON
BATHING: 3-->ASSISTIVE EQUIPMENT AND PERSON
AMBULATION: 3-->ASSISTIVE EQUIPMENT AND PERSON
TRANSFERRING: 3-->ASSISTIVE EQUIPMENT AND PERSON
DRESS: 2-->ASSISTIVE PERSON
SWALLOWING: 0-->SWALLOWS FOODS/LIQUIDS WITHOUT DIFFICULTY

## 2018-01-01 ASSESSMENT — VISUAL ACUITY
OU: OTHER (SEE COMMENT)
OU: OTHER (SEE COMMENT)
OU: NORMAL ACUITY
OU: OTHER (SEE COMMENT)
OU: OTHER (SEE COMMENT)

## 2018-01-01 ASSESSMENT — PAIN DESCRIPTION - DESCRIPTORS
DESCRIPTORS: PATIENT UNABLE TO DESCRIBE;OTHER (COMMENT)
DESCRIPTORS: PATIENT UNABLE TO DESCRIBE

## 2018-01-01 ASSESSMENT — PAIN SCALES - GENERAL: PAINLEVEL: EXTREME PAIN (8)

## 2018-01-02 NOTE — TELEPHONE ENCOUNTER
Fax received from [facility] for review and signature.  Put in Dr. Krishnan's in basket.

## 2018-01-03 NOTE — PROGRESS NOTES
CHIEF COMPLAINT:  Followup from hospital discharge for altered mental status.      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  He was recently hospitalized multiple times for altered mental status.  For the past 2 months, he was admitted to the hospital 4-5 times for similar patterns of altered mental status.  He is here for followup after hospital discharge.  The patient is a poor historian.  He is not very cooperative.  He is very agitated and verbally abusive.  Majority of the history was obtained from the chart and from his assisted living staff, Celestina.      The patient had multiple episodes of altered mental status recently.  According to the staff, Celestina, he had 2 different types of spells recently  Spell #1 is staring spells which he will simply be staring in space and not responding for about a minute, then he will be back to himself.  This happens about twice a week.  Type #2 was loss of awareness.  This could last for hours to a day.  This happened 4-5 times for the past several months.  This was the main reason why he was hospitalized many times during the past 2 months.      In the hospital, he had an EEG which showed moderate to severe generalized slowing of the background activities.  No epileptiform activities or seizures were recorded.  It was thought that his altered mental status was mainly due to the narcotics and the Seroquel he was taking.  He was on oxycodone 5 mg 4 times daily at that time and Seroquel 200 mg twice daily at that time.  During the last hospital stay, his narcotics and Seroquel were decreased.  Oxycodone was decreased to 2.5 mg twice daily and Seroquel decreased to 100 mg twice daily.      Since the hospital discharge about 3 weeks  "ago, he had no further spells of altered mental status.  No blackout spells and his behavior remained the same, is very verbally abusive and irritated.      The patient also had a history of seizures in the past after he suffered a right frontal lobe stroke.  Unfortunately, the detailed history of seizures is not available at this time.  The nursing staff did not report any obvious seizure activities for the past few years.  He is currently taking Keppra 1000 mg twice daily, Depakote 1500 mg twice daily and Depakote 250 mg twice daily, carbamazepine 200 mg twice daily for his seizure control.      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      PAST SURGICAL HISTORY:  None.      FAMILY HISTORY:  Mother had a history of heart attack.  Father had a history of Alzheimer disease.  Brother had a history of stroke.      SOCIAL HISTORY:  He used to work as a  and he was a heavy cigarette smoker.  He is now living in an assisted living.  He does have a son who lives in [place].  His son sees him about once a year.      REVIEW OF SYSTEMS:  Positive for behavioral problems, anger problems, left hemiplegia and shortness of breath.  The rest of the 12-point review of systems is negative.      ALLERGIES:  Ibuprofen and tuberculin test.      PHYSICAL EXAMINATION:   Blood pressure 107/73, pulse 80, height 1.727 m (5' 8\"), weight 99.8 kg (220 lb).    VITAL SIGNS:  Stable.   HEENT:  Normocephalic, atraumatic.   NECK:  Supple.   EXTREMITIES:  No edema.     NEUROLOGIC:  Alert and oriented x3.  Speech fluent, appropriate.  He is very agitated though and verbally abusive.  Left facial drooping.  Fundi exam not able to perform.  Patient " was not cooperating.   MOTOR EXAM:  Moves right arm and leg appropriately, not moving the left upper and lower extremities.   COORDINATION:  Not able to assess.     DEEP TENDON REFLEXES:  Not able to assess because patient was not cooperating.   GAIT:  Not assessed.  The patient was using a wheelchair.      CURRENT AEDs:   1.  Keppra 1000 mg b.i.d.   2.  Depakote 1750 mg b.i.d.   3.  Tegretol 200 mg b.i.d.      Current Outpatient Prescriptions   Medication Sig Dispense Refill     oxyCODONE IR (ROXICODONE) 5 MG tablet Take 0.5 tablets (2.5 mg) by mouth 2 times daily 15 tablet 0     QUEtiapine (SEROQUEL) 100 MG tablet Take 1 tablet (100 mg) by mouth 2 times daily       lidocaine (XYLOCAINE) 2 % topical gel Apply to rectum four times daily as needed       ipratropium - albuterol 0.5 mg/2.5 mg/3 mL (DUONEB) 0.5-2.5 (3) MG/3ML neb solution Take 1 vial by nebulization 3 times daily       levETIRAcetam 1000 MG TABS Take 1 tablet by mouth 2 times daily 62 tablet 1     furosemide (LASIX) 20 MG tablet TAKE 1 TABLET BY MOUTH ONCE DAILY 30 tablet 5     gabapentin (NEURONTIN) 600 MG tablet TAKE 1 TABLET BY MOUTH THREE TIMES DAILY 90 tablet 1     loperamide (IMODIUM) 2 MG capsule TAKE TWO CAPSULES (4MG) BY MOUTH TWICE DAILY;AND MAY TAKE ONE CAPSULE (2MG) BY MOUTH FOUR TIMES A DAY AS NEEDED 120 capsule 1     divalproex (DEPAKOTE) 500 MG EC tablet TAKE THREE TABLETS (1500MG) BY MOUTH TWICE DAILY (TAKE WITH 250MG FOR A TOTAL OF 1750MG) 180 tablet 5     mineral oil-hydrophilic petrolatum (AQUAPHOR) Apply topically as needed Apply daily to left wound.       divalproex (DEPAKOTE) 250 MG EC tablet Take 250 mg by mouth 2 times daily       pramoxine (SARNA SENSITIVE) 1 % LOTN lotion as needed for itching One application as needed for itchy skin on posterior ears and hands.       Sunscreens (AVEENO DAILY MOISTURIZER) LOTN Apply daily to hands and ears for dry skin.       order for DME Power wheelchair 1 Device 0     simvastatin (ZOCOR) 40  MG tablet TAKE 1 TABLET BY MOUTH AT BEDTIME 30 tablet 11     baclofen (LIORESAL) 20 MG tablet TAKE 1 TABLET BY MOUTH THREE TIMES DAILY 90 tablet 11     carBAMazepine (TEGRETOL) 200 MG tablet TAKE 1 TABLET BY MOUTH THREE TIMES DAILY 90 tablet 11     levalbuterol (XOPENEX HFA) 45 MCG/ACT Inhaler Inhale 2 puffs into the lungs every 6 hours as needed for shortness of breath / dyspnea or wheezing 1 Inhaler 8     MAPAP 325 MG tablet TAKE TWO TABLETS (650MG) BY MOUTH EVERY 4 HOURS AS NEEDED FOR PAIN 60 tablet 11     clopidogrel (PLAVIX) 75 MG tablet Take 1 tablet (75 mg) by mouth daily 90 tablet 2     diclofenac (VOLTAREN) 1 % GEL topical gel Place 2 g onto the skin 2 times daily as needed Apply 4 grams to knees or 2 grams to hands 100 g 0     vitamin D (ERGOCALCIFEROL) 44129 UNIT capsule TAKE ONE CAPSULE BY MOUTH ONCE WEEKLY ON MONDAY 12 capsule 3     amLODIPine (NORVASC) 10 MG tablet TAKE 1 TABLET BY MOUTH ONCE DAILY 31 tablet 11     niacin 500 MG CR capsule TAKE 1 CAPSULE BY MOUTH AT BEDTIME 31 capsule 11     potassium chloride (K-TAB,KLOR-CON) 10 MEQ tablet TAKE 1 TABLET BY MOUTH ONCE DAILY 31 tablet 11     tamsulosin (FLOMAX) 0.4 MG capsule TAKE 1 CAPSULE BY MOUTH ONCE DAILY 31 capsule 11     budesonide (PULMICORT) 0.25 MG/2ML neb solution NEBULIZE THE CONTENT OF 1 VIAL TWICE DAILY FOR COPD 120 mL 10     citalopram (CELEXA) 20 MG tablet Take 20 mg by mouth daily       Respiratory Therapy Supplies (NEBULIZER/ADULT MASK) KIT 1 Device 4 times daily. 1 kit 3          PREVIOUS DIAGNOSTIC TESTING:  EEG on 11/09/2017 showed mild to moderate generalized slowing of the background activities, no seizures, no epileptiform activities.  CT of the head on 12/15/2017 showed:    1.  Atrophy.     2.  Old right MCA territory infarct.  No acute changes.      IMPRESSION:   1.  Intermittent altered mental status.  This patient is a 57-year-old right-handed male with a history of right MCA territory stroke and chronic pain syndrome who  presented with intermittent episodes of altered mental status.  He had 2 different types of altered mental status.  Type 1 was staring spell which will last for only 1 minute, then he was back to his baseline.  Type 2 was decreased responsiveness, lasting for hours to a day.  The patient was on a much higher dose of narcotics.  He was taking oxycodone 5 mg 4 times daily and Seroquel 200 mg twice daily.  During the hospital stay, oxycodone was decreased to 2.5 mg b.i.d. and Seroquel decreased to 100 mg b.i.d.  Since the change of medications, his symptoms improved significantly.  He longer had any episodes of altered mental status.      It is thought that his altered mental status was most likely due to high dose of narcotics and Seroquel.  Now his symptoms are improved.  However, seizures as the cause cannot be completely ruled out at this time.      2.  Seizures secondary to right frontal lobe stroke.  It seems that his seizures are well controlled.  However, seizure as the cause of his altered mental status change cannot be completely ruled out at this time.  We will continue to observe.     3.  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      PLAN:   1.  We will continue current medications with Keppra 1000 mg b.i.d., Tegretol 200 mg b.i.d. and Depakote 1750 mg b.i.d.   2.  Continue oxycodone 2.5 mg b.i.d. and Seroquel 100 mg b.i.d.   3.  Continue to observe any mental status changes.  If the symptoms recur, we will perform an ambulatory EEG to rule out epilepsy as the cause of mental status change.   4.  Return to clinic as needed.           45 min was spent on the visit.  Over 50% of the time was spent on education, counseling about optimal seizure control and coordination of care.        MAHAMED FIELDS MD             D: 2018 15:31   T: 2018 16:40   MT: BISI      Name:     MILTON LAMBERT   MRN:      [id number]        Account:      [id number]   :      1960           Service Date:  01/03/2018      Document: [id number]

## 2018-01-03 NOTE — LETTER
1/3/2018       RE: [address]  [address] [address]     Dear Colleague,    Thank you for referring your patient, David Dawn, to the [facility]. Please see a copy of my visit note below.    CHIEF COMPLAINT:  Followup from hospital discharge for altered mental status.      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  He was recently hospitalized multiple times for altered mental status.  For the past 2 months, he was admitted to the hospital 4-5 times for similar patterns of altered mental status.  He is here for followup after hospital discharge.  The patient is a poor historian.  He is not very cooperative.  He is very agitated and verbally abusive.  Majority of the history was obtained from the chart and from his assisted living staff, Celestina.      The patient had multiple episodes of altered mental status recently.  According to the staff, Celestina, he had 2 different types of spells recently  Spell #1 is staring spells which he will simply be staring in space and not responding for about a minute, then he will be back to himself.  This happens about twice a week.  Type #2 was loss of awareness.  This could last for hours to a day.  This happened 4-5 times for the past several months.  This was the main reason why he was hospitalized many times during the past 2 months.      In the hospital, he had an EEG which showed moderate to severe generalized slowing of the background activities.  No epileptiform activities or seizures were recorded.  It was thought that his altered mental status was mainly due to the narcotics and the Seroquel he was taking.  He was on oxycodone 5 mg  "4 times daily at that time and Seroquel 200 mg twice daily at that time.  During the last hospital stay, his narcotics and Seroquel were decreased.  Oxycodone was decreased to 2.5 mg twice daily and Seroquel decreased to 100 mg twice daily.      Since the hospital discharge about 3 weeks ago, he had no further spells of altered mental status.  No blackout spells and his behavior remained the same, is very verbally abusive and irritated.      The patient also had a history of seizures in the past after he suffered a right frontal lobe stroke.  Unfortunately, the detailed history of seizures is not available at this time.  The nursing staff did not report any obvious seizure activities for the past few years.  He is currently taking Keppra 1000 mg twice daily, Depakote 1500 mg twice daily and Depakote 250 mg twice daily, carbamazepine 200 mg twice daily for his seizure control.      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      PAST SURGICAL HISTORY:  None.      FAMILY HISTORY:  Mother had a history of heart attack.  Father had a history of Alzheimer disease.  Brother had a history of stroke.      SOCIAL HISTORY:  He used to work as a  and he was a heavy cigarette smoker.  He is now living in an assisted living.  He does have a son who lives in [place].  His son sees him about once a year.      REVIEW OF SYSTEMS:  Positive for behavioral problems, anger problems, left hemiplegia and shortness of breath.  The rest of the 12-point review of systems is negative.      ALLERGIES:  Ibuprofen and tuberculin test.      PHYSICAL EXAMINATION:   Blood pressure 107/73, pulse 80, height 1.727 m (5' 8\"), weight 99.8 kg (220 " lb).    VITAL SIGNS:  Stable.   HEENT:  Normocephalic, atraumatic.   NECK:  Supple.   EXTREMITIES:  No edema.     NEUROLOGIC:  Alert and oriented x3.  Speech fluent, appropriate.  He is very agitated though and verbally abusive.  Left facial drooping.  Fundi exam not able to perform.  Patient was not cooperating.   MOTOR EXAM:  Moves right arm and leg appropriately, not moving the left upper and lower extremities.   COORDINATION:  Not able to assess.     DEEP TENDON REFLEXES:  Not able to assess because patient was not cooperating.   GAIT:  Not assessed.  The patient was using a wheelchair.      CURRENT AEDs:   1.  Keppra 1000 mg b.i.d.   2.  Depakote 1750 mg b.i.d.   3.  Tegretol 200 mg b.i.d.      Current Outpatient Prescriptions   Medication Sig Dispense Refill     oxyCODONE IR (ROXICODONE) 5 MG tablet Take 0.5 tablets (2.5 mg) by mouth 2 times daily 15 tablet 0     QUEtiapine (SEROQUEL) 100 MG tablet Take 1 tablet (100 mg) by mouth 2 times daily       lidocaine (XYLOCAINE) 2 % topical gel Apply to rectum four times daily as needed       ipratropium - albuterol 0.5 mg/2.5 mg/3 mL (DUONEB) 0.5-2.5 (3) MG/3ML neb solution Take 1 vial by nebulization 3 times daily       levETIRAcetam 1000 MG TABS Take 1 tablet by mouth 2 times daily 62 tablet 1     furosemide (LASIX) 20 MG tablet TAKE 1 TABLET BY MOUTH ONCE DAILY 30 tablet 5     gabapentin (NEURONTIN) 600 MG tablet TAKE 1 TABLET BY MOUTH THREE TIMES DAILY 90 tablet 1     loperamide (IMODIUM) 2 MG capsule TAKE TWO CAPSULES (4MG) BY MOUTH TWICE DAILY;AND MAY TAKE ONE CAPSULE (2MG) BY MOUTH FOUR TIMES A DAY AS NEEDED 120 capsule 1     divalproex (DEPAKOTE) 500 MG EC tablet TAKE THREE TABLETS (1500MG) BY MOUTH TWICE DAILY (TAKE WITH 250MG FOR A TOTAL OF 1750MG) 180 tablet 5     mineral oil-hydrophilic petrolatum (AQUAPHOR) Apply topically as needed Apply daily to left wound.       divalproex (DEPAKOTE) 250 MG EC tablet Take 250 mg by mouth 2 times daily       pramoxine  (SARNA SENSITIVE) 1 % LOTN lotion as needed for itching One application as needed for itchy skin on posterior ears and hands.       Sunscreens (AVEENO DAILY MOISTURIZER) LOTN Apply daily to hands and ears for dry skin.       order for DME Power wheelchair 1 Device 0     simvastatin (ZOCOR) 40 MG tablet TAKE 1 TABLET BY MOUTH AT BEDTIME 30 tablet 11     baclofen (LIORESAL) 20 MG tablet TAKE 1 TABLET BY MOUTH THREE TIMES DAILY 90 tablet 11     carBAMazepine (TEGRETOL) 200 MG tablet TAKE 1 TABLET BY MOUTH THREE TIMES DAILY 90 tablet 11     levalbuterol (XOPENEX HFA) 45 MCG/ACT Inhaler Inhale 2 puffs into the lungs every 6 hours as needed for shortness of breath / dyspnea or wheezing 1 Inhaler 8     MAPAP 325 MG tablet TAKE TWO TABLETS (650MG) BY MOUTH EVERY 4 HOURS AS NEEDED FOR PAIN 60 tablet 11     clopidogrel (PLAVIX) 75 MG tablet Take 1 tablet (75 mg) by mouth daily 90 tablet 2     diclofenac (VOLTAREN) 1 % GEL topical gel Place 2 g onto the skin 2 times daily as needed Apply 4 grams to knees or 2 grams to hands 100 g 0     vitamin D (ERGOCALCIFEROL) 65330 UNIT capsule TAKE ONE CAPSULE BY MOUTH ONCE WEEKLY ON MONDAY 12 capsule 3     amLODIPine (NORVASC) 10 MG tablet TAKE 1 TABLET BY MOUTH ONCE DAILY 31 tablet 11     niacin 500 MG CR capsule TAKE 1 CAPSULE BY MOUTH AT BEDTIME 31 capsule 11     potassium chloride (K-TAB,KLOR-CON) 10 MEQ tablet TAKE 1 TABLET BY MOUTH ONCE DAILY 31 tablet 11     tamsulosin (FLOMAX) 0.4 MG capsule TAKE 1 CAPSULE BY MOUTH ONCE DAILY 31 capsule 11     budesonide (PULMICORT) 0.25 MG/2ML neb solution NEBULIZE THE CONTENT OF 1 VIAL TWICE DAILY FOR COPD 120 mL 10     citalopram (CELEXA) 20 MG tablet Take 20 mg by mouth daily       Respiratory Therapy Supplies (NEBULIZER/ADULT MASK) KIT 1 Device 4 times daily. 1 kit 3          PREVIOUS DIAGNOSTIC TESTING:  EEG on 11/09/2017 showed mild to moderate generalized slowing of the background activities, no seizures, no epileptiform activities.  CT of  the head on 12/15/2017 showed:    1.  Atrophy.     2.  Old right MCA territory infarct.  No acute changes.      IMPRESSION:   1.  Intermittent altered mental status.  This patient is a 57-year-old right-handed male with a history of right MCA territory stroke and chronic pain syndrome who presented with intermittent episodes of altered mental status.  He had 2 different types of altered mental status.  Type 1 was staring spell which will last for only 1 minute, then he was back to his baseline.  Type 2 was decreased responsiveness, lasting for hours to a day.  The patient was on a much higher dose of narcotics.  He was taking oxycodone 5 mg 4 times daily and Seroquel 200 mg twice daily.  During the hospital stay, oxycodone was decreased to 2.5 mg b.i.d. and Seroquel decreased to 100 mg b.i.d.  Since the change of medications, his symptoms improved significantly.  He longer had any episodes of altered mental status.      It is thought that his altered mental status was most likely due to high dose of narcotics and Seroquel.  Now his symptoms are improved.  However, seizures as the cause cannot be completely ruled out at this time.      2.  Seizures secondary to right frontal lobe stroke.  It seems that his seizures are well controlled.  However, seizure as the cause of his altered mental status change cannot be completely ruled out at this time.  We will continue to observe.     3.  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      PLAN:   1.  We will continue current medications with Keppra 1000 mg b.i.d., Tegretol 200 mg b.i.d. and Depakote 1750 mg b.i.d.   2.  Continue oxycodone 2.5 mg b.i.d. and Seroquel 100 mg b.i.d.   3.  Continue to observe any mental status changes.  If the symptoms recur, we will perform an ambulatory EEG to rule out epilepsy as the cause of mental status change.   4.  Return to clinic as needed.           45 min was spent on the visit.  Over 50% of the time was spent on education,  counseling about optimal seizure control and coordination of care.    D: 2018 15:31   T: 2018 16:40   MT: BISI      Name:     MILTON LAMBERT   MRN:      [id number]        Account:      [id number]   :      1960           Service Date: 2018      Document: [id number]    Again, thank you for allowing me to participate in the care of your patient.      Sincerely,    Amelia Cloud MD

## 2018-01-03 NOTE — PROGRESS NOTES
[facility] requests orders through [employer]. Please REPLY TO THIS MESSAGE in order to give authorization for orders when needed  This is considered a verbal order, you will still receive a faxed copy of orders for signature.  Thank you for your assistance in improving collaboration for our patients.    ORDER: Skilled OT services, [id number], to address toileting safety with AE/DME recs/resources.    Thank you,    Maine Barton OTR/L  Phone: [phone]  Email: [email or web address]

## 2018-01-03 NOTE — MR AVS SNAPSHOT
After Visit Summary   1/3/2018    David Dawn    MRN: [id number]           Patient Information     Date Of Birth          1960        Visit Information        Provider Department      1/3/2018 2:20 PM Amelia Cloud MD [facility] Neurology        Today's Diagnoses     Altered mental status, unspecified altered mental status type    -  1       Follow-ups after your visit        Your next 10 appointments already scheduled     Koffi 15, 2018  2:00 PM CST   SHORT with Len Krishnan MD   [facility] ([facility])    [address]  [facility] [id number] [phone]              Who to contact     Please call your clinic at [phone] to:    Ask questions about your health    Make or cancel appointments    Discuss your medicines    Learn about your test results    Speak to your doctor   If you have compliments or concerns about an experience at your clinic, or if you wish to file a complaint, please contact [facility] Patient Relations at [phone] or email us at [email or web address]         Additional Information About Your Visit        MyChart Information     [employer] is an electronic gateway that provides easy, online access to your medical records. With [employer], you can request a clinic appointment, read your test results, renew a prescription or communicate with your care team.     To sign up for [employer] visit the website at [email or web address]   You will be asked to enter the access code listed below, as well as some personal information. Please follow the directions to create your username and password.     Your access code is: [email or web address]  Expires: 3/15/2018  2:17 PM     Your access code will  in 90 days. If you need help or a new code, please contact your [facility] Clinic or call [phone] for assistance.        Care EveryWhere ID     This is your Care  "EveryWhere ID. This could be used by other organizations to access your [place] medical records  [id number]        Your Vitals Were     Pulse Height BMI (Body Mass Index)             80 1.727 m (5' 8\") 33.45 kg/m2          Blood Pressure from Last 3 Encounters:   01/03/18 107/73   12/17/17 136/80   12/15/17 128/84    Weight from Last 3 Encounters:   01/03/18 99.8 kg (220 lb)   12/16/17 97.8 kg (215 lb 9.6 oz)   11/17/17 103.9 kg (229 lb)              Today, you had the following     No orders found for display       Primary Care Provider Office Phone # Fax #    Len Krishnan [phone]       [address]  [facility] [address]        Equal Access to Services     WHITNEY NEWELL : Hadii mei wilson hadasho Soomaali, waaxda luqadaha, qaybta kaalmada adegabinoyada, deny mancilla . So [facility] [phone].    ATENCIÓN: Si habla español, tiene a costello disposición servicios gratuitos de asistencia lingüística. Hood al [phone].    We comply with applicable federal civil rights laws and [place] laws. We do not discriminate on the basis of race, color, national origin, age, disability, sex, sexual orientation, or gender identity.            Thank you!     Thank you for choosing [facility]UROLOG[facility]  for your care. Our goal is always to provide you with excellent care. Hearing back from our patients is one way we can continue to improve our services. Please take a few minutes to complete the written survey that you may receive in the mail after your visit with us. Thank you!             Your Updated Medication List - Protect others around you: Learn how to safely use, store and throw away your medicines at www.disposemymeds.org.          This list is accurate as of: 1/3/18 11:59 PM.  Always use your most recent med list.                   Brand Name Dispense Instructions for use Diagnosis    amLODIPine 10 MG tablet    NORVASC    31 tablet    TAKE 1 TABLET BY MOUTH ONCE DAILY    " Essential hypertension, benign       AVEENO DAILY MOISTURIZER Lotn      Apply daily to hands and ears for dry skin.        baclofen 20 MG tablet    LIORESAL    90 tablet    TAKE 1 TABLET BY MOUTH THREE TIMES DAILY    Other seizures (H)       budesonide 0.25 MG/2ML neb solution    PULMICORT    120 mL    NEBULIZE THE CONTENT OF 1 VIAL TWICE DAILY FOR COPD    Chronic bronchitis, unspecified chronic bronchitis type (H)       carBAMazepine 200 MG tablet    TEGretol    90 tablet    TAKE 1 TABLET BY MOUTH THREE TIMES DAILY    Seizure disorder (H)       citalopram 20 MG tablet    celeXA     Take 20 mg by mouth daily        clopidogrel 75 MG tablet    PLAVIX    90 tablet    Take 1 tablet (75 mg) by mouth daily    Essential hypertension, benign, History of stroke       diclofenac 1 % Gel topical gel    VOLTAREN    100 g    Place 2 g onto the skin 2 times daily as needed Apply 4 grams to knees or 2 grams to hands    Chronic pain syndrome       * divalproex 250 MG EC tablet    DEPAKOTE     Take 250 mg by mouth 2 times daily        * divalproex 500 MG EC tablet    DEPAKOTE    180 tablet    TAKE THREE TABLETS (1500MG) BY MOUTH TWICE DAILY (TAKE WITH 250MG FOR A TOTAL OF 1750MG)    Seizure disorder (H)       furosemide 20 MG tablet    LASIX    30 tablet    TAKE 1 TABLET BY MOUTH ONCE DAILY    Bilateral edema of lower extremity       gabapentin 600 MG tablet    NEURONTIN    90 tablet    TAKE 1 TABLET BY MOUTH THREE TIMES DAILY    Seizure disorder (H)       ipratropium - albuterol 0.5 mg/2.5 mg/3 mL 0.5-2.5 (3) MG/3ML neb solution    DUONEB     Take 1 vial by nebulization 3 times daily        levalbuterol 45 MCG/ACT Inhaler    XOPENEX HFA    1 Inhaler    Inhale 2 puffs into the lungs every 6 hours as needed for shortness of breath / dyspnea or wheezing    Chronic obstructive pulmonary disease, unspecified COPD type (H)       levETIRAcetam 1000 MG Tabs     62 tablet    Take 1 tablet by mouth 2 times daily    Seizure disorder (H)        lidocaine 2 % topical gel    XYLOCAINE     Apply to rectum four times daily as needed        loperamide 2 MG capsule    IMODIUM    120 capsule    TAKE TWO CAPSULES (4MG) BY MOUTH TWICE DAILY;AND MAY TAKE ONE CAPSULE (2MG) BY MOUTH FOUR TIMES A DAY AS NEEDED    Functional diarrhea       MAPAP 325 MG tablet   Generic drug:  acetaminophen     60 tablet    TAKE TWO TABLETS (650MG) BY MOUTH EVERY 4 HOURS AS NEEDED FOR PAIN    Low back pain, unspecified back pain laterality, unspecified chronicity, with sciatica presence unspecified, Neck pain       mineral oil-hydrophilic petrolatum      Apply topically as needed Apply daily to left wound.        nebulizer/adult mask Kit     1 kit    1 Device 4 times daily.    Wheezing       niacin 500 MG CR capsule     31 capsule    TAKE 1 CAPSULE BY MOUTH AT BEDTIME    Hyperlipidemia LDL goal <100       order for DME     1 Device    Power wheelchair    History of CVA (cerebrovascular accident), Flaccid hemiplegia of left nondominant side due to infarction of brain (H)       oxyCODONE IR 5 MG tablet    ROXICODONE    15 tablet    Take 0.5 tablets (2.5 mg) by mouth 2 times daily    Low back pain, unspecified back pain laterality, unspecified chronicity, with sciatica presence unspecified       potassium chloride 10 MEQ tablet    K-TAB,KLOR-CON    31 tablet    TAKE 1 TABLET BY MOUTH ONCE DAILY    Bilateral edema of lower extremity       QUEtiapine 100 MG tablet    SEROQUEL     Take 1 tablet (100 mg) by mouth 2 times daily    Behavior problem, adult, Hallucinations       SARNA SENSITIVE 1 % Lotn lotion   Generic drug:  pramoxine      as needed for itching One application as needed for itchy skin on posterior ears and hands.        simvastatin 40 MG tablet    ZOCOR    30 tablet    TAKE 1 TABLET BY MOUTH AT BEDTIME    Hyperlipidemia LDL goal <100       tamsulosin 0.4 MG capsule    FLOMAX    31 capsule    TAKE 1 CAPSULE BY MOUTH ONCE DAILY    Benign non-nodular prostatic hyperplasia with  lower urinary tract symptoms       vitamin D 84255 UNIT capsule    ERGOCALCIFEROL    12 capsule    TAKE ONE CAPSULE BY MOUTH ONCE WEEKLY ON MONDAY    Vitamin D deficiency       * Notice:  This list has 2 medication(s) that are the same as other medications prescribed for you. Read the directions carefully, and ask your doctor or other care provider to review them with you.

## 2018-01-04 NOTE — TELEPHONE ENCOUNTER
Requested Prescriptions   Pending Prescriptions Disp Refills     QUEtiapine (SEROQUEL) 100 MG tablet [Pharmacy Med Name: QUETIAPINE TAB 100MG]  11     Sig: TAKE 1 TABLET BY MOUTH TWICE DAILY    Antipsychotic Medications Failed    12/28/2017  3:14 PM       Failed - Lipid panel on file within the past 12 months    Recent Labs   Lab Test  05/07/14   0703   07/16/12   1445   CHOL   --    --   211*   TRIG  127   < >  302*   HDL   --    --   52   LDL   --    --   99   VLDL   --    --   60*   CHOLHDLRATIO   --    --   4.1    < > = values in this interval not displayed.              Passed - BP is less then 140/90    BP Readings from Last 3 Encounters:   01/03/18 107/73   12/17/17 136/80   12/15/17 128/84                Passed - Patient is 12 years of age or older       Passed - CBC on file in past 12 months    Recent Labs   Lab Test  12/15/17   1431  12/15/17   1430   WBC   --   7.8   RBC   --   3.67*   HGB  14.3  12.9*   HCT   --   39.7*   PLT   --   259            Passed - Heart Rate on file within past 12 months    Pulse Readings from Last 3 Encounters:   01/03/18 80   12/15/17 82   12/15/17 86              Passed - A1c or Glucose on file in past 12 months    Recent Labs   Lab Test  12/15/17   1431   11/06/17   0229   GLC  102*   < >  118*   A1C   --    --   5.3    < > = values in this interval not displayed.       Please review patients last 3 weights. If a weight gain of >10 lbs exists, you may refill the prescription once after instructing the patient to schedule an appointment within the next 30 days.    Wt Readings from Last 3 Encounters:   01/03/18 220 lb (99.8 kg)   12/16/17 215 lb 9.6 oz (97.8 kg)   11/17/17 229 lb (103.9 kg)            Passed - Recent or future visit with authorizing provider's specialty    Patient had office visit in the last 6 months or has a visit in the next 30 days with authorizing provider.  See chart review.               Routing refill request to provider for review/approval  because:  Med not filled at this clinic before.    Please advise, thanks.

## 2018-01-05 NOTE — TELEPHONE ENCOUNTER
Fax received from [facility] for review and signature.  Put in Dr. Krishnan's in basket.

## 2018-01-11 NOTE — TELEPHONE ENCOUNTER
Oxycodone.  Please mail rx to [facility].      Last Written Prescription Date:  12/17/17  Last Fill Quantity: 15,   # refills: 0  Last Office Visit: 12/15/17  Future Office visit:    Next 5 appointments (look out 90 days)     Koffi 15, 2018  2:00 PM CST   SHORT with Len Krishnan MD   [facility] ([facility])    [address] [place]  [facility] [address]   [phone]                   Routing refill request to provider for review/approval because:  Drug not on the [facility], [facility] or [facility] refill protocol or controlled substance  Barbara Vera RN

## 2018-01-15 NOTE — NURSING NOTE
"Chief Complaint   Patient presents with     Pain     Bilateral feet pain, numbness, lack of circulation. Also discuss Mobility assesment     Shaking       Initial /74  Pulse 75  Temp 97.7  F (36.5  C) (Oral)  Ht 5' 8\" (1.727 m)  Wt 210 lb (95.3 kg)  SpO2 95%  BMI 31.93 kg/m2 Estimated body mass index is 31.93 kg/(m^2) as calculated from the following:    Height as of this encounter: 5' 8\" (1.727 m).    Weight as of this encounter: 210 lb (95.3 kg).  Medication Reconciliation: complete   Danielle Taylor MA      "

## 2018-01-15 NOTE — PROGRESS NOTES
SUBJECTIVE:   David Dawn is a 57 year old male who presents to clinic today for the following health issues:      Bilateral leg/foot pain, numbness:  Patient is seen for a follow up visit.  Recently hospitalized for altered mental status. Improved with medications adjustment, decreased narcotic analgesics. Feels more alert.   Has chronic pain in the feet and left arm. Has h/o cervicalgia with left radiculopathy. Has on and off paresthesias.   Has h/o stroke with left arm and leg paralysis. Uses a motorized wheelchair.   Has h/o seizure. On anti seizure treatment, seen by neurology. Continues current treatment. No change.   Has h/o COPD. Still smokes, no change in chronic cough, SOB.   No fevers.           Problem list and histories reviewed & adjusted, as indicated.  Additional history: as documented    Patient Active Problem List   Diagnosis     Seizure disorder (H)     Chronic fatigue     Low back pain     Mild major depression (H)     GERD (gastroesophageal reflux disease)     Hyperlipidemia LDL goal <100     COPD (chronic obstructive pulmonary disease) (H)     Hemiplegia affecting left nondominant side (H)     Radicular syndrome of upper limbs     Neck pain     ACP (advance care planning)     Osteoarthritis of glenohumeral joint     Osteoarthritis of acromioclavicular joint     Metabolic encephalopathy     Health Care Home     Dysphagia     Hallucinations     Cough     Generalized muscle weakness     Alcohol abuse     Behavior problem, adult     Neuropathy     History of CVA (cerebrovascular accident)     Cognitive impairment     Essential hypertension, benign     COPD exacerbation (H)     Benign non-nodular prostatic hyperplasia with lower urinary tract symptoms     Seizure (H)     Encephalopathy     Altered mental status     Past Surgical History:   Procedure Laterality Date     COLONOSCOPY  12/19/2012     COLONOSCOPY  8/6/2014    Procedure: COMBINED COLONOSCOPY, SINGLE BIOPSY/POLYPECTOMY BY BIOPSY;   Surgeon: Jose Elias Mclain MD;  Location:  GI     EXCISE TUMOR RECTUM VILLUS  2/28/2013    Procedure: EXCISE TUMOR RECTUM VILLOUS;  Trans Anal Excision Rectal Villous Tumor, Proctoscopy;  Surgeon: Milton Jacobs MD;  Location:  OR     KNEE SURGERY       NECK SURGERY       SIGMOIDOSCOPY FLEXIBLE  1/31/2013    Procedure: SIGMOIDOSCOPY FLEXIBLE;   flexible sigmoidoscopy with anoscope;  Surgeon: Milton Jacobs MD;  Location:  GI     SIGMOIDOSCOPY FLEXIBLE  4/25/2013    Procedure: SIGMOIDOSCOPY FLEXIBLE;  SIGMOIDOSCOPY FLEXIBLE;  Surgeon: Milton Jacobs MD;  Location:  GI       Social History   Substance Use Topics     Smoking status: Current Every Day Smoker     Packs/day: 1.00     Years: 35.00     Types: Cigarettes     Smokeless tobacco: Never Used     Alcohol use No      Comment: quit 20 years ago.     Family History   Problem Relation Age of Onset     HEART DISEASE Mother      MI     CEREBROVASCULAR DISEASE Father      alzheimers disease     CEREBROVASCULAR DISEASE Brother      Neurologic Disorder Brother      stroke     Neurologic Disorder Son      seizure     CANCER Sister          Current Outpatient Prescriptions   Medication Sig Dispense Refill     gabapentin (NEURONTIN) 600 MG tablet Take 1 tablet (600 mg) by mouth 4 times daily (before meals and nightly) 120 tablet 3     acetaminophen (TYLENOL) 500 MG tablet Take 2 tablets (1,000 mg) by mouth 3 times daily as needed for mild pain 100 tablet 0     oxyCODONE IR (ROXICODONE) 5 MG tablet TAKE ONE-HALF TABLET (2.5MG) BY MOUTH TWICE DAILY **CAUTION: OPIOID. RISK OF OVERDOSE AND ADDICTION** 30 tablet 0     QUEtiapine (SEROQUEL) 100 MG tablet TAKE 1 TABLET BY MOUTH TWICE DAILY 60 tablet 3     lidocaine (XYLOCAINE) 2 % topical gel Apply to rectum four times daily as needed       ipratropium - albuterol 0.5 mg/2.5 mg/3 mL (DUONEB) 0.5-2.5 (3) MG/3ML neb solution Take 1 vial by nebulization 3 times daily       levETIRAcetam 1000 MG TABS Take 1 tablet by  mouth 2 times daily 62 tablet 1     furosemide (LASIX) 20 MG tablet TAKE 1 TABLET BY MOUTH ONCE DAILY 30 tablet 5     loperamide (IMODIUM) 2 MG capsule TAKE TWO CAPSULES (4MG) BY MOUTH TWICE DAILY;AND MAY TAKE ONE CAPSULE (2MG) BY MOUTH FOUR TIMES A DAY AS NEEDED 120 capsule 1     divalproex (DEPAKOTE) 500 MG EC tablet TAKE THREE TABLETS (1500MG) BY MOUTH TWICE DAILY (TAKE WITH 250MG FOR A TOTAL OF 1750MG) 180 tablet 5     mineral oil-hydrophilic petrolatum (AQUAPHOR) Apply topically as needed Apply daily to left wound.       divalproex (DEPAKOTE) 250 MG EC tablet Take 250 mg by mouth 2 times daily       pramoxine (SARNA SENSITIVE) 1 % LOTN lotion as needed for itching One application as needed for itchy skin on posterior ears and hands.       Sunscreens (AVEENO DAILY MOISTURIZER) LOTN Apply daily to hands and ears for dry skin.       simvastatin (ZOCOR) 40 MG tablet TAKE 1 TABLET BY MOUTH AT BEDTIME 30 tablet 11     baclofen (LIORESAL) 20 MG tablet TAKE 1 TABLET BY MOUTH THREE TIMES DAILY 90 tablet 11     carBAMazepine (TEGRETOL) 200 MG tablet TAKE 1 TABLET BY MOUTH THREE TIMES DAILY 90 tablet 11     levalbuterol (XOPENEX HFA) 45 MCG/ACT Inhaler Inhale 2 puffs into the lungs every 6 hours as needed for shortness of breath / dyspnea or wheezing 1 Inhaler 8     clopidogrel (PLAVIX) 75 MG tablet Take 1 tablet (75 mg) by mouth daily 90 tablet 2     diclofenac (VOLTAREN) 1 % GEL topical gel Place 2 g onto the skin 2 times daily as needed Apply 4 grams to knees or 2 grams to hands 100 g 0     vitamin D (ERGOCALCIFEROL) 67163 UNIT capsule TAKE ONE CAPSULE BY MOUTH ONCE WEEKLY ON MONDAY 12 capsule 3     amLODIPine (NORVASC) 10 MG tablet TAKE 1 TABLET BY MOUTH ONCE DAILY 31 tablet 11     niacin 500 MG CR capsule TAKE 1 CAPSULE BY MOUTH AT BEDTIME 31 capsule 11     potassium chloride (K-TAB,KLOR-CON) 10 MEQ tablet TAKE 1 TABLET BY MOUTH ONCE DAILY 31 tablet 11     tamsulosin (FLOMAX) 0.4 MG capsule TAKE 1 CAPSULE BY MOUTH  "ONCE DAILY 31 capsule 11     budesonide (PULMICORT) 0.25 MG/2ML neb solution NEBULIZE THE CONTENT OF 1 VIAL TWICE DAILY FOR COPD 120 mL 10     citalopram (CELEXA) 20 MG tablet Take 20 mg by mouth daily       Respiratory Therapy Supplies (NEBULIZER/ADULT MASK) KIT 1 Device 4 times daily. 1 kit 3     [DISCONTINUED] gabapentin (NEURONTIN) 600 MG tablet TAKE 1 TABLET BY MOUTH THREE TIMES DAILY 90 tablet 1     order for DME Power wheelchair 1 Device 0         Reviewed and updated as needed this visit by clinical staff       Reviewed and updated as needed this visit by Provider         ROS:  Constitutional, HEENT, cardiovascular, pulmonary, gi and gu systems are negative, except as otherwise noted.      OBJECTIVE:   /74  Pulse 75  Temp 97.7  F (36.5  C) (Oral)  Ht 5' 8\" (1.727 m)  Wt 210 lb (95.3 kg)  SpO2 95%  BMI 31.93 kg/m2  Body mass index is 31.93 kg/(m^2).   GENERAL:weak, in a wheelchair, alert and no distress  EYES: Eyes grossly normal to inspection, PERRL and conjunctivae and sclerae normal  NECK: no adenopathy, no asymmetry, masses, or scars and thyroid normal to palpation  RESP: lungs clear to auscultation - no rales, + mild rhonchi , no wheezes  CV: regular rate and rhythm, normal S1 S2, no S3 or S4, no murmur, click or rub, no peripheral edema and peripheral pulses strong  ABDOMEN: soft, nontender, no hepatosplenomegaly, no masses and bowel sounds normal  MS: no gross musculoskeletal defects noted, no edema  SKIN: no suspicious lesions or rashes  NEURO:left arm and leg paralysis , skin is cold, no cyanosis , mentation intact and speech normal    Diagnostic Test Results:  none     ASSESSMENT/PLAN:     Problem List Items Addressed This Visit     Seizure disorder (H)    Relevant Medications    gabapentin (NEURONTIN) 600 MG tablet    acetaminophen (TYLENOL) 500 MG tablet    COPD (chronic obstructive pulmonary disease) (H) - Primary    Hemiplegia affecting left nondominant side (H)    Neuropathy    " Relevant Medications    gabapentin (NEURONTIN) 600 MG tablet           Chronic pain, avoid increasing narcotics, will increase Neurontin to 600 mg qid, increase Tylenol to 1000 mg tid   Cont current treatment, no recurrent seizures or MS changes   Cont inhalers, smoking cessation   Needs motorized wheelchair for mobility with history of stroke and left hemiparalysis     Follow-Up:in 1 month     Len Krishnan MD  [facility]

## 2018-01-15 NOTE — MR AVS SNAPSHOT
"              After Visit Summary   1/15/2018    David Dawn    MRN: [id number]           Patient Information     Date Of Birth          1960        Visit Information        Provider Department      1/15/2018 2:00 PM Len Krishnan MD [facility]        Today's Diagnoses     Seizure disorder (H)           Follow-ups after your visit        Follow-up notes from your care team     Return in about 4 weeks (around 2018) for Routine Visit.      Who to contact     If you have questions or need follow up information about today's clinic visit or your schedule please contact [facility] directly at [phone].  Normal or non-critical lab and imaging results will be communicated to you by MyChart, letter or phone within 4 business days after the clinic has received the results. If you do not hear from us within 7 days, please contact the clinic through MyChart or phone. If you have a critical or abnormal lab result, we will notify you by phone as soon as possible.  Submit refill requests through Datapipe or call your pharmacy and they will forward the refill request to us. Please allow 3 business days for your refill to be completed.          Additional Information About Your Visit        MyChart Information     Datapipe lets you send messages to your doctor, view your test results, renew your prescriptions, schedule appointments and more. To sign up, go to [email or web address] . Click on \"Log in\" on the left side of the screen, which will take you to the Welcome page. Then click on \"Sign up Now\" on the right side of the page.     You will be asked to enter the access code listed below, as well as some personal information. Please follow the directions to create your username and password.     Your access code is: [email or web address]  Expires: 3/15/2018  2:17 PM     Your access code will  in 90 days. If you need help or a new code, please call your [facility] or " "[phone].        Care EveryWhere ID     This is your Care EveryWhere ID. This could be used by other organizations to access your [facility] medical records  [id number]        Your Vitals Were     Pulse Temperature Height Pulse Oximetry BMI (Body Mass Index)       75 97.7  F (36.5  C) (Oral) 5' 8\" (1.727 m) 95% 31.93 kg/m2        Blood Pressure from Last 3 Encounters:   01/15/18 118/74   01/03/18 107/73   12/17/17 136/80    Weight from Last 3 Encounters:   01/15/18 210 lb (95.3 kg)   01/03/18 220 lb (99.8 kg)   12/16/17 215 lb 9.6 oz (97.8 kg)              Today, you had the following     No orders found for display         Today's Medication Changes          These changes are accurate as of: 1/15/18  2:34 PM.  If you have any questions, ask your nurse or doctor.               These medicines have changed or have updated prescriptions.        Dose/Directions    acetaminophen 500 MG tablet   Commonly known as:  TYLENOL   This may have changed:  See the new instructions.   Used for:  Seizure disorder (H)   Changed by:  Len Krishnan MD        Dose:  1000 mg   Take 2 tablets (1,000 mg) by mouth 3 times daily as needed for mild pain   Quantity:  100 tablet   Refills:  0       gabapentin 600 MG tablet   Commonly known as:  NEURONTIN   This may have changed:  See the new instructions.   Used for:  Seizure disorder (H)   Changed by:  Len Krishnan MD        Dose:  600 mg   Take 1 tablet (600 mg) by mouth 4 times daily (before meals and nightly)   Quantity:  120 tablet   Refills:  3            Where to get your medicines      These medications were sent to [facility] - [place], [place] - [address]  [address], [facility] [address]     Phone:  [phone]     acetaminophen 500 MG tablet    gabapentin 600 MG tablet                Primary Care Provider Office Phone # Fax #    Len Krishnan [phone]       [address]  [facility] " [id number]        Equal Access to Services     [facility]: Hadii mei wilson ck Swan, watabithada luqclaudine, qaybta kabrandieroberto carlos nova, waxbrant frankie sonpaulojoss pace. So [facility] [phone].    ATENCIÓN: Si habla español, tiene a costello disposición servicios gratuitos de asistencia lingüística. Tylerame al [phone].    We comply with applicable federal civil rights laws and [place] laws. We do not discriminate on the basis of race, color, national origin, age, disability, sex, sexual orientation, or gender identity.            Thank you!     Thank you for choosing [facility]  for your care. Our goal is always to provide you with excellent care. Hearing back from our patients is one way we can continue to improve our services. Please take a few minutes to complete the written survey that you may receive in the mail after your visit with us. Thank you!             Your Updated Medication List - Protect others around you: Learn how to safely use, store and throw away your medicines at www.disposemymeds.org.          This list is accurate as of: 1/15/18  2:34 PM.  Always use your most recent med list.                   Brand Name Dispense Instructions for use Diagnosis    acetaminophen 500 MG tablet    TYLENOL    100 tablet    Take 2 tablets (1,000 mg) by mouth 3 times daily as needed for mild pain    Seizure disorder (H)       amLODIPine 10 MG tablet    NORVASC    31 tablet    TAKE 1 TABLET BY MOUTH ONCE DAILY    Essential hypertension, benign       AVEENO DAILY MOISTURIZER Lotn      Apply daily to hands and ears for dry skin.        baclofen 20 MG tablet    LIORESAL    90 tablet    TAKE 1 TABLET BY MOUTH THREE TIMES DAILY    Other seizures (H)       budesonide 0.25 MG/2ML neb solution    PULMICORT    120 mL    NEBULIZE THE CONTENT OF 1 VIAL TWICE DAILY FOR COPD    Chronic bronchitis, unspecified chronic bronchitis type (H)       carBAMazepine 200 MG tablet    TEGretol    90 tablet    TAKE 1  TABLET BY MOUTH THREE TIMES DAILY    Seizure disorder (H)       citalopram 20 MG tablet    celeXA     Take 20 mg by mouth daily        clopidogrel 75 MG tablet    PLAVIX    90 tablet    Take 1 tablet (75 mg) by mouth daily    Essential hypertension, benign, History of stroke       diclofenac 1 % Gel topical gel    VOLTAREN    100 g    Place 2 g onto the skin 2 times daily as needed Apply 4 grams to knees or 2 grams to hands    Chronic pain syndrome       * divalproex 250 MG EC tablet    DEPAKOTE     Take 250 mg by mouth 2 times daily        * divalproex 500 MG EC tablet    DEPAKOTE    180 tablet    TAKE THREE TABLETS (1500MG) BY MOUTH TWICE DAILY (TAKE WITH 250MG FOR A TOTAL OF 1750MG)    Seizure disorder (H)       furosemide 20 MG tablet    LASIX    30 tablet    TAKE 1 TABLET BY MOUTH ONCE DAILY    Bilateral edema of lower extremity       gabapentin 600 MG tablet    NEURONTIN    120 tablet    Take 1 tablet (600 mg) by mouth 4 times daily (before meals and nightly)    Seizure disorder (H)       ipratropium - albuterol 0.5 mg/2.5 mg/3 mL 0.5-2.5 (3) MG/3ML neb solution    DUONEB     Take 1 vial by nebulization 3 times daily        levalbuterol 45 MCG/ACT Inhaler    XOPENEX HFA    1 Inhaler    Inhale 2 puffs into the lungs every 6 hours as needed for shortness of breath / dyspnea or wheezing    Chronic obstructive pulmonary disease, unspecified COPD type (H)       levETIRAcetam 1000 MG Tabs     62 tablet    Take 1 tablet by mouth 2 times daily    Seizure disorder (H)       lidocaine 2 % topical gel    XYLOCAINE     Apply to rectum four times daily as needed        loperamide 2 MG capsule    IMODIUM    120 capsule    TAKE TWO CAPSULES (4MG) BY MOUTH TWICE DAILY;AND MAY TAKE ONE CAPSULE (2MG) BY MOUTH FOUR TIMES A DAY AS NEEDED    Functional diarrhea       mineral oil-hydrophilic petrolatum      Apply topically as needed Apply daily to left wound.        nebulizer/adult mask Kit     1 kit    1 Device 4 times daily.     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 RISK OF OVERDOSE AND ADDICTION**    Low back pain, unspecified back pain laterality, unspecified chronicity, with sciatica presence unspecified       potassium chloride 10 MEQ tablet    K-TAB,KLOR-CON    31 tablet    TAKE 1 TABLET BY MOUTH ONCE DAILY    Bilateral edema of lower extremity       QUEtiapine 100 MG tablet    SEROquel    60 tablet    TAKE 1 TABLET BY MOUTH TWICE DAILY    Behavior problem, adult, Hallucinations       SARNA SENSITIVE 1 % Lotn lotion   Generic drug:  pramoxine      as needed for itching One application as needed for itchy skin on posterior ears and hands.        simvastatin 40 MG tablet    ZOCOR    30 tablet    TAKE 1 TABLET BY MOUTH AT BEDTIME    Hyperlipidemia LDL goal <100       tamsulosin 0.4 MG capsule    FLOMAX    31 capsule    TAKE 1 CAPSULE BY MOUTH ONCE DAILY    Benign non-nodular prostatic hyperplasia with lower urinary tract symptoms       vitamin D 29816 UNIT capsule    ERGOCALCIFEROL    12 capsule    TAKE ONE CAPSULE BY MOUTH ONCE WEEKLY ON MONDAY    Vitamin D deficiency       * Notice:  This list has 2 medication(s) that are the same as other medications prescribed for you. Read the directions carefully, and ask your doctor or other care provider to review them with you.

## 2018-01-26 NOTE — TELEPHONE ENCOUNTER
"Requested Prescriptions   Pending Prescriptions Disp Refills     divalproex (DEPAKOTE) 250 MG EC tablet [Pharmacy Med Name: DIVALPROEX TAB 250MG DR]  11     Sig: TAKE 1 TABLET BY MOUTH TWICE DAILY (TAKE WITH 1500MG FOR A TOTAL OF 1750MG)    Anticonvulsants Protocol  Failed    1/21/2018  3:08 PM       Failed - Review Authorizing provider's last note.     Refer to last progress notes: confirm request is for original authorizing provider (cannot be through other providers).         FAILPassed - Depakote level within therapeutic range in past 6 months    Lab Results   Component Value Date    VALPROIC 41.7 06/24/2016     Depakote level must be checked 2-4 weeks after dosage change.         Passed - Normal ALT on file in past 6 months    Recent Labs   Lab Test  12/15/17   1430   ALT  22          Passed - Normal platelet count on file in past 6 months    Recent Labs   Lab Test  12/15/17   1430   PLT  259          Passed - Recent or future visit with authorizing provider    Patient had office visit in the last 6 months or has a visit in the next 30 days with authorizing provider.  See \"Patient Info\" tab in inbasket, or \"Choose Columns\" in Meds & Orders section of the refill encounter.    Last OV: 01/15/18, per OV note: f/u in 1 month        levETIRAcetam 1000 MG TABS [Pharmacy Med Name: LEVETIRACETA TAB 1000MG]  11     Sig: TAKE 1 TABLET BY MOUTH TWICE DAILY    Anticonvulsants Protocol  Failed    1/21/2018  3:08 PM       Failed - Review Authorizing provider's last note.     Refer to last progress notes: confirm request is for original authorizing provider (cannot be through other providers).         Passed - Depakote level within therapeutic range in past 6 months    Lab Results   Component Value Date    VALPROIC 41.7 06/24/2016     Depakote level must be checked 2-4 weeks after dosage change.         Passed - Normal ALT on file in past 6 months    Recent Labs   Lab Test  12/15/17   1430   ALT  22          Passed - Normal " "platelet count on file in past 6 months    Recent Labs   Lab Test  12/15/17   1430   PLT  259          Passed - Recent or future visit with authorizing provider    Patient had office visit in the last 6 months or has a visit in the next 30 days with authorizing provider.  See \"Patient Info\" tab in inbasket, or \"Choose Columns\" in Meds & Orders section of the refill encounter.          loperamide (IMODIUM) 2 MG capsule [Pharmacy Med Name: LOPERAMIDE CAP 2MG]  1     Sig: TAKE TWO CAPSULES (4MG) BY MOUTH TWICE DAILY;AND TAKE ONE CAPSULE (2MG) FOUR TIMES A DAY AS NEEDED    There is no refill protocol information for this order        Routing refill request to provider for review/approval because:  Drug not on the [facility] refill protocol   Depakote level >1 year ago    Please advise, thanks.  "

## 2018-02-14 NOTE — TELEPHONE ENCOUNTER
OXYCODONE, FAX THEN MAIL.         Last Written Prescription Date:  1/12/18  Last Fill Quantity: 30,   # refills: 0    Last Office Visit: 1/15/18    Future Office visit:       Routing refill request to provider for review/approval because:  Drug not on the [facility], [facility] or [facility] refill protocol or controlled substance

## 2018-02-15 NOTE — TELEPHONE ENCOUNTER
Reason for Call:  Medication or medication refill:    Do you use a [facility]?  Name of the pharmacy and phone number for the current request:  [facility] long term [facility]    Name of the medication requested: oxycodone     Other request: patient will be out on Sunday, per Janina at [facility] [address] needs asap, patient is having more pain    Can we leave a detailed message on this number? YES    Phone number patient can be reached at: Cell number on file:  [phone], Janina      Best Time: any    Call taken on 2/15/2018 at 11:50 AM by Fadumo Griggs

## 2018-02-19 NOTE — TELEPHONE ENCOUNTER
Roger, nurse from [facility] [address] calls, asking about oxycodone rx. Relay rx mailed out 02/14. States they spoke with the pharm earlier today. Pharm doesn't have yet.    Called pharm, states it's President's Day and they don't get mail today.     Called Roger, relay above information. Recommend waiting for mail to get there tomorrow as can't fill anywhere else.

## 2018-02-19 NOTE — TELEPHONE ENCOUNTER
"Requested Prescriptions   Pending Prescriptions Disp Refills     carBAMazepine (TEGRETOL) 200 MG tablet [Pharmacy Med Name: CARBAMAZEPIN TAB 200MG]  11     Sig: TAKE 1 TABLET BY MOUTH THREE TIMES DAILY    Anticonvulsants Protocol  Failed    2/17/2018  3:36 PM       Failed - Review Authorizing provider's last note.     Refer to last progress notes: confirm request is for original authorizing provider (cannot be through other providers).       Failed - Normal TSH on file in past 6 months    Recent Labs   Lab Test  06/25/14   0520   TSH  4.08          Passed - Normal CBC on file in past 6 months    Recent Labs   Lab Test  12/15/17   1431  12/15/17   1430   WBC   --   7.8   RBC   --   3.67*   HGB  14.3  12.9*   HCT   --   39.7*   PLT   --   259          Passed - Recent or future visit with authorizing provider    Patient had office visit in the last 6 months or has a visit in the next 30 days with authorizing provider.  See \"Patient Info\" tab in inJammcardsket, or \"Choose Columns\" in Meds & Orders section of the refill encounter.    Last OV: 01/15/18, per OV note: f/u in 1 month       Passed - Carbamazepine level within therapeutic range in last 6 months    Recent Labs   Lab Test 05/01/15   TEGRETOL  8.3     Carbamazepine level must be checked 2-4 weeks after dosage change.          citalopram (CELEXA) 20 MG tablet [Pharmacy Med Name: CITALOPRAM TAB 20MG]  11     Sig: TAKE 1 TABLET BY MOUTH ONCE DAILY    SSRIs Protocol Passed    2/17/2018  3:36 PM       Passed - Recent or future visit with authorizing provider    Patient had office visit in the last year or has a visit in the next 30 days with authorizing provider.  See \"Patient Info\" tab in inbasket, or \"Choose Columns\" in Meds & Orders section of the refill encounter.       Passed - Patient is age 18 or older        Last Tegretol rx sent 08/04/17 for 1 years worth of med. Rx request is for dispense 93 d/t assisted living pt. Per 08/04/17 rx: \"PLEASE AUTHORIZE REFILLS QTY OF " "93 WITH 12 REFILLS FOR ASSISTING LIVING PATIENT\" Rx sent for 93 disp with refills until August.    Routing citalopram refill request to provider for review/approval because:  Medication is reported/historical  Pt due for appt  Please advise, thanks.  "

## 2018-02-21 NOTE — TELEPHONE ENCOUNTER
Nurse informed of message below from provider.  Nurse states were not giving Tylenol TID. Will try to give patient TID Tylenol for breakthrough pain.

## 2018-02-21 NOTE — TELEPHONE ENCOUNTER
Janina calling from [facility] assisted living pt  unable to get into dentist due to wheel chair and limited transferring, appointment is February 28th. Patient reporting having a lot of tooth pain. Is there something patient can take for that? Nurse asking for a scheduled pain medication? Ibuprofen listed on allergies.   Fax signed order to [phone] Attn: Nursing.  Provider please review and advise. Thank you.

## 2018-02-26 NOTE — TELEPHONE ENCOUNTER
Janina [facility] [address] calls stating pt has URI, productive cough, runny nose for a few days.     He is asking for OTC Robitussin cough medication.     Needs Rx to be faxed to the LTC pharmacy.

## 2018-03-09 PROBLEM — I63.9 ACUTE ISCHEMIC STROKE (H): Status: ACTIVE | Noted: 2018-01-01

## 2018-03-09 NOTE — PHARMACY-ADMISSION MEDICATION HISTORY
Admission medication history interview status for the 3/9/2018 admission is complete. See Epic admission navigator for allergy information, pharmacy, prior to admission medications and immunization status.     Medication history interview sources:  facility MAR (Sasha Cote, pH: [phone])    Changes made to PTA medication list (reason)  Added: none  Deleted: guaifenisen  Changed: APAP (650mg q4h PRN), diclofenac (2g to hands, 4g to knees both PRN), loperamide (takes BID scheduled and QID PRN, created separate entries), Aquaphor (daily, not PRN), niacin (taking differently than prescribed in the AM rather than PM), sarna (clarified SIG entry, no change in directions)    Additional medication history information (including reliability of information, actions taken by pharmacist):  Allergies confirmed in MAR (reactions not listed in MAR, just allergies). Patient received flu shot this year.  All AM meds were given this morning at care facility on 3/9.       Prior to Admission medications    Medication Sig Last Dose Taking? Auth Provider   ACETAMINOPHEN PO Take 650 mg by mouth every 4 hours as needed for pain PRN Yes Unknown, Entered By History   diclofenac (VOLTAREN) 1 % GEL topical gel Apply 2 g topically as needed (apply to hands for osteoarthritis pain) PRN Yes Unknown, Entered By History   diclofenac (VOLTAREN) 1 % GEL topical gel Place 4 g onto the skin as needed (apply to knees for osteoarthritis pain) PRN Yes Unknown, Entered By History   loperamide (IMODIUM) 2 MG capsule Take 4 mg by mouth 2 times daily 3/9/2018 at AM Yes Unknown, Entered By History   loperamide (IMODIUM) 2 MG capsule Take 2 mg by mouth 4 times daily as needed for diarrhea PRN Yes Unknown, Entered By History   citalopram (CELEXA) 20 MG tablet TAKE 1 TABLET BY MOUTH ONCE DAILY 3/9/2018 at AM Yes Len Krishnan MD   carBAMazepine (TEGRETOL) 200 MG tablet TAKE 1 TABLET BY MOUTH THREE TIMES DAILY 3/9/2018 at AM Yes Len Krishnan  MD ROMARIO   oxyCODONE IR (ROXICODONE) 5 MG tablet TAKE ONE-HALF TABLET (2.5MG) BY MOUTH TWICE DAILY **CAUTION: OPIOID. RISK OF OVERDOSE AND ADDICTION** 3/9/2018 at AM Yes Len Krishnan MD   XOPENEX HFA 45 MCG/ACT Inhaler INHALE 2 PUFFS INTO THE LUNGS EVERY 6 HOURS AS NEEDED FOR SHORTNESS OF BREATH, DYSPNEA OR WHEEZING PRN Yes Len Krishnan MD   divalproex sodium delayed-release (DEPAKOTE) 250 MG DR tablet TAKE 1 TABLET BY MOUTH TWICE DAILY (TAKE WITH 1500MG FOR A TOTAL OF 1750MG) 3/9/2018 at AM Yes Len Krishnan MD   levETIRAcetam 1000 MG TABS TAKE 1 TABLET BY MOUTH TWICE DAILY 3/9/2018 at AM Yes Len Krishnan MD   gabapentin (NEURONTIN) 600 MG tablet Take 1 tablet (600 mg) by mouth 4 times daily (before meals and nightly) 3/9/2018 at AM Yes Len Krishnan MD   QUEtiapine (SEROQUEL) 100 MG tablet TAKE 1 TABLET BY MOUTH TWICE DAILY 3/9/2018 at AM Yes Len Krishnan MD   lidocaine (XYLOCAINE) 2 % topical gel Apply to rectum four times daily as needed PRN Yes Unknown, Entered By History   ipratropium - albuterol 0.5 mg/2.5 mg/3 mL (DUONEB) 0.5-2.5 (3) MG/3ML neb solution Take 1 vial by nebulization 3 times daily 3/9/2018 at AM Yes Unknown, Entered By History   furosemide (LASIX) 20 MG tablet TAKE 1 TABLET BY MOUTH ONCE DAILY 3/9/2018 at AM Yes Len Krishnan MD   divalproex (DEPAKOTE) 500 MG EC tablet TAKE THREE TABLETS (1500MG) BY MOUTH TWICE DAILY (TAKE WITH 250MG FOR A TOTAL OF 1750MG) 3/9/2018 at AM Yes Len Krishnan MD   mineral oil-hydrophilic petrolatum (AQUAPHOR) Apply topically daily to left wound. 3/9/2018 at AM Yes Unknown, Entered By History   pramoxine (SARNA SENSITIVE) 1 % LOTN lotion Apply topically as needed for itching (on posterior ears and hands)  PRN Yes Unknown, Entered By History   Sunscreens (AVEENO DAILY MOISTURIZER) LOTN Apply daily to hands and ears for dry skin. 3/9/2018 at AM Yes Unknown, Entered By History   simvastatin (ZOCOR) 40 MG tablet TAKE 1  TABLET BY MOUTH AT BEDTIME 3/8/2018 at PM Yes Len Krishnan MD   baclofen (LIORESAL) 20 MG tablet TAKE 1 TABLET BY MOUTH THREE TIMES DAILY 3/9/2018 at AM Yes Len Krishnan MD   clopidogrel (PLAVIX) 75 MG tablet Take 1 tablet (75 mg) by mouth daily 3/9/2018 at AM Yes Len Krishnan MD   vitamin D (ERGOCALCIFEROL) 50527 UNIT capsule TAKE ONE CAPSULE BY MOUTH ONCE WEEKLY ON MONDAY 3/5/2018 Yes Len Krishnan MD   amLODIPine (NORVASC) 10 MG tablet TAKE 1 TABLET BY MOUTH ONCE DAILY 3/9/2018 at AM Yes Len Krishnan MD   niacin 500 MG CR capsule TAKE 1 CAPSULE BY MOUTH AT BEDTIME  Patient taking differently: TAKE 1 CAPSULE BY MOUTH EVERY MORNING 3/9/2018 at AM Yes Len Krishnan MD   potassium chloride (K-TAB,KLOR-CON) 10 MEQ tablet TAKE 1 TABLET BY MOUTH ONCE DAILY 3/9/2018 at AM Yes Len Krishnan MD   tamsulosin (FLOMAX) 0.4 MG capsule TAKE 1 CAPSULE BY MOUTH ONCE DAILY 3/9/2018 at AM Yes Len Krishnan MD   budesonide (PULMICORT) 0.25 MG/2ML neb solution NEBULIZE THE CONTENT OF 1 VIAL TWICE DAILY FOR COPD 3/9/2018 at AM Yes Len Krishnan MD   order for DME Power wheelchair   Len Krishnan MD   Respiratory Therapy Supplies (NEBULIZER/ADULT MASK) KIT 1 Device 4 times daily.   Len Krishnan MD         Medication history completed by: Lexii Lambert, PharmD

## 2018-03-09 NOTE — LETTER
Transition Communication Hand-off for Care Transitions to Next Level of Care Provider    Name: David Dawn  : 1960  MRN #: [id number]  Primary Care Provider: Len Krishnan     Primary Clinic: [address] [facility] [address]     Reason for Hospitalization:  History of CVA (cerebrovascular accident) [Z86.73]  Altered mental status, unspecified altered mental status type [R41.82]  Speech disturbance, unspecified type [R47.9]  Admit Date/Time: 3/9/2018  8:54 AM  Discharge Date: 3/23/1/  Payor Source: Payor: MEDICARE / Plan: MEDICARE / Product Type: Medicare /     Readmission Assessment Measure (ROMARIO) Risk Score/category: Elevated         Reason for Communication Hand-off Referral: Fragility    Discharge Plan:  Hospice:  [facility]  [address] [id number]   [phone]     Concern for non-adherence with plan of care:   Y/N No  Discharge Needs Assessment:      Already enrolled in Tele-monitoring program and name of program:  No  Follow-up specialty is recommended: No    Follow-up plan:  No future appointments.    Any outstanding tests or procedures:        Referrals     Future Labs/Procedures    Physical Therapy Adult Consult     Comments:    Evaluate and treat as clinically indicated.    Reason:  S/p stroke, please assist in left arm and leg movement to prevent cramping    Speech Language Path Adult Consult     Comments:    Evaluate and treat as clinically indicated.    Reason:  Hospice patient now aphasic after a stroke.  Please assist retraining patient in communication with basic needs.  determine a basic means of communication and educate family/pt/staff to reduce communication breakdowns and maximize quality of life. Anticipate short treatment course following initial evaluation.            SAM Comer, LGSW  5A Unit   Pager [phone]  Phone [phone]      AVS/Discharge Summary is the source of truth; this is  a helpful guide for improved communication of patient story

## 2018-03-09 NOTE — PLAN OF CARE
Problem: Stroke (Ischemic) (Adult)  Goal: Signs and Symptoms of Listed Potential Problems Will be Absent, Minimized or Managed (Stroke)  Signs and symptoms of listed potential problems will be absent, minimized or managed by discharge/transition of care (reference Stroke (Ischemic) (Adult) CPG).   Outcome: No Change  Pt admitted from NH w AMS. Stroke code called; CT negative. Has baseline L hemiplegia and Left droop from old stroke. WC at baseline. Unable to assess d/t combativeness and noncooperation. Grunts for speech. VSS. Lungs coarse. Respiratory will come to assess. NPO pending speech eval. MIVFs infusing. Inc of urine and presumably stool. Some Left foot swelling noted. Nonblanchable redness to coccyx; moisture barrier applied.  Patient with oral secretions and unable to suction d/t combativeness, but will self suction. TPq2h. On CCM for 48hrs, NSR. Echo ordered. EEG ordered but patient refused. Neuro notified. C/t monitor.     SCD machine ordered.   NP will discuss w team as to how aggressive they want to be w putting on leads.   Echo done.

## 2018-03-09 NOTE — PROGRESS NOTES
Stroke Code Nurse-Responder Note    Arrival Time to Stroke Code: 0904    Stroke Code Team interventions:   - De-escalated at 0925 by Dr. Parminder Bullard    ED/Bedside Nurse providing handoff: Litzy WEBSTER    Time left for CT: 0908    Time arrived to next location (ED/Unit/IR): Returned to ED at 0920    ED/Bedside Nurse given handoff (name/time): Bedside handoff given to Litzy WEBSTER At 0923    Baldev Belcher RN

## 2018-03-09 NOTE — IP AVS SNAPSHOT
` `     UNIT 5A [facility] BANK: [phone]            Medication Administration Report for David Dawn as of 03/23/18 0952   Legend:    Given Hold Not Given Due Canceled Entry Other Actions    Time Time (Time) Time  Time-Action       Inactive    Active    Linked        Medications 03/17/18 03/18/18 03/19/18 03/20/18 03/21/18 03/22/18 03/23/18    0.9% sodium chloride BOLUS  Dose: 500 mL  Freq: ONCE Route: IV  Start: 03/09/18 0910              acetaminophen (TYLENOL) Suppository 650 mg  Dose: 650 mg  Freq: EVERY 4 HOURS PRN Route: RE  PRN Reasons: mild pain,fever  PRN Comment: temperatures greater than 99.5  F(37.5 C)  Start: 03/09/18 1153   Admin Instructions: Maximum acetaminophen dose from all sources = 75 mg/kg/day not to exceed 4 grams/day.               atropine 1 % ophthalmic solution 1-2 drop  Dose: 1-2 drop  Freq: EVERY 1 HOUR PRN Route: SL  PRN Reason: other  PRN Comment: secretions  Start: 03/14/18 2005    0848 (2 drop)-Given       1206 (2 drop)-Given       2211 (2 drop)-Given       2356 (2 drop)-Given        0344 (2 drop)-Given       1225 (2 drop)-Given       2122 (2 drop)-Given        0449 (2 drop)-Given               bisacodyl (DULCOLAX) Suppository 10 mg  Dose: 10 mg  Freq: ONCE PRN Route: RE  PRN Reason: other  PRN Comment: for no bowel movement for 72 hours  Start: 03/17/18 0000   Admin Instructions: Give only after rectal check for impacted stool.               haloperidol (HALDOL) 2 MG/ML (HIGH CONC) solution 2 mg  Dose: 2 mg  Freq: EVERY 6 HOURS PRN Route: PO  PRN Reason: agitation  Start: 03/15/18 1556       0935 (2 mg)-Given       2349 (2 mg)-Given              hypromellose-dextran (ARTIFICAL TEARS) ophthalmic solution 1-2 drop  Dose: 1-2 drop  Freq: EVERY 8 HOURS PRN Route: Both Eyes  PRN Reason: dry eyes  Start: 03/14/18 2005   Admin Instructions: Offer every shift.               LORazepam (ATIVAN) 1 mg/0.5 mL (HIGH CONC) solution 0.5-1 mg  Dose: 0.5-1 mg  Freq: EVERY 4 HOURS PRN  Route: PO  PRN Reasons: anxiety,aggression  Start: 03/18/18 1039       1950 (1 mg)-Given        2112 (1 mg)-Given        0118 (1 mg)-Given            LORazepam (ATIVAN) 1 mg/0.5 mL (HIGH CONC) solution 1 mg  Dose: 1 mg  Freq: EVERY 4 HOURS Route: SL  Start: 03/15/18 1542    0016 (1 mg)-Given       0435 (1 mg)-Given       0846 (1 mg)-Given       1204 (1 mg)-Given       1614 (1 mg)-Given       1959 (1 mg)-Given       2356 (1 mg)-Given        0349 (1 mg)-Given       0859 (1 mg)-Given       1225 (1 mg)-Given       1534 (1 mg)-Given       2119 (1 mg)-Given       2355 (1 mg)-Given        0444 (1 mg)-Given       0836 (1 mg)-Given       (1259)-Not Given       (1550)-Not Given       (1959)-Not Given        (0041)-Not Given       (0400)-Not Given       0754 (1 mg)-Given       1127 (1 mg)-Given       1621 (1 mg)-Given       (1953)-Not Given        (0032)-Not Given       (0400)-Not Given       0753 (1 mg)-Given       1146 (1 mg)-Given       1557 (1 mg)-Given       1930 (1 mg)-Given        (0122)-Not Given       0511 (1 mg)-Given       (0928)-Not Given       1107 (1 mg)-Given       1651 (1 mg)-Given       2032 (1 mg)-Given        0014 (1 mg)-Given       0415 (1 mg)-Given       0732 (1 mg)-Given       [ ] 1200       [ ] 1600       [ ] 2000          Or  LORazepam (ATIVAN) tablet 1 mg  Dose: 1 mg  Freq: EVERY 4 HOURS Route: PO  Start: 03/15/18 1542                                                                                                                                                                                                                                                                                           [ ] 1200       [ ] 1600       [ ] 2000          Or  LORazepam (ATIVAN) 1 mg/0.5 mL (HIGH CONC) solution 1 mg  Dose: 1 mg  Freq: EVERY 4 HOURS Route: RE  Start: 03/15/18 1542                                                                                                                                                                                                                                                                                            [ ] 1200       [ ] 1600       [ ] 2000           LORazepam (ATIVAN) 2 MG/ML (HIGH CONC) solution 2-4 mg  Dose: 2-4 mg  Freq: EVERY 8 HOURS PRN Route: PO  PRN Reason: seizures  Start: 03/21/18 1731              LORazepam (ATIVAN) injection 2 mg  Dose: 2 mg  Freq: EVERY 4 HOURS PRN Route: IV  PRN Reason: seizures  Start: 03/21/18 1730   Admin Instructions: For IV PUSH: Dilute with equal volume of NS. For ordered doses up to 4 mg give IV Push. Administer each 2mg over 1-5 minutes.               LORazepam (ATIVAN) tablet 2 mg  Dose: 2 mg  Freq: DAILY PRN Route: PO  PRN Reason: seizures  PRN Comment: please also notify physician if patient has seizure  Start: 03/15/18 1355              morphine solution 5-10 mg  Dose: 5-10 mg  Freq: EVERY 2 HOURS PRN Route: PO  PRN Reason: moderate to severe pain  PRN Comment: or dyspnea  Start: 03/14/18 2008           (1032)-Not Given [C]                                                                                 2003 (5 mg)-Given                                        Or  morphine sulfate HIGH CONCENTRATE (ROXANOL CONCENTRATED) 10 mg/0.5 mL (HIGH CONC) solution 5-10 mg  Dose: 5-10 mg  Freq: EVERY 2 HOURS PRN Route: SL  PRN Reasons: moderate to severe pain,other  PRN Comment: or dyspnea  Start: 03/14/18 2008   Admin Instructions: Caution: solution is 10 times more concentrated than standard solution. Verify dose volume.     0538 (5 mg)-Given              1036 (10 mg)-Given       1625 (10 mg)-Given       2204 (10 mg)-Given       2357 (10 mg)-Given        0349 (10 mg)-Given       1326 (10 mg)-Given       2234 (10 mg)-Given        0118 (10 mg)-Given       0444 (10 mg)-Given        2115 (10 mg)-Given                0314 (10 mg)-Given       1351 (5 mg)-Given       2032 (5 mg)-Given        0812 (10 mg)-Given           naloxone (NARCAN) injection  0.1-0.4 mg  Dose: 0.1-0.4 mg  Freq: EVERY 2 MIN PRN Route: IV  PRN Reason: opioid reversal  Start: 03/14/18 1331   Admin Instructions: For respiratory rate LESS than or EQUAL to 8.  Partial reversal dose:  0.1 mg titrated q 2 minutes for Analgesia Side Effects Monitoring Sedation Level of 3 (frequently drowsy, arousable, drifts to sleep during conversation).Full reversal dose:  0.4 mg bolus for Analgesia Side Effects Monitoring Sedation Level of 4 (somnolent, minimal or no response to stimulation).  For ordered doses up to 2mg give IVP. Give each 0.4mg over 15 seconds in emergency situations. For non-emergent situations further dilute in 9mL of NS to facilitate titration of response.               nicotine (NICODERM CQ) 7 MG/24HR 24 hr patch 1 patch  Dose: 1 patch  Freq: DAILY Route: TD  Start: 03/11/18 1230    2235 (1 patch)-Given        2121 (1 patch)-Given        (2320)-Not Given        (2139)-Not Given        2200 (1 patch)-Given        2210 (1 patch)-Given        [ ] 2200           nicotine Patch in Place  Freq: EVERY 8 HOURS Route: TD  Start: 03/11/18 1230   Admin Instructions: Chart every shift, confirming that patch is still in place on patient (no barcode scan needed). See patch order for dose information.     0353 ( )-Patch in Place       (1203)-Not Given [C]       (2001)-Not Given [C]        0349 ( )-Given       1239 ( )-Patch in Place       1934 ( )-Negative        0444 ( )-Negative       (1259)-Not Given [C]       1955 ( )-Negative        0430 ( )-Negative       1207 ( )-Negative       1954 ( )-Negative        0430 ( )-Negative       1153 ( )-Negative               0512 ( )-Patch in Place       1253 ( )-Patch in Place       2029 ( )-Patch in Place        0424 ( )-Patch in Place       [ ] 1230       [ ] 2030           nicotine patch REMOVAL  Freq: DAILY Route: TD  Start: 03/12/18 0800   Admin Instructions: Remove patch when new patch is applied or patch is discontinued.     0847-Hold [C]       2235 (  )-Patch Removed [C]        2102 ( )-Patch Removed [C]        2321 ( )-Patch Removed        2139-Hold        2201 ( )-Patch Removed        2208 ( )-Patch Removed        [ ] 2200           nystatin (MYCOSTATIN) ointment  Freq: 2 TIMES DAILY Route: Top  Start: 03/09/18 2100   Admin Instructions: Apply to scrotum     0846 ( )-Given       (2205)-Not Given        (1205)-Not Given       2121 ( )-Given        0836 ( )-Given       1956 ( )-Given        0755 ( )-Given       1955 ( )-Given        0754 ( )-Given       1933 ( )-Given        0931 ( )-Given       2036 ( )-Given        0732 ( )-Given       [ ] 2000           ondansetron (ZOFRAN-ODT) ODT tab 4 mg  Dose: 4 mg  Freq: EVERY 6 HOURS PRN Route: PO  PRN Reasons: nausea,vomiting  Start: 03/14/18 2004   Admin Instructions: This is Step 1 of nausea and vomiting management.  If nausea not resolved in 15 minutes, go to Step 2 prochlorperazine (COMPAZINE). Do not push through foil backing. Peel back foil and gently remove. Place on tongue immediately. Administration with liquid unnecessary  With dry hands, peel back foil backing and gently remove tablet; do not push oral disintegrating tablet through foil backing; administer immediately on tongue and oral disintegrating tablet dissolves in seconds; then swallow with saliva; liquid not required.              Or  ondansetron (ZOFRAN) injection 4 mg  Dose: 4 mg  Freq: EVERY 6 HOURS PRN Route: IV  PRN Reasons: nausea,vomiting  Start: 03/14/18 2004   Admin Instructions: This is Step 1 of nausea and vomiting management.  If nausea not resolved in 15 minutes, go to Step 2 prochlorperazine (COMPAZINE).  Irritant. For ordered doses up to 4 mg, give IV Push undiluted over 2-5 minutes.               polyethylene glycol (MIRALAX/GLYCOLAX) Packet 17 g  Dose: 17 g  Freq: DAILY PRN Route: ORAL OR FEED  PRN Reason: constipation  Start: 03/09/18 1153   Admin Instructions: Give in 8oz of  water, juice, or soda. Hold for loose stools.  This is  the second step of a three step constipation treatment.  1 Packet = 17 grams. Mixed prescribed dose in 8 ounces of water. Follow with 8 oz. of water.               scopolamine (TRANSDERM) 72 hr patch 1 patch  Dose: 1 patch  Freq: EVERY 72 HOURS Route: TD  Start: 03/14/18 1430   Admin Instructions: Apply patch to skin, behind ear.  Remove every 72 hours.  Each 1.5 mg patch delivers 1 mg of scopolamine.     0019 (1 patch)-Given       1204 (1 patch)-Given                 1425 (1 patch)-Given          [ ] 1430          And  scopolamine (TRANSDERM-SCOP) Patch in Place  Freq: EVERY 8 HOURS Route: TD  Start: 03/14/18 1430   Admin Instructions: Chart every shift, confirming that patch is still in place on patient (no barcode scan needed). See patch order for dose information.     0645 ( )-Patch in Place       1630 ( )-Patch in Place       2236 ( )-Patch in Place        0913 ( )-Patch in Place       1405 ( )-Patch in Place       2305 ( )-Patch in Place        0643 ( )-Patch in Place       1549 ( )-Patch in Place       2321 ( )-Patch in Place        0748 ( )-Patch in Place       1427 ( )-Patch in Place       2348 ( )-Patch in Place        0743 ( )-Patch in Place       1358 ( )-Patch in Place       2201 ( )-Patch in Place [C]        0732 ( )-Patch in Place       1642 ( )-Patch in Place       2208 ( )-Patch in Place        0610 ( )-Patch in Place       [ ] 1430       [ ] 2230          And  scopolamine (TRANSDERM-SCOP) patch REMOVAL  Freq: EVERY 72 HOURS Route: TD  Start: 03/17/18 1430   Admin Instructions: Nurse may need to adjust schedule time to match new patch application time.  Old patch should be removed when new patch is applied.     1430 ( )-Patch Removed [C]          1427 ( )-Patch Removed          [ ] 1430           sodium chloride (PF) 0.9% PF flush 10 mL  Dose: 10 mL  Freq: ONCE Route: IK  Start: 03/09/18 1415              sodium chloride bacteriostatic 0.9 % flush 20 mL  Dose: 20 mL  Freq: ONCE Route: IV  Start:  03/09/18 1415             Discontinued Medications  Medications 03/17/18 03/18/18 03/19/18 03/20/18 03/21/18 03/22/18 03/23/18         Dose: 2-4 mg  Freq: EVERY 8 HOURS PRN Route: PO  PRN Reason: seizures  Start: 03/21/18 1802   End: 03/21/18 1802        1802-Med Discontinued

## 2018-03-09 NOTE — PLAN OF CARE
Discharge Planner SLP   Patient plan for discharge: none stated  Current status: Clinical swallow evaluation completed this afternoon at 1430. Exam significantly limited due to pt agitation, poor acceptance of PO trials. Pt presents with poor secretion management, producing significant amount of sputum and using oral suction frequently. Pt accepted single ice chip and single sip from swallow which resulted in anterior loss and overt s/s of aspiration. Pt refused further trials. Recommending continue NPO until further assessed by SLP for po readiness. SLP to follow daily.   Barriers to return to prior living situation: inadequate means of PO, medical stability  Recommendations for discharge: pending further assessment  Rationale for recommendations: pending further assessment; see above       Entered by: Maine Monreal 03/09/2018 4:59 PM

## 2018-03-09 NOTE — IP AVS SNAPSHOT
"` `           [facility]: [phone]                                              INTERAGENCY TRANSFER FORM - NURSING   3/9/2018                    Hospital Admission Date: 3/9/2018  MILTON LAMBERT   : 1960  Sex: Male        Attending Provider: Darron Humphrey MD     Allergies:  Tuberculin Tests, Ibuprofen Sodium    Infection:  None   Service:  INTERNAL MED    Ht:  1.727 m (5' 8\")   Wt:  103 kg (227 lb 1.2 oz)   Admission Wt:  110.7 kg (244 lb)    BMI:  34.53 kg/m 2   BSA:  2.22 m 2            Patient PCP Information     Provider PCP Type    Len Krishnan MD General      Current Code Status     Date Active Code Status Order ID Comments User Context       3/11/2018 12:32 PM DNR/DNI [id number]  Darron Humphrey MD Inpatient       Code Status History     Date Active Date Inactive Code Status Order ID Comments User Context    3/9/2018 11:53 AM 3/11/2018 12:32 PM Full Code [id number]  Darren Lozano MD Inpatient    2017 11:15 AM 3/9/2018 11:53 AM Full Code [id number]  Hernesto Domingeuz MD Outpatient    12/15/2017  7:44 PM 2017 11:15 AM Full Code [id number]  Yoselin Vargas MD Inpatient    2017 10:33 AM 12/15/2017  7:44 PM DNR/DNI [id number]  Miguel Valero MD Outpatient    2017  2:12 AM 2017 10:33 AM DNR/DNI [id number]  Refugio Muniz MD Inpatient    2017  2:02 AM 2017  2:12 AM Full Code [id number]  Refugio Muniz MD Inpatient    2017 12:19 PM 2017  2:02 AM DNR/DNI [id number]  Heber Rod MD Outpatient    2017  9:05 PM 2017 12:19 PM DNR/DNI [id number]  Bonifacio Le MD Inpatient    2015  1:20 PM 2017  9:05 PM DNR/DNI [id number]  Nick Quarles PA-C Outpatient    2015 12:29 AM 2015  1:20 PM DNR/DNI [id number]  Humberto Ansari, GIAN CNP Inpatient    2015 11:40 AM 2015 12:29 AM Full Code [id number]  Evelin Mai MD Outpatient    2015  9:45 PM 2015 11:40 AM " Full Code [id number]  Demetrius Corona MD Inpatient    1/26/2015  8:32 PM 1/30/2015  8:07 PM Full Code [id number]  Ephraim Kim MD Inpatient    6/30/2014 10:30 AM 1/26/2015  8:32 PM Full Code [id number]  Roger Sagastume DO Outpatient    6/23/2014  5:11 PM 6/30/2014 10:30 AM Full Code [id number]  Heber Rod MD Inpatient    6/12/2014 11:52 AM 6/23/2014  5:11 PM Full Code [id number]  Elias Staley MD Outpatient    4/29/2014 11:43 PM 6/10/2014  7:15 PM Full Code [id number]  Andrae Stover MD Inpatient    4/8/2012  2:43 AM 4/10/2012  8:45 PM Full Code [id number]  Abbie Waters RN Inpatient      Advance Directives        Scanned docmt in ACP Activity?           No scanned doc        Hospital Problems as of 3/23/2018              Priority Class Noted POA    Acute ischemic stroke (H) Medium  3/9/2018 Yes      Non-Hospital Problems as of 3/23/2018              Priority Class Noted    Seizure disorder (H) Medium  9/15/2011    Chronic fatigue Medium  9/15/2011    Low back pain Medium  9/15/2011    Mild major depression (H) Medium  9/15/2011    GERD (gastroesophageal reflux disease) Medium  9/15/2011    Hyperlipidemia LDL goal <100 Medium  9/15/2011    COPD (chronic obstructive pulmonary disease) (H) Medium  9/15/2011    Hemiplegia affecting left nondominant side (H) Medium  4/27/2013    Radicular syndrome of upper limbs Medium  4/27/2013    Neck pain Medium  4/27/2013    ACP (advance care planning) Medium  7/15/2013    Osteoarthritis of glenohumeral joint Medium  12/20/2013    Osteoarthritis of acromioclavicular joint Medium  12/20/2013    Metabolic encephalopathy Medium  6/9/2014    Health Care Home Medium  6/11/2014    Dysphagia Medium  7/2/2014    Hallucinations Medium  4/15/2015    Cough Medium  4/15/2015    Generalized muscle weakness Medium  4/15/2015    Alcohol abuse Medium  6/17/2015    Behavior problem, adult Medium  7/22/2015    Neuropathy Medium  10/14/2015    History of CVA  (cerebrovascular accident) Medium  1/11/2016    Cognitive impairment Medium  1/11/2016    Essential hypertension, benign Medium  1/11/2016    COPD exacerbation (H) Medium  4/26/2017    Benign non-nodular prostatic hyperplasia with lower urinary tract symptoms Medium  6/9/2017    Seizure (H) Medium  11/6/2017    Encephalopathy Medium  12/15/2017    Altered mental status Medium  12/16/2017      Immunizations     Name Date      Influenza (IIV3) PF 10/17/15     Influenza (IIV3) PF 11/10/14     Influenza (IIV3) PF 01/11/13     Influenza (IIV3) PF 11/09/11     Influenza Vaccine IM 3yrs+ 4 Valent IIV4 09/08/17     Influenza Vaccine IM 3yrs+ 4 Valent IIV4 11/08/16     Influenza Vaccine IM 3yrs+ 4 Valent IIV4 11/15/13     Pneumo Conj 13-V (2010&after) 09/27/15     Pneumococcal 23 valent 05/14/14     Pneumococcal 23 valent 07/15/13     TD (ADULT, 7+) 01/01/10     TDAP Vaccine (Adacel) 07/15/13          END      ASSESSMENT     Discharge Profile Flowsheet     EXPECTED DISCHARGE     SKIN      Expected Discharge Date  03/21/18 ( Hospice at [facility]) 03/20/18 1031   Inspection of bony prominences  Full 03/23/18 0947    DISCHARGE NEEDS ASSESSMENT     Inspection under devices  Full 03/23/18 0947    Equipment Currently Used at Home  wheelchair, power 12/17/17 1244   Skin WDL  ex 03/23/18 0947    # of Referrals Placed by CTS  Scheduled Follow-up appointments;Communication hand-offs to next level of Care Providers (Assess for need of ) 04/28/17 1109   Skin Color/Characteristics  bruised (ecchymotic);redness blanchable 03/23/18 0947    Equipment Used at Home  wheelchair;cane, straight 02/18/15 1237   Skin Temperature  warm 03/23/18 0947    GASTROINTESTINAL (ADULT,PEDIATRIC,OB)     Skin Moisture  dry 03/23/18 0947    GI WDL  ex 03/23/18 0947   Skin Elasticity  quick return to original state 03/21/18 1644    Last Bowel Movement  -- 03/21/18 1644   Skin Integrity  bruise(s);drain/device(s);rash(es);scab(s);scar(s) 03/23/18 0947    GI  "Signs/Symptoms  other (see comments) (No stool) 03/20/18 2037   Full except areas not inspected   Buttock, right;Buttock, left;Sacrum 03/18/18 1359    Passing flatus  -- (unknown) 03/20/18 1015   Not Inspected under devices  IV/art line hub 03/22/18 0110    COMMUNICATION ASSESSMENT     SAFETY      Patient's communication style  spoken language (English or Bilingual) 03/09/18 0855   Safety WDL  ex 03/23/18 0947    FINAL RESOURCES     All Alarms  alarm(s) activated and audible 03/23/18 0947    Resources List  Assisted Living 12/17/17 1244   Aspiration Risk Screen  swallowing difficulty 03/23/18 0947    PAS Number  [id number] 02/25/15 1627   Additional Documentation  Airway Safety Measures (Row);Aspiration Risk Screen (Row) 03/18/18 0150    Existing Resources/Services  None 02/17/15 1014   Airway Safety Measures  suction at bedside 03/23/18 0947                 Assessment WDL (Within Defined Limits) Definitions           Safety WDL     Effective: 09/28/15    Row Information: <b>WDL Definition:</b> Bed in low position, wheels locked; call light in reach; upper side rails up x 2; ID band on<br> <font color=\"gray\"><i>Item=AS safety wdl>>List=AS safety wdl>>Version=F14</i></font>      Skin WDL     Effective: 09/28/15    Row Information: <b>WDL Definition:</b> Warm; dry; intact; elastic; without discoloration; pressure points without redness<br> <font color=\"gray\"><i>Item=AS skin wdl>>List=AS skin wdl>>Version=F14</i></font>      Vitals     Vital Signs Flowsheet     VITAL SIGNS     Sleep  awake with occasional pain 03/18/18 0528    Temp  98.8  F (37.1  C) 03/16/18 1532   PAIN ASSESSMENT/NONVERBAL ICU (ADULT)      Temp src  Axillary 03/16/18 1532   Presence of Pain  No nonverbal indicators of pain present 03/23/18 0939    Resp  -- 03/23/18 0351   Assessment Indicator  PRN assessment 03/23/18 0939    Pulse  74 03/13/18 2022   Nonverbal Indicators of Pain  Moaning;Restless;Grimace 03/22/18 1954    Heart Rate  83 03/16/18 1532   " Pain Management Interventions  Single medication modality 03/22/18 1954    Pulse/Heart Rate Source  Monitor 03/14/18 1521   Response to Interventions  Absence of nonverbal indicators of pain 03/22/18 2206    BP  138/85 03/16/18 1532   ANALGESIA SIDE EFFECTS MONITORING      BP Location  Right arm 03/14/18 1521   Side Effects Monitoring: Respiratory Quality  R 03/23/18 0939    OXYGEN THERAPY     Side Effects Monitoring: Respiratory Depth  N 03/23/18 0939    SpO2  -- 03/23/18 0351   Side Effects Monitoring: Sedation Level  1 03/23/18 0939    O2 Device  -- 03/23/18 0351   HEIGHT AND WEIGHT      Oxygen Delivery  4 LPM 03/09/18 0900   Height Method  Actual 03/09/18 0900    PAIN/COMFORT     Weight  103 kg (227 lb 1.2 oz) 03/09/18 1207    Patient Currently in Pain  yes 03/23/18 0939   Weight Method  Bed scale 03/09/18 1207    Preferred Pain Scale  Adult Non-Verbal ([facility] Only) 03/23/18 0939   CLAUDIO COMA SCALE      PAINAD Negative Vocalization  1-->occasional moan/groan: low speech, negative/disapproving quality 03/22/18 1954   Best Eye Response  4-->(E4) spontaneous 03/23/18 0939    Pain Location  Leg 03/23/18 0939   Best Motor Response  6-->(M6) obeys commands 03/23/18 0939    Pain Orientation  Right;Left 03/23/18 0939   Best Verbal Response  4-->(V4) confused 03/23/18 0939    Pain Descriptors  Patient unable to describe 03/23/18 0939   Fabens Coma Scale Score  14 03/23/18 0939    Pain Management Interventions  analgesia administered 03/23/18 0939   POSITIONING      Pain Intervention(s)  Medication (See eMAR) 03/23/18 0939   Body Position  side-lying, left 03/23/18 0939    Response to Interventions  Absence of nonverbal indicators of pain 03/23/18 0939   Head of Bed (HOB)  HOB at 30-45 degrees 03/23/18 0939    CLINICALLY ALIGNED PAIN ASSESSMENT (CAPA) ([facility], [facility] AND [facility] ADULTS ONLY)     Positioning/Transfer Devices  pillows;in use 03/23/18 0939    Comfort  comfortably manageable 03/20/18 2028   DAILY CARE       Change in Pain  getting better 03/18/18 0528   Activity Management  bedrest 03/23/18 0939    Pain Control  partially effective 03/18/18 0528   Activity Assistance Provided  assistance, 2 people 03/23/18 0939    Functioning  pain keeps me from doing most of what I need to do 03/18/18 0528                 Patient Lines/Drains/Airways Status    Active LINES/DRAINS/AIRWAYS     Name: Placement date: Placement time: Site: Days: Last dressing change:    Peripheral IV 12/15/17 Right 12/15/17   1712      97     Peripheral IV 03/10/18 Right Lower forearm 03/10/18   0700   Lower forearm   13     Pressure Ulcer 05/20/14 Right Buttocks 05/20/14   2024    1402     Pressure Injury 03/09/18 Coccyx non blanchable redness Stage 1 03/09/18   1253    13     Wound 12/19/15 Right;Upper Buttocks Suspected pressure ulcer dime size nonblanchable erythema 12/19/15   0045   Buttocks   825     Wound 12/19/15 Posterior Knee Suspected pressure ulcer nonblanchable line of erythema 12/19/15   0045   Knee   825     Wound 12/19/15 Left;Outer Ankle Suspected pressure ulcer nonblanchable erythema dime size 12/19/15   0045   Ankle   825     Wound 12/19/15 Right Heel Other (comment) blisters on heel of foot 12/19/15   0045   Heel   825     Wound 12/19/15 Right;Anterior Leg scrapes on R leg 12/19/15   0045   Leg   825     Wound 04/27/17 Left;Inner;Right Foot Other (comment) Left foot blister, Right foot scabbing 04/27/17   0001   Foot   330     Wound 11/06/17 Left;Right;Lower Leg Abrasion(s) multiple abrasions from fall pta 11/06/17   0200   Leg   137     Wound 12/15/17 Left Knee abrasions and skin tears 12/15/17   2000   Knee   97     Wound 03/12/18 Left Knee Abrasion(s) 03/12/18   0800   Knee   11     Wound 03/21/18 Right Foot 03/21/18   1643   Foot   1     Incision/Surgical Site 02/28/13 Rectum 02/28/13   1415    1848             Patient Lines/Drains/Airways Status    Active PICC/CVC     None            Intake/Output Detail Report     None      Last  Void/BM       Most Recent Value    Urine Occurrence 1 at 03/23/2018 0500    Stool Occurrence 0 at 03/18/2018 1800      Case Management/Discharge Planning     Case Management/Discharge Planning Flowsheet     REFERRAL INFORMATION     Equipment Used at Home  wheelchair;cane, straight 02/18/15 1237    Arrived From  other (see comments) ([facility] ) 12/17/17 1244   FINAL RESOURCES      # of Referrals Placed by CTS  Scheduled Follow-up appointments;Communication hand-offs to next level of Care Providers (Assess for need of ) 04/28/17 1109   Equipment Currently Used at Home  wheelchair, power 12/17/17 1244    Primary Care Clinic Name  [facility] [phone] 12/17/17 1244   Resources List  Assisted Living 12/17/17 1244    Primary Care MD Name  Len Krishnan 12/17/17 1244   PAS Number  [id number] 02/25/15 1627    LIVING ENVIRONMENT     Existing Resources/Services  None 02/17/15 1014    Lives With  facility resident 03/09/18 1250   MH/BH CAREGIVER      Living Arrangements  assisted living 12/17/17 1244   Filed Complexity Screen Score  14 03/12/18 0813    COPING/STRESS     ABUSE RISK SCREEN      Major Change/Loss/Stressor  hospitalization 03/09/18 1254   QUESTION TO PATIENT:  Has a member of your family or a partner(now or in the past) intimidated, hurt, manipulated, or controlled you in any way?  patient declined to answer or is unable to answer 03/09/18 0857    EXPECTED DISCHARGE     QUESTION TO PATIENT: Do you feel safe going back to the place where you are living?  patient declined to answer or is unable to answer 03/09/18 0857    Expected Discharge Date  03/21/18 ( Hospice at [facility]) 03/20/18 1031   OTHER      DISCHARGE PLANNING     Are you depressed or being treated for depression?  No ([facility]) 03/13/18 0207

## 2018-03-09 NOTE — PROGRESS NOTES
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 Per EMS report, the patient is currently living at [facility] living [facility]. Staff called EMS yesterday as they were concerned about respiratory distress secondary to pneumonia. EMS arrived and the patient was both verbally and physically combative refusing transportation for evaluation. He was alert and oriented at that time. Today, EMS received another call from assisted living staff as the patient was audibly gurgling, unable to speak, and continuing to be combative. EMS arrived, gave him a DuoNeb and noted that he coughed up nearly one cup of mucus. He was transported to the Emergency Department for evaluation. The patient is unable to provide any history. Of note, the patient was [place] [facility] [place]opathy versus delirium secondary to neurocognitive decline which resolved spontaneously. Clinical interview not completed due to only vocalization of groaning or pointing during attempts to communicate. Pt significantly bothered by SLP precense and agitated. Pt has hx of beinge evaluated during prior admission, and was recommended regular diet and thin lqiuids at that time.    Clinical Swallow Evaluation   Oral Musculature unable to  assess due to poor participation/comprehension   Secretion Management problems swallowing secretions  (frequent yaunker use)   Mucosal Quality adequate   Oral Musculature Comments limited due to agitation, poor command following. Baseline L facial droop present   Additional Documentation Yes   Clinical Swallow Eval: Thin Liquid Texture Trial   Mode of Presentation, Thin Liquids spoon;straw   Volume of Liquid or Food Presented 1 ice chip; 1 sip via straw   Oral Phase of Swallow Poor AP movement;Residue in oral cavity   Oral Residue left lip drooling   Pharyngeal Phase of Swallow coughing/choking   Diagnostic Statement overt s/s of aspiration with limited trials. Pt pushed away SLP hand at attempts for further PO trials. Exam significantly limited   General Therapy Interventions   Planned Therapy Interventions Dysphagia Treatment   Dysphagia treatment Instruction of safe swallow strategies  (ongoing assessment for PO readiness)   Swallow Eval: Clinical Impressions   Skilled Criteria for Therapy Intervention Skilled criteria met.  Treatment indicated.   Functional Assessment Scale (FAS) 3   Treatment Diagnosis moderate oropharyngeal dysphagia suspected   Diet texture recommendations NPO   Therapy Frequency daily   Predicted Duration of Therapy Intervention (days/wks) 2 weeks   Risks and Benefits of Treatment have been explained. Yes   Patient, family and/or staff in agreement with Plan of Care Yes   Clinical Impression Comments Clinical swallow evaluation completed this afternoon at 1430. Exam significantly limited due to pt agitation, poor acceptance of PO trials. Pt presents with poor secretion management, producing significant amount of sputum and using oral suction frequently. Pt accepted single ice chip and single sip from swallow which resulted in anterior loss and overt s/s of aspiration. Pt refused further trials. Recommending continue NPO until further assessed by SLP for po readiness. SLP to follow daily.     Total Evaluation Time   Total Evaluation Time (Minutes) 10

## 2018-03-09 NOTE — H&P
General acute hospital, [place]      Neurology Stroke Admission    Patient Name: David Dawn  : 1960 MRN#: [id number]    STROKE DATA    Stroke Code:  Time called:  2018  Time patient seen:  2018 09  Onset of symptoms:  2018 unknown, presumed 10:01pm  Last known normal (pt's baseline):  3/8/18 10:00pm  Head CT read by Dr. Bullard at:  2018  Stroke Code de-escalated at 2018 after discussion with Dr. Bullard due to patient is outside emergent treatment time parameters.      TPA treatment:  Not given due to outside the time window.      Stroke Scales      National Institutes of Health Stroke Scale (at presentation)   NIHSS done at:  time patient seen      Score    Level of consciousness:  (0)   Alert, keenly responsive    LOC questions:  (2)   Answers neither question correctly    LOC commands:  (2)   Performs neither task correctly     Best gaze:  (0)   Normal    Visual:  (1)   Partial hemianopia    Facial palsy:  (1)   Minor paralysis (flat nasolabial fold, smile asymmetry)    Motor arm (left):  (3)   No effort against gravity    Motor arm (right):  (0)   No drift    Motor leg (left):  (4)   No movement    Motor leg (right):  (0)   No drift    Limb ataxia:  (0)   Absent / unable to fully assess due to AMS    Sensory:  (0)   Normal- no sensory loss    Best language:  (3)   Mute- global aphasia    Dysarthria:  (2)   Severe dysaphria    Extinction and inattention:  (0)   No abnormality        NIHSS Total Score:  18          Dysphagia Screen  Time of screening: unable to assess due to AMS / aphasia / dysarthria  Screening results: Dysarthria present - maintain NPO, consult SLP     ASSESSMENT & PLAN BY PROBLEM     Work-up for Ischemic Stroke (no TPA) vs stroke-mimic (e.g. seizure)     Acute Ischemic Stroke (without tPA) Plan vs. Seizure vs. Stroke mimic. Unclear etiology. Hx of szs as well as multiple recent hospitalizations for AMS / spells, but these  usually resolve w/ speech intact / not this severely impaired. NIH today 18, chronically elevated due to previous R MCA stroke w/ persisting L hemiplegia, also possibly falsely elevated due to aphasia/agitation/AMS. 1 CT/CTA negative. LKN 10pm 3/9/18, outside of tpa window. Not likely LVO w/ intact RUE + RLE strength.     - Admit to Neurology/Stroke  - Neurochecks  - Permissive HTN; labetalol PRN for SBP > 220  - Euthermia, Euglycemia  - NPO due to aphasia / dysarthria, will start AP vs AC pending further evaluation (previously on plavix)  - Statin pending passing speech  - MRI/MRA Stroke Protocol  - TTE with Bubble Study: limited study, but atrial septum intact.   - Telemetry, EKG  - Bedside Glucose Monitoring  - A1c: 5.7  - Lipid Panel: pending  - Troponin x 3: ) <0.015 -> 0.0  - PT/OT/SLP  - PM&R  - Stroke Education  - Smoking Cessation  - Depression Screen  - Apnea Screen    During initial physical assessment, the plan of care was discussed and developed with patient.  Plan of care includes: admit for further evaluation..    Patient was admitted via [facility] ED ([place]).    The patient will be admitted to the Neuro Critical Care/Stroke team..     Other Medical Problems:  #AMS: unknown etiology, present w/ agitation (appears chronic), aphasic (not typically present). Concern for stroke w/ aphasia, although not likely/consistent presentation of  Aphasia w/o associated weakness. Hx of szs, resp acidosis + marginally elevated lactate on ISTAT; Valproic + Carbamazepine in therapeutic range, Pending Keppra, did have mildly elevated lactic acid + acidosis on admission. Hx of recent cough, increased secretions, fever sxs; no fever, tachycardia, desat or leukocytosis. Hx of tooth pain + cold/flu symptoms, but no vegetations seen on ECHO.    - EEG placed, pending initial read, Valp + carb levels wnl, keppra pending.  - CXR w/o e/o infection, procal negative, mildly altered ISTAT gases w/ acidosis + mild CO2 retention,  repeat shows improvement; sputum culture + gram stain pending  - UA unremarkable  - will continue to follow    #Hx of COPD  #Increased secretions/ possible inability to clear secretions appropriately: reports of resp distress /difficulty breathing at DEE, has increased secretions here + wheezes on exam, not allowing providers help him suction but will do himself. No desat, mildly abnormal VBG. CXR clear. Procal negative. Question/concern for COPD exacerbation vs. AMS contributing to resp distress + secretion clearance.  - Sputum culture + gram stain  - duoneb q4H  - albuterol neb Q4H prn  - consider predn burst tomorrow.   - continuous pulse ox  - will continue to follow    #Sz d/o following previous stroke: depakote + tegretol levels therapeutic, keppra pending  - transition pta AEDs to IV for the time being    Other Chronic Conditions: Will continue pta meds as described below/per med rec upon passing speech eval    #Lower extremity edema: on pta lasix  - will consider transitioning to IV tomorrow    #Agitation / aggressive behavior  #Depression / mood disturbance  - holding pta citalopram / gabapentin / quetiapine   - zyprexa PRN    #HTN:   - allowing for permissive htn, will restart amlodipine upon ability to PO  - prn hydral + labet for BP >220    #BPH:  - holding pta tamsulosin  - will need to evaluate for retention if continues to be unable to PO    #Constipation  - prn bowel regimen      Fluids/Electrolytes/Nutrition  0.9% sodium chloride @ 50 mL/hr  Avoid hypotonic fluids.    Nutrition:   Active Diet Order      NPO for Medical/Clinical Reasons Except for: No Exceptions    Prophylaxis            For VTE Prevention:  - heparin SQ  - pneumatic compression device    For Acid Suppression:  - pantoprazole    Code Status  Full code, based on historical since unable to assess/aphasic agitated altered    HPI  David Dawn is a 58 year old male with hx of COPD, HTN, TBI, R frontal stroke c/b persistent L-sided  hemiplegia, sz, agitation/abusive behavior/personality alteration, Cheyne-Escobedo respiration who presents w/ altered mental status. Patient is aphasic/dysarthric and unable to provide history or respond appropriately to questions.     Per EMR + [facility] staff, patient was first evaluated by EMS 3/8 due to staff's concern for wheezing, respiratory distress, altered mental status (driving chair into wall) and possible pneumonia; upon EMS evaluation at [facility] yesterday, patient was combative + refused transport/further eval but otherwise alert + oriented. This morning, [facility] staff noted patient to be having difficulty breathing, audibly gurgling, unable to speak yet continued to be agitated/combative. Given Duoneb en route w/ subsequent prolif mucus production. Presented to ED altered, unable to provide hx.     Has been admitted 4-5 times in the past 2mo for similar patterns of altered mental status. Per recent office visit, patient has 2 different spell types - 1) staring spells, not responsive for ~1min w/o postictal, ~twice a week 2) loss of awareness, lasting few hours to day (main reason for recent hospitalization. Last hospitalization 12/9-12/17 at [facility] for AMS EEG was done, which showed mod-severe gen slowing of background activities; no epileptiform changes; this AMS was thought to be related to oxycodone + seroquel at that time - AMS improved after these were decreased. Since this hospitalization, no reported further spells but continued verbally abusive/irritable.    Hx of L hemiparesis, chronic pain in feet + L arm, cervicalgia w/ left radiculopathy. On+off paresthesias.     Most recent medication changes: decreased oxycodone to 2.5mg BID, decreased seroquel 100mg BID (both decreased during most recent admission); Gabapentin 600 increased TID to QID (changed 1/18/18).    Pertinent Past Medical/Surgical History  Past Medical History:   Diagnosis Date     BPH (benign prostatic hyperplasia) 9/15/2011     COPD  (chronic obstructive pulmonary disease) (H) 9/15/2011     Edema      ETOH abuse      GERD (gastroesophageal reflux disease) 9/15/2011     HTN (hypertension) 6/14/2014     Hyperlipidemia LDL goal <100 9/15/2011     Impulse control disorder      Mild major depression (H) 9/15/2011     Positive TB test      Seizures (H) 9/15/2011     Unspecified cerebral artery occlusion with cerebral infarction        Past Surgical History:   Procedure Laterality Date     COLONOSCOPY  12/19/2012     COLONOSCOPY  8/6/2014    Procedure: COMBINED COLONOSCOPY, SINGLE BIOPSY/POLYPECTOMY BY BIOPSY;  Surgeon: Jose Elias Mclain MD;  Location:  GI     EXCISE TUMOR RECTUM VILLUS  2/28/2013    Procedure: EXCISE TUMOR RECTUM VILLOUS;  Trans Anal Excision Rectal Villous Tumor, Proctoscopy;  Surgeon: Milton Jacobs MD;  Location:  OR     KNEE SURGERY       NECK SURGERY       SIGMOIDOSCOPY FLEXIBLE  1/31/2013    Procedure: SIGMOIDOSCOPY FLEXIBLE;   flexible sigmoidoscopy with anoscope;  Surgeon: Milton Jacobs MD;  Location:  GI     SIGMOIDOSCOPY FLEXIBLE  4/25/2013    Procedure: SIGMOIDOSCOPY FLEXIBLE;  SIGMOIDOSCOPY FLEXIBLE;  Surgeon: Milton Jacobs MD;  Location:  GI       Medications:   Current Facility-Administered Medications   Medication     0.9% sodium chloride BOLUS     Current Outpatient Prescriptions   Medication Sig     ACETAMINOPHEN PO Take 650 mg by mouth every 4 hours as needed for pain     diclofenac (VOLTAREN) 1 % GEL topical gel Apply 2 g topically as needed (apply to hands for osteoarthritis pain)     diclofenac (VOLTAREN) 1 % GEL topical gel Place 4 g onto the skin as needed (apply to knees for osteoarthritis pain)     loperamide (IMODIUM) 2 MG capsule Take 4 mg by mouth 2 times daily     loperamide (IMODIUM) 2 MG capsule Take 2 mg by mouth 4 times daily as needed for diarrhea     citalopram (CELEXA) 20 MG tablet TAKE 1 TABLET BY MOUTH ONCE DAILY     carBAMazepine (TEGRETOL) 200 MG tablet TAKE 1 TABLET BY MOUTH  THREE TIMES DAILY     oxyCODONE IR (ROXICODONE) 5 MG tablet TAKE ONE-HALF TABLET (2.5MG) BY MOUTH TWICE DAILY **CAUTION: OPIOID. RISK OF OVERDOSE AND ADDICTION**     XOPENEX HFA 45 MCG/ACT Inhaler INHALE 2 PUFFS INTO THE LUNGS EVERY 6 HOURS AS NEEDED FOR SHORTNESS OF BREATH, DYSPNEA OR WHEEZING     divalproex sodium delayed-release (DEPAKOTE) 250 MG DR tablet TAKE 1 TABLET BY MOUTH TWICE DAILY (TAKE WITH 1500MG FOR A TOTAL OF 1750MG)     levETIRAcetam 1000 MG TABS TAKE 1 TABLET BY MOUTH TWICE DAILY     gabapentin (NEURONTIN) 600 MG tablet Take 1 tablet (600 mg) by mouth 4 times daily (before meals and nightly)     QUEtiapine (SEROQUEL) 100 MG tablet TAKE 1 TABLET BY MOUTH TWICE DAILY     lidocaine (XYLOCAINE) 2 % topical gel Apply to rectum four times daily as needed     ipratropium - albuterol 0.5 mg/2.5 mg/3 mL (DUONEB) 0.5-2.5 (3) MG/3ML neb solution Take 1 vial by nebulization 3 times daily     furosemide (LASIX) 20 MG tablet TAKE 1 TABLET BY MOUTH ONCE DAILY     divalproex (DEPAKOTE) 500 MG EC tablet TAKE THREE TABLETS (1500MG) BY MOUTH TWICE DAILY (TAKE WITH 250MG FOR A TOTAL OF 1750MG)     mineral oil-hydrophilic petrolatum (AQUAPHOR) Apply topically daily to left wound.     pramoxine (SARNA SENSITIVE) 1 % LOTN lotion Apply topically as needed for itching (on posterior ears and hands)      Sunscreens (AVEENO DAILY MOISTURIZER) LOTN Apply daily to hands and ears for dry skin.     order for DME Power wheelchair     simvastatin (ZOCOR) 40 MG tablet TAKE 1 TABLET BY MOUTH AT BEDTIME     baclofen (LIORESAL) 20 MG tablet TAKE 1 TABLET BY MOUTH THREE TIMES DAILY     clopidogrel (PLAVIX) 75 MG tablet Take 1 tablet (75 mg) by mouth daily     vitamin D (ERGOCALCIFEROL) 90396 UNIT capsule TAKE ONE CAPSULE BY MOUTH ONCE WEEKLY ON MONDAY     amLODIPine (NORVASC) 10 MG tablet TAKE 1 TABLET BY MOUTH ONCE DAILY     niacin 500 MG CR capsule TAKE 1 CAPSULE BY MOUTH AT BEDTIME (Patient taking differently: TAKE 1 CAPSULE BY  MOUTH EVERY MORNING)     potassium chloride (K-TAB,KLOR-CON) 10 MEQ tablet TAKE 1 TABLET BY MOUTH ONCE DAILY     tamsulosin (FLOMAX) 0.4 MG capsule TAKE 1 CAPSULE BY MOUTH ONCE DAILY     budesonide (PULMICORT) 0.25 MG/2ML neb solution NEBULIZE THE CONTENT OF 1 VIAL TWICE DAILY FOR COPD     Respiratory Therapy Supplies (NEBULIZER/ADULT MASK) KIT 1 Device 4 times daily.   .    Allergies:   Allergies   Allergen Reactions     Tuberculin Tests      Ibuprofen Sodium Diarrhea   .    Family History:   Family History   Problem Relation Age of Onset     HEART DISEASE Mother      MI     CEREBROVASCULAR DISEASE Father      alzheimers disease     CEREBROVASCULAR DISEASE Brother      Neurologic Disorder Brother      stroke     Neurologic Disorder Son      seizure     CANCER Sister    .    Social History:   Social History   Substance Use Topics     Smoking status: Current Every Day Smoker     Packs/day: 1.00     Years: 35.00     Types: Cigarettes     Smokeless tobacco: Never Used     Alcohol use No      Comment: quit 20 years ago.   .    Tobacco use: yes, chronic    ROS:  Review of systems not obtained due to patient factors - confusion, lack of cooperation and mental status    PHYSICAL EXAMINATION  Vital Signs:  B/P: 129/83,  T: Data Unavailable,  P: 83,  R: 14    General:  lying in bed, angry, agitated , nonverbal   HEENT:  normocephalic/atraumatic  Cardio:  RRR  Pulmonary:  wheezing present, coarse b/l, transmitted upper airway sounds  Abdomen:  soft, non-tender, non-distended, bowel sounds present, obese  Extremities:  no edema  Skin:  intact, warm/dry     Neurologic  Mental Status:  alert, aphasic/ unable to communicate w/ nodding or other nonverbal. Patient highly agitated, swinging at providers  Cranial Nerves:  blinked to threat on R, not on L. PERRL. mild L lower facial droop.   Motor:  No movement in LLE. Strong in RUE + RLE, antigravity in LUE.  Sensory:  w/d / moves to noxious stimuli in all 4  extremities.  Coordination:  unable to assess  Station/Gait:  unable to test, hx of L hemiplegia    Labs  Labs and Imaging reviewed and used in developing the plan; pertinent results included.     Lab Results   Component Value Date    GLC 94 03/09/2018       The patient was discussed with the fellow, Dr. Bullard.  The staff is Dr. Rich.    Darren Lozano MD  Pager: [phone]

## 2018-03-09 NOTE — ED NOTES
[facility], [place]   ED Nurse to Floor Handoff     David Dawn is a 58 year old male who speaks English and lives alone,  in a nursing home  They arrived in the ED by ambulance from home    ED Chief Complaint: Altered Mental Status and Respiratory Distress    ED Dx;   Final diagnoses:   Altered mental status, unspecified altered mental status type   Speech disturbance, unspecified type   History of CVA (cerebrovascular accident) - with baseline left hemiplegia         Needed?: No    Allergies:   Allergies   Allergen Reactions     Ibuprofen Sodium Diarrhea     Tuberculin Tests    .  Past Medical Hx:   Past Medical History:   Diagnosis Date     BPH (benign prostatic hyperplasia) 9/15/2011     COPD (chronic obstructive pulmonary disease) (H) 9/15/2011     Edema      ETOH abuse      GERD (gastroesophageal reflux disease) 9/15/2011     HTN (hypertension) 6/14/2014     Hyperlipidemia LDL goal <100 9/15/2011     Impulse control disorder      Mild major depression (H) 9/15/2011     Positive TB test      Seizures (H) 9/15/2011     Unspecified cerebral artery occlusion with cerebral infarction       Baseline Mental status: cognitively impaired  Current Mental Status changes: combative    Infection: No  Sepsis suspected: No  Isolation type: No active isolations     Activity level - Baseline/Home:  Unable to Assess  Activity Level - Current:   Total Care    Bariatric equipment needed?: No    In the ED these meds were given:   Medications   0.9% sodium chloride BOLUS (not administered)   sodium chloride (PF) 0.9% PF flush 90 mL (90 mLs Intravenous Given 3/9/18 0917)   iopamidol (ISOVUE-370) solution 75 mL (75 mLs Intravenous Given 3/9/18 0917)       Drips running?  NS bolus    Home pump or pre-existing LDA's present? No    Labs results:   Labs Ordered and Resulted from Time of ED Arrival Up to the Time of Departure from the ED   BASIC METABOLIC PANEL - Abnormal; Notable for the  following:        Result Value    Chloride 110 (*)     All other components within normal limits   CBC WITH PLATELETS - Abnormal; Notable for the following:     RBC Count 3.95 (*)      (*)     MCH 34.4 (*)     All other components within normal limits   ISTAT  GASES LACTATE TRACIE POCT - Abnormal; Notable for the following:     Ph Venous 7.31 (*)     PCO2 Venous 59 (*)     PO2 Venous 23 (*)     Bicarbonate Venous 30 (*)     Lactic Acid 2.3 (*)     All other components within normal limits   GLUCOSE MONITOR NURSING POCT   CREATININE POCT   INR   TROPONIN I   GLUCOSE BY METER   INR POINT OF CARE   CARBAMAZEPINE TOTAL   VALPROIC ACID   PARTIAL THROMBOPLASTIN TIME   KEPPRA (LEVETIRACETAM) LEVEL   CBC WITH PLATELETS DIFFERENTIAL   BASIC METABOLIC PANEL   ROUTINE UA WITH MICROSCOPIC   PULSE OXIMETRY NURSING   NOTIFY   ISTAT INR NURSING POCT   ISTAT TROPONIN NURSING POCT   VITAL SIGNS AND NEURO CHECKS   ACTIVITY   ASSESSMENT   TROPONIN POCT   URINE CULTURE AEROBIC BACTERIAL       Imaging Studies: No results found for this or any previous visit (from the past 24 hour(s)).    Recent vital signs:   /83  Pulse 83  Resp 14  Wt 110.7 kg (244 lb)  SpO2 96%  BMI 37.1 kg/m2    Cardiac Rhythm: Normal Sinus  Pt needs tele? No  Skin/wound Issues: None    Code Status: unknown    Pain control: pt had none    Nausea control: pt had none    Abnormal labs/tests/findings requiring intervention: na    Family present during ED course? No   Family Comments/Social Situation comments: from NH, per report they are looking for higher level of care facility    Tasks needing completion: ua-sent    Litzy Delgadillo, RN    [phone] [facility] ED

## 2018-03-09 NOTE — ED PROVIDER NOTES
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 Per EMS report, the patient is currently living at [facility] living [facility]. Staff called EMS yesterday as they were concerned about respiratory distress secondary to pneumonia. EMS arrived and the patient was both verbally and physically combative refusing transportation for evaluation. He was alert and oriented at that time. Today, EMS received another call from assisted living staff as the patient was audibly gurgling, unable to speak, and continuing to be combative. EMS arrived, gave him a DuoNeb and noted that he coughed up nearly one cup of mucus. He was transported to the Emergency Department for evaluation. The patient is unable to provide any history. Of note, the patient [place] [facility] [place] versus delirium secondary to neurocognitive decline which resolved spontaneously.    Past Medical History:   Diagnosis Date     BPH (benign prostatic hyperplasia) 9/15/2011     COPD (chronic obstructive pulmonary disease) (H) 9/15/2011     Edema      ETOH abuse      GERD (gastroesophageal reflux disease) 9/15/2011     HTN (hypertension) 6/14/2014     Hyperlipidemia LDL goal <100 9/15/2011     Impulse control disorder      Mild major depression (H) 9/15/2011     Positive TB test      Seizures (H) 9/15/2011     Unspecified cerebral artery occlusion with cerebral infarction        Past Surgical History:   Procedure Laterality Date     COLONOSCOPY  12/19/2012     COLONOSCOPY  8/6/2014    Procedure: COMBINED COLONOSCOPY,  SINGLE BIOPSY/POLYPECTOMY BY BIOPSY;  Surgeon: Jose Elias Mclain MD;  Location:  GI     EXCISE TUMOR RECTUM VILLUS  2/28/2013    Procedure: EXCISE TUMOR RECTUM VILLOUS;  Trans Anal Excision Rectal Villous Tumor, Proctoscopy;  Surgeon: Milton Jacobs MD;  Location:  OR     KNEE SURGERY       NECK SURGERY       SIGMOIDOSCOPY FLEXIBLE  1/31/2013    Procedure: SIGMOIDOSCOPY FLEXIBLE;   flexible sigmoidoscopy with anoscope;  Surgeon: Milton Jacobs MD;  Location:  GI     SIGMOIDOSCOPY FLEXIBLE  4/25/2013    Procedure: SIGMOIDOSCOPY FLEXIBLE;  SIGMOIDOSCOPY FLEXIBLE;  Surgeon: Milton Jacobs MD;  Location:  GI       Family History   Problem Relation Age of Onset     HEART DISEASE Mother      MI     CEREBROVASCULAR DISEASE Father      alzheimers disease     CEREBROVASCULAR DISEASE Brother      Neurologic Disorder Brother      stroke     Neurologic Disorder Son      seizure     CANCER Sister        Social History   Substance Use Topics     Smoking status: Current Every Day Smoker     Packs/day: 1.00     Years: 35.00     Types: Cigarettes     Smokeless tobacco: Never Used     Alcohol use No      Comment: quit 20 years ago.       Current Facility-Administered Medications   Medication     0.9% sodium chloride BOLUS     medication instruction     senna-docusate (SENOKOT-S;PERICOLACE) 8.6-50 MG per tablet 1 tablet    Or     senna-docusate (SENOKOT-S;PERICOLACE) 8.6-50 MG per tablet 2 tablet     polyethylene glycol (MIRALAX/GLYCOLAX) Packet 17 g     bisacodyl (DULCOLAX) Suppository 10 mg     ondansetron (ZOFRAN-ODT) ODT tab 4 mg    Or     ondansetron (ZOFRAN) injection 4 mg     prochlorperazine (COMPAZINE) injection 10 mg    Or     prochlorperazine (COMPAZINE) tablet 10 mg    Or     prochlorperazine (COMPAZINE) Suppository 25 mg     metoclopramide (REGLAN) tablet 10 mg    Or     metoclopramide (REGLAN) injection 10 mg     labetalol (NORMODYNE/TRANDATE) injection 10-20 mg     hydrALAZINE (APRESOLINE) injection 10-20  mg     acetaminophen (TYLENOL) Suppository 650 mg     OLANZapine (zyPREXA) injection 5-10 mg     sodium chloride 0.9% infusion     sodium chloride (PF) 0.9% PF flush 10 mL     sodium chloride bacteriostatic 0.9 % flush 20 mL     LORazepam (ATIVAN) injection 1 mg        Allergies   Allergen Reactions     Tuberculin Tests      Ibuprofen Sodium Diarrhea         I have reviewed the Medications, Allergies, Past Medical and Surgical History, and Social History in the Epic system.    Review of Systems   Unable to perform ROS: Mental status change       Physical Exam   BP: 151/82  Pulse: 83  Heart Rate: 83  Temp: 97.1  F (36.2  C)  Resp: 12  Weight: 110.7 kg (244 lb)  SpO2: 97 %      Physical Exam   Constitutional: No distress.   HENT:   Head: Atraumatic.   Mouth/Throat: Oropharynx is clear and moist. No oropharyngeal exudate.   Eyes: Pupils are equal, round, and reactive to light. No scleral icterus.   Neck: Neck supple. No JVD present.   Cardiovascular: Normal rate, normal heart sounds and intact distal pulses.  Exam reveals no gallop and no friction rub.    No murmur heard.  Pulmonary/Chest: Effort normal and breath sounds normal. No respiratory distress. He has no wheezes. He has no rales. He exhibits no tenderness.   Abdominal: Soft. Bowel sounds are normal. He exhibits no distension and no mass. There is no tenderness. There is no rebound and no guarding.   Musculoskeletal: He exhibits no edema or tenderness.   Neurological: He is alert. No cranial nerve deficit.   ?aphasic-new but not clear exact onset per Medics, left hemiplegia-that is old   Skin: Skin is warm. No rash noted. He is not diaphoretic.       ED Course     ED Course     Procedures             EKG Interpretation:      Interpreted by Sadie Emerson MD   Time reviewed: 9:00 AM  Symptoms at time of EKG: AMS   Rhythm: normal sinus   Rate: 84  Axis: normal  Ectopy: none  Conduction: normal  ST Segments/ T Waves: No ST-T wave changes  Q Waves: none  Comparison  to prior: Unchanged from 12/2017    Clinical Impression: normal EKG      Critical Care time:  was 40 minutes for this patient excluding procedures.     The patient has stroke symptoms:           ED Stroke specific documentation           NIHSS PDF          Protocol PDF     Patient last known well time: on arrival    ED Provider first to bedside at: on arrival  CT Results received at: 9:31  Patient was not treated with TPA due to the following reason(s):  Out of the treatment time window since onset not clear    National Institutes of Health Stroke Scale (Baseline)  Time Performed: on arrival         Stroke Mimics were considered (including migraine headache, seizure disorder, hypoglycemia (or hyperglycemia), head or spinal trauma, CNS infection, Toxin ingestion and shock state (e.g. sepsis) .                       Results for orders placed or performed during the hospital encounter of 03/09/18 (from the past 24 hour(s))   Basic metabolic panel   Result Value Ref Range    Sodium 144 133 - 144 mmol/L    Potassium 4.5 3.4 - 5.3 mmol/L    Chloride 110 (H) 94 - 109 mmol/L    Carbon Dioxide 26 20 - 32 mmol/L    Anion Gap 8 3 - 14 mmol/L    Glucose 94 70 - 99 mg/dL    Urea Nitrogen 15 7 - 30 mg/dL    Creatinine 0.78 0.66 - 1.25 mg/dL    GFR Estimate >90 >60 mL/min/1.7m2    GFR Estimate If Black >90 >60 mL/min/1.7m2    Calcium 8.8 8.5 - 10.1 mg/dL   CBC with platelets   Result Value Ref Range    WBC 6.5 4.0 - 11.0 10e9/L    RBC Count 3.95 (L) 4.4 - 5.9 10e12/L    Hemoglobin 13.6 13.3 - 17.7 g/dL    Hematocrit 42.4 40.0 - 53.0 %     (H) 78 - 100 fl    MCH 34.4 (H) 26.5 - 33.0 pg    MCHC 32.1 31.5 - 36.5 g/dL    RDW 13.1 10.0 - 15.0 %    Platelet Count 188 150 - 450 10e9/L   INR   Result Value Ref Range    INR 1.04 0.86 - 1.14   Troponin I   Result Value Ref Range    Troponin I ES <0.015 0.000 - 0.045 ug/L   Carbamazepine total   Result Value Ref Range    Carbamazepine Level 8.6 4.0 - 12.0 mg/L   Valproic acid   Result  Value Ref Range    Valproic Acid Level 76 50 - 100 mg/L   Partial thromboplastin time   Result Value Ref Range    PTT 29 22 - 37 sec   Hemoglobin A1c   Result Value Ref Range    Hemoglobin A1C 5.7 4.3 - 6.0 %   Glucose by meter   Result Value Ref Range    Glucose 79 70 - 99 mg/dL   INR point of care   Result Value Ref Range    INR Point of Care 1.1 0.86 - 1.14   Creatinine POCT   Result Value Ref Range    Creatinine 0.8 0.66 - 1.25 mg/dL    GFR Estimate >90 >60 mL/min/1.7m2    GFR Estimate If Black >90 >60 mL/min/1.7m2   ISTAT gases lactate jennifer POCT   Result Value Ref Range    Ph Venous 7.31 (L) 7.32 - 7.43 pH    PCO2 Venous 59 (H) 40 - 50 mm Hg    PO2 Venous 23 (L) 25 - 47 mm Hg    Bicarbonate Venous 30 (H) 21 - 28 mmol/L    O2 Sat Venous 33 %    Lactic Acid 2.3 (H) 0.7 - 2.1 mmol/L   Troponin POCT   Result Value Ref Range    Troponin I 0.00 0.00 - 0.10 ug/L   CT Head Neck Angio w/o & w Contrast    Narrative    CTA ANGIOGRAM HEAD NECK 3/9/2018 9:31 AM    Head CT without contrast  CT angiogram of the neck   CT angiogram of the base of the brain with contrast  Reconstruction by the Radiologist on the 3D workstation    Provided History:  Evaluate for dissection/thromboembolism;     Comparison:  12/15/2017.      Technique:  HEAD CT:  Using multidetector thin collimation helical acquisition  technique, axial, coronal and sagittal CT images from the skull base  to the vertex were obtained without intravenous contrast.   HEAD and NECK CTA: During rapid bolus intravenous injection of  nonionic contrast material, axial images were obtained using thin  collimation multidetector helical technique from the base of the skull  through the [place]. This CT angiogram data was reconstructed  at thin intervals with mild overlap. Images were sent to the [employer]  workstation, and 3D reconstructions were obtained. The axial source  images, multiplanar reformations, 3D reconstructions in both maximum  intensity projection  display and volume rendered models were reviewed,  with reconstructions performed by the technologist and the  radiologist.    Contrast: 75 cc of isovue 370    Findings:  Head CT: Unchanged encephalomalacia in the right frontal lobe. There  is no intracranial hemorrhage, mass effect, or midline shift.  Ventricles are proportionate to the cerebral sulci. Mucosal thickening  of the bilateral maxillary sinus.     Head CTA demonstrates high-grade narrowing of the cavernous segment of  the left internal carotid artery, unchanged with about a 65-75%  stenosis at the mid cavernous segment. The anterior communicating  artery is patent. The posterior communicating arteries are patent  bilaterally. Right MCA is again tiny or attenuated post remote  infarctions. Small caliber of basilar artery, though patent.    Neck CTA demonstrates unchanged chronic occlusion of the right  internal carotid artery from the bifurcation through the supraclinoid  internal carotid artery. The origins of the great vessels from the  aortic arch show moderate to severe atherosclerosis, though are  patent. The normal distal left internal carotid artery measures 4 mm.  There is moderate to severe atherosclerosis of the left internal  carotid artery at the bifurcation and the distal neck. There is  narrowing throughout the cervical left internal carotid artery.    No mass is noted within the visualized portions of the cervical soft  tissues or lung apices.       Impression    Impression:    1. Head CTA demonstrates unchanged high-grade narrowing of the left  internal carotid artery in the cavernous segment, 65-75%. The left  anterior and middle cerebral artery segments appear patent.  2. 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 Atherosclerotic changes at the left carotid  bifurcation with resultant diffusely small caliber left  cervical  internal carotid artery.   3. Unchanged chronic right MCA territory infarction on noncontrast CT  scan. No acute intracranial hemorrhage or overt infarct.      I have personally reviewed the examination and initial interpretation  and I agree with the findings.    SHWETHA BRAGG MD   UA with Microscopic   Result Value Ref Range    Color Urine Light Yellow     Appearance Urine Clear     Glucose Urine Negative NEG^Negative mg/dL    Bilirubin Urine Negative NEG^Negative    Ketones Urine Negative NEG^Negative mg/dL    Specific Gravity Urine 1.050 (H) 1.003 - 1.035    Blood Urine Negative NEG^Negative    pH Urine 7.5 (H) 5.0 - 7.0 pH    Protein Albumin Urine Negative NEG^Negative mg/dL    Urobilinogen mg/dL Normal 0.0 - 2.0 mg/dL    Nitrite Urine Negative NEG^Negative    Leukocyte Esterase Urine Negative NEG^Negative    Source Catheterized Urine     WBC Urine <1 0 - 5 /HPF    RBC Urine 1 0 - 2 /HPF   Urine Culture   Result Value Ref Range    Specimen Description Catheterized Urine     Special Requests Specimen received in preservative     Culture Micro No growth    Glucose by meter   Result Value Ref Range    Glucose 98 70 - 99 mg/dL     Medications   0.9% sodium chloride BOLUS ( Intravenous Canceled Entry 3/9/18 1239)   medication instruction (not administered)   senna-docusate (SENOKOT-S;PERICOLACE) 8.6-50 MG per tablet 1 tablet (not administered)     Or   senna-docusate (SENOKOT-S;PERICOLACE) 8.6-50 MG per tablet 2 tablet (not administered)   polyethylene glycol (MIRALAX/GLYCOLAX) Packet 17 g (not administered)   bisacodyl (DULCOLAX) Suppository 10 mg (not administered)   ondansetron (ZOFRAN-ODT) ODT tab 4 mg (not administered)     Or   ondansetron (ZOFRAN) injection 4 mg (not administered)   prochlorperazine (COMPAZINE) injection 10 mg (not administered)     Or   prochlorperazine (COMPAZINE) tablet 10 mg (not administered)     Or   prochlorperazine (COMPAZINE) Suppository 25 mg (not administered)    metoclopramide (REGLAN) tablet 10 mg (not administered)     Or   metoclopramide (REGLAN) injection 10 mg (not administered)   labetalol (NORMODYNE/TRANDATE) injection 10-20 mg (not administered)   hydrALAZINE (APRESOLINE) injection 10-20 mg (not administered)   acetaminophen (TYLENOL) Suppository 650 mg (not administered)   OLANZapine (zyPREXA) injection 5-10 mg (not administered)   sodium chloride 0.9% infusion (not administered)   sodium chloride (PF) 0.9% PF flush 10 mL (not administered)   sodium chloride bacteriostatic 0.9 % flush 20 mL (not administered)   LORazepam (ATIVAN) injection 1 mg (not administered)   sodium chloride (PF) 0.9% PF flush 90 mL (90 mLs Intravenous Given 3/9/18 0917)   iopamidol (ISOVUE-370) solution 75 mL (75 mLs Intravenous Given 3/9/18 0917)   perflutren diluted 1mL to 2mL with saline (OPTISON) diluted injection 6 mL (6 mLs Intravenous Given 3/9/18 1415)         Assessments & Plan (with Medical Decision Making)  Altered MS, ?aphasia in the pt with h/o CVA with left hemiplegia, MH, onset not clear per Medics, because of new ?aphasia-stroke code called, CT CTA neg, DD includes not just CVA but Sz, Delirium, Encepahlopathy-very similar event and admission back in Nov 2017 here and [facility] [place], sz meds level, infection work up also started, admit to Neuro.       I have reviewed the nursing notes.    I have reviewed the findings, diagnosis, plan and need for follow up with the patient.    Current Discharge Medication List          Final diagnoses:   Altered mental status, unspecified altered mental status type   Speech disturbance, unspecified type   History of CVA (cerebrovascular accident) - with baseline left hemiplegia       3/9/2018   [facility], [place], EMERGENCY DEPARTMENT     Sadie Emerson MD  03/09/18 1129

## 2018-03-09 NOTE — IP AVS SNAPSHOT
` `           [facility]: [phone]                 INTERAGENCY TRANSFER FORM - NOTES (H&P, Discharge Summary, Consults, Procedures, Therapies)   3/9/2018                    Hospital Admission Date: 3/9/2018  DAVID DAWN   : 1960  Sex: Male        Patient PCP Information     Provider PCP Type    Len Krishnan MD General         History & Physicals      H&P by Darren Lozano MD at 3/9/2018 11:26 AM     Author:  Darren Lozano MD Service:  Neuro ICU Author Type:  Resident    Filed:  3/9/2018 11:31 PM Date of Service:  3/9/2018 11:26 AM Creation Time:  3/9/2018 11:26 AM    Status:  Attested :  Darren Lozano MD (Resident)    Cosigner:  Shaun Rich MD at 3/13/2018  3:43 AM        Attestation signed by Shaun Rich MD at 3/13/2018  3:43 AM        Attending Attestation:                                                   Date Patient seen: 3/9/2018     Patient with hx of R MCA stroke with left hemiplegia who presented with aphasia outside of window. Patient has complete stenosis of REBECCA and > 70 percent on left. CT does not show any pathology.   Admitted for workup.      Total time spent in direct patient care at the bedside was 55. Over 50% of the time was spend in coordination of the care.      Patient was discussed during our bedside rounds.   I reviewed patients labs, Xrays, Scans.   I examined the patient with the team and plan of care, as documented above, was developed on our Bedside rounds.  Patients family was updated.   Collaborating teams were updated.     Jolanta Rich MD  Neuro Critical Care  [phone]                                [facility], [place]      Neurology Stroke Admission    Patient Name: David Dawn  : 1960 MRN#: [id number]    STROKE DATA    Stroke Code:[KN1.1]  Time called:[KN1.2]  2018[KN1.3] 0855  Time patient seen:[KN1.2]  2018[KN1.4] 0900  Onset of symptoms:[KN1.2]   03/0[KN1.5]8[KN1.2]/2018[KN1.5] unknown, presumed 10:01pm  Last known normal (pt's baseline):[KN1.2]  3/8/18[KN1.5] 10:00pm  Head CT read by Dr. Bullard at:[KN1.2]  03/09/2018[KN1.6] 0914  Stroke Code de-escalated at[KN1.2] 03/09/2018[KN1.6] 0925 after discussion with Dr. Bullard due to patient is outside emergent treatment time parameters.[KN1.2]      TPA treatment:[KN1.1]  Not given due to outside the time window.[KN1.2]      Stroke Scales[KN1.1]      National Institutes of Health Stroke Scale (at presentation)   NIHSS done at:  time patient seen      Score    Level of consciousness:  (0)   Alert, keenly responsive    LOC questions:  (2)   Answers neither question correctly    LOC commands:  (2)   Performs neither task correctly     Best gaze:  (0)   Normal    Visual:  (1)   Partial hemianopia    Facial palsy:  (1)   Minor paralysis (flat nasolabial fold, smile asymmetry)    Motor arm (left):  (3)   No effort against gravity    Motor arm (right):  (0)   No drift    Motor leg (left):  (4)   No movement    Motor leg (right):  (0)   No drift    Limb ataxia:  (0)   Absent / unable to fully assess due to AMS    Sensory:  (0)   Normal- no sensory loss    Best language:  (3)   Mute- global aphasia    Dysarthria:  (2)   Severe dysaphria    Extinction and inattention:  (0)   No abnormality        NIHSS Total Score:  18[KN1.7]          Dysphagia Screen  Time of screening:[KN1.1] unable to assess due to AMS / aphasia / dysarthria[KN1.7]  Screening results:[KN1.1] Dysarthria present - maintain NPO, consult SLP[KN1.7]     ASSESSMENT & PLAN BY PROBLEM[KN1.1]     Work-up for Ischemic Stroke (no TPA) vs stroke-mimic (e.g. seizure)     Acute Ischemic Stroke (without tPA) Plan[KN1.8] vs. Seizure vs. Stroke mimic. Unclear etiology. Hx of szs as well as multiple recent hospitalizations for AMS / spells, but these usually resolve w/ speech intact / not this severely impaired. NIH today 18, chronically elevated due to previous R MCA stroke  w/ persisting L hemiplegia, also possibly falsely elevated due to aphasia/agitation/AMS. 1 CT/CTA negative. LKN 10pm 3/9/18, outside of tpa window. Not likely LVO w/ intact RUE + RLE strength.[KN1.7]     - Admit to Neurology[KN1.8]/Stroke[KN1.7]  - Neurochecks  - Permissive HTN; labetalol PRN for SBP > 220  - Euthermia, Euglycemia[KN1.8]  - NPO due to aphasia / dysarthria, will start AP vs AC pending further evaluation[KN1.7] (previously on plavix)[KN1.2]  - Statin[KN1.8] pending passing speech[KN1.7]  - MRI/MRA Stroke Protocol  - TTE with Bubble Study[KN1.8]: limited study, but atrial septum intact.[KN1.7]   - Telemetry, EKG  - Bedside Glucose Monitoring  - A1c[KN1.8]: 5.7  -[KN1.7] Lipid Panel[KN1.8]: pending  -[KN1.7] Troponin x 3[KN1.8]: ) <0.015 -> 0.0[KN1.7]  - PT/OT/SLP  - PM&R  - Stroke Education  - Smoking Cessation  - Depression Screen  - Apnea Screen[KN1.8]    During initial physical assessment, the plan of care[KN1.1] was discussed and developed with patient.  Plan of care includes: admit for further evaluation.[KN1.8].    Patient was admitted via[KN1.1] [facility] ED ([place])[KN1.8].    The patient[KN1.1] will be admitted to the Neuro Critical Care/Stroke team.[KN1.8].     Other Medical Problems:[KN1.1]  #AMS: unknown etiology, present w/ agitation (appears chronic), aphasic (not typically present). Concern for stroke w/ aphasia, although not likely/consistent presentation of  Aphasia w/o associated weakness. Hx of szs, resp acidosis + marginally elevated lactate on ISTAT; Valproic + Carbamazepine in therapeutic range, Pending Keppra[KN1.7], did have mildly elevated lactic acid + acidosis on admission[KN1.2]. Hx of recent cough, increased secretions, fever sxs; no fever, tachycardia, desat or leukocytosis. Hx of tooth pain + cold[KN1.7]/flu[KN1.2] symptoms, but no vegetations seen on ECHO.    - EEG placed, pending initial read[KN1.7], Valp + carb levels wnl, keppra pending.[KN1.2]  - CXR w/o e/o  infection, procal[KN1.7] negative, mildly altered ISTAT gases w/ acidosis + mild CO2 retention, repeat shows improvement; sputum culture + gram stain pending  - UA unremarkable  - will continue to follow    #Hx of COPD  #Increased secretions/ possible inability to clear secretions appropriately: reports of resp distress /difficulty breathing at DEE, has increased secretions here + wheezes on exam, not allowing providers help him suction but will do himself. No desat, mildly abnormal VBG. CXR clear. Procal negative. Question/concern for COPD exacerbation vs. AMS contributing to resp distress + secretion clearance.  - Sputum culture + gram stain  - duoneb q4H  - albuterol neb Q4H prn  - consider predn burst tomorrow.   - continuous pulse ox  - will continue to follow    #Sz d/o following previous stroke: depakote + tegretol levels therapeutic, keppra pending  - transition pta AEDs to IV for the time being    Other Chronic Conditions: Will continue pta meds as described below/per med rec upon passing speech eval    #Lower extremity edema: on pta lasix  - will consider transitioning to IV tomorrow    #Agitation / aggressive behavior  #Depression / mood disturbance  - holding pta citalopram / gabapentin / quetiapine   - zyprexa PRN    #HTN:   - allowing for permissive htn, will restart amlodipine upon ability to PO  - prn hydral + labet for BP >220    #BPH:  - holding pta tamsulosin  - will need to evaluate for retention if continues to be unable to PO    #Constipation  - prn bowel regimen[KN1.2]      Fluids/Electrolytes/Nutrition  0.9% sodium chloride @[KN1.1] 50[KN1.8] mL/hr  Avoid hypotonic fluids.    Nutrition:   Active Diet Order      NPO for Medical/Clinical Reasons Except for: No Exceptions    Prophylaxis[KN1.1]            For VTE Prevention:  - heparin SQ  - pneumatic compression device    For Acid Suppression:  - pantoprazole[KN1.2]    Code Status[KN1.1]  Full code, based on historical since unable to  assess/aphasic agitated altered[KN1.8]    HPI  David Dawn is a 58 year old male with[KN1.1] hx of COPD, HTN, TBI, R frontal stroke c/b persistent L-sided hemiplegia, sz, agitation/abusive behavior/personality alteration, Cheyne-Escobedo respiration who presents w/ altered mental status. Patient is aphasic/dysarthric and unable to provide history or respond appropriately to questions.     Per EMR + [facility] staff, patient was first evaluated by EMS 3/8 due to staff's concern for wheezing, respiratory distress, altered mental status (driving chair into wall) and possible pneumonia; upon EMS evaluation at [facility] yesterday, patient was combative + refused transport/further eval but otherwise alert + oriented. This morning, [facility] staff noted patient to be having difficulty breathing, audibly gurgling, unable to speak yet continued to be agitated/combative. Given Duoneb en route w/ subsequent prolif mucus production. Presented to ED altered, unable to provide hx.     Has been admitted 4-5 times in the past 2mo for similar patterns of altered mental status. Per recent office visit, patient has 2 different spell types - 1) staring spells, not responsive for ~1min w/o postictal, ~twice a week 2) loss of awareness, lasting few hours to day (main reason for recent hospitalization. Last hospitalization 12/9-12/17 at [facility] for AMS EEG was done, which showed mod-severe gen slowing of background activities; no epileptiform changes; this AMS was thought to be related to oxycodone + seroquel at that time - AMS improved after these were decreased. Since this hospitalization, no reported further spells but continued verbally abusive/irritable.    Hx of L hemiparesis, chronic pain in feet + L arm, cervicalgia w/ left radiculopathy. On+off paresthesias.     Most recent medication changes: decreased oxycodone to 2.5mg BID, decreased seroquel 100mg BID[KN1.8] (both decreased during most recent admission);[KN1.2] Gabapentin 600  increased TID to QID (changed 1/18/18).[KN1.8]    Pertinent Past Medical/Surgical History  Past Medical History:   Diagnosis Date     BPH (benign prostatic hyperplasia) 9/15/2011     COPD (chronic obstructive pulmonary disease) (H) 9/15/2011     Edema      ETOH abuse      GERD (gastroesophageal reflux disease) 9/15/2011     HTN (hypertension) 6/14/2014     Hyperlipidemia LDL goal <100 9/15/2011     Impulse control disorder      Mild major depression (H) 9/15/2011     Positive TB test      Seizures (H) 9/15/2011     Unspecified cerebral artery occlusion with cerebral infarction        Past Surgical History:   Procedure Laterality Date     COLONOSCOPY  12/19/2012     COLONOSCOPY  8/6/2014    Procedure: COMBINED COLONOSCOPY, SINGLE BIOPSY/POLYPECTOMY BY BIOPSY;  Surgeon: Jose Elias Mclain MD;  Location:  GI     EXCISE TUMOR RECTUM VILLUS  2/28/2013    Procedure: EXCISE TUMOR RECTUM VILLOUS;  Trans Anal Excision Rectal Villous Tumor, Proctoscopy;  Surgeon: Milton Jacobs MD;  Location:  OR     KNEE SURGERY       NECK SURGERY       SIGMOIDOSCOPY FLEXIBLE  1/31/2013    Procedure: SIGMOIDOSCOPY FLEXIBLE;   flexible sigmoidoscopy with anoscope;  Surgeon: Milton Jacobs MD;  Location:  GI     SIGMOIDOSCOPY FLEXIBLE  4/25/2013    Procedure: SIGMOIDOSCOPY FLEXIBLE;  SIGMOIDOSCOPY FLEXIBLE;  Surgeon: Milton Jacobs MD;  Location:  GI       Medications:   Current Facility-Administered Medications   Medication     0.9% sodium chloride BOLUS     Current Outpatient Prescriptions   Medication Sig     ACETAMINOPHEN PO Take 650 mg by mouth every 4 hours as needed for pain     diclofenac (VOLTAREN) 1 % GEL topical gel Apply 2 g topically as needed (apply to hands for osteoarthritis pain)     diclofenac (VOLTAREN) 1 % GEL topical gel Place 4 g onto the skin as needed (apply to knees for osteoarthritis pain)     loperamide (IMODIUM) 2 MG capsule Take 4 mg by mouth 2 times daily     loperamide (IMODIUM) 2 MG capsule Take 2  mg by mouth 4 times daily as needed for diarrhea     citalopram (CELEXA) 20 MG tablet TAKE 1 TABLET BY MOUTH ONCE DAILY     carBAMazepine (TEGRETOL) 200 MG tablet TAKE 1 TABLET BY MOUTH THREE TIMES DAILY     oxyCODONE IR (ROXICODONE) 5 MG tablet TAKE ONE-HALF TABLET (2.5MG) BY MOUTH TWICE DAILY **CAUTION: OPIOID. RISK OF OVERDOSE AND ADDICTION**     XOPENEX HFA 45 MCG/ACT Inhaler INHALE 2 PUFFS INTO THE LUNGS EVERY 6 HOURS AS NEEDED FOR SHORTNESS OF BREATH, DYSPNEA OR WHEEZING     divalproex sodium delayed-release (DEPAKOTE) 250 MG DR tablet TAKE 1 TABLET BY MOUTH TWICE DAILY (TAKE WITH 1500MG FOR A TOTAL OF 1750MG)     levETIRAcetam 1000 MG TABS TAKE 1 TABLET BY MOUTH TWICE DAILY     gabapentin (NEURONTIN) 600 MG tablet Take 1 tablet (600 mg) by mouth 4 times daily (before meals and nightly)     QUEtiapine (SEROQUEL) 100 MG tablet TAKE 1 TABLET BY MOUTH TWICE DAILY     lidocaine (XYLOCAINE) 2 % topical gel Apply to rectum four times daily as needed     ipratropium - albuterol 0.5 mg/2.5 mg/3 mL (DUONEB) 0.5-2.5 (3) MG/3ML neb solution Take 1 vial by nebulization 3 times daily     furosemide (LASIX) 20 MG tablet TAKE 1 TABLET BY MOUTH ONCE DAILY     divalproex (DEPAKOTE) 500 MG EC tablet TAKE THREE TABLETS (1500MG) BY MOUTH TWICE DAILY (TAKE WITH 250MG FOR A TOTAL OF 1750MG)     mineral oil-hydrophilic petrolatum (AQUAPHOR) Apply topically daily to left wound.     pramoxine (SARNA SENSITIVE) 1 % LOTN lotion Apply topically as needed for itching (on posterior ears and hands)      Sunscreens (AVEENO DAILY MOISTURIZER) LOTN Apply daily to hands and ears for dry skin.     order for DME Power wheelchair     simvastatin (ZOCOR) 40 MG tablet TAKE 1 TABLET BY MOUTH AT BEDTIME     baclofen (LIORESAL) 20 MG tablet TAKE 1 TABLET BY MOUTH THREE TIMES DAILY     clopidogrel (PLAVIX) 75 MG tablet Take 1 tablet (75 mg) by mouth daily     vitamin D (ERGOCALCIFEROL) 74553 UNIT capsule TAKE ONE CAPSULE BY MOUTH ONCE WEEKLY ON MONDAY      amLODIPine (NORVASC) 10 MG tablet TAKE 1 TABLET BY MOUTH ONCE DAILY     niacin 500 MG CR capsule TAKE 1 CAPSULE BY MOUTH AT BEDTIME (Patient taking differently: TAKE 1 CAPSULE BY MOUTH EVERY MORNING)     potassium chloride (K-TAB,KLOR-CON) 10 MEQ tablet TAKE 1 TABLET BY MOUTH ONCE DAILY     tamsulosin (FLOMAX) 0.4 MG capsule TAKE 1 CAPSULE BY MOUTH ONCE DAILY     budesonide (PULMICORT) 0.25 MG/2ML neb solution NEBULIZE THE CONTENT OF 1 VIAL TWICE DAILY FOR COPD     Respiratory Therapy Supplies (NEBULIZER/ADULT MASK) KIT 1 Device 4 times daily.   .    Allergies:   Allergies   Allergen Reactions     Tuberculin Tests      Ibuprofen Sodium Diarrhea   .    Family History:   Family History   Problem Relation Age of Onset     HEART DISEASE Mother      MI     CEREBROVASCULAR DISEASE Father      alzheimers disease     CEREBROVASCULAR DISEASE Brother      Neurologic Disorder Brother      stroke     Neurologic Disorder Son      seizure     CANCER Sister    .    Social History:[KN1.1]   Social History   Substance Use Topics     Smoking status: Current Every Day Smoker     Packs/day: 1.00     Years: 35.00     Types: Cigarettes     Smokeless tobacco: Never Used     Alcohol use No      Comment: quit 20 years ago.[KN1.9]   .    Tobacco use:[KN1.1] yes, chronic[KN1.2]    ROS:  Review of systems not obtained due to patient factors - confusion, lack of cooperation and mental status    PHYSICAL EXAMINATION  Vital Signs:  B/P: 129/83,  T: Data Unavailable,  P: 83,  R: 14    General:  lying in bed, angry, agitated[KN1.1] , nonverbal[KN1.8]   HEENT:[KN1.1]  normocephalic/atraumatic[KN1.8]  Cardio:[KN1.1]  RRR[KN1.2]  Pulmonary:[KN1.1]  wheezing present, coarse b/l, transmitted upper airway sounds[KN1.2]  Abdomen:[KN1.1]  soft, non-tender, non-distended, bowel sounds present, obese[KN1.2]  Extremities:[KN1.1]  no edema[KN1.2]  Skin:[KN1.1]  intact, warm/dry[KN1.8]     Neurologic  Mental Status:  alert, aphasic/ unable to communicate w/  nodding or other nonverbal. Patient highly agitated, swinging at providers  Cranial Nerves:  blinked to threat on R, not on L. PERRL. mild L lower facial droop.   Motor:  No movement in LLE. Strong in RUE + RLE, antigravity in LUE.  Sensory:  w/d / moves to noxious stimuli in all 4 extremities.  Coordination:  unable to assess  Station/Gait:  unable to test, hx of L hemiplegia    Labs  Labs and Imaging reviewed and used in developing the plan; pertinent results included.     Lab Results   Component Value Date    GLC 94 03/09/2018       The patient was discussed with the fellow, Dr. Bullard.  The staff is Dr. Rich.    Darren Lozano MD  Pager: [phone][KN1.1]         Revision History        User Key Date/Time User Provider Type Action    > KN1.4 3/9/2018 11:31 PM Darren Lozano MD Resident Sign     KN1.6 3/9/2018 10:57 PM Darren Lozano MD Resident      KN1.5 3/9/2018 10:55 PM Darren Lozano MD Resident      KN1.3 3/9/2018 10:53 PM Darren Lozano MD Resident      KN1.2 3/9/2018 10:42 PM Darren Lozano MD Resident      KN1.7 3/9/2018  5:55 PM Darren Lozano MD Resident Share     KN1.8 3/9/2018  5:03 PM Darren Lozano MD Resident Share     KN1.9 3/9/2018 11:36 AM Darren Lozano MD Resident Share     KN1.1 3/9/2018 11:26 AM Darren Lozano MD Resident                   Discharge Summaries     No notes of this type exist for this encounter.         Consult Notes      Consults by Enrique Gonzalez MD at 3/12/2018 10:55 AM     Author:  Enrique Gonzalez MD Service:  Palliative Author Type:  Physician    Filed:  3/12/2018  4:15 PM Date of Service:  3/12/2018 10:55 AM Creation Time:  3/12/2018 10:42 AM    Status:  Signed :  Enrique Gonzalez MD (Physician)     Consult Orders:    1. Palliative Care Adult IP Consult: Patient to be seen: Routine within 24 hrs; Call back #: [phone]; Assistance with goals of care. Recent CVA now wtih aphasia, dysphagia. POLST from 12/2017  stating now feeding tube.; Consultant may enter order... [[id number]] ordered by Pancho France DO at 03/12/18 1000                [facility]    Palliative Care Consultation Note    Patient: David Dawn  Date of Admission:  3/9/2018    Requesting Clinician / Team:[DR1.1] Dr Humphrey/medicine[DR1.2]  Reason for consult:[DR1.1] Goals of care[DR1.2]    Recommendations:[DR1.1]    1) We need to clarify how we are going to make decisions for the patient. Where it stands now is that he has a HCD which names his son Angelo as his only decision maker. I left a  for Angelo today and gave him my mobile # to call back but have yet to hear from him. Angelo told the primary team yesterday to talk with Caitlin the patient's sister who has clearly been involved and is willing to advocate for her brother although technically is not a decision maker. Surrogates cannot legally designate other surrogates - if a patient designates a secondary surrogate on a HCD then the primary surrogate can defer to the secondary if they choose, but technically speaking Angelo can't just designate Caitlin as the decision maker. However it's important to work with the facts on the ground as they actually obtain, and not be dogmatic, and especially if Angelo is unwilling or unable (Caitlin tells me he has chronic medical conditions and may not really be in a position where he can make complex medical decisions for the patient) to make decisions for the patient we need to proceed with input from other loved ones who are willing to show up and advocate for the patient, which currently seems to be Caitlin. Apparently there is another brother who is estranged from the patient. If the patient survives this and we think he's going to live a while, we should advocate for guardianship proceedings in my opinion. However immediately we need to make decisions for him in a time-frame for which guardianship is not realistic.    We  really need to hear from Angelo about his willingness to be a decision-maker for the patient    I[DR1.2] called ethics and ran this situation by them: they advised, and I think this is reasonable, that we should try to clarify with Angelo his interest/ability to participate in decision making for the patient. If he does not want to/is unable to, that's fine, we should document that and move on the the 'next of kin' (who is available and willing to advocate for the patient which in this case is Caitlin his sister).[DR1.3]     2) What we do clearly know about his decisions is that he's signed at least 2 POLST forms basically saying no feeding tubes ever and no IV abx. I think we have every reason ethically and clinically to honor those POLSTs which means tube feeding is off the table. This basically means one of 2 things will happen in the very near future: his swallow will not recover sufficiently that he will be able to sustain himself and he will die in the coming weeks/months of aspiration and complications of malnutrition, or his swallow will recover post-stroke and he'll live indefinitely---many months or even years. I think it's important to directly talk with SLP today about this and get guidance about when to offer him PO again. He will absolutely need to be offered oral nutrition again, although it doesn't mean it needs to be today. If there was a clearly articulated plan for comfort-care (which would be reasonable for him) he could be offered PO today. If there is more of a plan of 'let's help him recover although with strict treatment limitations of no feeding tubes, DNR/DNI' then would do whatever SLP advises about the 'better' timing of reinitiation of oral nutrition.[DR1.2]     3) Per other notes it looks like he can't go back to his DEE anyway, trying to find hospice at a facility may be the right next move for him.[DR1.3]      These recommendations have been discussed with[DR1.1] medicine team twice  today, and nurse[DR1.3].    Thank you for the opportunity to participate in the care of this patient and family. Our team will follow. Please feel free to contact the on-call Palliative provider with any urgent needs.     Thank you for involving us in the patient's care.[DR1.1] 90' on floor today over half counseling with family on phone and coord with primary team and nurse.[DR1.3]  Enrique Gonzalez MD / Palliative Medicine / Pager [phone] / [facility] Inpatient Team Consult Pager [phone] (answered 8am-430pm M-F) - ok to text page via [employer] / After-Hours Answering Service [phone] / Palliative Clinic in the [facility] at the [facility] - [phone] (scheduling); [phone] (triage).      Assessments:  David Dawn is a 58 year old male with a history of TBI, alcohol abuse, seizure disorder, COPD, HTN, R MCA stroke; history of chronic behavior disturbances (agitation, abusive behavior);  admitted with AMS & agitation, and suspected L basal ggl stroke[DR1.1] causing aphasia and dysphagia[DR1.3]  Being tx also for suspected aspiration pneumonia, with steroids and abx.      Symptoms:        1.[DR1.1] None obvious[DR1.2]      Prognosis, Goals, & Planning:        Discussion about disease understanding, prognosis, care goals:[DR1.1] above[DR1.2]       Decision making capacity:[DR1.1] no[DR1.2]       Preferred way of decision making:[DR1.1] n/a[DR1.2]       Health care directive: y       Health care agent: Angelo his son; no back up listed       Code Status:  DNR / DNI       POLST : variety of POLSTs from last few years including DNR/DNI ones and full code ones (most recent was full code)      Coping, Meaning, & Spirituality:[DR1.1] cannot assess[DR1.2]          Social:        Living situation: lives in [facility]       Support system/Family:[DR1.1] above[DR1.2]        Functional status:[DR1.1] in wheelchair[DR1.2]       Financial concerns:[DR1.1] /[DR1.2]       Substance use disorder (past /  present): past EtOH; smokes tobacco       Occupation/Past-times:     History of Present Illness:   Sources of History:[DR1.1] chart, sister, team, nurse[DR1.2]    Admitted with AMS, agitation, suspected stroke. No MRI due to agitation.  Decision made with sister for DNR/DNI order on 3/11, and comfort care/hospice introduced.    SLP saw this AM: severe global aphasia.[DR1.1]    Spoke with sister on phone as above.  Of note she tried to get guardianship several years ago but pulled out eventually because he was really mean and abusive to her and she wasn't sure she really wanted to take that on. She has remained involved in his care, updates from [facility], etc.     I am told at baseline he spends most of the day smoking. Is wheelchair 'bound'. Anger, perseveration problems.[DR1.2]       ROS:[DR1.1]  Cannot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 collaborative fashion with his sister.   - Please see the plan for pain management as documented above    Diet: Regular Diet Adult  Fluids: none  DVT Prophylaxis: None- Comfort Cares  Code Status: Comfort Care    Disposition Plan   Expected discharge: Tomorrow, recommended to hospice once safe disposition plan/ TCU bed available.     Entered: Kyung Leonardo 03/20/2018, 11:57 AM   Information in the above section will display in the discharge planner report.      The patient's care was discussed with the Attending Physician, Dr. Humphrey.    Kyung Patterson  [facility]  Pager: [phone]  Please see sticky note for cross cover information    Interval History   RN notes reviewed. No acute events overnight. Patient continues to be easily redirectable and doing well with PRN Haldol/Ativan.    Physical Exam   Vital Signs:                    Weight: 227 lbs 1.18 oz  General Appearance: Alert, lying in bed comfortably  Respiratory: rhonchi bilaterally, upper airway secretions  Cardiovascular: RRR  GI: soft, NTND  Skin: no acute rashes (right knee abrasion with bandage)  Other: Left hemiplegia, Alert and interactive, nonverbal         Data[MC1.1]   Medications       LORazepam  1 mg Oral Q4H    Or     LORazepam  1 mg Sublingual Q4H    Or     LORazepam  1 mg Rectal Q4H     scopolamine  1 patch Transdermal Q72H    And     scopolamine   Transdermal Q8H    And     scopolamine   Transdermal Q72H     nicotine   Transdermal Q8H     nicotine   Transdermal Daily     nicotine  1 patch Transdermal Daily     sodium chloride 0.9%  500 mL Intravenous Once     sodium chloride (PF)  10 mL Intracatheter Once     sodium chloride bacteriostatic  20 mL Intravenous Once     nystatin   Topical BID     Data     Recent Labs  Lab 03/14/18  0744      POTASSIUM 3.4   CHLORIDE 108   CO2 22   BUN 18   CR 0.62*   ANIONGAP 13   KADIE 8.7   GLC 71     No results found for this or any previous visit (from the past 24  hour(s)).[MC1.2]    Internal Medicine Staff Addendum  Date of Service: 3/20/2018  I have seen and examined Mr. Dawn, reviewed the data and discussed the plan of care with the care team on P&FC Rounds.  I agree with the above documentation     I discussed pt's care with bedside RN, case management/social work today.  I personally reviewed medications and past 24 hr notes.  Assessment/Plan/Diagnoses: plan/dx as above, which contains my edits and reflects our joint medical decision-making.   I d[RK1.1]iscussed patient with his son, Osbaldo (listed as the patient's health care agent).  Osbaldo does not want to be Mr. Dawn's health care agent. Therefore I consider that the patient does not have an assigned health care agent.  Hence, his next of kin, his sister Caitlin, is able to make medical decisions as Mr. Dawn is unable.[RK1.2]      Darron Humphrey MD  Internal Medicine/Pediatrics Hospitalist & Staff Physician   of Internal Medicine and Pediatrics  [facility]  Pager: [phone][RK1.1]       Revision History        User Key Date/Time User Provider Type Action    > RK1.1 3/21/2018  2:29 PM Darron Humphrey MD Physician Sign     RK1.2 3/21/2018  2:25 PM Darron Humphrey MD Physician      MC1.2 3/20/2018 12:02 PM Kyung Patterson DO Resident Sign     MC1.1 3/20/2018 11:57 AM Kyung Patterson DO Resident             Progress Notes by Monique Virk MSW at 3/20/2018  3:05 PM     Author:  Monique Virk MSW Service:  (none) Author Type:      Filed:  3/20/2018  3:09 PM Date of Service:  3/20/2018  3:05 PM Creation Time:  3/20/2018  3:05 PM    Status:  Signed :  Monique Virk MSW ()         Social Work Services Progress Note      Hospital Day: 12  Date of Initial Social Work Evaluation:  3/16/18  Collaborated with:  Primary team Maroon 3      Data:  Pt is a 58-year-old male admitted with worsening AMS and hypoxic respiratory failure. Now on  comfort cares.       Intervention:  Hospice liaison states that pt's son must sign hospice consent paperwork as he is named on a healthcare directive as pt's chosen decision-maker. Hospital has been deferring medical decisions to pt's sister per son's request, as son does not want to be involved with pt's care. Per hospice liaison their policy states son must sign the paperwork. Liaison spoke with son and he refused to sign paperwork, stating that he did not choose to be pt's decision-maker.    MD called and spoke with pt's son. Plan TBD.       Assessment:  Pursuing hospice placement      Plan:    Anticipated Disposition:  Facility:  Hospice    Barriers to d/c plan:  Hospice consent paperwork    Follow Up:  SW to follow for discharge planning      SAM Comer, LYN  [address]   Pager [phone]  Phone [phone][LP1.1]     Revision History        User Key Date/Time User Provider Type Action    > LP1.1 3/20/2018  3:09 PM Monique Virk MSW  Sign                     Procedure Notes      Procedures signed by Amelia Cloud MD at 3/14/2018  6:41 PM      Author:  Amelia Cloud MD Service:  Neurology Author Type:  Physician    Filed:  3/14/2018  6:41 PM Encounter Date:  3/9/2018 Status:  Signed    :  Amelia Cloud MD (Physician)       Procedure Orders:    1. EEG [[id number]] ordered by Interface, Transcription at 03/10/18 2047                Procedure Date: 2018      EEG #:         DATE OF RECORDIN2018       DURATION OF RECORDING:  Five hours and 48 minutes.      DAY #1 OF VIDEO EEG MONITORING.      CLINICAL HISTORY:  This patient is a 58-year-old man with a history of right frontal lobe stroke, subsequent seizure disorders who presented with altered mental status.  EEG was requested for evaluation for seizures.      CURRENT MEDICATIONS:  Ativan.      TECHNICAL SUMMARY: This continuous video- EEG monitoring procedure was performed with 23 scalp electrodes in  10-20 electrode system placements, and additional scalp, precordial and other surface electrodes used for electrical referencing and artifact detection.  Video monitoring was utilized and periodically reviewed by EEG technologists and the physician for electroclinical correlations.      BACKGROUND ACTIVITIES:  The background activities of this EEG were symmetric and consisted of 7 Hz posterior dominant rhythm which attenuates with eye opening.  There is mild generalized slowing of the background activities mostly in the theta frequencies.  Hyperventilation and photic stimulation were not performed.      Sleep stages were recorded with poorly formed sleep architectures.      No interictal epileptiform activities were observed.      ICTAL ACTIVITIES:  No clinical seizures or electrographic seizures were observed.      The patient had several events with right hand shaking, body tremors and the left arm shaking, which had no clear EEG correlations.  The examples of these events were at time 19:19 and 19:46.      SUMMARY OF DAY #1 OF VIDEO EEG MONITORING:  This is an abnormal EEG due to the presence of mild-to-moderate generalized slowing of the background activities.  No electrographic seizures were observed.  The patient had several episodes of right or left arm shaking, body tremoring with no clear EEG correlations.         AMELIA FIELDS MD             D: 03/10/2018   T: 03/10/2018   MT: NTS      Name:     MILTON LAMBERT   MRN:      [id number]        Account:        [id number]   :      1960           Procedure Date: 2018      Document: [id number][ZS1.1]         Revision History        User Key Date/Time User Provider Type Action    > ZS1.1 3/14/2018  6:41 PM Amelia Fields MD Physician Sign     [N/A] 3/10/2018  8:47 PM Amelia Fields MD Physician Edit                  Progress Notes - Therapies (Notes from 18 through 18)     No notes of this type exist for this encounter.

## 2018-03-09 NOTE — IP AVS SNAPSHOT
"    UNIT 5A [facility] BANK: [phone]                                              INTERAGENCY TRANSFER FORM - LAB / IMAGING / EKG / EMG RESULTS   3/9/2018                    Hospital Admission Date: 3/9/2018  MILTON LAMBERT   : 1960  Sex: Male        Attending Provider: Darron Humphrey MD     Allergies:  Tuberculin Tests, Ibuprofen Sodium    Infection:  None   Service:  INTERNAL MED    Ht:  1.727 m (5' 8\")   Wt:  103 kg (227 lb 1.2 oz)   Admission Wt:  110.7 kg (244 lb)    BMI:  34.53 kg/m 2   BSA:  2.22 m 2            Patient PCP Information     Provider PCP Type    Len Krishnan MD General      Unresulted Labs     None         ECG/EMG Results      ECHO COMPLETE BUBBLE STUDY WITH OPTISON [[id number]]  Resulted: 18 1357, Result status: Edited Result - FINAL    Ordering provider: Darren Lozano MD  18 1153 Resulted by: Niokle Sequeira MD    Performed: 18 1413 - 18 1432 Resulting lab: RADIOLOGY RESULTS    Narrative:       [id number]  [id number]  [id number]  [id number]^YULIANA^LUCIANOOOF^AHMED           [facility],[place]  Echocardiography Laboratory  [address] [place] [address]     Name: MILTON LAMBERT  MRN: [id number]  : 1960  Study Date: 2018 01:57 PM  Age: 58 yrs  Gender: Male  [facility]  Reason For Study: CVA  Ordering Physician: MEET BRIDGES  Performed By: LAVELL Barone     BSA: 2.2 m2  Height: 68 in  Weight: 244 lb  BP: 142/81 mmHg  _____________________________________________________________________________  __        Procedure  Bubble Echocardiogram with two-dimensional, color and spectral Doppler  performed. Contrast Optison. Optison (NDC #4209-6396-14) given intravenously.  Patient was given 6.0 ml mixture of 3 ml Optison and 6 ml saline. 3.0 ml  wasted.  _____________________________________________________________________________  __        Interpretation Summary  From limited views the LV Ejection " Fraction is estimated at 55-60% (visual).  Regional wall motion cannot be assessed.  Global right ventricular function appear normal.  Valves cannot be assessed accurately.  No pericardial effusion is present.  The atrial septum is intact as assessed by agitated saline bubble study .  Previous study not available for comparison.        _____________________________________________________________________________  __        Left Ventricle  The Ejection Fraction is estimated at 55-60%. Regional wall motion cannot be  assessed.     Right Ventricle  The right ventricle is normal size. Global right ventricular function is  normal.     Atria  The atria cannot be assessed. The atrial septum is intact as assessed by  agitated saline bubble study .        Mitral Valve  The mitral valve cannot be assessed.     Aortic Valve  The aortic valve cannot be assessed.     Tricuspid Valve  The tricuspid valve cannot be assessed.     Pulmonic Valve  The pulmonic valve cannot be assessed.     Vessels  IVC is normal size. Respiratory variation cannot be assessed.     Pericardium  No pericardial effusion is present.        Compared to Previous Study  Previous study not available for comparison.  _____________________________________________________________________________  __                          Report approved by: Sarah JOHNSON 03/09/2018 04:52 PM              _____________________________________________________________________________  __       1    Type Source Collected On     03/09/18 1357          View Image (below)        Echocardiogram - bubble study [[id number]]  Resulted: 03/09/18 1414, Result status: In process    Ordering provider: Darren Lozano MD  03/09/18 1153 Performed: 03/09/18 1413 - 03/09/18 1413    Resulting lab: [facility]                   Encounter-Level Documents:     There are no encounter-level documents.      Order-Level Documents:     There are no order-level documents.

## 2018-03-09 NOTE — LETTER
UNIT 5A [facility] BANK  [address] [facility] [address]  Phone: [phone]    March 19, 2018        David [address]  [address] [address]          To whom it may concern:    RE: David Dawn    Patient has been hospitalized from 3/9/2018 to present with no plans to return to his original living arrangement. His decision-maker, Caitlin Jefferson, is now pursuing hospice placement.    Sincerely,        Kyung Patterson MD  Internal Medicine Resident Physician

## 2018-03-09 NOTE — MR AVS SNAPSHOT
After Visit Summary   3/9/2018    David Dawn    MRN: [id number]           Patient Information     Date Of Birth          1960        Visit Information        Provider Department      3/9/2018 2:00 PM [facility] EEG TECH 4 [facility]        Today's Diagnoses     Seizure disorder (H)    -  1       Follow-ups after your visit        Your next 10 appointments already scheduled     Mar 09, 2018  2:00 PM CST   Video Hookup Visit with [facility] EEG TECH 4   [facility] EEG ([facility])    [facility]  [address] [facility] [address]   [phone]           [place]: Your appointment is scheduled at [facility]. [address] [place] [address]              Future tests that were ordered for you today     Open Standing Orders        Priority Remaining Interval Expires Ordered    Daily weights Routine 1/1 DAILY  3/9/2018    Oxygen: Nasal cannula, Oxygen mask Routine 77419/93426 PRN  3/9/2018    Lipid panel Routine 1/1 AM DRAW  3/9/2018    Smoking Cessation Program IP Consult Routine 1/1 CONDITIONAL X 1  3/9/2018    Basic metabolic panel Routine 1/1 AM DRAW  3/9/2018    CBC with platelets Routine 1/1 AM DRAW  3/9/2018    Oxygen: Nasal cannula, Oxygen mask STAT 21505/60727 CONTINUOUS  3/9/2018            Who to contact     Please call your clinic at [phone] to:    Ask questions about your health    Make or cancel appointments    Discuss your medicines    Learn about your test results    Speak to your doctor            Additional Information About Your Visit        MyChart Information     [employer] is an electronic gateway that provides easy, online access to your medical records. With [employer], you can request a clinic appointment, read your test results, renew a prescription or communicate with your care team.     To sign up for [employer] visit the website at [email or web address]   You will be asked to enter the access code  listed below, as well as some personal information. Please follow the directions to create your username and password.     Your access code is: [email or web address]  Expires: 3/15/2018  2:17 PM     Your access code will  in 90 days. If you need help or a new code, please contact your [facility] or call [phone] for assistance.        Care EveryWhere ID     This is your Care EveryWhere ID. This could be used by other organizations to access your [facility] medical records  [id number]         Blood Pressure from Last 3 Encounters:   18 142/81   01/15/18 118/74   18 107/73    Weight from Last 3 Encounters:   18 103 kg (227 lb 1.2 oz)   01/15/18 95.3 kg (210 lb)   18 99.8 kg (220 lb)              Today, you had the following     No orders found for display         Today's Medication Changes      Notice     This visit is during an admission. Changes to the med list made in this visit will be reflected in the After Visit Summary of the admission.             Primary Care Provider Office Phone # Fax #    Len Krishnan [phone]       [address] [facility] [address]        Equal Access to Services     NEY NEWELL : Hadii aad ku hadasho Soomaali, waaxda luqadaha, qaybta kaalmada adeegyada, waxay frankie mancilla . So [facility] [phone].    ATENCIÓN: Si habla español, tiene a costello disposición servicios gratuitos de asistencia lingüística. Llame al [phone].    We comply with applicable federal civil rights laws and [place] laws. We do not discriminate on the basis of race, color, national origin, age, disability, sex, sexual orientation, or gender identity.            Thank you!     Thank you for choosing [facility]  for your care. Our goal is always to provide you with excellent care. Hearing back from our patients is one way we can continue to improve our services. Please take a few minutes to complete the written  survey that you may receive in the mail after your visit with us. Thank you!             Your Updated Medication List - Protect others around you: Learn how to safely use, store and throw away your medicines at www.disposemymeds.org.      Notice     This visit is during an admission. Changes to the med list made in this visit will be reflected in the After Visit Summary of the admission.

## 2018-03-09 NOTE — LETTER
Health Information Management Services               Recipient:  Cheryl Cote         Sender:  SAM Comer, LGSW  [address]   Phone [phone]          Date: March 23, 2018  Patient Name:  David Dawn  Routing Message:    Discharge orders. [id number]        The documents accompanying this notice contain confidential information belonging to the sender.  This information is intended only for the use of the individual or entity named above.  The authorized recipient of this information is prohibited from disclosing this information to any other party and is required to destroy the information after its stated need has been fulfilled, unless otherwise required by state law.      If you are not the intended recipient, you are hereby notified that any disclosure, copying, distribution or action taken in reliance on the contents of these documents is strictly prohibited. If you have received this document in error, please notify [facility] immediately at [phone].  You may return the document via fax ([phone]) or return mail  (Health Information Management, , [address] [place] [address]).

## 2018-03-09 NOTE — IP AVS SNAPSHOT
"    [facility]: [phone]                                              INTERAGENCY TRANSFER FORM - PHYSICIAN ORDERS   3/9/2018                    Hospital Admission Date: 3/9/2018  MILTON LAMBERT   : 1960  Sex: Male        Attending Provider: Darron Humphrey MD     Allergies:  Tuberculin Tests, Ibuprofen Sodium    Infection:  None   Service:  INTERNAL MED    Ht:  1.727 m (5' 8\")   Wt:  103 kg (227 lb 1.2 oz)   Admission Wt:  110.7 kg (244 lb)    BMI:  34.53 kg/m 2   BSA:  2.22 m 2            Patient PCP Information     Provider PCP Type    Len Krishnan MD General      ED Clinical Impression     Diagnosis Description Comment Added By Time Added    Altered mental status, unspecified altered mental status type [R41.82] Altered mental status, unspecified altered mental status type [R41.82]  Sadie Emerson MD 3/9/2018  9:31 AM    Speech disturbance, unspecified type [R47.9] Speech disturbance, unspecified type [R47.9]  Sadie Emerson MD 3/9/2018  9:31 AM    History of CVA (cerebrovascular accident) [Z86.73] History of CVA (cerebrovascular accident) [Z86.73] with baseline left hemiplegia Sadie Emerson MD 3/9/2018  9:31 AM      Hospital Problems as of 3/23/2018              Priority Class Noted POA    Acute ischemic stroke (H) Medium  3/9/2018 Yes      Non-Hospital Problems as of 3/23/2018              Priority Class Noted    Seizure disorder (H) Medium  9/15/2011    Chronic fatigue Medium  9/15/2011    Low back pain Medium  9/15/2011    Mild major depression (H) Medium  9/15/2011    GERD (gastroesophageal reflux disease) Medium  9/15/2011    Hyperlipidemia LDL goal <100 Medium  9/15/2011    COPD (chronic obstructive pulmonary disease) (H) Medium  9/15/2011    Hemiplegia affecting left nondominant side (H) Medium  2013    Radicular syndrome of upper limbs Medium  2013    Neck pain Medium  2013    ACP (advance care planning) Medium  7/15/2013    Osteoarthritis of glenohumeral " joint Medium  12/20/2013    Osteoarthritis of acromioclavicular joint Medium  12/20/2013    Metabolic encephalopathy Medium  6/9/2014    Health Care Home Medium  6/11/2014    Dysphagia Medium  7/2/2014    Hallucinations Medium  4/15/2015    Cough Medium  4/15/2015    Generalized muscle weakness Medium  4/15/2015    Alcohol abuse Medium  6/17/2015    Behavior problem, adult Medium  7/22/2015    Neuropathy Medium  10/14/2015    History of CVA (cerebrovascular accident) Medium  1/11/2016    Cognitive impairment Medium  1/11/2016    Essential hypertension, benign Medium  1/11/2016    COPD exacerbation (H) Medium  4/26/2017    Benign non-nodular prostatic hyperplasia with lower urinary tract symptoms Medium  6/9/2017    Seizure (H) Medium  11/6/2017    Encephalopathy Medium  12/15/2017    Altered mental status Medium  12/16/2017      Code Status History     Date Active Date Inactive Code Status Order ID Comments User Context    3/9/2018 11:53 AM 3/11/2018 12:32 PM Full Code [id number]  Darren Lozano MD Inpatient    12/17/2017 11:15 AM 3/9/2018 11:53 AM Full Code [id number]  Hernesto Dominguez MD Outpatient    12/15/2017  7:44 PM 12/17/2017 11:15 AM Full Code [id number]  Yoselin Vargas MD Inpatient    11/7/2017 10:33 AM 12/15/2017  7:44 PM DNR/DNI [id number]  Miguel Valero MD Outpatient    11/6/2017  2:12 AM 11/7/2017 10:33 AM DNR/DNI [id number]  Refugio Muniz MD Inpatient    11/6/2017  2:02 AM 11/6/2017  2:12 AM Full Code [id number]  Refugio Muniz MD Inpatient    4/29/2017 12:19 PM 11/6/2017  2:02 AM DNR/DNI [id number]  Heber Rod MD Outpatient    4/26/2017  9:05 PM 4/29/2017 12:19 PM DNR/DNI [id number]  Bonifacio Le MD Inpatient    12/22/2015  1:20 PM 4/26/2017  9:05 PM DNR/DNI [id number]  Nick Quarles PA-C Outpatient    12/19/2015 12:29 AM 12/22/2015  1:20 PM DNR/DNI [id number]  Humberto Ansari, GIAN CNP Inpatient    2/25/2015 11:40 AM 12/19/2015 12:29 AM Full Code  [id number]  Evelin Mai MD Outpatient    2/16/2015  9:45 PM 2/25/2015 11:40 AM Full Code [id number]  Demetrius Corona MD Inpatient    1/26/2015  8:32 PM 1/30/2015  8:07 PM Full Code [id number]  Ephraim Kim MD Inpatient    6/30/2014 10:30 AM 1/26/2015  8:32 PM Full Code [id number]  Roger Sagastume DO Outpatient    6/23/2014  5:11 PM 6/30/2014 10:30 AM Full Code [id number]  Heber Rod MD Inpatient    6/12/2014 11:52 AM 6/23/2014  5:11 PM Full Code [id number]  Elias Staley MD Outpatient    4/29/2014 11:43 PM 6/10/2014  7:15 PM Full Code [id number]  Andrae Stover MD Inpatient    4/8/2012  2:43 AM 4/10/2012  8:45 PM Full Code [id number]  Abbie Waters RN Inpatient         Medication Review      START taking        Dose / Directions Comments    acetaminophen 650 MG Suppository   Commonly known as:  TYLENOL   Used for:  Low back pain, unspecified back pain laterality, unspecified chronicity, with sciatica presence unspecified   Replaces:  ACETAMINOPHEN PO        Dose:  650 mg   Place 1 suppository (650 mg) rectally every 4 hours as needed for mild pain or fever (temperatures?greater than 99.5? F(37.5 C))   Quantity:  60 suppository   Refills:  0        atropine 1 % ophthalmic solution   Used for:  Dysphagia, unspecified type        Dose:  1-2 drop   Place 1-2 drops under the tongue every hour as needed for other (secretions)   Quantity:  1 Bottle   Refills:  0        bisacodyl 10 MG Suppository   Commonly known as:  DULCOLAX   Used for:  Altered mental status, unspecified altered mental status type        Dose:  10 mg   Place 1 suppository (10 mg) rectally once as needed for other (for no bowel movement for 72 hours)   Quantity:  30 suppository   Refills:  0        haloperidol 2 MG/ML (HIGH CONC) solution   Commonly known as:  HALDOL   Used for:  Altered mental status, unspecified altered mental status type, Behavior problem, adult        Dose:  2-4 mg   Take 1-2 mLs (2-4 mg) by  mouth every 6 hours as needed for agitation or other (delirium)   Quantity:  90 mL   Refills:  0        hypromellose-dextran Soln ophthalmic solution   Used for:  Altered mental status, unspecified altered mental status type        Dose:  1-2 drop   Place 1-2 drops into both eyes every 8 hours as needed for dry eyes   Quantity:  15 mL   Refills:  0        * LORazepam 2 MG/ML (HIGH CONC) solution   Commonly known as:  LORazepam INTENSOL   Used for:  Speech disturbance, unspecified type        Dose:  1 mg   Place 0.5 mLs (1 mg) under the tongue every 4 hours   Quantity:  30 mL   Refills:  0        * LORazepam 2 MG/ML (HIGH CONC) solution   Commonly known as:  LORazepam INTENSOL   Used for:  History of CVA (cerebrovascular accident)        Dose:  2-4 mg   Take 1-2 mLs (2-4 mg) by mouth every hour as needed for seizures   Quantity:  30 mL   Refills:  0        * LORazepam 2 MG/ML (HIGH CONC) solution   Commonly known as:  LORazepam INTENSOL   Used for:  History of CVA (cerebrovascular accident)        Dose:  0.5-1 mg   Place 0.25-0.5 mLs (0.5-1 mg) under the tongue every 4 hours as needed for anxiety (agitation)   Quantity:  30 mL   Refills:  0        morphine sulfate (high concentrate) 20 MG/ML concentrated solution   Commonly known as:  ROXANOL-CONCENTRATED   Used for:  History of CVA (cerebrovascular accident)        Dose:  5-10 mg   Take 0.25-0.5 mLs (5-10 mg) by mouth every 2 hours as needed for shortness of breath / dyspnea or moderate to severe pain   Quantity:  30 mL   Refills:  0        nicotine 7 MG/24HR 24 hr patch   Commonly known as:  NICODERM CQ   Used for:  Cigarette nicotine dependence with nicotine-induced disorder        Dose:  1 patch   Place 1 patch onto the skin daily   Quantity:  30 patch   Refills:  0        nystatin ointment   Commonly known as:  MYCOSTATIN   Used for:  Scrotal rash        Apply topically 2 times daily   Quantity:  15 g   Refills:  0        scopolamine 72 hr patch   Commonly known  as:  TRANSDERM   Used for:  Dysphagia, unspecified type        Dose:  1 patch   Place 1 patch onto the skin every 72 hours   Quantity:  12 patch   Refills:  0        * Notice:  This list has 3 medication(s) that are the same as other medications prescribed for you. Read the directions carefully, and ask your doctor or other care provider to review them with you.      CONTINUE these medications which have NOT CHANGED        Dose / Directions Comments    nebulizer/adult mask Kit kit   Used for:  Wheezing        Dose:  1 Device   1 Device 4 times daily.   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)        Power wheelchair   Quantity:  1 Device   Refills:  0          STOP taking     ACETAMINOPHEN PO   Replaced by:  acetaminophen 650 MG Suppository           amLODIPine 10 MG tablet   Commonly known as:  NORVASC           AVEENO DAILY MOISTURIZER Lotn           baclofen 20 MG tablet   Commonly known as:  LIORESAL           budesonide 0.25 MG/2ML neb solution   Commonly known as:  PULMICORT           carBAMazepine 200 MG tablet   Commonly known as:  TEGretol           citalopram 20 MG tablet   Commonly known as:  celeXA           clopidogrel 75 MG tablet   Commonly known as:  PLAVIX           diclofenac 1 % Gel topical gel   Commonly known as:  VOLTAREN           divalproex sodium delayed-release 250 MG DR tablet   Commonly known as:  DEPAKOTE           divalproex sodium delayed-release 500 MG DR tablet   Commonly known as:  DEPAKOTE           furosemide 20 MG tablet   Commonly known as:  LASIX           gabapentin 600 MG tablet   Commonly known as:  NEURONTIN           ipratropium - albuterol 0.5 mg/2.5 mg/3 mL 0.5-2.5 (3) MG/3ML neb solution   Commonly known as:  DUONEB           levETIRAcetam 1000 MG Tabs           lidocaine 2 % topical gel   Commonly known as:  XYLOCAINE            loperamide 2 MG capsule   Commonly known as:  IMODIUM           mineral oil-hydrophilic petrolatum           niacin 500 MG CR capsule           oxyCODONE IR 5 MG tablet   Commonly known as:  ROXICODONE           potassium chloride 10 MEQ tablet   Commonly known as:  K-TAB,KLOR-CON           QUEtiapine 100 MG tablet   Commonly known as:  SEROquel           SARNA SENSITIVE 1 % Lotn lotion   Generic drug:  pramoxine           simvastatin 40 MG tablet   Commonly known as:  ZOCOR           tamsulosin 0.4 MG capsule   Commonly known as:  FLOMAX           vitamin D 87440 UNIT capsule   Commonly known as:  ERGOCALCIFEROL           XOPENEX HFA 45 MCG/ACT Inhaler   Generic drug:  levalbuterol                   Summary of Visit     Reason for your hospital stay       Mr. Dawn was hospitalized for a stroke which resulted in inability to speak and swallow. No meaningful recovery and patient indicated in POLST would not want feeding tube. Per family decision makers patient was transitioned to comfort care measures with hospice placement.             After Care     Activity - Up with nursing assistance           Advance Diet as Tolerated       Follow this diet upon discharge: Orders Placed This Encounter      Regular Diet Adult  Patient unable to tolerate po, diet and liquids for comfort care as tolerated       General info for SNF       Length of Stay Estimate: Long Term Care  Condition at Discharge: Declining  Level of care:skilled   Rehabilitation Potential: Poor  Admission H&P remains valid and up-to-date: Yes  Recent Chemotherapy: N/A  Use Nursing Home Standing Orders: Yes       Mantoux instructions       Give two-step Mantoux (PPD) Per Facility Policy Yes             Referrals     Physical Therapy Adult Consult       Evaluate and treat as clinically indicated.    Reason:  S/p stroke, please assist in left arm and leg movement to prevent cramping       Speech Language Path Adult Consult       Evaluate and treat as  clinically indicated.    Reason:  Hospice patient now aphasic after a stroke.  Please assist retraining patient in communication with basic needs.  determine a basic means of communication and educate family/pt/staff to reduce communication breakdowns and maximize quality of life. Anticipate short treatment course following initial evaluation.             Follow-Up Appointment Instructions     Future Labs/Procedures    Follow Up and recommended labs and tests     Comments:    None. Patient is hospice.      Follow-Up Appointment Instructions     Follow Up and recommended labs and tests       None. Patient is hospice.             Statement of Approval     Ordered          03/23/18 0918  I have reviewed and agree with all the recommendations and orders detailed in this document.  EFFECTIVE NOW     Approved and electronically signed by:  Darron Humphrey MD

## 2018-03-09 NOTE — IP AVS SNAPSHOT
MRN:[id number]                      After Visit Summary   3/9/2018    David Dawn    MRN: [id number]           Thank you!     Thank you for choosing [facility] for your care. Our goal is always to provide you with excellent care. Hearing back from our patients is one way we can continue to improve our services. Please take a few minutes to complete the written survey that you may receive in the mail after you visit with us. Thank you!        Patient Information     Date Of Birth          1960        About your hospital stay     You were admitted on:  March 9, 2018 You last received care in the:  [address] [facility]    You were discharged on:  March 23, 2018        Reason for your hospital stay       Mr. Danw was hospitalized for a stroke which resulted in inability to speak and swallow. No meaningful recovery and patient indicated in POLST would not want feeding tube. Per family decision makers patient was transitioned to comfort care measures with hospice placement.                  Who to Call     For medical emergencies, please call 911.  For non-urgent questions about your medical care, please call your primary care provider or clinic, [phone]          Attending Provider     Provider Specialty    Sadie Emerson MD Internal Medicine    [facility], Shaun Stover MD Internal Medicine    Aimee Bai DO Internal Medicine    Darron Humphrey MD Internal Medicine       Primary Care Provider Office Phone # Fax #    Len Krishnan [phone]      After Care Instructions     Activity - Up with nursing assistance           Advance Diet as Tolerated       Follow this diet upon discharge: Orders Placed This Encounter      Regular Diet Adult  Patient unable to tolerate po, diet and liquids for comfort care as tolerated            General info for SNF       Length of Stay Estimate: Long Term Care  Condition at Discharge: Declining  Level of care:skilled   Rehabilitation  "Potential: Poor  Admission H&P remains valid and up-to-date: Yes  Recent Chemotherapy: N/A  Use Nursing Home Standing Orders: Yes            Mantoux instructions       Give two-step Mantoux (PPD) Per Facility Policy Yes                  Follow-up Appointments     Follow Up and recommended labs and tests       None. Patient is hospice.                  Additional Services     Physical Therapy Adult Consult       Evaluate and treat as clinically indicated.    Reason:  S/p stroke, please assist in left arm and leg movement to prevent cramping            Speech Language Path Adult Consult       Evaluate and treat as clinically indicated.    Reason:  Hospice patient now aphasic after a stroke.  Please assist retraining patient in communication with basic needs.  determine a basic means of communication and educate family/pt/staff to reduce communication breakdowns and maximize quality of life. Anticipate short treatment course following initial evaluation.                  Pending Results     No orders found from 3/7/2018 to 3/10/2018.            Statement of Approval     Ordered          03/23/18 0918  I have reviewed and agree with all the recommendations and orders detailed in this document.  EFFECTIVE NOW     Approved and electronically signed by:  Darron Humphrey MD             Admission Information     Date & Time Provider Department Dept. Phone    3/9/2018 Darron Humphrey MD [address] [facility] [phone]      Your Vitals Were     Blood Pressure Pulse Temperature Respirations Weight Pulse Oximetry    138/85 74 98.8  F (37.1  C) (Axillary) 16 103 kg (227 lb 1.2 oz) 96%    BMI (Body Mass Index)                   34.53 kg/m2           MyChart Information     [employer] lets you send messages to your doctor, view your test results, renew your prescriptions, schedule appointments and more. To sign up, go to [email or web address] . Click on \"Log in\" on the left side of the screen, which will take you to the Welcome page. " "Then click on \"Sign up Now\" on the right side of the page.     You will be asked to enter the access code listed below, as well as some personal information. Please follow the directions to create your username and password.     Your access code is: [email or web address]  Expires: 2018  9:52 AM     Your access code will  in 90 days. If you need help or a new code, please call your [place] clinic or [phone].        Care EveryWhere ID     This is your Care EveryWhere ID. This could be used by other organizations to access your [place] medical records  [id number]        Equal Access to Services     [facility]: Romario Swan, adeola garza, angelika nova, deny mancilla . So [facility] [phone].    ATENCIÓN: Si habla español, tiene a costello disposición servicios gratuitos de asistencia lingüística. [facility] [phone].    We comply with applicable federal civil rights laws and [place] laws. We do not discriminate on the basis of race, color, national origin, age, disability, sex, sexual orientation, or gender identity.               Review of your medicines      START taking        Dose / Directions    acetaminophen 650 MG Suppository   Commonly known as:  TYLENOL   Used for:  Low back pain, unspecified back pain laterality, unspecified chronicity, with sciatica presence unspecified   Replaces:  ACETAMINOPHEN PO        Dose:  650 mg   Place 1 suppository (650 mg) rectally every 4 hours as needed for mild pain or fever (temperatures?greater than 99.5? F(37.5 C))   Quantity:  60 suppository   Refills:  0       atropine 1 % ophthalmic solution   Used for:  Dysphagia, unspecified type        Dose:  1-2 drop   Place 1-2 drops under the tongue every hour as needed for other (secretions)   Quantity:  1 Bottle   Refills:  0       bisacodyl 10 MG Suppository   Commonly known as:  DULCOLAX   Used for:  Altered mental status, unspecified altered mental status type "        Dose:  10 mg   Place 1 suppository (10 mg) rectally once as needed for other (for no bowel movement for 72 hours)   Quantity:  30 suppository   Refills:  0       haloperidol 2 MG/ML (HIGH CONC) solution   Commonly known as:  HALDOL   Used for:  Altered mental status, unspecified altered mental status type, Behavior problem, adult        Dose:  2-4 mg   Take 1-2 mLs (2-4 mg) by mouth every 6 hours as needed for agitation or other (delirium)   Quantity:  90 mL   Refills:  0       hypromellose-dextran Soln ophthalmic solution   Used for:  Altered mental status, unspecified altered mental status type        Dose:  1-2 drop   Place 1-2 drops into both eyes every 8 hours as needed for dry eyes   Quantity:  15 mL   Refills:  0       * LORazepam 2 MG/ML (HIGH CONC) solution   Commonly known as:  LORazepam INTENSOL   Used for:  Speech disturbance, unspecified type        Dose:  1 mg   Place 0.5 mLs (1 mg) under the tongue every 4 hours   Quantity:  30 mL   Refills:  0       * LORazepam 2 MG/ML (HIGH CONC) solution   Commonly known as:  LORazepam INTENSOL   Used for:  History of CVA (cerebrovascular accident)        Dose:  2-4 mg   Take 1-2 mLs (2-4 mg) by mouth every hour as needed for seizures   Quantity:  30 mL   Refills:  0       * LORazepam 2 MG/ML (HIGH CONC) solution   Commonly known as:  LORazepam INTENSOL   Used for:  History of CVA (cerebrovascular accident)        Dose:  0.5-1 mg   Place 0.25-0.5 mLs (0.5-1 mg) under the tongue every 4 hours as needed for anxiety (agitation)   Quantity:  30 mL   Refills:  0       morphine sulfate (high concentrate) 20 MG/ML concentrated solution   Commonly known as:  ROXANOL-CONCENTRATED   Used for:  History of CVA (cerebrovascular accident)        Dose:  5-10 mg   Take 0.25-0.5 mLs (5-10 mg) by mouth every 2 hours as needed for shortness of breath / dyspnea or moderate to severe pain   Quantity:  30 mL   Refills:  0       nicotine 7 MG/24HR 24 hr patch   Commonly known as:   NICODERM CQ   Used for:  Cigarette nicotine dependence with nicotine-induced disorder        Dose:  1 patch   Place 1 patch onto the skin daily   Quantity:  30 patch   Refills:  0       nystatin ointment   Commonly known as:  MYCOSTATIN   Used for:  Scrotal rash        Apply topically 2 times daily   Quantity:  15 g   Refills:  0       scopolamine 72 hr patch   Commonly known as:  TRANSDERM   Used for:  Dysphagia, unspecified type        Dose:  1 patch   Place 1 patch onto the skin every 72 hours   Quantity:  12 patch   Refills:  0       * Notice:  This list has 3 medication(s) that are the same as other medications prescribed for you. Read the directions carefully, and ask your doctor or other care provider to review them with you.      CONTINUE these medicines which have NOT CHANGED        Dose / Directions    nebulizer/adult mask Kit kit   Used for:  Wheezing        Dose:  1 Device   1 Device 4 times daily.   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)        Power wheelchair   Quantity:  1 Device   Refills:  0         STOP taking     ACETAMINOPHEN PO   Replaced by:  acetaminophen 650 MG Suppository           amLODIPine 10 MG tablet   Commonly known as:  NORVASC           AVEENO DAILY MOISTURIZER Lotn           baclofen 20 MG tablet   Commonly known as:  LIORESAL           budesonide 0.25 MG/2ML neb solution   Commonly known as:  PULMICORT           carBAMazepine 200 MG tablet   Commonly known as:  TEGretol           citalopram 20 MG tablet   Commonly known as:  celeXA           clopidogrel 75 MG tablet   Commonly known as:  PLAVIX           diclofenac 1 % Gel topical gel   Commonly known as:  VOLTAREN           divalproex sodium delayed-release 250 MG DR tablet   Commonly known as:  DEPAKOTE           divalproex sodium delayed-release 500 MG DR tablet   Commonly known as:   DEPAKOTE           furosemide 20 MG tablet   Commonly known as:  LASIX           gabapentin 600 MG tablet   Commonly known as:  NEURONTIN           ipratropium - albuterol 0.5 mg/2.5 mg/3 mL 0.5-2.5 (3) MG/3ML neb solution   Commonly known as:  DUONEB           levETIRAcetam 1000 MG Tabs           lidocaine 2 % topical gel   Commonly known as:  XYLOCAINE           loperamide 2 MG capsule   Commonly known as:  IMODIUM           mineral oil-hydrophilic petrolatum           niacin 500 MG CR capsule           oxyCODONE IR 5 MG tablet   Commonly known as:  ROXICODONE           potassium chloride 10 MEQ tablet   Commonly known as:  K-TAB,KLOR-CON           QUEtiapine 100 MG tablet   Commonly known as:  SEROquel           SARNA SENSITIVE 1 % Lotn lotion   Generic drug:  pramoxine           simvastatin 40 MG tablet   Commonly known as:  ZOCOR           tamsulosin 0.4 MG capsule   Commonly known as:  FLOMAX           vitamin D 21460 UNIT capsule   Commonly known as:  ERGOCALCIFEROL           XOPENEX HFA 45 MCG/ACT Inhaler   Generic drug:  levalbuterol                Where to get your medicines      These medications were sent to [facility] [place], [place] - [address] [address]     Phone:  [phone]     atropine 1 % ophthalmic solution    bisacodyl 10 MG Suppository    haloperidol 2 MG/ML (HIGH CONC) solution    hypromellose-dextran Soln ophthalmic solution    nicotine 7 MG/24HR 24 hr patch    nystatin ointment    scopolamine 72 hr patch         Some of these will need a paper prescription and others can be bought over the counter. Ask your nurse if you have questions.     Bring a paper prescription for each of these medications     acetaminophen 650 MG Suppository    LORazepam 2 MG/ML (HIGH CONC) solution    LORazepam 2 MG/ML (HIGH CONC) solution    LORazepam 2 MG/ML (HIGH CONC) solution    morphine sulfate (high concentrate) 20 MG/ML concentrated solution                 Protect others around you: Learn how to safely use, store and throw away your medicines at www.disposemymeds.org.        Information about OPIOIDS     PRESCRIPTION OPIOIDS: WHAT YOU NEED TO KNOW    Prescription opioids can be used to help relieve moderate to severe pain and are often prescribed following a surgery or injury, or for certain health conditions. These medications can be an important part of treatment but also come with serious risks. It is important to work with your health care provider to make sure you are getting the safest, most effective care.    WHAT ARE THE RISKS AND SIDE EFFECTS OF OPIOID USE?  Prescription opioids carry serious risks of addiction and overdose, especially with prolonged use. An opioid overdose, often marked by slowed breathing can cause sudden death. The use of prescription opioids can have a number of side effects as well, even when taken as directed:      Tolerance - meaning you might need to take more of a medication for the same pain relief    Physical dependence - meaning you have symptoms of withdrawal when a medication is stopped    Increased sensitivity to pain    Constipation    Nausea, vomiting, and dry mouth    Sleepiness and dizziness    Confusion    Depression    Low levels of testosterone that can result in lower sex drive, energy, and strength    Itching and sweating    RISKS ARE GREATER WITH:    History of drug misuse, substance use disorder, or overdose    Mental health conditions (such as depression or anxiety)    Sleep apnea    Older age (65 years or older)    Pregnancy    Avoid alcohol while taking prescription opioids.   Also, unless specifically advised by your health care provider, medications to avoid include:    Benzodiazepines (such as Xanax or Valium)    Muscle relaxants (such as Soma or Flexeril)    Hypnotics (such as Ambien or Lunesta)    Other prescription opioids    KNOW YOUR OPTIONS:  Talk to your health care provider about ways to  manage your pain that do not involve prescription opioids. Some of these options may actually work better and have fewer risks and side effects:    Pain relievers such as acetaminophen, ibuprofen, and naproxen    Some medications that are also used for depression or seizures    Physical therapy and exercise    Cognitive behavioral therapy, a psychological, goal-directed approach, in which patients learn how to modify physical, behavioral, and emotional triggers of pain and stress    IF YOU ARE PRESCRIBED OPIOIDS FOR PAIN:    Never take opioids in greater amounts or more often than prescribed    Follow up with your primary health care provider and work together to create a plan on how to manage your pain.    Talk about ways to help manage your pain that do not involve prescription opioids    Talk about all concerns and side effects    Help prevent misuse and abuse    Never sell or share prescription opioids    Never use another person's prescription opioids    Store prescription opioids in a secure place and out of reach of others (this may include visitors, children, friends, and family)    Visit www.cdc.gov/drugoverdose to learn about risks of opioid abuse and overdose    If you believe you may be struggling with addiction, tell your health care provider and ask for guidance or call [facility]'s National Helpline at 2-203-790-HELP    LEARN MORE / www.cdc.gov/drugoverdose/prescribing/guideline.html    Safely dispose of unused prescription opioids: Find your local drug take-back programs and more information about the importance of safe disposal at www.doseofreality.[place].gov             Medication List: This is a list of all your medications and when to take them. Check marks below indicate your daily home schedule. Keep this list as a reference.      Medications           Morning Afternoon Evening Bedtime As Needed    acetaminophen 650 MG Suppository   Commonly known as:  TYLENOL   Place 1 suppository (650 mg) rectally  every 4 hours as needed for mild pain or fever (temperatures?greater than 99.5? F(37.5 C))   Last time this was given:  650 mg on 3/10/2018 12:28 PM                                atropine 1 % ophthalmic solution   Place 1-2 drops under the tongue every hour as needed for other (secretions)   Last time this was given:  2 drops on 3/19/2018  4:49 AM                                bisacodyl 10 MG Suppository   Commonly known as:  DULCOLAX   Place 1 suppository (10 mg) rectally once as needed for other (for no bowel movement for 72 hours)                                haloperidol 2 MG/ML (HIGH CONC) solution   Commonly known as:  HALDOL   Take 1-2 mLs (2-4 mg) by mouth every 6 hours as needed for agitation or other (delirium)   Last time this was given:  2 mg on 3/20/2018 11:49 PM                                hypromellose-dextran Soln ophthalmic solution   Place 1-2 drops into both eyes every 8 hours as needed for dry eyes                                * LORazepam 2 MG/ML (HIGH CONC) solution   Commonly known as:  LORazepam INTENSOL   Place 0.5 mLs (1 mg) under the tongue every 4 hours   Last time this was given:  1 mg on 3/23/2018  7:32 AM                                * LORazepam 2 MG/ML (HIGH CONC) solution   Commonly known as:  LORazepam INTENSOL   Take 1-2 mLs (2-4 mg) by mouth every hour as needed for seizures   Last time this was given:  1 mg on 3/23/2018  7:32 AM                                * LORazepam 2 MG/ML (HIGH CONC) solution   Commonly known as:  LORazepam INTENSOL   Place 0.25-0.5 mLs (0.5-1 mg) under the tongue every 4 hours as needed for anxiety (agitation)   Last time this was given:  1 mg on 3/23/2018  7:32 AM                                morphine sulfate (high concentrate) 20 MG/ML concentrated solution   Commonly known as:  ROXANOL-CONCENTRATED   Take 0.25-0.5 mLs (5-10 mg) by mouth every 2 hours as needed for shortness of breath / dyspnea or moderate to severe pain   Last time this was  given:  10 mg on 3/23/2018  8:12 AM                                nebulizer/adult mask Kit kit   1 Device 4 times daily.                                nicotine 7 MG/24HR 24 hr patch   Commonly known as:  NICODERM CQ   Place 1 patch onto the skin daily   Last time this was given:  1 patch on 3/22/2018 10:10 PM                                nystatin ointment   Commonly known as:  MYCOSTATIN   Apply topically 2 times daily   Last time this was given:  3/23/2018  7:32 AM                                order for DME   Power wheelchair                                scopolamine 72 hr patch   Commonly known as:  TRANSDERM   Place 1 patch onto the skin every 72 hours   Last time this was given:  1 patch on 3/20/2018  2:25 PM                                * Notice:  This list has 3 medication(s) that are the same as other medications prescribed for you. Read the directions carefully, and ask your doctor or other care provider to review them with you.

## 2018-03-09 NOTE — IP AVS SNAPSHOT
` ` Patient Information     Patient Name Sex     David Dawn ([id number]) Male 1960       Room Bed    5208 5208-01      Patient Demographics     Address Phone    [address] [address]  [address] [address] [phone] (Home)  [phone] (Work)  NONE (Mobile)      Patient Ethnicity & Race     Ethnic Group Patient Race    American White      Emergency Contact(s)     Name Relation Home Work Mobile    Angelo Peña Son [phone] none [phone]    Caitlin Jefferson [phone]      Documents on File        Status Date Received Description       Documents for the Patient    Privacy Notice - [place] Received 11     Insurance Card Received () 12     External Medication Information Consent       Patient ID Received () 11     Consent for Services - Hospital/Clinic  ()      Consent for Services - Hospital/Clinic Received () 11     External Medication Information Consent Accepted () 11     Waiver - Payment Accepted 11     Waiver - Payment Accepted 11     Waiver - Payment Accepted 12     HIM JACKSON Authorization  04/10/12 DEC CONSENT FOR RELEASE OF INFORMATION    HIM JACKSON Authorization - File Only  12 AUTHORIZATION FOR RELEASE OF PROTECTED HEALTH INFORMATION    HIM JACKSON Authorization - File Only  12 AUTHORIZATION FOR RELEASE OF PROTECTED HEALTH INFORMATION    Consent for Services - Hospital/Clinic Received () 12     Waiver - Payment Received 12     Privacy Notice - [place] Received 12     Consent to Communicate Received () 12     Occupational Therapy Certification Sent 12     External Medication Information Consent Accepted 12     Insurance Card Received 12 [employer] CONNECT MEDICARE PRIMARY     Occupational Therapy Re-Certification Received 12     Insurance Card  () 12 nothing scanned    Insurance Card  Received 12 [employer] CONNECT MEDICARE PRIMARY     Consent to Communicate Received () 12     Physical Therapy Certification Received 12     Business/Insurance/Care Coordination/Health Form - Patient.1  12 ADULT REHABILITATION ATTENDANCE POLICY    Business/Insurance/Care Coordination/Health Form - Patient.1  12 ADULT REHABILITATION ATTENDANCE POLICY    Physical Therapy Re-Certification Received 13     Consent to Communicate Received () 12     HIM JACKSON Authorization  12     Occupational Therapy Certification Sent 13     Patient ID  ()      Patient ID Received () 02/16/15 [place]  2016    Physical Therapy Re-Certification Received 13     Consent for EHR Access  13 Copied from existing Consent for services - C/HOD collected on 2012    [facility] Specified Other       Consent for Services - Hospital/Clinic Received () 13     Consent to Communicate Received () 13     Business/Insurance/Care Coordination/Health Form - Patient.1  06/10/13 ADULT REHABILITATION ATTENDANCE POLICY    Power of  Not Received  Power of  1/10/2011    HIM JACKSON Authorization - File Only   [facility]  JACKSON 9/3/13    HIM JACKSON Authorization - File Only   [employer]  JACKSON 3/7/14    Advance Directives and Living Will Not Received  INVALID DIRECTIVE DATED 01/10/2011 RECEIVED resolved    Power of  Not Received  POWER OF  01/10/2011    Legal Documentation  14 TERMINATION OF GUARDIANSHIP 2014    Consent for Services - Hospital/Clinic Received () 14     Legal Documentation  14 STATE [facility] PHYSICIAN'S STATEMENT IN SUPPORT OF GUARDIANSHIP 14    HIM JACKSON Authorization  14     Consent to Communicate Received () 14 Invalid consent to communicate missing authority to sign    Speech Therapy Certification Received 14  dysphagia eval only    HIM JACKOSN Authorization - File Only   [facility] 2014    Speech Therapy Certification Received 14 Eval Only    Business/Insurance/Care Coordination/Health Form - Patient   [facility] Transition Notification-14    Legal Documentation  03/02/15 PETITION FOR GUARDIANSHIP    Consent for Services - Hospital/Clinic Received () 07/21/15     HIM JACKSON Authorization - File Only  07/22/15 DEC RELEASE OF INFORMATION    Advance Directives and Living Will Received 08/21/15 RESUSCITATION GUIDELINES 2015     Business/Insurance/Care Coordination/Health Form - Patient  16 PHYSICIAN ORDER, NUMOTION- 2015    Consent for Services - [facility]       Consent for Services/Privacy Notice - Hospital/Clinic-Esign Received () 16     Advance Directives and Living Will Received 16 RESUSCITATION GUIDELINES 2016     Consent for Services/Privacy Notice - Hospital/Clinic Received 17     Consent to Communicate Received 17     Advance Directives and Living Will Received 17 POLST  2017    Care Everywhere Prospective Auth Received 10/24/17     Advance Directives and Living Will Received 17 Health Care Directive 17    Advance Directives and Living Will Not Received  Validation of AD 17    Advance Directives and Living Will Received 17 POLST 2017    HIM JACKSON Authorization  18 adrian hunter/s    Advance Directives and Living Will Received 18 POLST 18    CMS IM for Patient Signature  (Deleted)      Consent for Services - Hospital/Clinic  (Deleted)      Power of  Not Received (Deleted)  duplicate scan    Advance Directives and Living Will Not Received (Deleted)  duplicate- to be deleted    Advance Directives and Living Will Not Received (Deleted)  To be deleted - Not signed by provider    Advance Directives and Living Will Not Received (Deleted)  to be deleted not valid    Consent  for Services/Privacy Notice - Hospital/Clinic  (Deleted)      Consent for Services/Privacy Notice - Hospital/Clinic  (Deleted)         Documents for the Encounter    CMS IM for Patient Signature       Photograph   WOC 3/22/18 R) foot    Photograph   WOC 3/22/18 R) heel    Assessment/Questionnaire  03/23/18 MRI HISTORY QUESTIONNAIRE AND CONTRAST NOTICE    Home Care/Hospice/Nursing Home Record  03/23/18 HOME CARE RECORD [place] [facility]      Admission Information     Attending Provider Admitting Provider Admission Type Admission Date/Time    Darron Humphrey MD [facility], Shaun Stover MD Emergency 03/09/18  0854    Discharge Date Hospital Service Auth/Cert Status Service Area     Internal Medicine Incomplete [facility]    Unit Room/Bed Admission Status       UU U5A 5208/5208-01 Admission (Confirmed)       Admission     Complaint    Acute ischemic stroke (H)      Hospital Account     Name Acct ID Class Status Primary Coverage    David Dawn [id number] Inpatient Open MEDICARE - MEDICARE            Guarantor Account (for Hospital Account #[id number])     Name Relation to Pt Service Area Active? Acct Type    David Dawn Self FCS Yes Personal/Family    Address Phone          [address]  [address] [place] [address] [phone](H)  [phone](O)              Coverage Information (for Hospital Account #[id number])     1. MEDICARE/MEDICARE     F/O Payor/Plan Precert #    MEDICARE/MEDICARE     Subscriber Subscriber #    David Dawn [id number]    Address Phone    [employer] CLAIM[employer]  [address]  [place] [address] [phone]          2. UCARE/UCARE CONNECT [place] ONLY     F/O Payor/Plan Precert #    UCARE/UCARE CONNECT [place] ONLY     Subscriber Subscriber #    David Dawn [id number]    Address Phone    [address]  [place] [address] [phone]

## 2018-03-09 NOTE — PHARMACY
Pharmacy Stroke Code Response  Pharmacist responded as part of the Stroke Code Team activation to patient care area ED.  The Stroke Team determined that the patient was not a candidate for IV alteplase therapy and the pharmacy team was dismissed at 0925.

## 2018-03-10 NOTE — PLAN OF CARE
Problem: Patient Care Overview  Goal: Plan of Care/Patient Progress Review    [id number]: Pt continues to be agitated and combative. Coarse lung sounds and copious secretions requiring oral suction which pt is mostly independent with. Sats in high-80s, low-90s. Pt ripped off EEG leads, EEG discontinued. Pt continues to remove CCM leads. Incontinent x2. NPO. Not OOB. Continue to monitor and follow current POC.

## 2018-03-10 NOTE — PROGRESS NOTES
[facility], [place]   Stroke Progress Note  3/10/2018    Summary: David Dawn is a 58 year old male with PMH significant for R frontal stroke (residual left sided weakness and behavioral issues/aggressiveness), seizure d/o 2/2 stroke, COPD, and HTN who was admitted to stroke team 3/9/18 with inability to speak. EEG hooked up 3/9, removed that night by patient and not replaced. MRI pending.     Subjective:    Mr. Dawn is awake and alert and interacting abusively with all who enter his room this morning. He continues to not produce speech and communicates through gestures and grunting. Overnight he became hypoxic to the 80s, however did not allow providers to apply nasal cannula.      Objective:    Vitals: BP (!) 184/113 (BP Location: Right arm)  Pulse 91  Temp 97.3  F (36.3  C) (Axillary)  Resp 20  Wt 103 kg (227 lb 1.2 oz)  SpO2 93%  BMI 34.53 kg/m2  General: NAD, aggressive and abusive  HEENT: sclera anicteric  Cardiac: RRR  Chest: expiratory wheeze b/l, coarse breath sounds  Extremities: No LE edema.    Psychiatric: Aggressive, making abusive gestures, not cooperative    Neurologic: *Exam limited by patient aggression*  Mental Status: Awake and alert, unable or unwilling to speak, communicates with grunting and gestures   Cranial Nerves: Eyes conjugate with EOMI. Left facial droop. Dysarthric.   Motor: Strength appears good on right, ?tremor with movement vs shaking from agitation, left side with no movement  Sensory: Deferred  Coordination: Deferred  Station/Gait: Deferred      Pertinent Investigations:    MRI pending  EEG read pending    Assessment/Plan:   Mr. Dawn has a known R MCA syndrome with left sided weakness/sensory loss and aggressive/abusive behavior. He presented with inability to speak and the cause of this is still unclear. MRI is pending to r/o stroke.     # Aphasia:  Unclear cause. Could be d/t small stroke orrelated to encephalopathy vs seizure vs  post-ictal state (less likely). EEG was performed overnight, patient removed his EEG leads in the middle of the night and they were not replaced; prelim EEG report was negative for seizure. Patient unlikely to tolerate MRI today, will repeat CT to evaluate for stroke.     # H/O R MCA stroke:  Currently NPO due to aphasia/dysarthria and inability to get good swallow eval 2/2 patient cooperation. When able to take PO will restart home Plavix 75mg qd, baclofen and simvastatin 40mg qhs. Will add ASA 81 x2 months for dual antiplatelet therapy when able to take PO.    # COPD, possible exacerbation:  Became hypoxic overnight and has significant wheezing on exam. Started on duonebs qid and albuterol nebs prn. Will treat for exacerbation with azithromycin and methylprednisolone. Patient worsening from respiratory exam standpoint, called medicine for possible transfer.  -Azithromycin 500mg q12h  -Methylprednisolone 40mg q6h    # Seizure disorder:  Unable to take home carbamazepine d/t NPO status. Keppra and valproate have been switched to IV formulations. Since he is unable to take PO will add vimpat for seizure prophylaxis.   -Keppra 1000mg BID  -Valproic acid 875mg q6h  -Vimpat 200mg q12h    # Agitation:  Holding home quetiapine while NPO.  -Olanzapine 5-10mg IM q6h    # HTN:  Holding home amlodipine while NPO.  -Labetalol/hydralazine IV PRN     # Depression:  Holding home citalopram while NPO.    # BPH:  Holding home tamsulosin while NPO.     FEN: NPO pending SLP eval  PPx: Heparin  Code: presumed full    Dispo: anticipate he will be able to discharge back to nursing home     Patient seen and discussed with attending physician Dr. Rich.    Alfonzo [place]  Neurology PGY-2

## 2018-03-10 NOTE — PLAN OF CARE
Problem: Patient Care Overview  Goal: Plan of Care/Patient Progress Review  OT 6A: Cancel.  Acknowledge order placed for OT eval and treat.  Per discussion with RN this AM, pt currently agitated and having increased difficulty with breathing, plan for RT to come see pt.  Therapy to check back at later time as able if pt is less agitated.

## 2018-03-10 NOTE — PROGRESS NOTES
Medicine Transfer Acceptence Note  [facility], [place]    Internal Medicine Progress Note - Gold Service      Assessment & Plan   David Dawn is a 58 year old male admitted on 3/9/2018.    David Dawn is a 58-year-old male with a past medical history remarkable for right MCA stroke complicated by left sided hemiplegia and agitation/abusive behavior/personality alteration, history of seizures, COPD, hypertension, TBI, Cheyne-Escobedo respirations to initially presented with respiratory distress and altered mental status.  Patient was initially admitted to neuro ICU for concern of possible stroke patient  outside the TPA window.  CTA demonstrated unchanged chronic right MCA infarct and  chronic occlusion of right ICA.  CT head notable for increased conspicuity of left basal ganglia lacunar infartc since 3/9.      Respiratory distress:  Patient is also noted to have increased secretions, with inability to clear her cough.  Remains on room air but intermittently allowing staff to assist suction.  There is some concern about possible COPD exacerbation versus inability to clear secretions.  Initiated on azithromycin and methylprednisone for presumed COPD exacerbation.  Patient currently not tolerating p.o. Medications and will plan to restart PTA meds as able.     Time of encounter, patient was Cheyne-Escobedo respirations and able to handle writer Yonker for ongoing suction.  Prior to administration of Ativan for imaging studies clouded neuro exam.  Patient with garbled speech, but following commands.  Pulmonary exam notable for bilateral wheezes and coarse breath sounds. We will continue to reassess and optimize his pulmonary status.     Likely stroke: discuss with neuro    Goals of care:  Will attempt to discuss with family    Diet: NPO  Fluids: none  DVT Prophylaxis: Heparin SQ  Code Status: full    Disposition Plan   Expected discharge: 2 - 3 days, recommended to unknown, TCU vs  assisted living once goals of care established.     Entered: Darron Humphrey 03/16/2018, 8:14 AM   Information in the above section will display in the discharge planner report.      The patient's care was discussed with the Attending Physician, Dr. Humphrey and Bedside Nurse.    Pancho France DO  Internal Medicine, PGY1  Pager [phone]    Interval History     No acute events overnight. Continues to be somewhat aggressive/combative with staff during cares. Unable to communicate via paper/pencil. Non-specific gesturing with right upper extremity to mouth and lower extremities. Grunting but unable to speak.       Data reviewed today: I reviewed all medications, new labs and imaging results over the last 24 hours.     Physical Exam      Vital Signs: reviewed  General Appearance:  NAD   Respiratory: Course breath sounds bilaterally, faint bilateral expiratory wheezes, unable to suction secretions by younker   Cardiovascular: Tachycardic, regular rhythm, no m/g/r  GI: soft, mild distension, NT, normoactive bowel sounds  Skin: Small erythematous petechiae over soles of feet bilaterally, absent over digits or nail beds.  Neuro: Following commands, moving right UE freely, tremulous, EOM grossly intact      Data   reviewed    Internal Medicine Staff Addendum  Date of Service: 3/10/2018  I have seen and examined Mr. Dawn, reviewed the data and discussed the plan of care with the care team on P&FC Rounds.  I agree with the above documentation     I discussed pt's care with bedside RN, case management/social work today.  I personally reviewed labs, medications and past 24 hr notes.  Assessment/Plan/Diagnoses: plan/dx as above, which contains my edits and reflects our joint medical decision-making.     Darron Humphrey MD  Internal Medicine/Pediatrics Hospitalist & Staff Physician   of Internal Medicine and Pediatrics  [facility]  Pager: [phone]

## 2018-03-10 NOTE — PROGRESS NOTES
VEEG monitoring preliminary results:    VEEG has been running for over one hour.  EEG showed mild generalized slowing of the background activities.  No clinical or electrographic seizures were observed.    Amelia Cloud MD  Neurology

## 2018-03-10 NOTE — PLAN OF CARE
Problem: Stroke (Ischemic) (Adult)  Goal: Signs and Symptoms of Listed Potential Problems Will be Absent, Minimized or Managed (Stroke)  Signs and symptoms of listed potential problems will be absent, minimized or managed by discharge/transition of care (reference Stroke (Ischemic) (Adult) CPG).   Outcome: No Change  Admit for R MCA stroke. Neuros difficulty to assess, patient combative during assessment. Non verbal and does not follow commands. Left side hemiparesis, right side strong. Opens eyes spontaneously, tries to hit care takers during cares. SAT mid 80s to low 90s HS at start of 2300 shift, refuses oxygen mask and MD aware. Respiratory status throughout night improved with SAT mid 90s by 0400. Weak cough with moderate amount of frothy white sputum, oral suctions self. HTN within parameters and tachycardic in 100s. Refuses repositioning and prefers supine position. PIV NS @50ml/hr. NPO, failed SLP swallow evaluation. Up with 2/lift. Incontinent at times, will use urinal when offered. ELLA carbone disconnected by patient yesterday, continuously disconnects cardiac monitoring. Zyprexa IM given for agitation/aggression with positive results. On seizure precautions. Continue to monitor.

## 2018-03-10 NOTE — PLAN OF CARE
Problem: Stroke (Ischemic) (Adult)  Goal: Signs and Symptoms of Listed Potential Problems Will be Absent, Minimized or Managed (Stroke)  Signs and symptoms of listed potential problems will be absent, minimized or managed by discharge/transition of care (reference Stroke (Ischemic) (Adult) CPG).   Outcome: Declining  Pt admitted from Assisted living facility w AMS and communication impairment. Residual L sided hemiplegia from an old stroke. Wheelchair at baseline. Patient combative and does not cooperate with assessments or nursing cares. Difficult to complete neuro. CT head was negative. H/o COPD. Saturating well on RA. Has coarse lung sounds and wheezing. Fluids stopped. Weak cough. Oral secretions but will not let staff suction him. He will self suction w Amos, but does not really go deep enough. Methylpred and Azithromycin ordered. Awaiting from pharm. Respiratory on board. Medicine consulted and may transfer to their service. CTA Angiogram Head ordered but will need an 18G and more sedation to get. Awaiting to hear from Neuro team to discuss plan. Endorsed to evening nurse. C/t monitor.

## 2018-03-10 NOTE — PLAN OF CARE
Problem: Patient Care Overview  Goal: Plan of Care/Patient Progress Review  PT evaluation cx and on hold.  Per RN, patient agitated with worsening respiratory status, recommending cx of evaluation today.  After consultation with OT, anticipate patient appropriate for 1 discipline to initiate evaluation and address basic mobility/ADLs. OT to initiate evaluation and will advise PT if additional intervention indicated to address functional mobility.

## 2018-03-10 NOTE — PROGRESS NOTES
Time/length of seizure event: 7:20 transient few seconds difficult to determine if was an event vs coughing fit.  But he did bite his lip.   Movements (head, eyes, extremities): Whole body tremor.  Orientation during seizure: Unable to determine.  After seizure, remembers the unique phrase given during seizure: Unable to Determine.  After seizure, remembers an unnamed visual object shown during seizure: Unable to Determine.  Able to identify/name aloud item during seizure: Unable to Determine.  Able to follow command during seizure: Unable to Determine.  Able to read test sentence aloud during seizure: Unable to Determine.  Able to read test sentence aloud again after the seizure: Unable to Determine.  After seizure, remembers name of object shown during seizure: Unable to Determine.  Orientation and level of consciousness after seizure: Unable to Determine.  VS and oxygen saturation during/after seizure: 94%  Recall of the event?: Unable to Determine.  Was this a typical seizure/event?: Unable to Determine.  Presence of aura or pre-seizure activity: Unable to Determine.  Incontinence: No

## 2018-03-10 NOTE — PLAN OF CARE
Problem: Patient Care Overview  Goal: Plan of Care/Patient Progress Review  Discharge Planner SLP   Patient plan for discharge: Pt unable to state.   Current status: Pt continues to demonstrate overt s/sx of aspiration/penetration at this time. Pt continues to have increased audible breathing and is unable to cough volitionally to clear secretions. Recommend NPO with alternative methods of nutrition/hydration/medication. SLP to follow per POC.   Barriers to return to prior living situation: NPO status  Recommendations for discharge: inpatient rehab  Rationale for recommendations: pt will likely require ongoing SLP services.        Entered by: Caitlin Isbell 03/10/2018 12:51 PM

## 2018-03-11 NOTE — PLAN OF CARE
Problem: Patient Care Overview  Goal: Plan of Care/Patient Progress Review  OT 6A: cancel, per discussion with RN, pt is agitated/combative and not following any commands, not appropriate for OT eval on this date, will reschedule.

## 2018-03-11 NOTE — PLAN OF CARE
Problem: Stroke (Ischemic) (Adult)  Goal: Signs and Symptoms of Listed Potential Problems Will be Absent, Minimized or Managed (Stroke)  Signs and symptoms of listed potential problems will be absent, minimized or managed by discharge/transition of care (reference Stroke (Ischemic) (Adult) CPG).   Outcome: No Change  Per MD: R frontal stroke (residual left sided weakness and behavioral issues/aggressiveness), seizure d/o 2/2 stroke, COPD, and HTN who was admitted to stroke team 3/9/18 with inability to speak       Alert PAZ O, pt does make attempt to speak but mostly garbled or grunts heard., pt can be combative. Lungs coarse throughout, sputum  White to clr-tenatious, oral sx per self -refuses staff attempts Neb TX Q 4hrs. Sats maintained 93-95%.L) sided weakness.both incontinent and continent. Unable to keep cardiac leads on pt. SL between piggy back IV.

## 2018-03-11 NOTE — PROGRESS NOTES
[facility], [place]    Internal Medicine Progress Note - Maroon Service    Main Plans for Today    Decrease methylpred  Continue Azithromycin     Assessment & Plan   David Dawn is a 58 year old male with hx of COPD, HTN, TBI, R frontal stroke c/b persistent L-sided deficits and  agitation/abusive behavior/personality alteration, seizure disorder, Cheyne-Escobedo respiration who presents with worsening AMS and hypoxic respiratory failure.     # Aphasia  # Altered mental status  # H/o R MCA stroke  Suspect likely due to stroke as CT demonstrates questionable hypodensity in left basal ganglia and possibly hypodensity in areas corresponding to Brocas area.   -- ASA + Plavix when able to take PO, continue suppository ASA at this time  -- SLP for assessment of aphasia and assistance with possible communication modalities   -- PT/OT  -- MRI brain deferred at this time due to agitation     # COPD  # Poor secretion clearance   # Suspected aspiration pneumonitis  History of tobacco abuse and COPD. Home regimen includes pulmicort nebs, levalbuterol and duonebs. Became hypoxic on 3/10 with new wheezes. Suspect aspiration pneumonitis vs  COPD exacerbation. Failing SLP swallow evals.   - Continue azithromycin, methylpred 32 mg for 5 days  - RT for suction as able  - Duonebs QID    # Seizure disorder  AEDs have been transitioned to IV until able to tolerate PO.   - Keppra 1000 mg BID  - Valproic acid 875 mg q6h  - Vimpat 200 mg q12h    # HTN  - Holding PTA amlodipine while NPO  - Labetalol, hydralazine IV PRN    # Depression  # Mood disorder  Mood is likely exacerbated by inability to speak. History of aggressive behavior following MCA stroke.   - Hold PTA quetiapine while NPO  - Olanzapine 5-10 mg IM q6h    # Tobacco dependence   -Nicotine patch    # BPH  - Holding PTA tamsulosin while NPO    # Goals of care  Please refer to note from Dr. Darron Humphrey on 3/11/18 which outlines code status  conversation with family and ongoing GOC discussion.     # Pain Assessment:   Current Pain Score 3/10/2018 3/10/2018 3/9/2018   Patient currently in pain? PAZ PAZ PAZ   Pain score (0-10) - - -   Pain location - - -   Pain descriptors - - -   CPOT pain score - - -   David blum pain level was assessed and he currently denies pain.      Diet: NPO for Medical/Clinical Reasons Except for: No Exceptions  Fluids: 50 cc/hr  DVT Prophylaxis: Heparin SQ  Code Status: DNR / DNI    Disposition Plan   Expected discharge: > 7 days, recommended to transitional care unit once mental status at baseline and safe disposition plan/ TCU bed available.     Entered: Pancho France 03/11/2018, 3:29 PM   Information in the above section will display in the discharge planner report.      The patient's care was discussed with the Attending Physician, Dr. Humphrey.    Pancho France  [facility]  Maroon: 3  Pager: [phone]  Please see sticky note for cross cover information    Interval History   No acute events overnight. Continues to be somewhat aggressive/combative with staff during cares. Unable to communicate via paper/pencil. Non-specific gesturing with right upper extremity to mouth and lower extremities. Grunting but unable to speak.     Physical Exam   Vital Signs: Temp: 97.5  F (36.4  C) Temp src: Axillary BP: (!) 132/96 Pulse: 109 Heart Rate: 106 Resp: 20 SpO2: 98 % O2 Device: None (Room air)    Weight: 227 lbs 1.18 oz  General Appearance: NAD, uncomfortable   Respiratory: Course breath sounds bilaterally, faint bilateral expiratory wheezes, unable to suction secretions by younker   Cardiovascular: Tachycardic, regular rhythm, no m/g/r  GI: soft, mild distension, NT, normoactive bowel sounds  Skin: Small erythematous petechiae over soles of feet bilaterally, absent over digits or nail beds.  Neuro: Following commands, moving right UE freely, tremulous, EOM grossly intact    Data   All data personally reviewed in epic.      Internal Medicine Staff Addendum  Date of Service: 3/11/2018  I have seen and examined Mr. Danw, reviewed the data and discussed the plan of care with the patient's family by phone and the care team on P&FC Rounds.  I agree with the above documentation     I discussed pt's care with bedside RN, case management/social work today.  I personally reviewed labs, medications and past 24 hr notes.  Assessment/Plan/Diagnoses: plan/dx as above, which contains my edits and reflects our joint medical decision-making.     Darron Humphrey MD  Internal Medicine/Pediatrics Hospitalist & Staff Physician   of Internal Medicine and Pediatrics  [facility]  Pager: [phone]

## 2018-03-11 NOTE — PLAN OF CARE
Problem: Stroke (Ischemic) (Adult)  Goal: Signs and Symptoms of Listed Potential Problems Will be Absent, Minimized or Managed (Stroke)  Signs and symptoms of listed potential problems will be absent, minimized or managed by discharge/transition of care (reference Stroke (Ischemic) (Adult) CPG).   Outcome: No Change  Admit for right frontal stroke, has left sided residual weakness and behavioral changes/aggression from previous strokes. Neuros difficult to assess as patient is nonverbal and does not follow commands. Left sided paralysis/spastic, right side strong. Aggressive during cares/assessment and will hit/grab. Zyprexa IM given for aggressive/agitated/combative behavior with positive results. Refuses SAT monitoring and cardiac monitoring. PIV NS 50ml/hr. NPO for failed swallow evaluation. VSS ex. HTN within parameters. Lungs coarse throughout all fields. SAT mid 90s RA. Patient will oral suctions self. Turn and reposition q2h. Incontinent of bowel and bladder. Plan for CT angio, will need sedation. Continue to monitor.

## 2018-03-11 NOTE — PROGRESS NOTES
Discussed with patient's sister:   Dicussed Mr. Dawn's current aphasia and challenge communicating with neurology; symptoms plus imaging concerning for recurrent stroke, likely effecting Broca's area.    Based on poor prognosis, and prior emphasis on independence, recommended that we continue current medical management, but change his code status to DNR/DNI, as if he was intubated or his heart stopped, he likely would not recover.    Also discussed that based on recovery in the next day or two, focusing on quality of life will likely be in his best interest.  Reviewed his POLST from 2017; see change in code status as above.  In addition, based on page two, she is in agreement that he wouldn't want life sustaining measures that wouldn't increase independence (tube feeding, for example).    Finally, raised the possibility of palliative care assisting with symptoms, and possible hospice involvement, again, to focus on quality and comfort over duration of life, especially if he continues to be unable to take medications orally.     Darron Humphrey MD  Internal Medicine/Pediatrics Hospitalist & Staff Physician   of Internal Medicine and Pediatrics  [facility]  Pager: [phone]

## 2018-03-11 NOTE — PROGRESS NOTES
Stroke Progress Note  03/11/2018     ASSESSMENT:  David Dawn is a 58 year old male with PMH significant for R frontal stroke (residual left sided weakness and behavioral issues/aggressiveness), seizure d/o 2/2 stroke, COPD, and HTN who was admitted to stroke team 3/9/18 with inability to speak + agitation. EEG hooked up 3/9, removed that night by patient and not replaced; reading from that time negative. Unable to get MRI due to agitation, repeat CT shows questionable area of hypodensity in L basal ganglia. Transferred care to Medicine team 3/10 due to increased secretions w/ limited ability to clear and subsequent respiratory distress, known hx of Cheyne-Escobedo respiration, treating for COPD exacerbation currently.     RECOMMENDATIONS:  ##Aphasia + AMS in the setting of likely baseline agitation/agression: unclear etiology, small stroke vs. sz vs post-ictal state. Repeat CTH read as questionable area of hypodensity in L basal ganglia (this would explain intermittent tremors) and on reviewing the images, there may also possibly be areas of hypodensity in lateral frontal lobe corresponding to Broca's area, which would potentially explain the aphasia. Unable to get MRI at this time due to agitation/aggression (likely baseline per notes worsened by frustration 2/2 aphasia). Patient has occlusion of R ICA and significant stenosis of L ICA, susceptible to hypoxia and subsequent ischemia; endovascular intervention likely not appropriate at this time due to blood flow dependence on the affected L ICA and chronic medical conditions. No sz activity on initial EEG read, patient removed leads night of 3/9, have been unable to replace and clinical suspicion is low at this time; possible prolonged post-ictal state but also unlikely. NPO currently due to aphasia/dysarthria.   #Hx of R frontal stroke w/ residual L sided weakness + behavioral issues/aggressiveness  - If patient becomes less agitated, would attempt MRI to  rule in/out stroke and better describe subtle findings of CT, but tenuous/will defer to Medicine team given patient's current respiratory status   - ASA suppository until able to PO/ oral access, then pta plavix and add ASA 81mg x 2mo  - BP goal <140/80  - LDL 51, can restart pta simvastatin 40mg when able to PO  - Hgb A1C 5.7  - SLP to assess dysarthria/aphasia  - PT/OT    #Sz d/o: 2/2 R frontal stroke. Unable to PO. PTA keppra, depakote converted to IV. Tegretol  tegretol), tegretol transitioned to Vimpat. Depakote + tegretol therapeutic @ admission, keppra level pending. No sz activity on 3/9 EEG (patient self-d/c that night / removed all the wires, have not been able to place again.    - continue current IV AED's, would transition back to pta upon ability to PO      This patient was seen & discussed with my attending, Dr. Rich, who agrees with my assessment and plan.     David Lozano   Neurology  [phone]    =========================================================    ON events: No new events ON, continues w/ aggressive behavior + refusing certain cares/evaluation, incontinent of b/b. Continues to be aphasic/only able to grunt responses. Aggressive this morning, unable to communicate w/ paper/maker (unintelligible scribbles), able to point to yes or no (pointed no to pain and SOB before becoming frustrated and throwing cap of pen).     Problem List:  Patient Active Problem List   Diagnosis     Seizure disorder (H)     Chronic fatigue     Low back pain     Mild major depression (H)     GERD (gastroesophageal reflux disease)     Hyperlipidemia LDL goal <100     COPD (chronic obstructive pulmonary disease) (H)     Hemiplegia affecting left nondominant side (H)     Radicular syndrome of upper limbs     Neck pain     ACP (advance care planning)     Osteoarthritis of glenohumeral joint     Osteoarthritis of acromioclavicular joint     Metabolic encephalopathy     Health Care Home     Dysphagia      Hallucinations     Cough     Generalized muscle weakness     Alcohol abuse     Behavior problem, adult     Neuropathy     History of CVA (cerebrovascular accident)     Cognitive impairment     Essential hypertension, benign     COPD exacerbation (H)     Benign non-nodular prostatic hyperplasia with lower urinary tract symptoms     Seizure (H)     Encephalopathy     Altered mental status     Acute ischemic stroke (H)       Current Medications:    azithromycin  500 mg Intravenous Q24H     methylPREDNISolone  40 mg Intravenous Q6H     lacosamide (VIMPAT) intermittent infusion  200 mg Intravenous BID     aspirin  300 mg Rectal Daily     sodium chloride 0.9%  500 mL Intravenous Once     sodium chloride (PF)  10 mL Intracatheter Once     sodium chloride bacteriostatic  20 mL Intravenous Once     valproate (DEPACON) intermittent infusion  875 mg Intravenous Q6H     levETIRAcetam  1,000 mg Intravenous Q12H     nystatin   Topical BID     heparin  5,000 Units Subcutaneous Q12H     pantoprazole (PROTONIX) IV  40 mg Intravenous Daily with breakfast     ipratropium - albuterol 0.5 mg/2.5 mg/3 mL  3 mL Nebulization 4x daily     thiamine  500 mg Intravenous Daily       PRN Medications:  - MEDICATION INSTRUCTIONS -, senna-docusate **OR** senna-docusate, polyethylene glycol, bisacodyl, ondansetron **OR** ondansetron, prochlorperazine **OR** prochlorperazine **OR** prochlorperazine, metoclopramide **OR** metoclopramide, labetalol, hydrALAZINE, acetaminophen, OLANZapine, albuterol    Infusions:    - MEDICATION INSTRUCTIONS -       sodium chloride Stopped (03/10/18 0600)       Objective findings:  /82 (BP Location: Right arm)  Pulse 109  Temp 98.2  F (36.8  C) (Axillary)  Resp 20  Wt 103 kg (227 lb 1.2 oz)  SpO2 96%  BMI 34.53 kg/m2    Intake/Output:    Intake/Output Summary (Last 24 hours) at 03/11/18 0703  Last data filed at 03/10/18 2000   Gross per 24 hour   Intake              350 ml   Output                0 ml   Net               350 ml       Physical Examination:   Vitals:  B/P: 173/82, T: 98.2, P: 109, R: 20  General:  Hemiplegic, grunting, unable to speak, respiratory distress  HEENT:  NC/AT, no icterus, op pink and moist, no ear or nose drainage.   Chest:  Audible upper airway sounds/secretions, sonorous grunting, unable to auscultate due to aggression/agitation  Abdomen:  Obese  Ext: moving RUE+RLE, intermittently tremulous in RUE+RLE+LUE    Neuro Exam: limited due to aggression/agitation  Mental status: alert, aphasic - unable to speak or write intelligibly, grunting, aggressive/physically threatening/attempting to punch/hit writer  Cranial nerves: EOMI. L lower facial droop (chronic)  Motor: volitional moving RUE +RLE, no movement in LLE, intermittently tremulous/shaking w/ movments in LUE, RUE, RLE. Strong in RUE + RLE.   Sensory: intact to noxious stimuli in all extremities  Reflexes:unable to assess due to aphasia/agitation  Coordination:unable to assess due to aphasia / agitation  Gait: deferred      Labs/Studies:  Recent Labs   Lab Test  03/10/18   0639  03/09/18   0907  12/15/17   1431  12/15/17   1430   NA  144  144  140  138   POTASSIUM  3.9  4.5  5.3  5.7*   CHLORIDE  110*  110*  108  107   CO2  23  26   --   25   ANIONGAP  11  8  5*  6   GLC  104*  94  102*  96   BUN  13  15  24  21   CR  0.63*  0.78   --   1.02   KADIE  9.0  8.8   --   8.5   WBC  10.5  6.5   --   7.8   RBC  3.78*  3.95*   --   3.67*   HGB  13.0*  13.6  14.3  12.9*   PLT  191  188   --   259       Recent Labs   Lab Test  03/09/18   0908  03/09/18   0907  11/05/17   2333   12/01/12   1515   INR  1.1  1.04  1.01   < >  0.90   PTT   --   29   --    --   24    < > = values in this interval not displayed.         Recent Labs  Lab 03/09/18  1834   O2PER 21         Imaging: CTH, reviewed    ==========================================================

## 2018-03-11 NOTE — PROCEDURES
Procedure Date: 2018      EEG #:         DATE OF RECORDIN2018       DURATION OF RECORDING:  Five hours and 48 minutes.      DAY #1 OF VIDEO EEG MONITORING.      CLINICAL HISTORY:  This patient is a 58-year-old man with a history of right frontal lobe stroke, subsequent seizure disorders who presented with altered mental status.  EEG was requested for evaluation for seizures.      CURRENT MEDICATIONS:  Ativan.      TECHNICAL SUMMARY: This continuous video- EEG monitoring procedure was performed with 23 scalp electrodes in 10-20 electrode system placements, and additional scalp, precordial and other surface electrodes used for electrical referencing and artifact detection.  Video monitoring was utilized and periodically reviewed by EEG technologists and the physician for electroclinical correlations.      BACKGROUND ACTIVITIES:  The background activities of this EEG were symmetric and consisted of 7 Hz posterior dominant rhythm which attenuates with eye opening.  There is mild generalized slowing of the background activities mostly in the theta frequencies.  Hyperventilation and photic stimulation were not performed.      Sleep stages were recorded with poorly formed sleep architectures.      No interictal epileptiform activities were observed.      ICTAL ACTIVITIES:  No clinical seizures or electrographic seizures were observed.      The patient had several events with right hand shaking, body tremors and the left arm shaking, which had no clear EEG correlations.  The examples of these events were at time 19:19 and 19:46.      SUMMARY OF DAY #1 OF VIDEO EEG MONITORING:  This is an abnormal EEG due to the presence of mild-to-moderate generalized slowing of the background activities.  No electrographic seizures were observed.  The patient had several episodes of right or left arm shaking, body tremoring with no clear EEG correlations.         MAHAMED FIELDS MD             D: 03/10/2018   T:  03/10/2018   MT: NTS      Name:     MILTON LAMBERT   MRN:      [id number]        Account:        [id number]   :      1960           Procedure Date: 2018      Document: [id number]

## 2018-03-11 NOTE — PLAN OF CARE
Problem: Stroke (Ischemic) (Adult)  Goal: Signs and Symptoms of Listed Potential Problems Will be Absent, Minimized or Managed (Stroke)  Signs and symptoms of listed potential problems will be absent, minimized or managed by discharge/transition of care (reference Stroke (Ischemic) (Adult) CPG).   Outcome: No Change  Pt admitted for stroke; possible evolving infarct near Broca's seen on CT (3/10). Difficulty w communicating, mostly grunting and pointing. Patient uncooperative, agitated and combative. Difficult to assess. Left side droop and hemiplegia from old stroke. VSS on RA with occasional htn within parameters, most likely r/t agitation. Wheezes and crackles. Weak cough. Scheduled nebs though patient often refuses. Patient will self suction w Amos. NPO as per speech. MIVFs at 50 between IV AEDs, methylpred and abx. Inc of stool X2 and inc of urine. TPq2h as patient permits. Medicine now primary, neuro consulting. Zyprexa IM given at 1330. Nonblanchable redness on coccyx, moisture barrier applied. Now DNR/DNI.

## 2018-03-12 NOTE — PROGRESS NOTES
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 Orders received for speech-language evaluation d/t concern for aphasia.    Precautions/Limitations fall precautions;swallowing precautions   General Observations Pt with significant difficulties communicating wants/needs.    Oral Motor Sensory Function   Completed on Swallow Evaluation Other (Comment)   Comments Attempted on clinical swallow evaluation; however, evaluation difficult to complete d/t lanuage deficits.    Language: Auditory Comprehension (understanding of spoken language)   Tests were administered at the following levels Basic (rote activities)   One Step Commands (out of 10 total) 2   Y/N Simple Questions (out of 10 total) 2   Functional Assessment Scale (Auditory Comprehension) Severe Impairment   Comments (Auditory Comprehension) Pt appeared to benefit from visual cue/model and repetition of verbal cue to complete simple 1-step commands. When given cueing, he completed additional x5 commands (for total of 7/10). He also appeared to benefit from written cues to assist in answering yes/no questions. When given written cues, pt answered yes/no questions in 6/8 opportunities by pointing to yes/no answer and/or shaking/nodding head. When given choice of two objects and asked to point to object, pt completed in 4/5 opportunities when given repetition of information.    Language: Verbal Expression (use of  spoken language to express information)   Tests were administered at the following levels Basic (rote activities)   Automatized Sequences; [place] Diagnostic Aphasia Exam (out of 8 total) 0   Functional Assessment Scale (Verbal Expression) Severe Impairment   Comments (Verbal Expression) Pt unable to complete tasks. Occasional groans noted when attempting to communicate during evaluation. He also occasionally attempted using gestures to communicate.    Reading Comprehension (understanding of written language)   Tests were administered at the following levels Basic (rote activities)   Match Letters/Match Words (out of 8 total) 0   Comments (Reading Comprehension) Limited participation in this portion of evaluation as pt became agitated. Suspected servere impairment.    Written Expression (use of writing to express information)   Tests were administered at the following levels Other (Comment)   Functional Assessment Scale (Written Expression) Severe Impairment   Comments (Written Expression) Pt made x1 attempt to write on white board. He was unable to write any legible letters.    Pragmatics (the social or functional use of a language)   Functional Assessment Scale  (Pragmatics) Not Tested (see Comment)   Comments (Pragmatics) Unable to fully assess; however, deficits noted with eye contact.    Cognitive Status Examination   Attention unable to assess   Behavioral Observations alert;combative/agitated   General Therapy Interventions   Planned Therapy Interventions Language   Language Auditory comprehension;Reading comprehension;Verbal expression;Written expression   Clinical Impression, SLP Eval   Criteria for Skilled Therapeutic Interventions Met Yes;Treatment indicated   SLP Diagnosis Severe cognitive-linguistic impairment   Influenced by the following factors/impairments Recent CVA   Functional limitations due to impairments Pt unable to consistently and/or effectively communicate wants and needs   Rehab Potential  Fair, will monitor progress closely   Rehab potential affected by Severity of CVA, prior CVA   Therapy Frequency Daily   Predicted Duration of Therapy Intervention (days/wks) 4 weeks   Anticipated Discharge Disposition Acute Rehabilitation Facility   Risks and Benefits of Treatment have been explained. Yes   Patient, Family & other staff in agreement with plan of care Yes   Clinical Impression Comments Pt presents with severe global aphasia marked by significant deficits in auditory comprehension, verbal expression, reading comprehension, and written expression. He demonstrates significant difficulty following simple 1-step commands but does show improvement in ability to follow commands when given visual cues/models and repetition of verbal command. He exhibits relative strength of answering yes/no questions with use of written yes/no choices, extra time for processing, and repetition of auditory stimulus. He demonstrates additional relative strength of identifying objects from field of 2 when given verbal cues. Pt's verbal communication is marked occasional by groans and attempts at gesturing. He is unable to effectively communicate basic wants and needs. Reading expression was unable to fully be assessed d/t pt participation and written expression is characterized by no legible communication. Also suspect cognitive deficits; however, unable to fully assess at this time d/t significant language impairment. Pt will benefit from ongoing daily SLP services to address language deficits.    Total Evaluation Time      Total Evaluation Time (Minutes) 25
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
## 2018-03-12 NOTE — PLAN OF CARE
Problem: Patient Care Overview  Goal: Plan of Care/Patient Progress Review  OT 6A: Cancel. Patient continues to be agitated/combative with RN staff and not following commands or participating in neuro checks, per RN. Patient not appropriate to engage in OT session toward functional goals at this time. OT to reschedule evaluation for 3/13 and will initiate as appropriate.
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
## 2018-03-12 NOTE — PROGRESS NOTES
Stroke Progress Note  03/12/2018     ASSESSMENT:  David Dawn is a 58 year old male with PMH significant for R frontal stroke (residual left sided weakness and behavioral issues/aggressiveness), seizure d/o 2/2 stroke, COPD, and HTN who was admitted to stroke team 3/9/18 with aphasia/dysarthria, agitation + resp distress. CTH negative for acute intracranial abnormality, CTA w/ occlusion of R ICA, 65-75% occlusion of L ICA. EEG 3/9 w/ generalized slowing, no epileptiform activity. Unable to get MRI due to agitation, repeat CT shows area of hypodensity in L basal ganglia as well as questionable area of hypodensity in L lateral frontal lobe near Broca's region. Transferred care to Medicine team 3/10 due to increased secretions w/ limited ability to clear and subsequent respiratory distress, known hx of Cheyne-Escobedo respiration.     RECOMMENDATIONS:  ##Aphasia, dysarthria + AMS: unclear etiology, small stroke vs. sz vs post-ictal state. Initial CTH 3/9 w/o e/o bleed, no acute findings; Repeat CTH 3/10 read as questionable area of hypodensity in L basal ganglia (this would explain intermittent tremors) and on reviewing the images, there may also possibly be areas of hypodensity in lateral frontal lobe near Broca's area, which would potentially explain the aphasia. Unable to get MRI due to agitation/aggression (likely baseline per notes worsened by frustration 2/2 aphasia). Patient has occlusion of R ICA and significant stenosis of L ICA, susceptible to hypoxia and subsequent ischemia; endovascular intervention may be appropriate if patient regains ability to swallow/speak but may not be appropriate if he does not regain these functions given complex goals of care discussions and patient's previously expressed wishes of independence. No sz activity on initial EEG read, unlikely prolonged post-ictal state given persistence of symptoms. NPO currently due to aphasia/dysarthria. Discussed w/ Medicine team, agree that  patient likely has decreased ability to clear secretions given likely stroke.  #Hx of R frontal stroke w/ residual L sided weakness + behavioral issues/aggressiveness  - Would recommend CT perfusion as an alternative for MRI to further evaluate aphasia and consideration of future stenting of ICA if his speech/swallowing improves over the course of the hospitalization; discussed w/ Primary medicine team,   - ASA suppository until able to PO / oral access, then pta plavix and add ASA 81mg x 2mo  - BP goal <140/80  - LDL 51, can restart pta simvastatin 40mg when able to PO  - Hgb A1C 5.7  - would continue zyprexa PRN for agitation  - SLP to assess dysarthria/aphasia  - PT/OT when able / less agitated    #Sz d/o: 2/2 R frontal stroke. Unable to PO. PTA keppra, depakote converted to IV. Tegretol  tegretol), tegretol transitioned to Vimpat. Depakote + tegretol therapeutic @ admission, keppra level pending. No sz activity on 3/9 EEG (patient self-d/c that night / removed all the wires, have not been able to place again.    - continue current IV AED's, would transition back to pta upon ability to PO    Family discussions: Patient's current medical condition was discussed w/ patient's family (Caitlin) yesterday, along with our concern that he likely had another stroke that led to his current aphasia and dysarthria. Caitlin relayed understanding and reiterated/confirmed conversations she had with Medicine team relating to quality of life. Of note, patient's son Angleo was contacted on the day of David's admission; at this time, Angelo was ambivalent about his father being in the hospital and did not want to know about the patient's condition. He did not want to be the emergency contact and did not ok/agree to this but his father put him down anyways. He directed me to talk with the patient's sister Caitlin for all further medical discussion and cares. In light of discussion by palliative care, this will need to be further  discussed and corroborated with Angelo.     This patient was seen & discussed with my attending, Dr. Pardo, who agrees with my assessment and plan.     David Lozano   Neurology  [phone]    =========================================================    ON events: No acute events ON. Awake/alert this morning, moving R side, able to gesture to providers but very agitated  - swinging, attempting to punch/kick provider.    Problem List:  Patient Active Problem List   Diagnosis     Seizure disorder (H)     Chronic fatigue     Low back pain     Mild major depression (H)     GERD (gastroesophageal reflux disease)     Hyperlipidemia LDL goal <100     COPD (chronic obstructive pulmonary disease) (H)     Hemiplegia affecting left nondominant side (H)     Radicular syndrome of upper limbs     Neck pain     ACP (advance care planning)     Osteoarthritis of glenohumeral joint     Osteoarthritis of acromioclavicular joint     Metabolic encephalopathy     Health Care Home     Dysphagia     Hallucinations     Cough     Generalized muscle weakness     Alcohol abuse     Behavior problem, adult     Neuropathy     History of CVA (cerebrovascular accident)     Cognitive impairment     Essential hypertension, benign     COPD exacerbation (H)     Benign non-nodular prostatic hyperplasia with lower urinary tract symptoms     Seizure (H)     Encephalopathy     Altered mental status     Acute ischemic stroke (H)       Current Medications:    methylPREDNISolone  32 mg Intravenous BID     azithromycin  500 mg Intravenous Q24H     nicotine   Transdermal Q8H     nicotine   Transdermal Daily     nicotine  1 patch Transdermal Daily     lacosamide (VIMPAT) intermittent infusion  200 mg Intravenous BID     aspirin  300 mg Rectal Daily     sodium chloride 0.9%  500 mL Intravenous Once     sodium chloride (PF)  10 mL Intracatheter Once     sodium chloride bacteriostatic  20 mL Intravenous Once     valproate (DEPACON) intermittent infusion   875 mg Intravenous Q6H     levETIRAcetam  1,000 mg Intravenous Q12H     nystatin   Topical BID     heparin  5,000 Units Subcutaneous Q12H     pantoprazole (PROTONIX) IV  40 mg Intravenous Daily with breakfast     ipratropium - albuterol 0.5 mg/2.5 mg/3 mL  3 mL Nebulization 4x daily     thiamine  500 mg Intravenous Daily       PRN Medications:  - MEDICATION INSTRUCTIONS -, senna-docusate **OR** senna-docusate, polyethylene glycol, bisacodyl, ondansetron **OR** ondansetron, prochlorperazine **OR** prochlorperazine **OR** prochlorperazine, metoclopramide **OR** metoclopramide, labetalol, hydrALAZINE, acetaminophen, OLANZapine, albuterol    Infusions:    lactated ringers       - MEDICATION INSTRUCTIONS -         Objective findings:  /75  Pulse 109  Temp 99  F (37.2  C) (Axillary)  Resp 20  Wt 103 kg (227 lb 1.2 oz)  SpO2 97%  BMI 34.53 kg/m2    Intake/Output:  No intake or output data in the 24 hours ending 03/12/18 0707    Physical Examination: limited by aggression / agitation  Vitals:  B/P: 137/75, T: 99, P: 109, R: 20  General:  Lying in bed, aphasic  HEENT:  NC/AT, no icterus, op pink and moist, no ear or nose drainage.   Chest:  Breathing unlabored/improved from previous/admission, grunting  Abdomen: obese  Extremities:  No LE swelling, moving R extremities    Neuro Exam:  Mental status: alert, aphasic, grunting, pointing to leg/provider  Cranial nerves: PERRL, EOMI, L facial droop  Motor: No abnormal movements/tremor, R side moving volitionally + strong, no movement seen in LUE or LLE   Reflexes: unable to assess  Sensory: unable to assess  Coordination: unable to assess  Gait: unable to assess    Labs/Studies:  Recent Labs   Lab Test  03/11/18   0801  03/10/18   0639  03/09/18   0907   NA  146*  144  144   POTASSIUM  4.0  3.9  4.5   CHLORIDE  112*  110*  110*   CO2  20  23  26   ANIONGAP  13  11  8   GLC  100*  104*  94   BUN  16  13  15   CR  0.58*  0.63*  0.78   KADIE  9.3  9.0  8.8   WBC  6.3  10.5   6.5   RBC  3.86*  3.78*  3.95*   HGB  13.4  13.0*  13.6   PLT  216  191  188       Recent Labs   Lab Test  03/09/18   0908  03/09/18   0907  11/05/17   2333   12/01/12   1515   INR  1.1  1.04  1.01   < >  0.90   PTT   --   29   --    --   24    < > = values in this interval not displayed.         Recent Labs  Lab 03/09/18  1834   O2PER 21       ==========================================================
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
## 2018-03-12 NOTE — PLAN OF CARE
Problem: Stroke (Ischemic) (Adult)  Goal: Signs and Symptoms of Listed Potential Problems Will be Absent, Minimized or Managed (Stroke)  Signs and symptoms of listed potential problems will be absent, minimized or managed by discharge/transition of care (reference Stroke (Ischemic) (Adult) CPG).   Outcome: No Change  2820-4907: Neuros difficult to assess, pt is combative and uncooperative with cares/not following commands. Difficult to understand as pt mostly grunts and points. LUE with slight contraction, unable to use LLE. L facial droop from old stroke. VSS on RA. Can be tachy or HTN as pt often fights when VS are taken. LS with crackles and wheezes throughout. Poor cough/pulmonary toilet. Refusing nebs and suctioning. NPO. MIVF with intermittent medications. Zyprexa given approx 2345 as pt agitation increasing. Incontinent of stool and urine, turning q2h. Will continue to monitor per POC.

## 2018-03-12 NOTE — PLAN OF CARE
Problem: Patient Care Overview  Goal: Plan of Care/Patient Progress Review  Discharge Planner SLP   Patient plan for discharge: not stated  Current status: Language evaluation completed per MD request. Pt presents with severe global aphasia marked by significant deficits in auditory comprehension, verbal expression, reading comprehension, and written expression. He demonstrates significant difficulty following simple 1-step commands but shows improvement in ability to follow commands when given visual cues/models and repetition of verbal command. He exhibits relative strength of answering yes/no questions with use of written yes/no choices, extra time for processing, and repetition of auditory stimulus. He demonstrates additional relative strength of identifying objects from field of 2 when given verbal cues. Pt's verbal communication is marked occasional by groans and attempts at gesturing. He is unable to effectively communicate basic wants and needs. Reading expression was unable to fully be assessed d/t pt participation and written expression is characterized by no legible communication. Also suspect cognitive deficits; however, unable to fully assess at this time d/t significant language impairment. Pt will benefit from ongoing daily SLP services to address language deficits. Recommended communication strategies: 1) ask pt yes/no questions with use of written cues 2) keep communication simple, use repetition, and give pt extra time for processing 3) model commands or tasks you are wanting pt to complete 4) give pt 2 choices using object/picture and pair with verbal cues and written cues as able  Barriers to return to prior living situation: cognitive-communication deficits, dysphagia  Recommendations for discharge: rehab w/ ongoing SLP services   Rationale for recommendations: dysphagia, severe aphasia        Entered by: Abbie Olsen 03/12/2018 10:51 AM
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
## 2018-03-13 NOTE — PLAN OF CARE
Problem: Patient Care Overview  Goal: Plan of Care/Patient Progress Review  Outcome: No Change  D/I:Was allowing cares this AM. Was snoring with apneic periods, eyes open (same as yesterday). Got eye drops ordered, but would not have been unable to put them in, since they came up. Has been restless and agitated this afternoon.Kicking, punching, scratching when he can.Zyprexa 5 mg IM x 2.-did not seem to have much of an effect.BP high this AM and got labatolol IV with good results. This afternoon Zyprexa brought down BP after second dose.Incontinent. Voiding spont. BM x 2.Need 3 for turns.Neuros unchanged from yesterday. Right side is strong. L side 1/5 and shakes at times.DAVID.no verbal, will point to YES/NO on grease board, but gets angry after a few questions.  P:Continue with cares as ordered.
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
## 2018-03-13 NOTE — PLAN OF CARE
Problem: Patient Care Overview  Goal: Plan of Care/Patient Progress Review  OT 6A: Cancel. Per RN, patient continues to be agitated but less physical this day. OT to continue to hold evaluation until goals of care have been established with patient's advocate or patient becomes more participatory in cares.

## 2018-03-13 NOTE — PLAN OF CARE
Problem: Stroke (Ischemic) (Adult)  Goal: Signs and Symptoms of Listed Potential Problems Will be Absent, Minimized or Managed (Stroke)  Signs and symptoms of listed potential problems will be absent, minimized or managed by discharge/transition of care (reference Stroke (Ischemic) (Adult) CPG).   Outcome: No Change  Pt admitted for stroke; possible evolving infarct near Broca's seen on CT (3/10). Difficulty w communicating, mostly grunting and pointing. Patient uncooperative, agitated and combative. Difficult to assess. Left side droop and hemiplegia from old stroke. VSS on RA with occasional htn within parameters, most likely r/t agitation. Wheezes and crackles. Weak cough. Scheduled nebs though patient often refuses. Patient will self suction w Amos. NPO as per speech. MIVFs at 50 between IV AEDs.  No BM,  inc of urine. TPq2h as patient permits. Medicine now primary, neuro consulting.  Nonblanchable redness on coccyx, moisture barrier applied. Now DNR/DNI.

## 2018-03-13 NOTE — PLAN OF CARE
Problem: Patient Care Overview  Goal: Plan of Care/Patient Progress Review  Outcome: No Change  [id number]: VSS ex hypertensive-within parameters, patient very agitated and very difficult to get VS. O2 high 90 s on RA. Patient admitted for stroke seen on CT 3/10. DNR/DNI. Unable to complete neuro assessment, patient mainly grunts and points but becomes agitated and combative immediately. L) sided hemiplegia and droop from previous stroke. Patient does not appear to be in pain. Audible expiratory wheezing noted, refusing scheduled nebulizer treatments. Patient allowed some suctioning but would throw yaunker at staff. NPO. LR 50cc/hr with intermittent AEDs. Incontinent of B&B. T&Rq2 if patient allows. Palliative care consulted. Will continue to monitor and follow POC.

## 2018-03-14 NOTE — PLAN OF CARE
Problem: Patient Care Overview  Goal: Plan of Care/Patient Progress Review  Outcome: No Change  Pt continues to be aggressive/combative with cares and vital signs. Has been more cooperative this afternoon. Incontinent urine x3 and x1 small BM this AM. Neuros difficult to assess as pt does not follow most commands. LUE 1/5, LLE 0/5 from previous stroke. Also has a slight left droop. No s/s of pain. IV infusing TKO between sz meds. On a regular diet, nursing staff has not attempted to feed pt yet. Turned/repo q2-3 hrs/as needed for pt comfort. Decision was made today per family to transition to comfort cares, pt will also be tx to 5C, report given to RN. Continue with POC.
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
## 2018-03-14 NOTE — PLAN OF CARE
Problem: Patient Care Overview  Goal: Plan of Care/Patient Progress Review  OT 6A: Cancel and DEFER.  Upon chart review, Pt has change in goals of care.  Plan is now to transition to comfort cares and pursue hospice options.  Will discontinue therapy orders at this time.

## 2018-03-14 NOTE — PROGRESS NOTES
Stroke Progress Note  03/14/2018     ASSESSMENT:  David Dawn is a 58 year old male with PMH significant for R frontal stroke (residual left sided weakness and behavioral issues/aggressiveness), seizure d/o 2/2 stroke, COPD, Cheyne-franklin respiration and HTN who was admitted to stroke team 3/9/18 with aphasia/dysarthria, agitation + resp distress, transferred to Medicine Primary 3/10 due to worsening respiratory distress. Initial CTH negative for acute intracranial abnormality, CTA w/ occlusion of R ICA, 65-75% occlusion of L ICA. Unable to get MRI due to agitation, repeat CT shows area of hypodensity in L basal ganglia as well as questionable area of hypodensity in L lateral frontal lobe near Broca's region. EEG 3/9 w/ generalized slowing, no epileptiform activity. Unlikely prolonged post-ictal state given persistence of symptoms. Per discussion w/ Medicine, no other medical etiology/correlary to current symptoms. Current aphasia/dysarthria consistent w/ acute ischemic stroke of L frontal lobe, likley 2/2 L ICA stenosis, although have not been able to confirm w/ MRI.     RECOMMENDATIONS:  - Medicine Primary contacting patient's sister regarding further medical decision making and goals of care besides what was specifically mentioned in patient's previous POLST/known wishes  - When patient is medically stable and if consistent/helpful to patient's goals of care, would recommend MRI w/ limited/specific sequences to better correlate/understand patient's current symptoms.   - ASA suppository until able to PO / oral access, then pta plavix and add ASA 81mg x 2mo  - BP goal <140/80, treat if >180/100 (since patient's cerebral blood flow is dependent on L ICA that is already stenotic).  - LDL 51, can restart pta simvastatin 40mg when able to PO  - would continue zyprexa PRN for agitation  - SLP to assess dysarthria/aphasia, PT/OT when able / less agitated  - continue current IV AED's, would transition back to pta  upon ability to PO    Since medicine is in discussion with family, we will continue to follow peripherally until further goals of care are established with patient's sister, Caitlin. We will gladly be available for any further questions or concerns that arise.     This patient was seen & discussed with my attending, Dr. Pardo, who agrees with my assessment and plan.      David Lozano  G1 Neurology    ON events: Agitation + aggression overnight, continued aphasia + difficulty communicating.    Problem List:  Patient Active Problem List   Diagnosis     Seizure disorder (H)     Chronic fatigue     Low back pain     Mild major depression (H)     GERD (gastroesophageal reflux disease)     Hyperlipidemia LDL goal <100     COPD (chronic obstructive pulmonary disease) (H)     Hemiplegia affecting left nondominant side (H)     Radicular syndrome of upper limbs     Neck pain     ACP (advance care planning)     Osteoarthritis of glenohumeral joint     Osteoarthritis of acromioclavicular joint     Metabolic encephalopathy     Health Care Home     Dysphagia     Hallucinations     Cough     Generalized muscle weakness     Alcohol abuse     Behavior problem, adult     Neuropathy     History of CVA (cerebrovascular accident)     Cognitive impairment     Essential hypertension, benign     COPD exacerbation (H)     Benign non-nodular prostatic hyperplasia with lower urinary tract symptoms     Seizure (H)     Encephalopathy     Altered mental status     Acute ischemic stroke (H)       Current Medications:    nicotine   Transdermal Q8H     nicotine   Transdermal Daily     nicotine  1 patch Transdermal Daily     lacosamide (VIMPAT) intermittent infusion  200 mg Intravenous BID     aspirin  300 mg Rectal Daily     sodium chloride 0.9%  500 mL Intravenous Once     sodium chloride (PF)  10 mL Intracatheter Once     sodium chloride bacteriostatic  20 mL Intravenous Once     valproate (DEPACON) intermittent infusion  875 mg Intravenous Q6H      levETIRAcetam  1,000 mg Intravenous Q12H     nystatin   Topical BID     heparin  5,000 Units Subcutaneous Q12H     pantoprazole (PROTONIX) IV  40 mg Intravenous Daily with breakfast     ipratropium - albuterol 0.5 mg/2.5 mg/3 mL  3 mL Nebulization 4x daily     thiamine  500 mg Intravenous Daily       PRN Medications:  hydrALAZINE, labetalol, hypromellose-dextran, - MEDICATION INSTRUCTIONS -, senna-docusate **OR** senna-docusate, polyethylene glycol, bisacodyl, ondansetron **OR** ondansetron, prochlorperazine **OR** prochlorperazine **OR** prochlorperazine, metoclopramide **OR** metoclopramide, acetaminophen, OLANZapine, albuterol    Infusions:    lactated ringers 50 mL/hr at 03/12/18 0753     - MEDICATION INSTRUCTIONS -         Objective findings:  BP (!) 176/106  Pulse 74  Temp 98.2  F (36.8  C) (Axillary)  Resp 18  Wt 103 kg (227 lb 1.2 oz)  SpO2 100%  BMI 34.53 kg/m2    Intake/Output:    Intake/Output Summary (Last 24 hours) at 03/14/18 0801  Last data filed at 03/13/18 1415   Gross per 24 hour   Intake            382.5 ml   Output                0 ml   Net            382.5 ml       Physical Examination:   Vitals:  B/P: 176/106, T: 98.2, P: 74, R: 18  General:  Lying in bed, aphasic; exam limited by significant agitation/aggression  HEENT:  NC/AT, no icterus, op pink and moist, no ear or nose drainage.   Chest:  breathing unlabored besides intermittent grunting  Abdomen: obese  Extremities:  moving R extremities, no movement seen on left      Neuro Exam:  Mental status: initially sleeping but alert w/ voice, aphasic, grunting, pointing   Cranial nerves: EOMI, L facial droop  Motor: No abnormal movements/tremor, R side moving volitionally + strong, no movement in LUE or LLE during exam.   Reflexes: unable to assess  Sensory: unable to assess  Coordination: unable to assess  Gait: unable to assess    Labs/Studies:  Recent Labs   Lab Test  03/11/18   0801  03/10/18   0639  03/09/18   0907   NA  146*  144   144   POTASSIUM  4.0  3.9  4.5   CHLORIDE  112*  110*  110*   CO2  20  23  26   ANIONGAP  13  11  8   GLC  100*  104*  94   BUN  16  13  15   CR  0.58*  0.63*  0.78   KADIE  9.3  9.0  8.8   WBC  6.3  10.5  6.5   RBC  3.86*  3.78*  3.95*   HGB  13.4  13.0*  13.6   PLT  216  191  188       Recent Labs   Lab Test  03/09/18   0908  03/09/18   0907  11/05/17   2333   12/01/12   1515   INR  1.1  1.04  1.01   < >  0.90   PTT   --   29   --    --   24    < > = values in this interval not displayed.         Recent Labs  Lab 03/09/18  1834   O2PER 21         =========================================================

## 2018-03-14 NOTE — PLAN OF CARE
Problem: Stroke (Ischemic) (Adult)  Goal: Signs and Symptoms of Listed Potential Problems Will be Absent, Minimized or Managed (Stroke)  Signs and symptoms of listed potential problems will be absent, minimized or managed by discharge/transition of care (reference Stroke (Ischemic) (Adult) CPG).   Outcome: No Change  Pt admitted for stroke; possible evolving infarct near Broca's seen on CT (3/10). Difficulty w communicating, mostly grunting and pointing. Patient uncooperative, agitated and combative. Difficult to assess. Left side droop and hemiplegia from old stroke. VSS on RA with occasional htn most likely r/t agitation; to keep SBP < 160. Hydralazine given once. Wheezes and crackles. Weak cough. Scheduled nebs though patient often refuses. Patient will self suction w Amos. NPO as per speech. MIVFs at 50 between IV AEDs.  No BM,  inc of urine. TPq2h as patient permits. Medicine now primary, neuro consulting.  Nonblanchable redness on coccyx, moisture barrier applied. Now DNR/DNI.

## 2018-03-14 NOTE — PROGRESS NOTES
Discussed goals of care with patient's sister Caitlin today.  Patient's son Angelo although listed as POA on an advance directive does not want to be involved with any decision making processes related to his father's health.  He defers all decisions to his aunt Caitlin.  Of note on one document Caitlin is listed as power of .    Given the patient's stated decline of artificial nutrition on advance directives, Caitlin agrees to NOT pursue feeding tube.  She wants her brother to be offered food by mouth for comfort.  The risks of oral intake to include aspiration, pneumonia, and death were explained.  She understands the risks and requests he be offered food. A discussion of his overall health to include his current deficits which may not improve occurred.  Caitlin would like to transition his care to comfort care and pursue hospice placement.  Her goal is for him to have comfort for the remainder of time he may have.       Aimee Bai, DO  Internal Medicine Staff
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
## 2018-03-15 NOTE — PROGRESS NOTES
[facility], [place]    Palliative Care Progress Note    Patient: David Dawn  Date of Admission:  3/9/2018    Recommendations:  - stop IV antiepileptics (unable to continue these as IV on hospice, pt would not tolerate oral or rectal routes either)  - start lorazepam 1mg q6 hours scheduled; would have additional 2-4mg q 1 hour prn seizure activity  - continue scopolamine patch  - continue haldol 2mg q 6 hours prn  - continue morphine 5-10mg q 2 hours prn  - continue plan of dispo to hospice, comfort measures only while inpatient per discussion between primary team and patient's sister florence who is acting as healthcare decision maker because patient's son and appointed [facility]o is unwilling/unable to do this.     Discussion:  Given plan to work toward discharge will work toward changing medications over to what can be done on the outpatient hospice setting. Given his severe dysphagia I do not think he can tolerate switching the IV antiepileptics to oral or sublingual, furthermore given his agitation and mental status changes he will not tolerate rectal dosing and I would also worry he would not tolerate subcutaneous infusion (which likely would not be able to be managed at many facilities anyway). So I think best option is to switch to scheduled benzodiazepine for seizure prevention. This may also have added benefit of calming his aggression however we need to watch for worsening of symptoms including delirium and further disinhibition       Assessment & Plan   David Dawn is a 58 year old male with a history of TBI, alcohol abuse, seizure disorder, COPD, HTN, R MCA stroke; history of chronic behavior disturbances (agitation, abusive behavior);  admitted with AMS & agitation, and suspected L basal ggl stroke causing aphasia and dysphagia    Symptoms:  - dysphagia  - aphagia  - behavioral disturbances  - secretions    These recommendations have been discussed with medicine  team attending.    Alyssa Goodrich  Pager: [phone]  [facility] Inpatient Team Consult pager [phone] (M-F 8-4:30)  After-hours Answering Service [phone]   Main Palliative Clinic - PWB 1C: [phone]     Interval hx    Primary team transitioned patient to CMO yesterday with plan for hospice discharge  Issues with severe dysphagia and agitation  Per nursing notes seemed to calm down some after haldol but then had trouble with cougin and secretions and calmed again after deeper suctioning and morphine.  Continues to have intermittent combative behaviors      Medications   I have reviewed this patient's medication profile and medications given in the past 24 hours.     IV keppra and IV valproate  Nicotine patch   Scopolamine patch started yesterday evening    PRN atronpine  Prn acetaminophen suppository  PRN haldol 1-2 IV q1 hour prn --x 3mg yesterday , 2mg x 2 doses overnight  Prn artificial tears  PRN morphine solution 5-10mg q 2 hours prn pain or dyspnea--none used, had 1x 4mg IV morphine yesterday  Prn ondansetron-none  Prn miralax-none    Of note: prn zyprexa IM tried up to 30mg in one day without good relief of agitation    Review of Systems   Palliative Symptom Review (0=no symptom/no concern, 1=mild, 2=moderate, 3=severe):  Unable to assess    Physical Exam   Vital Signs: Temp: 98.3  F (36.8  C) Temp src: Axillary BP: 168/80   Heart Rate: 75 Resp: 20 SpO2: 96 % O2 Device: None (Room air)    Weight: 227 lbs 1.18 oz    Physical Exam:  Gen: sitting/lying in bed. Appears comfortable when not being examened, angry and aggressive during attempts to examen him  HEENT: NCAT. Conjunctiva clear. Sclera anicteric .  CV: unable to assess  Resp: unlabored work of breathing, on room air, no audible secretions at bedside  Abd:  Msk: no gross deformity  Skin:  no jaundice  Ext: warm, well perfused.   Neuro: aphasia, left hemiplegia  Mental status/Psych: alert. Oriented. Asks/answers questions appropriately.  Affect is angry, motioning at us to leave      Data reviewed today:   Reviewed labs and imaging in epic    Alyssa Zuletaflor Kp  Pager: [phone]  [facility] Inpatient Team Consult pager [phone] (M-F 8-4:30)  After-hours Answering Service [phone]   Palliative Clinic:  [phone]    Total time spent was 40 minutes,  >50% of time was spent counseling and/or coordination of care regarding end of life symptoms control.

## 2018-03-15 NOTE — PROGRESS NOTES
S: patient with intermittent behavioral issues over  Night requiring haldol.    O:  /80 (BP Location: Right arm)  Pulse 74  Temp 98.3  F (36.8  C) (Axillary)  Resp 20  Wt 103 kg (227 lb 1.2 oz)  SpO2 96%  BMI 34.53 kg/m2  General: Laying in bed and does not appear distressed  Rest of the exam unable to examine 2/2 agitation and combativeness  Neuro: he is alert.  Unable to determine orientation.  He gestured to water.  He was unable to drink via straw when offered.      A/P:    57 y/o male with PMH COPD, HTN, TBI, Right frontal stroke with residual left sided hemiplegia, agitation/abusive behavior, seizure disorder, cheyne franklin respirations whom presented with aphasia.  Aphasia and dysphagia which are severe are secondary to new infarct.  Patient's previous advance directives indicate he does not want artificial nutrition.  This was discussed with the patient's sister at length.  She would like to honor her brother's wishes and thus he will be transitioned to hospice care.     Comfort care  - scheduled ativan sublingual 1 tab every 6 hours  - scopolamine patch  - morphine prn  - additional ativan prn in the event of seizure      Sister Caitlin updated via phone this afternoon.      Aimee Bai,   Internal Medicine Staff
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
## 2018-03-15 NOTE — PLAN OF CARE
Problem: Patient Care Overview  Goal: Plan of Care/Patient Progress Review    SLP 5C: Pt has transitioned to comfort cares. Family/pt electing for PO for goals of comfort/pleasure with knowing risk of aspiration. No further skilled SLP intervention indicated given change in goals of care.     Speech Language Therapy Discharge Summary    Reason for therapy discharge:    No further expectations of functional progress.    Progress towards therapy goal(s). See goals on Care Plan in [facility] electronic health record for goal details.  Goals not met.  Barriers to achieving goals:   no longer appropriate for skilled intevention. Change in goals of care.    Therapy recommendation(s):    No further therapy is recommended.
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
## 2018-03-15 NOTE — PLAN OF CARE
Problem: Patient Care Overview  Goal: Plan of Care/Patient Progress Review  Outcome: No Change  Pt on comfort cares. MD notified that comfort care orders weren't in and pt was being combative with previous nurse. Orders put in. Haldol x3 restless/agressive behavior. Pt didn't want to be turned. Only turned to clean pt up from incontinence. Pt is nonverbal.  Pt can not deal with any oral intake. Dribbles out some of it, but chokes on it as well. Pt uses the yaunker to suction self. Deep suctioning x3. Looking for hospice facility. Continue POC.

## 2018-03-16 NOTE — PROGRESS NOTES
CLINICAL NUTRITION SERVICES    Pt is on comfort cares with no nutrition intervention planned at this time. RD can be consulted if needed.    RD signing off on 3/16/2018.    Roger Henry RD, LD  5C/BMT Dietitian  Pager: [phone]

## 2018-03-16 NOTE — PLAN OF CARE
Problem: Patient Care Overview  Goal: Plan of Care/Patient Progress Review  Outcome: No Change  Comfort cares. Pt was slightly aggressive at start of shift, but has since slept majority of the night. Sublingual ativan working well. Pt is still arousalable to touch and voice, but isn't as aggressive. Sublingual morphine x1. Atropine drops x2. Pt still using yaunker to self suction. Tried to deep suction once, but pt wouldn't let writer.  Incontinent of urine x1. VMP discontinued, due to needing to be free of it for hospice placement.

## 2018-03-16 NOTE — PROGRESS NOTES
Social Work Services Progress Note    Hospital Day: 8  Date of Initial Social Work Evaluation:  3/16/18  Collaborated with:  Chart review    Data:  Pt is a 58-year-old male admitted with worsening AMS and hypoxic respiratory failure. Now on comfort cares.     Intervention:  Pursuing hospice placement. Per medical team pt's agitation/combativeness has improved. Pt's decision-maker/sister Caitlin (ph [phone]) requests referral to [facility] for hospice placement. Faxed referral to [facility] (ph [phone]) for consideration. Per hospice liaison, as pt's sister [place], hospice paperwork and consents will need to be faxed to her for signatures.     Assessment:  Pursuing hospice placement    Plan:    Anticipated Disposition:  Facility:  Hospice    Barriers to d/c plan:  Agitation/combativeness, placement    Follow Up:  SW to follow for discharge planning    SAM Comer, LGSW  5A Unit   Pager [phone]  Phone [phone]    Addendum 2:44pm: Received VM from [facility]; they will re-eval pt on Monday for potential dc on hospice. Bed available at facility is far from nurse's station so facility will need to re-eval pt's agitation/combativeness.

## 2018-03-16 NOTE — PROGRESS NOTES
VPM discontinued. Pt sleeping in bed.  Also to go to a hospice facility, pt needs to be VPM/sitter free for 48hrs.

## 2018-03-16 NOTE — PROGRESS NOTES
[facility], [place]    Internal Medicine Progress Note - [facility] Service    Main Plans for Today   Update POLST  Continue with Ativan q4h    Assessment & Plan   David Dawn is a 58 year old male with hx of COPD, HTN, TBI, R frontal stroke c/b persistent L-sided deficits and  agitation/abusive behavior/personality alteration, seizure disorder, Cheyne-Escobedo respiration who presents with worsening AMS and hypoxic respiratory failure.       # Aphasia  # Altered mental status  # H/o R MCA stroke  Suspect likely due to stroke as CT demonstrates questionable hypodensity in left basal ganglia and possibly hypodensity in areas corresponding to [place] area. Transferred from Neuro ICU to medicine for further care. Initial neuro recommendation to pursue further imaging in order to evaluate if further events can be prevented. Patient with limited mobility and aphasia as a result of stroke limiting his ability to have oral intake. Evaluated by SLP and patient was consistently able to follow commands and answer yes no questions with verbal/written cues.  His POLST indicates no antibiotics, no tube feeds, etc. Indicating he would not want aggressive treatment. Decision-maker (per POLST) who is son was contacted and he deferred decision-making to patient's sister Caitlin. She elected to pursue comfort cares.   -- Palliative consult, appreciate recs   - POLST indicates DNR/DNI, no tube feeds, no IV abx   - Per patient's sister, Caitlin, transitioned to comfort cares 3/14/18   - Scopolamine patch, atropine for secretions   - Ativan q4h for seizure prevention and aggressive behavior   - Morphine for dysphagia and respiratory discomfort         # COPD  # Poor secretion clearance   # Suspected aspiration pneumonitis  History of tobacco abuse and COPD. Home regimen includes pulmicort nebs, levalbuterol and duonebs. Became hypoxic on 3/10 with new wheezes. Suspect aspiration pneumonitis  > COPD exacerbation.  Failing SLP swallow evals. No evidence of COPD exacerbation at this time: steroids and abx discontinued. Patient O2 sats remain >90%. Poor secretions as part of neurologic deficits at this time. Azithromycin discontinued (POLST indicates no IV abx).   - Discontinue deep suctioning  - Nebs discontinued  - continue with Scopolamine and Atropine for secretions  - Morphine for airway discomfort      # Seizure disorder  AEDs have been transitioned to IV until able to tolerate PO.   - Discontinue Keppra, Valproic Acid, and Vimpat  - Continue with Ativan q4h for seizure prevention with comfort cares      # HTN  Patient has been hypertensive BP 180s/100s.  - discontinue all medications for comfort cares      # Depression  # Mood disorder  Mood is likely exacerbated by inability to speak. Patient frustrated following MCA stroke. He has limited mobility and requires visual cues to express needs. Behavior not well controlled with Olanzapine and Haldol, however patient is redirectable and calms down if left alone. Ativan has made patient much more comfortable and no longer behavior issue as of 3/15.  - Continue Ativan sublingual q4h PRN      # Tobacco dependence   -Nicotine patch for comfort on cravings      # BPH  - Holding PTA tamsulosin while NPO  - Patient comfort cares    # Pain Assessment:   Current Pain Score 3/15/2018 3/14/2018 3/14/2018   Patient currently in pain? PAZ sleeping: patient not able to self report yes   Pain score (0-10) - - -   Pain location - - -   Pain descriptors - - -   CPOT pain score - - -   Patient unable to participate in care. Decision maker has elected for comfort cares. Morphine and Ativan as above.    Diet: Regular Diet Adult  Fluids: none  DVT Prophylaxis: none- comfort cares  Code Status: Comfort Care    Disposition Plan   Expected discharge: 2 - 3 days, recommended to hospice once safe disposition plan/ TCU bed available.     Entered: Kyung Patterson 03/16/2018, 3:08 PM   Information in  the above section will display in the discharge planner report.      The patient's care was discussed with the Attending Physician, Dr. Bai.    [facility]  [place]: 3  Pager: [phone]  Please see sticky note for cross cover information    Interval History   RN notes reviewed. No acute events overnight. Patient much more comfortable on Ativan. Still arousable. Unable to take PO.     Physical Exam   Vital Signs:                    Weight: 227 lbs 1.18 oz  General Appearance: Lying in bed comfortably, easily wakes to voice  Respiratory: upper airway secretions and rhonchi bilaterally  Cardiovascular: RRR  GI: soft, +BS  Neuro: limited mobility, left hemiplegia, moves right arm, aphasia        Data   Medications       LORazepam  1 mg Oral Q4H    Or     LORazepam  1 mg Sublingual Q4H    Or     LORazepam  1 mg Rectal Q4H     scopolamine  1 patch Transdermal Q72H    And     scopolamine   Transdermal Q8H    And     [START ON 3/17/2018] scopolamine   Transdermal Q72H     nicotine   Transdermal Q8H     nicotine   Transdermal Daily     nicotine  1 patch Transdermal Daily     sodium chloride 0.9%  500 mL Intravenous Once     sodium chloride (PF)  10 mL Intracatheter Once     sodium chloride bacteriostatic  20 mL Intravenous Once     nystatin   Topical BID     Data     Recent Labs  Lab 03/14/18  0744 03/11/18  0801 03/10/18  0639   WBC  --  6.3 10.5   HGB  --  13.4 13.0*   MCV  --  105* 108*   PLT  --  216 191    146* 144   POTASSIUM 3.4 4.0 3.9   CHLORIDE 108 112* 110*   CO2 22 20 23   BUN 18 16 13   CR 0.62* 0.58* 0.63*   ANIONGAP 13 13 11   KADIE 8.7 9.3 9.0   GLC 71 100* 104*     No results found for this or any previous visit (from the past 24 hour(s)).

## 2018-03-16 NOTE — PROGRESS NOTES
[facility], [place]    Palliative Care Progress Note    Patient: David Dawn  Date of Admission:  3/9/2018    Recommendations:  New recommendations:   - could add lorazepam 0.5-1mg q 4 hours prn anxiety/agigation (given good response to scheduled dosing)  - should review and re-do POLST when talking with sister Florence over phone, make copies in chart to send along to facility when placement is determined  - continue plan of dispo to hospice; now that patient is more calm and accepting of bedside cares with use of comfort medications and is managed on sublingual meds I think he will be able to be accepted to facility on hospice    Hospice discharge medication recommendations:  - lorazepam solution 1mg sublingual q4 hours scheduled  - lorazepam solution 2-4mg q 1 hour prn seizure activity  - lorazepam solution 0.5-1mg sublingual q 4 hours prn anxiety/agitation  - scopolamine patch q 72 hours  - haldol solution 2-4mg q 6 hours prn agitation/delirium  - morphine concentrated solution sublingual 5-10mg q 2 hours prn  - nicotene patch  - Bisacodyl 10 mg Suppository ID daily to bid prn constipation  - Tylenol 650 mg suppository PO/ID q4hr prn pain, fever  - Atropine sulfate 1% opth solution 1-2 drops SL q2h prn secretions  - Senna solution 8.6 mg sublingual 1-2 BID prn constipation        Assessment & Plan   David Dawn is a 58 year old male with a history of TBI, alcohol abuse, seizure disorder, COPD, HTN, R MCA stroke; history of chronic behavior disturbances (agitation, abusive behavior);  admitted with AMS & agitation, and suspected L basal ggl stroke causing aphasia and dysphagia. Sister florence acting as healthcare decision maker as next of kid given his designated HCA son is not willing/able to act as decision maker. Given patient's repeated documented wish for no supplemental nutrition by feeding tube, plan is for hospice.     Symptoms:  - dysphagia- tolerating and responding to  sublingual medications  - aphagia  - behavioral disturbances--improved with scheduled lorazepam  - secretions- dry upper oral membranes with scopolamine, limited to no benefit with suctioning at this point    These recommendations have been discussed with medicine team attending.    Alyssa Goodrich  Pager: [phone]  [facility] Inpatient Team Consult pager [phone] (M-F 8-4:30)  After-hours Answering Service [phone]   Main Palliative Clinic - PWB 1C: [phone]     Interval hx    Lorazepam has improved his agitation and now allowing nursing to do general cares, is more sedated but easily wakes.  Last BM 3/15  No able to interact in converstation-unable to use message board. Nods head no to some questions but inconsistently        Medications   I have reviewed this patient's medication profile and medications given in the past 24 hours.     Nicotine patch   Scopolamine patch   Lorazepam ordered 1mg q 4    PRN atronpine  Prn acetaminophen suppository  PRN haldol 1-2 IV q1 hour prn   Prn artificial tears  PRN morphine solution 5-10mg q 2 hours prn pain or dyspnea  Prn ondansetron-none  Prn miralax-none    Of note: prn zyprexa IM tried up to 30mg in one day without good relief of agitation    Review of Systems   Palliative Symptom Review (0=no symptom/no concern, 1=mild, 2=moderate, 3=severe):  Unable to assess    Physical Exam   Vital Signs:                    Weight: 227 lbs 1.18 oz    Physical Exam:  Gen: sitting/lying in bed. Appears comfortable HEENT: NCAT. Dry MM  CV: unable to assess  Resp: unlabored work of breathing, on room air, does have exp wheeze on anterior exam but no audible secretions  Abd: soft, nontender, BS present  Msk: no gross deformity  Skin:  no jaundice  Ext: warm, well perfused.   Neuro: aphasia, left hemiplegia,  wakes and makes eye contact easily but nto able to consistently follow commands or nod yes/no or use message board  Mental status/Psych: awake, calm at times, at  times frustrated when trying to communicate without success but redirectable.     Data reviewed today:   Reviewed labs and imaging in [facility]    Alyssaolegario Goodrich  Pager: [phone]  [facility] Inpatient Team Consult pager [phone] (M-F 8-4:30)  After-hours Answering Service [phone]   Palliative Clinic:  [phone]    Total time spent was 40 minutes,  >50% of time was spent counseling and/or coordination of care regarding end of life symptoms control.

## 2018-03-17 NOTE — PLAN OF CARE
Problem: Stroke (Ischemic) (Adult)  Goal: Signs and Symptoms of Listed Potential Problems Will be Absent, Minimized or Managed (Stroke)  Signs and symptoms of listed potential problems will be absent, minimized or managed by discharge/transition of care (reference Stroke (Ischemic) (Adult) CPG).   Outcome: No Change   03/16/18 1903   Stroke (Ischemic)   Problems Assessed (Stroke (Ischemic)/TIA) all   Problems Present (Stroke Ischemic/TIA) bladder/bowel dysfunction;cognitive impairment;communication impairment;eating/swallowing impairment;muscle tone abnormal       Problem: Patient Care Overview  Goal: Plan of Care/Patient Progress Review  Outcome: No Change  Patient remains on comfort cares. Has been incontinent of urine several times today. Patient has been wide awake and agitated most the day. Ativan q4 as ordered. Morphine x1 and haldol x1. Atropine x1. Suctioning as patient allows. Continue to monitor closely.        03/16/18 1903   OTHER   Plan Of Care Reviewed With patient   Plan of Care Review   Progress no change

## 2018-03-17 NOTE — PLAN OF CARE
Problem: Stroke (Ischemic) (Adult)  Goal: Signs and Symptoms of Listed Potential Problems Will be Absent, Minimized or Managed (Stroke)  Signs and symptoms of listed potential problems will be absent, minimized or managed by discharge/transition of care (reference Stroke (Ischemic) (Adult) CPG).   Outcome: No Change   03/17/18 0646   Stroke (Ischemic)   Problems Assessed (Stroke (Ischemic)/TIA) all   Problems Present (Stroke Ischemic/TIA) bladder/bowel dysfunction;cognitive impairment;communication impairment;eating/swallowing impairment;muscle tone abnormal;skin breakdown       Problem: Patient Care Overview  Goal: Plan of Care/Patient Progress Review  Outcome: No Change  Pt on comfort cares. He continued to be agitated overnight. Received scheduled ativan every 4 hrs. Morphine given x 1, atropine given x 1. Both his scopolamine and nicotine patches replaced because pt had removed them. Pt was incontinent of urine, becomes combative when trying to change linens and clean patient up. Bed alarm on zone 2 for patient safety. Continue to monitor.

## 2018-03-17 NOTE — PLAN OF CARE
Problem: Patient Care Overview  Goal: Plan of Care/Patient Progress Review  Outcome: No Change  Continues to have agitated behaviors, swing right arm and kicking right leg at staff.  Restless for most of morning, after 1200 ativan he seemed to calm down.  Incontinent of urine, X3 staff needed to change sheets.  Expressive aphasia.  No oral intake given with concern of dysphagia.  Oral care with sponge attempted when allowed, same with oral suctioning.  Thick white secretions from mouth.  Turned as often as possible, his restlessness typically leads to him lying on his R side.  Abrasion to L knee covered with mepilex.  Bottom is slightly reddened, blanchable.  Scrotal area slightly reddened also, nystatin cream applied.   Plan to transfer to  once bed is available.  Plan to continue with scheduled ativan and will utilize haldol if agitation continues.  Will notify team if interventions are not effective.  Morphine for dyspnea.

## 2018-03-17 NOTE — PROGRESS NOTES
[facility], [place]    Internal Medicine Progress Note - [facility] Service    Main Plans for Today   - alternate ativan and haldol for agitation  - transfer to /B when available bed     Assessment & Plan   David Dawn is a 58 year old male with hx of COPD, HTN, TBI, R frontal stroke c/b persistent L-sided deficits and  agitation/abusive behavior/personality alteration, seizure disorder, Cheyne-Escobedo respiration who presents with worsening AMS and hypoxic respiratory failure.       # Aphasia  # Altered mental status  # H/o R MCA stroke  Suspect likely due to stroke as CT demonstrates questionable hypodensity in left basal ganglia and possibly hypodensity in areas corresponding to Bro[place] area. Transferred from Neuro ICU to medicine for further care. Initial neuro recommendation to pursue further imaging in order to evaluate if further events can be prevented. Patient with limited mobility and aphasia as a result of stroke limiting his ability to have oral intake. Evaluated by SLP and patient was consistently able to follow commands and answer yes no questions with verbal/written cues.  His POLST indicates no antibiotics, no tube feeds, etc. Indicating he would not want aggressive treatment. Decision-maker (per POLST) who is son was contacted and he deferred decision-making to patient's sister Caitlin. She elected to pursue comfort cares.   -- Palliative consult, appreciate recs                        - POLST indicates DNR/DNI, no tube feeds, no IV abx                        - Per patient's sister, Caitlin, transitioned to comfort cares 3/14/18                        - Scopolamine patch, atropine for secretions                        - Ativan q4h for seizure prevention and aggressive behavior                        - Morphine for dysphagia and respiratory discomfort         - Haldol also available for agitation as needed, plan to alternate with ativan      # COPD  # Poor secretion clearance    # Suspected aspiration pneumonitis  History of tobacco abuse and COPD. Home regimen includes pulmicort nebs, levalbuterol and duonebs. Became hypoxic on 3/10 with new wheezes. Suspect aspiration pneumonitis  > COPD exacerbation. Failing SLP swallow evals. No evidence of COPD exacerbation at this time: steroids and abx discontinued. Patient O2 sats remain >90%. Poor secretions as part of neurologic deficits at this time. Azithromycin discontinued (POLST indicates no IV abx).   - Discontinue deep suctioning  - Nebs discontinued  - continue with Scopolamine and Atropine for secretions  - Morphine for airway discomfort      # Seizure disorder  AEDs have been transitioned to IV until able to tolerate PO.   - Discontinue Keppra, Valproic Acid, and Vimpat  - Continue with Ativan q4h for seizure prevention with comfort cares      # HTN  Patient has been hypertensive BP 180s/100s.  - discontinue all medications for comfort cares      # Depression  # Mood disorder  Mood is likely exacerbated by inability to speak. Patient frustrated following MCA stroke. He has limited mobility and requires visual cues to express needs. Behavior not well controlled with Olanzapine and Haldol, however patient is redirectable and calms down if left alone. Ativan has made patient much more comfortable and no longer behavior issue as of 3/15.  - Continue Ativan sublingual q4h PRN       # Tobacco dependence   -Nicotine patch for comfort on cravings      # BPH  - Holding PTA tamsulosin while NPO  - Patient comfort cares    # Pain Assessment:   Current Pain Score 3/16/2018 3/15/2018 3/14/2018   Patient currently in pain? PAZ PAZ sleeping: patient not able to self report   Pain score (0-10) - - -   Pain location - - -   Pain descriptors - - -   CPOT pain score - - -   - David is unable to participate in a collaborative plan for pain management due to CVA.   - Please see the plan for pain management as documented above    Diet: Regular Diet  Adult  Fluids: none  DVT Prophylaxis: none - comfort cares  Code Status: DNR / DNI    Disposition Plan   Expected discharge: 2 - 3 days, recommended to hospice once safe disposition plan/ TCU bed available.     Entered: Gareth Ruiz 03/17/2018, 2:58 PM   Information in the above section will display in the discharge planner report.      The patient's care was discussed with the Attending Physician, Dr. Bai and Bedside Nurse.    [facility]  Pager: [phone]  Please see sticky note for cross cover information    Interval History   No acute events overnight, agitation is greatly improved with ativan and also seems to be responding to haldol. Appears to be comfortable. Will attempt to transfer to / once a bed is available.    Physical Exam   Vital Signs: Temp: 98.8  F (37.1  C) Temp src: Axillary BP: 138/85   Heart Rate: 83 Resp: 18 SpO2: 96 % O2 Device: None (Room air)    Weight: 227 lbs 1.18 oz  General Appearance:  Lying in bed, restless, ill-appearing, NAD  Respiratory: rhonchi bilaterally, upper airway secretions rattle  Cardiovascular: RRR  GI: soft, nondistended  Neuro: arouses to voice, left hemiplegia      Data   Medications       LORazepam  1 mg Oral Q4H    Or     LORazepam  1 mg Sublingual Q4H    Or     LORazepam  1 mg Rectal Q4H     scopolamine  1 patch Transdermal Q72H    And     scopolamine   Transdermal Q8H    And     scopolamine   Transdermal Q72H     nicotine   Transdermal Q8H     nicotine   Transdermal Daily     nicotine  1 patch Transdermal Daily     sodium chloride 0.9%  500 mL Intravenous Once     sodium chloride (PF)  10 mL Intracatheter Once     sodium chloride bacteriostatic  20 mL Intravenous Once     nystatin   Topical BID

## 2018-03-18 NOTE — PROGRESS NOTES
S: Patient sleeping comfortably when evaluated this morning.    O:  General: appears comfortable  Resp: not tachypnic, appears comfortable      A/P:    59 y/o male with PMH COPD, HTN, TBI, Right frontal stroke with residual left sided hemiplegia, agitation/abusive behavior, seizure disorder, cheyne franklin respirations whom presented with aphasia.  Aphasia and dysphagia which are severe are secondary to new infarct.  Patient's previous advance directives indicate he does not want artificial nutrition.  This was discussed with the patient's sister at length.  She would like to honor her brother's wishes and thus he will be transitioned to hospice care.     Comfort care  - scheduled ativan sublingual 1 tab every 6 hours  - lorazepam 0.5-1mg sublingual Q 4 hours prn anxiety/aggitation  - scopolamine patch  - morphine prn  - additional ativan prn in the event of seizure  - haldol prn in-between haldol doses         Aimee Bai DO  Internal Medicine Staff

## 2018-03-18 NOTE — PLAN OF CARE
Problem: Stroke (Ischemic) (Adult)  Goal: Signs and Symptoms of Listed Potential Problems Will be Absent, Minimized or Managed (Stroke)  Signs and symptoms of listed potential problems will be absent, minimized or managed by discharge/transition of care (reference Stroke (Ischemic) (Adult) CPG).   Outcome: Declining   03/18/18 1239   Stroke (Ischemic)   Problems Assessed (Stroke (Ischemic)/TIA) all   Problems Present (Stroke Ischemic/TIA) bladder/bowel dysfunction;cognitive impairment;communication impairment;eating/swallowing impairment;motor/sensory impairment;muscle tone abnormal;situational response;skin breakdown       Problem: Patient Care Overview  Goal: Plan of Care/Patient Progress Review  Outcome: Declining   03/18/18 1239   OTHER   Plan Of Care Reviewed With patient   Plan of Care Review   Progress declining     Pt on comfort cares. Q4 hr ativan sublingual given per order. Pt is resting comfortably. No agitation or aggression so far today. Pt started to fidget with noon ativan due, given and pt seems okay. Pt to transfer to , report given to nurse and will transfer shortly after ativan dose. No PRN medications given this shift.

## 2018-03-18 NOTE — PROGRESS NOTES
SPIRITUAL HEALTH SERVICES  [facility] ([place]) 5C  ON-CALL VISIT     REFERRAL SOURCE: family requesting [employer] of anointing     Pt is expected to discharge to hospice on Monday. Not clear whether sister or sister-in-law requesting anointing. Per nurse pt not interactive enough to be assessed directly; no family present.      PLAN: refer to   for anointing Monday morning.     Fred Jordan M.Div. (Bill), [facility]  Staff   Pager [phone]

## 2018-03-18 NOTE — PLAN OF CARE
Problem: Patient Care Overview  Goal: Plan of Care/Patient Progress Review  Outcome: No Change   03/17/18 9426   OTHER   Plan Of Care Reviewed With patient   Plan of Care Review   Progress no change     Comfort cares patient. Agitation continues. Scheduled ativan given every 4 hours. PRN MS x2, atropine x1. Suctioning of secretions when patient allows. Incontinent of urine, patient becomes agitated with linen changes. Scop patch in place. Will continue to monitor, following plan of care.

## 2018-03-18 NOTE — PLAN OF CARE
Problem: Patient Care Overview  Goal: Plan of Care/Patient Progress Review  Outcome: Declining  5C transfer. Comfort Cares. Pt alert throughout shift, cooperative and following most commands. Nonverbal, attempts to make needs known through hand gestures. Right side is strong, left sided weakness. Pt is intermittently incontinent of urine, no BM today. Scheduled sublingual ativan given when ordered, prn morphine given x1. Pt self-suctions when provided with the younker. Regular diet ordered, attempted a spoonful of thickened apple juice but pt was unable to manage liquid. Restless but cooperative, no combative behaviors observed. Provided with call light, has not utilized it this shift. Will continue to monitor and follow POC.

## 2018-03-18 NOTE — PLAN OF CARE
Problem: Patient Care Overview  Goal: Plan of Care/Patient Progress Review  Outcome: No Change   03/18/18 0555   OTHER   Plan Of Care Reviewed With patient   Plan of Care Review   Progress no change     Goal: Individualization & Mutuality  Outcome: No Change  Patient continues to be DNR/DNI, comfort cares. Patient was restless, agitated and confused at the start of the shift. At midnight patient becomes agitated during bed changed due to incontinent of urine. Patient received scheduled ativan given every 4 hours. PRN MS x 2, atropine x 2 and TLC. Patient slept fair over night. Continue with plan of care and notify MD for status changes.

## 2018-03-19 NOTE — PLAN OF CARE
Problem: Patient Care Overview  Goal: Plan of Care/Patient Progress Review  Outcome: No Change  /85  Pulse 74  Temp 98.8  F (37.1  C) (Axillary)  Resp 16  Wt 103 kg (227 lb 1.2 oz)  SpO2 96%  BMI 34.53 kg/m2    Pt alert intermittently throughout shift. Falling asleep around noon. Uncooperative, not following commands, combative at times. Thorough and multiple explanations of cares, tends to be helpful with combativeness. Nonverbal, some head nods to questions. Left-sided weakness. Pt is intermittently incontinent of urine, no BM today. Scheduled sublingual ativan given when ordered, except when patient fell asleep. Pt self-suctions when provided with the younker. Regular diet ordered but not able to tolerate, spoonfuls of thickened liquids provided for comfort followed by suctioning. Awaiting placement at TCU facility. Will continue to monitor and follow POC.

## 2018-03-19 NOTE — PROGRESS NOTES
[facility], [place]    Internal Medicine Progress Note - [facility] Service    Main Plans for Today   Hospice Placement       Assessment & Plan   David Dawn is a 58 year old male with hx of COPD, HTN, TBI, R frontal stroke c/b persistent L-sided deficits and  agitation/abusive behavior/personality alteration, seizure disorder, Cheyne-Escobedo respiration who presents with worsening AMS and hypoxic respiratory failure. Pt transitioned to comfort cares.       # Aphasia  # Altered mental status  # H/o R MCA stroke  Suspect likely due to stroke as CT demonstrates questionable hypodensity in left basal ganglia and possibly hypodensity in areas corresponding to [place] area. Transferred from Neuro ICU to medicine for further care. Initial neuro recommendation to pursue further imaging in order to evaluate if further events can be prevented. Patient with limited mobility and aphasia as a result of stroke limiting his ability to have oral intake. Evaluated by SLP and patient was consistently able to follow commands and answer yes no questions with verbal/written cues.  His POLST indicates no antibiotics, no tube feeds, etc. Indicating he would not want aggressive treatment. Decision-maker (per POLST) who is son was contacted and he deferred decision-making to patient's sister Caitlin Jefferson. She elected to pursue comfort cares.   -- Palliative consult, appreciate recs    - POLST indicates DNR/DNI, no tube feeds, no IV abx    - Per patient's sister, Caitlin, transitioned to comfort cares 3/14/18    - Updated POLST completed 3/17/18    - Scopolamine patch, atropine for secretions    - Ativan q4h for seizure prevention and aggressive behavior    - Morphine for dysphagia and respiratory discomfort    - Haldol also available for agitation as needed, plan to alternate with ativan      # COPD  # Poor secretion clearance   # Suspected aspiration pneumonitis  History of tobacco abuse and COPD. Home regimen  includes pulmicort nebs, levalbuterol and duonebs. Became hypoxic on 3/10 with new wheezes. Suspect aspiration pneumonitis  > COPD exacerbation. Failing SLP swallow evals. No evidence of COPD exacerbation at this time: steroids and abx discontinued. Patient O2 sats remain >90%. Poor secretions as part of neurologic deficits at this time. Azithromycin discontinued (POLST indicates no IV abx).   - Discontinue deep suctioning and Nebs  - continue with Scopolamine and Atropine for secretions  - Morphine for airway discomfort      # Seizure disorder  AEDs have been transitioned to IV until able to tolerate PO.   - Discontinued Keppra, Valproic Acid, and Vimpat  - Continue with Ativan q4h for seizure prevention with comfort cares      # HTN  Patient has been hypertensive BP 180s/100s.  - discontinued all medications for comfort cares      # Depression  # Mood disorder  Mood is likely exacerbated by inability to speak. Patient frustrated following MCA stroke. He has limited mobility and requires visual cues to express needs. Behavior not well controlled with Olanzapine and Haldol, however patient is redirectable and calms down if left alone. Ativan has made patient much more comfortable and no longer behavior issue as of 3/15.  - Continue Ativan sublingual q4h PRN       # Tobacco dependence   -Nicotine patch for comfort on cravings      # BPH  - Holding PTA tamsulosin while NPO  - Patient comfort cares    # Pain Assessment:   Current Pain Score 3/18/2018 3/18/2018 3/17/2018   Patient currently in pain? sleeping: patient not able to self report sleeping: patient not able to self report other (see comments)   Pain score (0-10) - - -   Pain location - - -   Pain descriptors - - -   CPOT pain score - - -    - David is unable to participate in a plan for pain management due to CVA; however, the plan  was discussed in a collaborative fashion with his sister.   - Please see the plan for pain management as documented  above    Diet: Regular Diet Adult  Fluids: none  DVT Prophylaxis: none-comfort cares  Code Status: Comfort Care    Disposition Plan   Expected discharge: Tomorrow, recommended to hospice once safe disposition plan/ TCU bed available.     Entered: Kyung Leonardo 03/19/2018, 5:45 PM   Information in the above section will display in the discharge planner report.      The patient's care was discussed with the Attending Physician, Dr. Bai.    Kyung Leonardo  [facility]  [place]: 3  Pager: [phone]  Please see sticky note for cross cover information    Interval History   RN notes reviewed. Patient has aggressive behavior but very easily redirectable. Also well managed with PRN Ativan and Haldol. Hospice to review today. Provided update to sister, Caitlin Jefferson, and faxed letter to assist her in picking up his belongings from his prior facility.    Physical Exam   Vital Signs:                    Weight: 227 lbs 1.18 oz  General Appearance: Ill-appearing, comfortable, NAD  Respiratory: apneic episodes, rhonchi bilaterally, on room air  Cardiovascular: RRR  GI: soft, no palpable masses  Skin: no acute rashes  Neuro: Pt not oriented, lethargic but easily arouses to voice, left hemiparesis         Data   Medications       LORazepam  1 mg Oral Q4H    Or     LORazepam  1 mg Sublingual Q4H    Or     LORazepam  1 mg Rectal Q4H     scopolamine  1 patch Transdermal Q72H    And     scopolamine   Transdermal Q8H    And     scopolamine   Transdermal Q72H     nicotine   Transdermal Q8H     nicotine   Transdermal Daily     nicotine  1 patch Transdermal Daily     sodium chloride 0.9%  500 mL Intravenous Once     sodium chloride (PF)  10 mL Intracatheter Once     sodium chloride bacteriostatic  20 mL Intravenous Once     nystatin   Topical BID     Data     Recent Labs  Lab 03/14/18  0744      POTASSIUM 3.4   CHLORIDE 108   CO2 22   BUN 18   CR 0.62*   ANIONGAP 13   KADIE 8.7   GLC 71     No results found for this  or any previous visit (from the past 24 hour(s)).

## 2018-03-19 NOTE — PROGRESS NOTES
SPIRITUAL HEALTH SERVICES  SPIRITUAL ASSESSMENT Progress Note (Palliative Focus)  [facility] ([place]) 5A    REFERRAL SOURCE: Palliative care on call visit per family request.    Fr. Nettles visited with patient David Dawn per family request for anointing. David shook his head no and used hand gestures to indicate lack of interest in a visit. RN was present and will follow up with family to confirm.    Plan: I am available for spiritual support as needed, as are  chaplains.    Makenzie Alfaro  Palliative   Pager [phone]  [facility] Inpatient Team Consult pager [phone] (M-F 8-4:30)  After-hours Answering Service [phone]

## 2018-03-19 NOTE — PLAN OF CARE
Problem: Patient Care Overview  Goal: Plan of Care/Patient Progress Review  Outcome: No Change  [id number]    Pt transferred from  yesterday 3/18. Comfort cares.     Comfort care plan (per MD note)  - scheduled ativan sublingual 1 tab every 6 hours  - lorazepam 0.5-1mg sublingual Q 4 hours prn anxiety/aggitation  - scopolamine patch  - morphine prn  - additional ativan prn in the event of seizure  - haldol prn in-between haldol doses    Activity: Lift pt. Able to move himself in bed. Bed alarm on for safety. Pt tends to lift R leg out of bed. Pt does not use call light, but in reach.   Pain: Pt appears to be in pain and uncomfortable. PRN sublingual morphine given x3.   Neuro: Alert but unable to assess orientation d/t aphasia. Pt cooperative at time, able to follow some basic commands. However, becomes agitated and combative at times, punching and kicking staff. Scheduled sublingual ativan given. Haldol available but not given this shift. Pt can be re-directed with simple commands.   Respiratory: Increased respiratory secretions managed with younker suction and atropine drops. Pt unable to clear airway or swallow.   GI/: Incontinent of urine. No BM this shift.   Diet: Regular diet but unable to swallow. Small spoonfuls of thickened juice given to moisten mouth, but suctioned immediately after. Pt refusing mouth swabs.   Skin: Bilateral heel redness, floated with pillows.   LDAs: R PIV in place SL.     Plan: Awaiting hospice placement. Continue to monitor and follow POC.

## 2018-03-19 NOTE — PROGRESS NOTES
Social Work Services Progress Note     Hospital Day: 11  Date of Initial Social Work Evaluation:  3/16/18  Collaborated with:  Primary team Rocael Pink     Data:  Pt is a 58-year-old male admitted with worsening AMS and hypoxic respiratory failure. Now on comfort cares.      Intervention:  Pursuing hospice placement. Refaxed referral to [facility] (ph [phone]) and left VM for update on if they would be able to accept pt. Nursing notes state pt has been having periods of aggression.     Per hospice liaison, as pt's [place], hospice paperwork and consents will need to be faxed to her for signatures.      Assessment:  Pursuing hospice placement     Plan:    Anticipated Disposition:  Facility:  Hospice    Barriers to d/c plan:  Agitation/combativeness, placement    Follow Up:  SW to follow for discharge planning     SAM Comer, [facility]  5A Unit   Pager [phone]  Phone [phone]    Addendum 3:42pm: Pt is accepted at [facility] pending sign-on with  Hospice. Notified hospice liaison Camilla.

## 2018-03-20 NOTE — PROGRESS NOTES
Social Work Services Progress Note      Hospital Day: 12  Date of Initial Social Work Evaluation:  3/16/18  Collaborated with:  Primary team Maroon 3      Data:  Pt is a 58-year-old male admitted with worsening AMS and hypoxic respiratory failure. Now on comfort cares.       Intervention:  Hospice liaison states that pt's son must sign hospice consent paperwork as he is named on a healthcare directive as pt's chosen decision-maker. Hospital has been deferring medical decisions to pt's sister per son's request, as son does not want to be involved with pt's care. Per hospice liaison their policy states son must sign the paperwork. Liaison spoke with son and he refused to sign paperwork, stating that he did not choose to be pt's decision-maker.    MD called and spoke with pt's son. Plan TBD.       Assessment:  Pursuing hospice placement      Plan:    Anticipated Disposition:  Facility:  Hospice    Barriers to d/c plan:  Hospice consent paperwork    Follow Up:  SW to follow for discharge planning      SAM Comer, LGSW  5A Unit   Pager [phone]  Phone [phone]

## 2018-03-20 NOTE — PLAN OF CARE
Problem: Patient Care Overview  Goal: Plan of Care/Patient Progress Review  Comfort cares. Patient alert, non verbal, uncooperative, agitated and combative at times. Left sided hemiplegia. Patient not able to tolerate regular diet. Not able to swallow. Oral care done.Small spoonfuls of thickened water given to moisten mouth but suctioned immediately after. Patient incontinent of urine. Vilma care done. Barrier applied. No BM this shift. Bed alarm on for safety. Plan to discharge to TCU.

## 2018-03-20 NOTE — PLAN OF CARE
Problem: Patient Care Overview  Goal: Plan of Care/Patient Progress Review  Pts VSS ORA. Pt continues to be comfortable. Unable to assess A&O at this time due to non verbal status. Pt continues to be confused and want to leave the unit to smoke and get physical with staff. Pt relaxes and calms down with verbal techniques and explanations/reorientation. Scheduled ativan not given during the shift due to sleeping or outright refusal by patient. Patient continues to suction himself with bedside equipment and is now currently sleeping comfortably. Will continue to follow POC.

## 2018-03-20 NOTE — PROGRESS NOTES
[facility], [place]    Internal Medicine Progress Note - [facility] Service    Main Plans for Today   Hospice Placement    Assessment & Plan   David Dawn is a 58 year old male with hx of COPD, HTN, TBI, R frontal stroke c/b persistent L-sided deficits and  agitation/abusive behavior/personality alteration, seizure disorder, Cheyne-Escobedo respiration who presents with worsening AMS and hypoxic respiratory failure. Pt transitioned to comfort cares.       # Aphasia  # Altered mental status  # H/o R MCA stroke  Suspect likely due to stroke as CT demonstrates questionable hypodensity in left basal ganglia and possibly hypodensity in areas corresponding to [place] area. Transferred from Neuro ICU to medicine for further care. Initial neuro recommendation to pursue further imaging in order to evaluate if further events can be prevented. Patient with limited mobility and aphasia as a result of stroke limiting his ability to have oral intake. Evaluated by SLP and patient was consistently able to follow commands and answer yes no questions with verbal/written cues.  His POLST indicates no antibiotics, no tube feeds, etc. Indicating he would not want aggressive treatment. Decision-maker (per STEHPANY) who is son (Angelo) was contacted and he deferred decision-making to patient's sister Caitlin Jefferson. She elected to pursue comfort cares.   -- Palliative consult, appreciate recs                         - POLST indicates DNR/DNI, no tube feeds, no IV abx                         - Per patient's sister, Caitlin, transitioned to comfort cares 3/14/18                         - Updated POLST completed 3/17/18                         - Scopolamine patch, atropine for secretions                         - Ativan q4h for seizure prevention and aggressive behavior                         - Morphine for dysphagia and respiratory discomfort                         - Haldol also available for agitation as needed, plan  to alternate with ativan      # COPD  # Poor secretion clearance   # Suspected aspiration pneumonitis  History of tobacco abuse and COPD. Home regimen includes pulmicort nebs, levalbuterol and duonebs. Became hypoxic on 3/10 with new wheezes. Suspect aspiration pneumonitis  > COPD exacerbation. Failing SLP swallow evals. No evidence of COPD exacerbation at this time: steroids and abx discontinued. Patient O2 sats remain >90%. Poor secretions as part of neurologic deficits at this time. Azithromycin discontinued (POLST indicates no IV abx).   - Discontinue deep suctioning and Nebs  - continue with Scopolamine and Atropine for secretions  - Morphine for airway discomfort      # Seizure disorder  AEDs have been transitioned to IV until able to tolerate PO.   - Discontinued Keppra, Valproic Acid, and Vimpat  - Continue with Ativan q4h for seizure prevention with comfort cares      # HTN  Patient has been hypertensive BP 180s/100s.  - discontinued all medications for comfort cares      # Depression  # Mood disorder  Mood is likely exacerbated by inability to speak. Patient frustrated following MCA stroke. He has limited mobility and requires visual cues to express needs. Behavior not well controlled with Olanzapine and Haldol, however patient is redirectable and calms down if left alone. Ativan has made patient much more comfortable and no longer behavior issue as of 3/15.  - Continue Ativan sublingual q4h PRN       # Tobacco dependence   -Nicotine patch for comfort on cravings      # BPH  - Holding PTA tamsulosin while NPO  - Patient comfort cares    # Pain Assessment:   Current Pain Score 3/20/2018 3/20/2018 3/18/2018   Patient currently in pain? PAZ PAZ sleeping: patient not able to self report   Pain score (0-10) - - -   Pain location - - -   Pain descriptors - - -   CPOT pain score - - -    - David is unable to participate in a plan for pain management; however, the plan  was discussed in a collaborative fashion  with his sister.   - Please see the plan for pain management as documented above    Diet: Regular Diet Adult  Fluids: none  DVT Prophylaxis: None- Comfort Cares  Code Status: Comfort Care    Disposition Plan   Expected discharge: Tomorrow, recommended to hospice once safe disposition plan/ TCU bed available.     Entered: Kyung Patterson 03/20/2018, 11:57 AM   Information in the above section will display in the discharge planner report.      The patient's care was discussed with the Attending Physician, Dr. Humphrey.    Kyung Patterson  [facility]  Pager: [phone]  Please see sticky note for cross cover information    Interval History   RN notes reviewed. No acute events overnight. Patient continues to be easily redirectable and doing well with PRN Haldol/Ativan.    Physical Exam   Vital Signs:                    Weight: 227 lbs 1.18 oz  General Appearance: Alert, lying in bed comfortably  Respiratory: rhonchi bilaterally, upper airway secretions  Cardiovascular: RRR  GI: soft, NTND  Skin: no acute rashes (right knee abrasion with bandage)  Other: Left hemiplegia, Alert and interactive, nonverbal         Data   Medications       LORazepam  1 mg Oral Q4H    Or     LORazepam  1 mg Sublingual Q4H    Or     LORazepam  1 mg Rectal Q4H     scopolamine  1 patch Transdermal Q72H    And     scopolamine   Transdermal Q8H    And     scopolamine   Transdermal Q72H     nicotine   Transdermal Q8H     nicotine   Transdermal Daily     nicotine  1 patch Transdermal Daily     sodium chloride 0.9%  500 mL Intravenous Once     sodium chloride (PF)  10 mL Intracatheter Once     sodium chloride bacteriostatic  20 mL Intravenous Once     nystatin   Topical BID     Data     Recent Labs  Lab 03/14/18  0744      POTASSIUM 3.4   CHLORIDE 108   CO2 22   BUN 18   CR 0.62*   ANIONGAP 13   KADIE 8.7   GLC 71     No results found for this or any previous visit (from the past 24 hour(s)).    Internal Medicine Staff  Addendum  Date of Service: 3/20/2018  I have seen and examined Mr. Dawn, reviewed the data and discussed the plan of care with the care team on P&FC Rounds.  I agree with the above documentation     I discussed pt's care with bedside RN, case management/social work today.  I personally reviewed medications and past 24 hr notes.  Assessment/Plan/Diagnoses: plan/dx as above, which contains my edits and reflects our joint medical decision-making.   I discussed patient with his son, Osbaldo (listed as the patient's health care agent).  Osbaldo does not want to be Mr. Dawn's health care agent. Therefore I consider that the patient does not have an assigned health care agent.  Hence, his next of kin, his sister Caitlin, is able to make medical decisions as Mr. Dawn is unable.      Darron Humphrey MD  Internal Medicine/Pediatrics Hospitalist & Staff Physician   of Internal Medicine and Pediatrics  [facility]  Pager: [phone]

## 2018-03-21 NOTE — PLAN OF CARE
Problem: Patient Care Overview  Goal: Plan of Care/Patient Progress Review  Comfort cares. Patient alert. Agitated and restless beginning of the night. Given oral ativan, haldol and morphine. Patient incontinent of urine. No BM. Oral and gorge care done. Bed alarm on for safety. Continue to monitor and follow POC.

## 2018-03-21 NOTE — PROGRESS NOTES
[facility], [place]    Internal Medicine Progress Note - [facility]    Main Plans for Today   Pending hospice placement    Assessment & Plan   David Dawn is a 58 year old male admitted on 3/9/2018. David Dawn is a 58 year old male with hx of COPD, HTN, TBI, R frontal stroke c/b persistent L-sided deficits and  agitation/abusive behavior/personality alteration, seizure disorder, Cheyne-Escobedo respiration who presents with worsening AMS and hypoxic respiratory failure. Pt transitioned to comfort cares.       # Aphasia  # Altered mental status  # H/o R MCA stroke  Suspect likely due to stroke as CT demonstrates questionable hypodensity in left basal ganglia and possibly hypodensity in areas corresponding to [place] area. Transferred from Neuro ICU to medicine for further care. Initial neuro recommendation to pursue further imaging in order to evaluate if further events can be prevented. Patient with limited mobility and aphasia as a result of stroke limiting his ability to have oral intake. Evaluated by SLP and patient was consistently able to follow commands and answer yes no questions with verbal/written cues.  His POLST indicates no antibiotics, no tube feeds, etc. Indicating he would not want aggressive treatment. Decision-maker (per POLST) who is son was contacted and he deferred decision-making to patient's sister Caitlin [place]. She elected to pursue comfort cares.   -- Palliative consult, appreciate recs                         - POLST indicates DNR/DNI, no tube feeds, no IV abx                         - Per patient's sister, Caitlin, transitioned to comfort cares 3/14/18                         - Updated POLST completed 3/17/18                         - Scopolamine patch, atropine for secretions                         - Ativan q4h for seizure prevention and aggressive behavior                         - Morphine for dysphagia and respiratory discomfort                          - Haldol also available for agitation as needed, plan to alternate with ativan      # COPD  # Poor secretion clearance   # Suspected aspiration pneumonitis  History of tobacco abuse and COPD. Home regimen includes pulmicort nebs, levalbuterol and duonebs. Became hypoxic on 3/10 with new wheezes. Suspect aspiration pneumonitis  > COPD exacerbation. Failing SLP swallow evals. No evidence of COPD exacerbation at this time: steroids and abx discontinued. Patient O2 sats remain >90%. Poor secretions as part of neurologic deficits at this time. Azithromycin discontinued (POLST indicates no IV abx).   - Discontinue deep suctioning and Nebs  - continue with Scopolamine and Atropine for secretions  - Morphine for airway discomfort      # Seizure disorder  AEDs have been transitioned to IV until able to tolerate PO.   - Discontinued Keppra, Valproic Acid, and Vimpat  - Continue with Ativan q4h for seizure prevention with comfort cares      # HTN  Patient has been hypertensive BP 180s/100s.  - discontinued all medications for comfort cares      # Depression  # Mood disorder  Mood is likely exacerbated by inability to speak. Patient frustrated following MCA stroke. He has limited mobility and requires visual cues to express needs. Behavior not well controlled with Olanzapine and Haldol, however patient is redirectable and calms down if left alone. Ativan has made patient much more comfortable and no longer behavior issue as of 3/15.  - Continue Ativan sublingual q4h PRN       # Tobacco dependence   -Nicotine patch for comfort on cravings      # BPH  - Holding PTA tamsulosin while NPO  - Patient comfort cares    # Pain Assessment:   Current Pain Score 3/21/2018 3/20/2018 3/20/2018   Patient currently in pain? yes yes PAZ   Pain score (0-10) - - -   Pain location Leg Leg -   Pain descriptors Patient unable to describe Patient unable to describe;Other (comment) -   CPOT pain score - - -    - David is unable to participate  in a plan for pain management; however, the plan  was discussed in a collaborative fashion with his sister.   - Please see the plan for pain management as documented above    Diet: Regular Diet Adult  Fluids: none  DVT Prophylaxis: none- comfort cares  Code Status: Comfort Care    Disposition Plan   Expected discharge: Tomorrow, recommended to hospice once hospice bed available and paperwork complete.     Entered: Kyung Patterson 03/21/2018, 4:39 PM   Information in the above section will display in the discharge planner report.      The patient's care was discussed with the Attending Physician, Dr. Humphrey.    Kyung Patterson  [facility]  Pager: [phone]  Please see sticky note for cross cover information    Interval History   RN notes reviewed. No acute events overnight. Patient redirectable and comfortable with current regimen in place. Concern for paperwork to be completed by son Angelo per social work instead of sister Caitlin. Risk management involved.     Physical Exam   Vital Signs:                    Weight: 227 lbs 1.18 oz  General Appearance: Alert, comfortably lying in bed, gesturing to make needs known  Respiratory: rhonchi bilaterally with upper airway secretions  Cardiovascular: RRR no m/r/g  GI: soft NTND  Skin: no acute rash, L knee bandaged, R heel skin abrasion  Other: left hemiparesis, patient alert today        Data   Medications       LORazepam  1 mg Oral Q4H    Or     LORazepam  1 mg Sublingual Q4H    Or     LORazepam  1 mg Rectal Q4H     scopolamine  1 patch Transdermal Q72H    And     scopolamine   Transdermal Q8H    And     scopolamine   Transdermal Q72H     nicotine   Transdermal Q8H     nicotine   Transdermal Daily     nicotine  1 patch Transdermal Daily     sodium chloride 0.9%  500 mL Intravenous Once     sodium chloride (PF)  10 mL Intracatheter Once     sodium chloride bacteriostatic  20 mL Intravenous Once     nystatin   Topical BID     Data   No lab  results found in last 7 days.  No results found for this or any previous visit (from the past 24 hour(s)).    Internal Medicine Staff Addendum  Date of Service: 3/21/2018  I have seen and examined Mr. Dawn, reviewed the data and discussed the plan of care with the care team on P&FC Rounds.  I agree with the above documentation      I discussed pt's care with bedside RN, case management/social work today.  I personally reviewed medications and past 24 hr notes.  Assessment/Plan/Diagnoses: plan/dx as above, which contains my edits and reflects our joint medical decision-making.   I discussed patient with his son, Osbaldo (listed as the patient's health care agent).  Osbaldo does not want to be Mr. Dawn's health care agent. Therefore I consider that the patient does not have an assigned health care agent.  Hence, his next of kin, his sister Caitlin, is able to make medical decisions as Mr. Dawn is unable.       Darron Humphrey MD  Internal Medicine/Pediatrics Hospitalist & Staff Physician   of Internal Medicine and Pediatrics  [facility]  Pager: [phone]

## 2018-03-21 NOTE — PROGRESS NOTES
[facility], [place]    Palliative Care Progress Note    Patient: David Dawn  Date of Admission:  3/9/2018    Recommendations:  - I changed the prn lorazepam order for seizures to liquid solution and added prn IV dose; I dont anticipate seizure but given seizure hx its good to have available.     - agree with involving risk management for support around who can sign hospice consent. Given that his son is not willing to act as healthcare agent I believe that it is reasonable to defer to next of kin. That is what was initially discussed with ethics team and how we have been operating while inpatient. See consult note by Dr. Gonazlez 3/12/2018        Hospice discharge medication recommendations: if he does d/c on hospice I recommend the following meds on discharge  - lorazepam solution 1mg sublingual q4 hours scheduled  - lorazepam solution 2-4mg q 1 hour prn seizure activity  - lorazepam solution 0.5-1mg sublingual q 4 hours prn anxiety/agitation  - scopolamine patch q 72 hours  - haldol solution 2-4mg q 6 hours prn agitation/delirium  - morphine concentrated solution sublingual 5-10mg q 2 hours prn  - nicotene patch  - Bisacodyl 10 mg Suppository ME daily to bid prn constipation  - Tylenol 650 mg suppository PO/ME q4hr prn pain, fever  - Atropine sulfate 1% opth solution 1-2 drops SL q2h prn secretions  - Senna solution 8.6 mg sublingual 1-2 BID prn constipation        Assessment & Plan   David Dawn is a 58 year old male with a history of TBI, alcohol abuse, seizure disorder, COPD, HTN, R MCA stroke; history of chronic behavior disturbances (agitation, abusive behavior);  admitted with AMS & agitation, and suspected L basal ggl stroke causing aphasia and dysphagia. Sister florence acting as healthcare decision maker as next of kid given his designated HCA son is not willing/able to act as decision maker. Given patient's repeated documented wish for no supplemental nutrition by  feeding tube, plan is for hospice.     Symptoms:  - dysphagia- tolerating and responding to sublingual medications  - aphagia  - behavioral disturbances--improved with scheduled lorazepam  - secretions- dry upper oral membranes with scopolamine, limited to no benefit with suctioning at this point    These recommendations have been discussed with medicine team attending.    Alyssa Goodrich  Pager: [phone]  [facility] Inpatient Team Consult pager [phone] (M-F 8-4:30)  After-hours Answering Service [phone]   Main Palliative Clinic - PWB 1C: [phone]     Interval hx    Overall has been calm and symtpoms well managed. Primary sytmpom has been agitation/behaviors and this has improved since lorazepam was scheduled. No seizures since switching from antiepileptics to scheuled lorazepam. No PO intake. No IV fluids since 3/14 (1 week)    Today was intermittently awake but doses off during my visit. Unable to communicate with yes/no nods or writing or hand gestures and difficulty maintaining attention     Hospice discharge on hold due to concerns from hospice team around who should sign consetn for hsopice given his son and designated HCA is refusing to participate in decisions. Medicine team is involving risk.         Medications   I have reviewed this patient's medication profile and medications given in the past 24 hours.     Nicotine patch   Scopolamine patch   Lorazepam ordered 1mg q 4    PRN atronpine  Prn acetaminophen suppository  PRN haldol 1-2 IV q1 hour prn   Prn artificial tears  PRN morphine solution 5-10mg q 2 hours prn pain or dyspnea  Prn ondansetron-none  Prn miralax-none    Of note: prn zyprexa IM tried up to 30mg in one day without good relief of agitation    Review of Systems   Palliative Symptom Review (0=no symptom/no concern, 1=mild, 2=moderate, 3=severe):  Unable to assess    Physical Exam   Vital Signs:                    Weight: 227 lbs 1.18 oz    Physical Exam:  Gen:  sitting/lying in bed. Appears comfortable HEENT: NCAT. Dry MM  CV:  Resp: unlabored work of breathing, on room air, does some audible upper airway secretions  Abd:   Msk: no gross deformity  Skin:  no jaundice  Ext: warm, well perfused.   Neuro: aphasia, left hemiplegia, awake but doses off intermittently, makes eye contact easily but nto able to consistently follow commands or nod yes/no or use message board. Motioning with hand that he wants something but unable to communicate clearly.       Data reviewed today:   Reviewed labs and imaging in epic. No new data since comfort care initiated.     Alyssa Goodrich  Pager: [phone]  [facility] Inpatient Team Consult pager [phone] (M-F 8-4:30)  After-hours Answering Service [phone]   Palliative Clinic:  [phone]    Total time spent was 30 minutes,  >50% of time was spent counseling and/or coordination of care regarding end of life symptoms control.

## 2018-03-21 NOTE — PLAN OF CARE
Problem: Patient Care Overview  Goal: Plan of Care/Patient Progress Review  Outcome: No Change  Pt alert, PAZ orientation. Comfort cares. Agitated and awake throughout shift, throwing pillows/blankets frequently but cooperative with cares. Requires redirection and calming environment. PIV SL. Given scheduled ativan and one dose of prn PO haldol. Incontinent of urine, voiding every 3-4 hours. No BM. Unable to manage secretions, self-suctioning with assistance. Will transition to TCU/hospice when paperwork with family is completed. Will continue to monitor and follow POC.

## 2018-03-21 NOTE — PLAN OF CARE
Problem: Patient Care Overview  Goal: Plan of Care/Patient Progress Review  Outcome: No Change  Pt is Alert, pt nonverbal and PAZ orientation, intermittently agitated throwing pillows and punching, and on room air. Suction equipment frequently offered to pt to clear secretions. Thickened liquid swabs used as needed. L Scopolamine patch in place. R PIV SL. Scheduled liquid Ativan given. Pt incontinent of urine x1 during my shift. Awaiting paperwork to be signed by POA to transfer to hospice care. Continue to monitor and POC.

## 2018-03-22 NOTE — PROGRESS NOTES
Focus- Right foot and heel  D/I/A/P: Received consult for suspected pressure injury on right heel. Assessed the area and noted blanchable erythema on heel with dry peeling epithelium. Multiple pustule appearance lesions on the plantar aspect of the foot. Pt continuously thrashing the foot. No intervention warranted at this time unless able to float the heels using pillows. Pt is on comfort cares.          Plan- No further visit planned, will complete the consult.

## 2018-03-22 NOTE — PROGRESS NOTES
Social Work Services Progress Note      Hospital Day: 14  Date of Initial Social Work Evaluation:  3/16/18  Collaborated with:  Primary team [facility], [facility], [facility]      Data:  Pt is a 58-year-old male admitted with worsening AMS and hypoxic respiratory failure. Now on comfort cares.       Intervention:  Consulted with care team who spoke with management at [facility].  Hospice now agreeable to allowing pt's son to defer signing consents to pt's sister, Caitlin. Rosa from [facility] (ph [phone]) has spoken with pt's sister and faxed consents to her. Pt is scheduled for intake with [facility] tomorrow at 1pm at [facility] as long as consents are received prior. Updated [facility] (ph [phone])  that pt will likely be ready to discharge tomorrow morning.       Assessment:  Pursuing hospice placement      Plan:    Anticipated Disposition:  Facility:  Hospice    Barriers to d/c plan:  Hospice consent paperwork    Follow Up:  SW to follow for discharge planning      SAM Comer, LGSW  [address] Unit   Pager [phone]  Phone [phone]

## 2018-03-22 NOTE — PLAN OF CARE
Problem: Patient Care Overview  Goal: Plan of Care/Patient Progress Review  Patient alert, nonverbal (history of right frontal stroke) Left sided hemiplegia. Regular diet. Not able to tolerate. Patient self suction with younker. Incontinent of urine. No BM this shift. Morphine and ativan given for pain and agitation. No seizures noted.Turned/repositioned Q2H/PRN. Oral and gorge care done. Bed alarm on for safety. Awaiting placement.

## 2018-03-22 NOTE — DISCHARGE SUMMARY
[facility], [place]    Internal Medicine Discharge Summary- [facility]achelle Service    Date of Admission:  3/9/2018  Date of Discharge:  3/23/2018  Discharging Attending Provider: Dr. Humphrey  Discharge Team: Lachelle 3    Discharge Diagnoses   Altered Mental Status  CVA with Aphasia and hemiplegia  COPD  Seizure disorder  Tobacco dependence    Follow-ups Needed After Discharge   None    Hospital Course   David Dawn is a 58 year old male admitted on 3/9/2018 with hx of COPD, HTN, TBI, R frontal stroke c/b persistent L-sided deficits, aphasia, and decreased inhibition, seizure disorder, Cheyne-Escobedo respiration who presents with worsening AMS and hypoxemic respiratory failure.  Initially on neuro ICU stroke team, new stroke outside of tpa window suspected.  Mr. Dawn transitioned to comfort cares and will be discharging with hospice.       #Comfort Care Measures  Decision-maker (efraín HAMMOND) who is son, Angelo, was contacted and he declined to be health care agent. Considered this patient to be without assigned health care agent, hense next of kin, Caitlin (sister), was able to make decisions for Mr. Dawn. She elected to pursue comfort cares on 3/14/18 with input from medical team and palliative care, given the extent of his CVA and dependence on others for all needed cares, inability to take life sustaining medications orally, and prior wishes to not have enteral feeding tubes or IV antibiotics,.  - Palliative consulted with the following recommendations   - lorazepam solution 1mg sublingual q4 hours scheduled   - lorazepam solution 2-4mg q 1 hour prn seizure activity   - lorazepam solution 0.5-1mg sublingual q 4 hours prn anxiety/agitation   - scopolamine patch q 72 hours   - haldol solution 2-4mg q 6 hours prn agitation/delirium   - morphine concentrated solution sublingual 5-10mg q 2 hours prn   - nicotene patch   - Bisacodyl 10 mg Suppository SD daily to bid prn constipation   - Tylenol  650 mg suppository PO/CA q4hr prn pain, fever   - Atropine sulfate 1% opth solution 1-2 drops SL q2h prn secretions   - Senna solution 8.6 mg sublingual 1-2 BID prn constipation    # Aphasia  # Altered mental status  # H/O R MCA stroke  Suspect stroke as CT demonstrates questionable hypodensity in left basal ganglia and possibly hypodensity in areas corresponding to [place] area. Transferred from Neuro ICU to medicine for further care. Initial neuro recommendation to pursue further imaging in order to evaluate if further events can be prevented. Patient with limited mobility and aphasia as a result of stroke limiting his ability to have oral intake. His POLST indicates no antibiotics, no tube feeds, etc. Indicating he would not want aggressive treatment. Decision-maker (per POLST) is son, Angelo, was contacted and he refused to be health care agent. Considered this patient to be without assigned health care agent, benigno next of kin, Caitlin (sister), was able to make decisions on Mr. Dawn's behalf. She elected to pursue comfort cares on 3/14/18 given his poor prognosis and inability to return to meaningful quality of life.  - will place speech therapy to evaluate patient at TCU given potential for some form of communication      # COPD  # Poor secretion clearance   # Suspected aspiration pneumonitis  History of tobacco abuse and COPD. Home regimen includes pulmicort nebs, levalbuterol and duonebs. Became hypoxic on 3/10 with new wheezes. Suspect aspiration pneumonitis  > COPD exacerbation. Failing SLP swallow evals. No evidence of COPD exacerbation at this time: steroids and abx discontinued. Patient O2 sats remain >90%. Poor secretion clearance as part of neurologic deficits at this time. Azithromycin discontinued (POLST indicates no IV abx). Deep suctioning not needed as patient will have medication for upper respiratory discomfort.  - continue with Scopolamine and Atropine for secretions  - Morphine for airway  discomfort      # Seizure disorder  - Discontinued Keppra, Valproic Acid, and Vimpat  - Continue with Ativan for seizure prevention with comfort cares    # Depression  # Mood disorder  Mood is likely exacerbated by inability to speak. Patient frustrated following MCA stroke. He has limited mobility and requires visual cues to express needs. Behavior not well controlled with Olanzapine and Haldol, however patient is redirectable and calms down if left alone. Ativan has made patient much more comfortable and no longer behavior issue as of 3/15.  - Continue Ativan as needed      # Tobacco dependence   -Nicotine patch for comfort on cravings      # BPH  Home meds discontinued.  Patient comfort cares    # HTN  Home meds discontinued.  Patient comfort cares    # Discharge Pain Plan:   See above for comfort care measures with pain management.    Consultations This Hospital Stay   NEUROLOGY SERVICE  SWALLOW EVAL SPEECH PATH AT BEDSIDE IP CONSULT  SPEECH LANGUAGE PATH ADULT IP CONSULT  PHYSICAL THERAPY ADULT IP CONSULT  OCCUPATIONAL THERAPY ADULT IP CONSULT  PALLIATIVE CARE ADULT IP CONSULT  SPIRITUAL HEALTH SERVICES IP CONSULT    Code Status   Comfort Care/DNR/DNI     The patient was discussed with Dr. Milagro Crespo  Internal Medicine PGY2  [facility]  Pager: [phone]  ______________________________________________________________________    Physical Exam   Vital Signs:                    Weight: 227 lbs 1.18 oz    General Appearance: Lying in bed, aphasic, gesturing at neck  Respiratory: gurgling upper airway noises  Cardiovascular: normal rate, regular rhythm  GI: non distended  Skin: no rashes or bruising  Other: moving right arm/leg, not moving left arm/leg    Significant Results and Procedures   Results for orders placed or performed during the hospital encounter of 03/09/18   CT Head Neck Angio w/o & w Contrast    Narrative    CTA ANGIOGRAM HEAD NECK 3/9/2018 9:31 AM    Head CT without  contrast  CT angiogram of the neck   CT angiogram of the base of the brain with contrast  Reconstruction by the Radiologist on the 3D workstation    Provided History:  Evaluate for dissection/thromboembolism;     Comparison:  12/15/2017.      Technique:  HEAD CT:  Using multidetector thin collimation helical acquisition  technique, axial, coronal and sagittal CT images from the skull base  to the vertex were obtained without intravenous contrast.   HEAD and NECK CTA: During rapid bolus intravenous injection of  nonionic contrast material, axial images were obtained using thin  collimation multidetector helical technique from the base of the skull  through the [place] of [place]. This CT angiogram data was reconstructed  at thin intervals with mild overlap. Images were sent to the [employer]  workstation, and 3D reconstructions were obtained. The axial source  images, multiplanar reformations, 3D reconstructions in both maximum  intensity projection display and volume rendered models were reviewed,  with reconstructions performed by the technologist and the  radiologist.    Contrast: 75 cc of isovue 370    Findings:  Head CT: Unchanged encephalomalacia in the right frontal lobe. There  is no intracranial hemorrhage, mass effect, or midline shift.  Ventricles are proportionate to the cerebral sulci. Mucosal thickening  of the bilateral maxillary sinus.     Head CTA demonstrates high-grade narrowing of the cavernous segment of  the left internal carotid artery, unchanged with about a 65-75%  stenosis at the mid cavernous segment. The anterior communicating  artery is patent. The posterior communicating arteries are patent  bilaterally. Right MCA is again tiny or attenuated post remote  infarctions. Small caliber of basilar artery, though patent.    Neck CTA demonstrates unchanged chronic occlusion of the right  internal carotid artery from the bifurcation through the supraclinoid  internal carotid artery. The origins of the  great vessels from the  aortic arch show moderate to severe atherosclerosis, though are  patent. The normal distal left internal carotid artery measures 4 mm.  There is moderate to severe atherosclerosis of the left internal  carotid artery at the bifurcation and the distal neck. There is  narrowing throughout the cervical left internal carotid artery.    No mass is noted within the visualized portions of the cervical soft  tissues or lung apices.       Impression    Impression:    1. Head CTA demonstrates unchanged high-grade narrowing of the left  internal carotid artery in the cavernous segment, 65-75%. The left  anterior and middle cerebral artery segments appear patent.  2. 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 Atherosclerotic changes at the left carotid  bifurcation with resultant diffusely small caliber left cervical  internal carotid artery.   3. Unchanged chronic right MCA territory infarction on noncontrast CT  scan. No acute intracranial hemorrhage or overt infarct.      I have personally reviewed the examination and initial interpretation  and I agree with the findings.    SHWETHA BRAGG MD   XR Chest Port 1 View    Narrative    Exam:  Chest X-ray 3/9/2018 4:06 PM    History: Pneumonia;     Comparison: Chest x-ray 12/15/2017    Findings: AP radiograph of the chest at 45 degrees. The trachea is  midline. The cardiac silhouette is normal in size. The pulmonary  vasculature is mildly indistinct. No pleural effusion or pneumothorax.  Linear opacity in the right upper lobe, likely atelectasis. No acute  bony abnormalities. The visualized upper abdomen appears unremarkable.        Impression    Impression:   Linear atelectasis in the right upper lobe. No acute airspace disease.    I have personally reviewed the examination and initial interpretation  and I agree with the findings.    FELECIA  MD TORI   CT Head w/o Contrast    Narrative    CT HEAD W/O CONTRAST 3/10/2018 2:21 PM    History: Stroke;     Comparison: Head CT 3/9/2018.    Technique: Using multidetector thin collimation helical acquisition  technique, axial, coronal and sagittal CT images from the skull base  to the vertex were obtained without intravenous contrast.     Findings:    Increased conspicuity of lacunar infarct in the left basal ganglia  since 3/9/2018, possible evolving acute to subacute infarction.    Stable chronic right MCA territory infarct with associated ex vacuo  dilatation of the right lateral ventricle, rightward midline shift and  Wallerian degeneration. Ventricles are not enlarged out of proportion  to the cerebral sulci. No intracranial hemorrhage, mass effect or  abnormal extra axial fluid collection.    Polypoid maxillary sinus because of thickening. Mastoid air cells are  clear. Leftward gaze deviation.      Impression    Impression:   1. Increased conspicuity of left basal ganglia lacunar infarct since  3/9/2018, possible evolving acute to subacute infarction.  2. Stable chronic right middle cerebral artery territory infarct.    I have personally reviewed the examination and initial interpretation  and I agree with the findings.    RUBIA RODRÍGUEZ MD     Primary Care Physician   Len Krishnan    Discharge Disposition   Admited to hospice care.   Agency: [facility].  Condition at discharge: Terminal    Discharge Orders     General info for SNF   Length of Stay Estimate: Long Term Care  Condition at Discharge: Declining  Level of care:skilled   Rehabilitation Potential: Poor  Admission H&P remains valid and up-to-date: Yes  Recent Chemotherapy: N/A  Use Nursing Home Standing Orders: Yes     Mantoux instructions   Give two-step Mantoux (PPD) Per Facility Policy Yes     Activity - Up with nursing assistance     Reason for your hospital stay   Mr. Dawn was hospitalized for a stroke which resulted in inability  to speak and swallow. No meaningful recovery and patient indicated in POLST would not want feeding tube. Per family decision makers patient was transitioned to comfort care measures with hospice placement.     Follow Up and recommended labs and tests   None. Patient is hospice.     Physical Therapy Adult Consult   Evaluate and treat as clinically indicated.    Reason:  S/p stroke, please assist in left arm and leg movement to prevent cramping     Speech Language Path Adult Consult   Evaluate and treat as clinically indicated.    Reason:  Hospice patient now aphasic after a stroke.  Please assist retraining patient in communication with basic needs.  determine a basic means of communication and educate family/pt/staff to reduce communication breakdowns and maximize quality of life. Anticipate short treatment course following initial evaluation.     Advance Diet as Tolerated   Follow this diet upon discharge: Orders Placed This Encounter     Regular Diet Adult  Patient unable to tolerate po, diet and liquids for comfort care as tolerated       Discharge Medications   Discharge Medication List as of 3/23/2018  9:52 AM      START taking these medications    Details   !! LORazepam (LORAZEPAM INTENSOL) 2 MG/ML (HIGH CONC) solution Place 0.5 mLs (1 mg) under the tongue every 4 hours, Disp-30 mL, R-0, Local Print      !! LORazepam (LORAZEPAM INTENSOL) 2 MG/ML (HIGH CONC) solution Take 1-2 mLs (2-4 mg) by mouth every hour as needed for seizures, Disp-30 mL, R-0, Local Print      !! LORazepam (LORAZEPAM INTENSOL) 2 MG/ML (HIGH CONC) solution Place 0.25-0.5 mLs (0.5-1 mg) under the tongue every 4 hours as needed for anxiety (agitation), Disp-30 mL, R-0, Local Print      morphine sulfate, high concentrate, (ROXANOL-CONCENTRATED) 20 MG/ML concentrated solution Take 0.25-0.5 mLs (5-10 mg) by mouth every 2 hours as needed for shortness of breath / dyspnea or moderate to severe pain, Disp-30 mL, R-0, Local Print       !! -  Potential duplicate medications found. Please discuss with provider.      CONTINUE these medications which have CHANGED    Details   acetaminophen (TYLENOL) 650 MG Suppository Place 1 suppository (650 mg) rectally every 4 hours as needed for mild pain or fever (temperatures greater than 99.5  F(37.5 C)), Disp-60 suppository, R-0, Local Print      scopolamine (TRANSDERM) 72 hr patch Place 1 patch onto the skin every 72 hours, Disp-12 patch, R-0, E-Prescribe      haloperidol (HALDOL) 2 MG/ML (HIGH CONC) solution Take 1-2 mLs (2-4 mg) by mouth every 6 hours as needed for agitation or other (delirium), Disp-90 mL, R-0, E-Prescribe      nystatin (MYCOSTATIN) ointment Apply topically 2 times dailyDisp-15 g, [id number]      bisacodyl (DULCOLAX) 10 MG Suppository Place 1 suppository (10 mg) rectally once as needed for other (for no bowel movement for 72 hours), Disp-30 suppository, R-0, E-Prescribe      nicotine (NICODERM CQ) 7 MG/24HR 24 hr patch Place 1 patch onto the skin daily, Disp-30 patch, R-0, E-Prescribe      atropine 1 % ophthalmic solution Place 1-2 drops under the tongue every hour as needed for other (secretions), Disp-1 Bottle, R-0, E-Prescribe      hypromellose-dextran (ARTIFICAL TEARS) SOLN ophthalmic solution Place 1-2 drops into both eyes every 8 hours as needed for dry eyes, Disp-15 mL, R-0, E-Prescribe         CONTINUE these medications which have NOT CHANGED    Details   order for DME Power wheelchairDisp-1 Device, R-0, Local Print      Respiratory Therapy Supplies (NEBULIZER/ADULT MASK) KIT 1 Device 4 times daily., Disp-1 kit, R-3, E-Prescribe         STOP taking these medications       ACETAMINOPHEN PO Comments:   Reason for Stopping:         diclofenac (VOLTAREN) 1 % GEL topical gel Comments:   Reason for Stopping:         diclofenac (VOLTAREN) 1 % GEL topical gel Comments:   Reason for Stopping:         loperamide (IMODIUM) 2 MG capsule Comments:   Reason for Stopping:         loperamide  (IMODIUM) 2 MG capsule Comments:   Reason for Stopping:         citalopram (CELEXA) 20 MG tablet Comments:   Reason for Stopping:         carBAMazepine (TEGRETOL) 200 MG tablet Comments:   Reason for Stopping:         oxyCODONE IR (ROXICODONE) 5 MG tablet Comments:   Reason for Stopping:         XOPENEX HFA 45 MCG/ACT Inhaler Comments:   Reason for Stopping:         divalproex sodium delayed-release (DEPAKOTE) 250 MG DR tablet Comments:   Reason for Stopping:         levETIRAcetam 1000 MG TABS Comments:   Reason for Stopping:         gabapentin (NEURONTIN) 600 MG tablet Comments:   Reason for Stopping:         QUEtiapine (SEROQUEL) 100 MG tablet Comments:   Reason for Stopping:         lidocaine (XYLOCAINE) 2 % topical gel Comments:   Reason for Stopping:         ipratropium - albuterol 0.5 mg/2.5 mg/3 mL (DUONEB) 0.5-2.5 (3) MG/3ML neb solution Comments:   Reason for Stopping:         furosemide (LASIX) 20 MG tablet Comments:   Reason for Stopping:         divalproex (DEPAKOTE) 500 MG EC tablet Comments:   Reason for Stopping:         mineral oil-hydrophilic petrolatum (AQUAPHOR) Comments:   Reason for Stopping:         pramoxine (SARNA SENSITIVE) 1 % LOTN lotion Comments:   Reason for Stopping:         Sunscreens (AVEENO DAILY MOISTURIZER) LOTN Comments:   Reason for Stopping:         simvastatin (ZOCOR) 40 MG tablet Comments:   Reason for Stopping:         baclofen (LIORESAL) 20 MG tablet Comments:   Reason for Stopping:         clopidogrel (PLAVIX) 75 MG tablet Comments:   Reason for Stopping:         vitamin D (ERGOCALCIFEROL) 68137 UNIT capsule Comments:   Reason for Stopping:         amLODIPine (NORVASC) 10 MG tablet Comments:   Reason for Stopping:         niacin 500 MG CR capsule Comments:   Reason for Stopping:         potassium chloride (K-TAB,KLOR-CON) 10 MEQ tablet Comments:   Reason for Stopping:         tamsulosin (FLOMAX) 0.4 MG capsule Comments:   Reason for Stopping:         budesonide (PULMICORT)  0.25 MG/2ML neb solution Comments:   Reason for Stopping:             Allergies   Allergies   Allergen Reactions     Tuberculin Tests      Ibuprofen Sodium Diarrhea     Internal Medicine Staff Addendum  Date of Service: 3/23/2018  I have seen and examined Mr. Dawn, reviewed the data and discussed the plan of care with the patient and the care team on P&FC Rounds.  I agree with the above documentation.  I discussed pt's care with bedside RN, case management/social work today.  I personally reviewed imaging, labs, medications and past 24 hr notes.    40 minutes spent in discharge, including >50% in counseling and coordination of care, medication review and plan of care recommended on follow up. Questions were answered.   The patient's PC) was contacted electronically at the time of discharge.  It was our pleasure to care for Mr. Dawn during his hospitalization. Please do not hesitate to contact me should there be questions regarding the hospital course or discharge plan.      Darron Humphrey MD  Internal Medicine/Pediatrics Hospitalist & Staff Physician   of Internal Medicine and Pediatrics  [facility]  Pager: [phone]

## 2018-03-22 NOTE — PLAN OF CARE
Problem: Patient Care Overview  Goal: Plan of Care/Patient Progress Review  Outcome: No Change  Pt is Alert, pt nonverbal and PAZ orientation, restless, and on room air. Pt more alert today, speech consult placed to help w/ communication barrier. Suction equipment frequently offered to pt to clear secretions. Thickened liquid and apple sauce swabs frequently provided per pt. L Scopolamine patch in place. R PIV SL. Scheduled liquid Ativan given and PRN Morphine given for L leg pain. Pt incontinent of urine x1 during my shift. Awaiting paperwork to be signed by POA to transfer to hospice care. Continue to monitor and POC.

## 2018-03-22 NOTE — PROGRESS NOTES
[facility], [place]    Internal Medicine Progress Note - [facility], [place] [facility]    Main Plans for Today   Hospice placement     Assessment & Plan   David Dawn is a 58 year old male admitted on 3/9/2018. David Dawn is a 58 year old male with hx of COPD, HTN, TBI, R frontal stroke c/b persistent L-sided deficits and  agitation/abusive behavior/personality alteration, seizure disorder, Cheyne-Escobedo respiration who presents with worsening AMS and hypoxic respiratory failure. Pt transitioned to comfort cares.       # Aphasia  # Altered mental status  # H/o R MCA stroke  Suspect likely due to stroke as CT demonstrates questionable hypodensity in left basal ganglia and possibly hypodensity in areas corresponding to [place] area. Transferred from Neuro ICU to medicine for further care. Initial neuro recommendation to pursue further imaging in order to evaluate if further events can be prevented. Patient with limited mobility and aphasia as a result of stroke limiting his ability to have oral intake. Evaluated by SLP and patient was consistently able to follow commands and answer yes no questions with verbal/written cues.  His POLST indicates no antibiotics, no tube feeds, etc. Indicating he would not want aggressive treatment. Decision-maker (per POLST) who is son was contacted and he deferred decision-making to patient's sister Caitlin Jefferson. She elected to pursue comfort cares.   -- Palliative consult, appreciate recs                         - POLST indicates DNR/DNI, no tube feeds, no IV abx                         - Per patient's sister, Caitlin, transitioned to comfort cares 3/14/18                         - Updated POLST completed 3/17/18                         - Scopolamine patch, atropine for secretions                         - Ativan q4h for seizure prevention and aggressive behavior                         - Morphine for dysphagia and respiratory discomfort                         -  Haldol also available for agitation as needed, plan to alternate with ativan      # COPD  # Poor secretion clearance   # Suspected aspiration pneumonitis  History of tobacco abuse and COPD. Home regimen includes pulmicort nebs, levalbuterol and duonebs. Became hypoxic on 3/10 with new wheezes. Suspect aspiration pneumonitis  > COPD exacerbation. Failing SLP swallow evals. No evidence of COPD exacerbation at this time: steroids and abx discontinued. Patient O2 sats remain >90%. Poor secretions as part of neurologic deficits at this time. Azithromycin discontinued (POLST indicates no IV abx).   - Discontinue deep suctioning and Nebs  - continue with Scopolamine and Atropine for secretions  - Morphine for airway discomfort      # Seizure disorder  AEDs have been transitioned to IV until able to tolerate PO.   - Discontinued Keppra, Valproic Acid, and Vimpat  - Continue with Ativan q4h for seizure prevention with comfort cares      # HTN  Patient has been hypertensive BP 180s/100s.  - discontinued all medications for comfort cares      # Depression  # Mood disorder  Mood is likely exacerbated by inability to speak. Patient frustrated following MCA stroke. He has limited mobility and requires visual cues to express needs. Behavior not well controlled with Olanzapine and Haldol, however patient is redirectable and calms down if left alone. Ativan has made patient much more comfortable and no longer behavior issue as of 3/15.  - Continue Ativan sublingual q4h PRN       # Tobacco dependence   -Nicotine patch for comfort on cravings      # BPH  - Holding PTA tamsulosin while NPO  - Patient comfort cares    # Pain Assessment:   Current Pain Score 3/22/2018 3/22/2018 3/22/2018   Patient currently in pain? denies yes denies   Pain score (0-10) - - -   Pain location - Leg -   Pain descriptors - - -   CPOT pain score - - -   - David is unable to participate in a collaborative plan for pain management due to CVA.   - Please see  the plan for pain management as documented above    Diet: Regular Diet Adult  Fluids: none  DVT Prophylaxis: none-comfort cares  Code Status: Comfort Care    Disposition Plan   Expected discharge: Tomorrow, recommended to hospice once safe disposition plan/ TCU bed available.     Entered: Kyung Patterson 03/22/2018, 11:25 AM   Information in the above section will display in the discharge planner report.      The patient's care was discussed with the Attending Physician, Dr. Humphrey.    Kyung Patterson  [facility]  Pager: [phone]  Please see sticky note for cross cover information    Interval History   RN notes reviewed. No acute events overnight. Seen by WOCN for left he    Physical Exam   Vital Signs:                    Weight: 227 lbs 1.18 oz  General Appearance: Alert, nonverbal, comfortably lying in bed  Respiratory: rhonchi bilaterally  Cardiovascular: RRR  GI: soft NTND  Skin: R heel skin ulcer/abrasion, L knee bandaged abrasion  Other: Left hemiparesis, alert         Data   Medications       LORazepam  1 mg Oral Q4H    Or     LORazepam  1 mg Sublingual Q4H    Or     LORazepam  1 mg Rectal Q4H     scopolamine  1 patch Transdermal Q72H    And     scopolamine   Transdermal Q8H    And     scopolamine   Transdermal Q72H     nicotine   Transdermal Q8H     nicotine   Transdermal Daily     nicotine  1 patch Transdermal Daily     sodium chloride 0.9%  500 mL Intravenous Once     sodium chloride (PF)  10 mL Intracatheter Once     sodium chloride bacteriostatic  20 mL Intravenous Once     nystatin   Topical BID     Data   No lab results found in last 7 days.  No results found for this or any previous visit (from the past 24 hour(s)).    Internal Medicine Staff Addendum  Date of Service: 3/22/2018  I have seen and examined Mr. Dawn, reviewed the data and discussed the plan of care with the care team on P&FC Rounds.  I agree with the above documentation      I discussed pt's care with  bedside RN, case management/social work today.  I personally reviewed medications and past 24 hr notes.  Assessment/Plan/Diagnoses: plan/dx as above, which contains my edits and reflects our joint medical decision-making.   I discussed patient with his son, Osbaldo (listed as the patient's health care agent).  Osbaldo does not want to be Mr. Dawn's health care agent. Therefore I consider that the patient does not have an assigned health care agent.  Hence, his next of kin, his sister Caitlin, is able to make medical decisions as Mr. Dawn is unable.       Darron Humphrey MD  Internal Medicine/Pediatrics Hospitalist & Staff Physician   of Internal Medicine and Pediatrics  [facility]  Pager: [phone]

## 2018-03-23 NOTE — PROGRESS NOTES
Social Work Services Discharge Note      Patient Name:  David Dawn     Anticipated Discharge Date:  3/23/18    Discharge Disposition:   Hospice:  [facility], [place]   [address] [place] [address]   [phone]  Fax [phone]    Following MD:  Facility assignment     Pre-Admission Screening (PAS) online form has been completed.  The Level of Care (LOC) is:  Determined  Confirmation Code is:  [id number]     Additional Services/Equipment Arranged:  [facility] (ph [phone]) stretcher arranged for 11am. PCS form completed and placed in pt's chart.  Hospice to complete intake at facility at 1pm.      Patient / Family response to discharge plan:  Pt nonverbal. Pt's sister Caitlin in agreement with plan.      Persons notified of above discharge plan:  Pt's sister Caitlin (ph [phone]), RN Lupe Miller- admissions at [facility] (ph [phone]),  hospice- [facility] (ph [phone])Lachelle    Staff Discharge Instructions:  RN to call report to [phone].  All medications to be filled in discharge pharmacy and sent with pt.   SW to fax discharge orders.  Please print a packet and send with patient.     CTS Handoff completed:  YES    SAM Comer, LYN  5A Unit   Pager [phone]  Phone [phone]

## 2018-03-23 NOTE — PLAN OF CARE
Problem: Patient Care Overview  Goal: Plan of Care/Patient Progress Review    SLP 5A: Orders received for speech-language evaluation and treatment with goal to reduce communication barriers and maximize quality of life. Of note, pt is on comfort cares and discharging with hospice this date. Per discussion with MD, pt would benefit from SLP evaluation. Recommend defer evaluation to SLP services at [facility] to determine a basic means of communication and educate family/pt/staff to reduce communication breakdowns and maximize quality of life. Anticipate short treatment course following initial evaluation.

## 2018-03-23 NOTE — PROGRESS NOTES
Clinic Care Coordination Contact  Care Team Conversations    Patient is at [facility] with [facility] care.     Clinic care coordination is not indicated. Will not open for hospice care patient.    Tri Perkins RN, CCM - Care Coordinator     3/23/2018    3:50 PM  [phone]

## 2018-03-23 NOTE — PLAN OF CARE
Problem: Patient Care Overview  Goal: Plan of Care/Patient Progress Review  Outcome: Adequate for Discharge Date Met: 03/23/18  Pt is non-verbal, restless, on room air and comfort cares. D/c packet sent w/ pt to hospice facility. PIV removed. All belongings gathered and sent w/ pt. Pt is adequate for d/c.

## 2018-03-23 NOTE — PLAN OF CARE
Problem: Patient Care Overview  Goal: Plan of Care/Patient Progress Review  Outcome: No Change  Pt on comfort cares. Nonverbal at baseline. L sided weakness. Scheduled Ativan q4h. PRN sublingual morphine given x1 for discomfort. Frequent oral cares and suction with yaunker. Incontinent of urine x2, no BM. Repo with assist x2 q2h and PRN, pt favoring R side side. Frequently requesting water and drinks, however pt unable to swallow safely.  hospice [facility] with bed available for pt at 1300 on 3/23, awaiting consents from POA. Will continue to monitor.

## 2018-03-23 NOTE — PLAN OF CARE
Problem: Stroke (Ischemic) (Adult)  Goal: Signs and Symptoms of Listed Potential Problems Will be Absent, Minimized or Managed (Stroke)  Signs and symptoms of listed potential problems will be absent, minimized or managed by discharge/transition of care (reference Stroke (Ischemic) (Adult) CPG).   Outcome: Declining  1500 to 1900:pt.arouses to voices and opens his eyes spontaneously.pt is able to  Answer some questions by nodding  his head.left side weakness present,incontinent  Of urine,scrotum red ,right foot has redness and scabs on,ativan given per schedule.pt slept,most of the afternoon..

## 2018-03-26 NOTE — TELEPHONE ENCOUNTER
Notice of discharge from [facility] on [address] Brandyn Burgos.  Patient had a stroke, [facility] ; can't speak, swallow or stand  Currently on hospice  Notice put in 's basket in his office.

## 2018-04-26 ENCOUNTER — TELEPHONE (OUTPATIENT)
Dept: INTERNAL MEDICINE | Facility: CLINIC | Age: 58
End: 2018-04-26

## 2018-04-26 NOTE — TELEPHONE ENCOUNTER
Form received from: [facility]    Form requesting following info/need: Nebulizer supply orders    JACKSON needed?: No    Location of form: Dr. Krishnan's in basket    When completed the route for return: Fax

## 2018-06-26 ENCOUNTER — TELEPHONE (OUTPATIENT)
Dept: INTERNAL MEDICINE | Facility: CLINIC | Age: 58
End: 2018-06-26

## 2018-06-26 NOTE — TELEPHONE ENCOUNTER
This is related with the discharge summary from March.  The patient  in March.  The form will wait for Len Krishnan MD

## 2018-06-26 NOTE — TELEPHONE ENCOUNTER
This request is forwarded to Dr Gonzalez who covers Dr Lazo and Dr Krishnan patients with the letters C,D,E    [place] [facility]  Discharge from services  Patient is   Lisa for [facility]  Fax back

## 2018-07-24 ENCOUNTER — MEDICAL CORRESPONDENCE (OUTPATIENT)
Dept: HEALTH INFORMATION MANAGEMENT | Facility: CLINIC | Age: 58
End: 2018-07-24

## 2019-01-18 NOTE — NURSING NOTE
"Chief Complaint   Patient presents with     Clinic Care Coordination - Follow-up       Initial /84  Pulse 86  Temp 98.8  F (37.1  C) (Oral) Estimated body mass index is 34.82 kg/(m^2) as calculated from the following:    Height as of 11/17/17: 5' 8\" (1.727 m).    Weight as of 11/17/17: 229 lb (103.9 kg).  Medication Reconciliation: complete     Nicole Murphy CMA      " DISPLAY PLAN FREE TEXT

## 2022-02-25 NOTE — TELEPHONE ENCOUNTER
Fax received from [facility] for review and signature.  Put in Dr. Gonzalez's in basket in Dr. Krishnan's absence.         Patient had minimal output from chest tube during this shift. Dr. Juarez notified and no further orders were received.

## 2024-07-11 NOTE — CONSULTS
Care Transition Initial Assessment -   Reason For Consult: care coordination/care conference, discharge planning   Patient and Caregiver of [facility] MPLS     Active Problems:    COPD exacerbation (H)     And INFLUENZA B +     DATA  Lives With: facility resident  Living Arrangements: [facility] residential facility    [address] [facility] [address] [phone]  MA BED HOLD      ASSESSMENT  Cognitive Status:  Reported per chart review to be alert and oriented with behaviors 2/2 to dx of impulsivity.  Pt does have hx of COPD and is admitted with Influenza B, does not use nebs or 02 at the LT facility  He is described as a total care except for feeding.   He is W/C bound.  His own wheel chair is at the bedside.   PLAN:  RTN to   [facility] [address] [place] [address] [phone]  Transportation will have to be set up to return to [facility] when determined medically stable for discharge.     Update to be provided to [facility] regarding Discharge, anticipated date and time.   Number is .  Orders will need to be faxed upon discharge as well.     Will consult [facility] for following over weekend.     Ashlee Fernandez RN BSN CTS  Care Transitions Team  [phone]          
Prenatal Vitamins

## 2024-07-18 NOTE — TELEPHONE ENCOUNTER
GI team had been asked to see her for diarrhea. She is not compliant with any of the diagnostic work up or with the management suggested by our team. We will sign off, please call us back with any questions.    Kb Faustin DO  Gastroenterology Fellow     Requested Prescriptions   Pending Prescriptions Disp Refills     XOPENEX HFA 45 MCG/ACT Inhaler [Pharmacy Med Name: XOPENEX HFA AER] 15 g 10     Sig: INHALE 2 PUFFS INTO THE LUNGS EVERY 6 HOURS AS NEEDED FOR SHORTNESS OF BREATH, DYSPNEA OR WHEEZING    Last OV: 01/15/18      Routing refill request to provider for review/approval because:  Drug not on the [facility] refill protocol   Please advise, thanks.